# Patient Record
Sex: MALE | Race: WHITE | NOT HISPANIC OR LATINO | Employment: UNEMPLOYED | URBAN - METROPOLITAN AREA
[De-identification: names, ages, dates, MRNs, and addresses within clinical notes are randomized per-mention and may not be internally consistent; named-entity substitution may affect disease eponyms.]

---

## 2020-06-15 DIAGNOSIS — Z30.09 FAMILY PLANNING: Primary | ICD-10-CM

## 2020-06-23 DIAGNOSIS — Z30.09 FAMILY PLANNING: ICD-10-CM

## 2020-06-23 PROCEDURE — 89322 SEMEN ANAL STRICT CRITERIA: CPT

## 2020-06-25 LAB
ABNORMAL SPERM: 94 MORPHOLOGY
ABSTINENCE DAYS: 7 DAYS (ref 2–7)
AGGLUTINATION: NO YES/NO
ANALYSIS TEMP - CENTIGRADE: 23 CENTIGRADE
CELL FRAGMENTS: NORMAL %
COLLECTION METHOD: NORMAL
COLLECTION SITE: NORMAL
CONSENT TO RELEASE TO PARTNER: YES
HEAD DEFECT: 94
IMMATURE SPERM: NORMAL %
IMMOTILE: 44 %
LAB RECEIPT TIME: NORMAL
LIQUEFIED: YES YES/NO
MIDPIECE DEFECT: 49
NON-PROGRESSIVE MOTILITY: 6 %
NORMAL SPERM: 6 % NORMAL FORMS (ref 4–?)
PROGRESSIVE MOTILITY: 50 % (ref 32–?)
ROUND CELLS: 0.2 MILLION/ML (ref ?–2)
SPECIMEN CONCENTRATION: 290 MILLION/ML (ref 15–?)
SPECIMEN PH: 7.2 PH (ref 7.2–?)
SPECIMEN TYPE: NORMAL
SPECIMEN VOL UR: 1.8 ML (ref 1.5–?)
TAIL DEFECT: 16
TIME OF ANALYSIS: NORMAL
TOTAL NUMBER: 522 MILLION (ref 39–?)
TOTAL PROGRESSIVE MOTILE: 261 MILLION (ref 15.6–?)
VISCOUS: NO YES/NO
VITALITY: NORMAL % (ref 58–?)
WBC SPECIMEN: NORMAL %

## 2024-05-26 ENCOUNTER — HOSPITAL ENCOUNTER (INPATIENT)
Facility: CLINIC | Age: 34
LOS: 35 days | Discharge: HOME | DRG: 885 | End: 2024-07-03
Attending: FAMILY MEDICINE | Admitting: PSYCHIATRY & NEUROLOGY
Payer: COMMERCIAL

## 2024-05-26 DIAGNOSIS — F31.2 BIPOLAR DISORDER, CURRENT EPISODE MANIC SEVERE WITH PSYCHOTIC FEATURES (H): ICD-10-CM

## 2024-05-26 DIAGNOSIS — F30.9 MANIA (H): ICD-10-CM

## 2024-05-26 DIAGNOSIS — G25.9 EXTRAPYRAMIDAL AND MOVEMENT DISORDER: ICD-10-CM

## 2024-05-26 DIAGNOSIS — F31.2 SEVERE MANIC BIPOLAR I DISORDER WITH PSYCHOTIC FEATURES (H): Primary | ICD-10-CM

## 2024-05-26 DIAGNOSIS — F29 PSYCHOSIS, UNSPECIFIED PSYCHOSIS TYPE (H): ICD-10-CM

## 2024-05-26 PROBLEM — F25.0 SCHIZOAFFECTIVE DISORDER, BIPOLAR TYPE (H): Status: ACTIVE | Noted: 2024-05-26

## 2024-05-26 LAB
AMPHETAMINES UR QL SCN: NORMAL
BARBITURATES UR QL SCN: NORMAL
BENZODIAZ UR QL SCN: NORMAL
BZE UR QL SCN: NORMAL
CANNABINOIDS UR QL SCN: NORMAL
FENTANYL UR QL: NORMAL
OPIATES UR QL SCN: NORMAL
PCP QUAL URINE (ROCHE): NORMAL

## 2024-05-26 PROCEDURE — 99285 EMERGENCY DEPT VISIT HI MDM: CPT | Performed by: FAMILY MEDICINE

## 2024-05-26 PROCEDURE — 80307 DRUG TEST PRSMV CHEM ANLYZR: CPT | Performed by: FAMILY MEDICINE

## 2024-05-26 PROCEDURE — 250N000013 HC RX MED GY IP 250 OP 250 PS 637: Performed by: FAMILY MEDICINE

## 2024-05-26 PROCEDURE — 250N000013 HC RX MED GY IP 250 OP 250 PS 637: Performed by: EMERGENCY MEDICINE

## 2024-05-26 RX ORDER — CITALOPRAM HYDROBROMIDE 20 MG/1
20 TABLET ORAL DAILY
COMMUNITY
End: 2024-05-26

## 2024-05-26 RX ORDER — OLANZAPINE 10 MG/1
10 TABLET, ORALLY DISINTEGRATING ORAL ONCE
Status: COMPLETED | OUTPATIENT
Start: 2024-05-26 | End: 2024-05-26

## 2024-05-26 RX ORDER — ARIPIPRAZOLE 10 MG/1
10 TABLET, ORALLY DISINTEGRATING ORAL DAILY
COMMUNITY
End: 2024-05-26

## 2024-05-26 RX ORDER — GABAPENTIN 300 MG/1
600 CAPSULE ORAL AT BEDTIME
Status: DISCONTINUED | OUTPATIENT
Start: 2024-05-26 | End: 2024-07-03 | Stop reason: HOSPADM

## 2024-05-26 RX ORDER — GABAPENTIN 300 MG/1
300 CAPSULE ORAL DAILY
Status: DISCONTINUED | OUTPATIENT
Start: 2024-05-27 | End: 2024-07-03 | Stop reason: HOSPADM

## 2024-05-26 RX ORDER — IPRATROPIUM BROMIDE 42 UG/1
2 SPRAY, METERED NASAL 4 TIMES DAILY
COMMUNITY

## 2024-05-26 RX ORDER — DIVALPROEX SODIUM 500 MG/1
1000 TABLET, EXTENDED RELEASE ORAL AT BEDTIME
Status: ON HOLD | COMMUNITY
End: 2024-07-02

## 2024-05-26 RX ORDER — GABAPENTIN 100 MG/1
100 CAPSULE ORAL DAILY
COMMUNITY
End: 2024-05-26

## 2024-05-26 RX ORDER — RISPERIDONE 2 MG/1
2 TABLET ORAL AT BEDTIME
Status: DISCONTINUED | OUTPATIENT
Start: 2024-05-26 | End: 2024-05-30

## 2024-05-26 RX ORDER — RISPERIDONE 2 MG/1
2 TABLET ORAL AT BEDTIME
Status: ON HOLD | COMMUNITY
End: 2024-07-02

## 2024-05-26 RX ORDER — DIAZEPAM 5 MG
5-20 TABLET ORAL EVERY 30 MIN PRN
Status: DISCONTINUED | OUTPATIENT
Start: 2024-05-26 | End: 2024-05-29

## 2024-05-26 RX ORDER — FLUTICASONE PROPIONATE 50 MCG
2 SPRAY, SUSPENSION (ML) NASAL DAILY
COMMUNITY

## 2024-05-26 RX ORDER — DIVALPROEX SODIUM 500 MG/1
1000 TABLET, EXTENDED RELEASE ORAL AT BEDTIME
Status: DISCONTINUED | OUTPATIENT
Start: 2024-05-26 | End: 2024-05-29

## 2024-05-26 RX ORDER — GABAPENTIN 300 MG/1
CAPSULE ORAL
COMMUNITY
End: 2024-07-16

## 2024-05-26 RX ADMIN — DIVALPROEX SODIUM 1000 MG: 500 TABLET, FILM COATED, EXTENDED RELEASE ORAL at 22:36

## 2024-05-26 RX ADMIN — OLANZAPINE 10 MG: 10 TABLET, ORALLY DISINTEGRATING ORAL at 17:40

## 2024-05-26 RX ADMIN — RISPERIDONE 2 MG: 2 TABLET ORAL at 22:57

## 2024-05-26 RX ADMIN — GABAPENTIN 600 MG: 300 CAPSULE ORAL at 22:35

## 2024-05-26 ASSESSMENT — COLUMBIA-SUICIDE SEVERITY RATING SCALE - C-SSRS
2. HAVE YOU ACTUALLY HAD ANY THOUGHTS OF KILLING YOURSELF IN THE PAST MONTH?: NO
6. HAVE YOU EVER DONE ANYTHING, STARTED TO DO ANYTHING, OR PREPARED TO DO ANYTHING TO END YOUR LIFE?: NO
1. IN THE PAST MONTH, HAVE YOU WISHED YOU WERE DEAD OR WISHED YOU COULD GO TO SLEEP AND NOT WAKE UP?: NO

## 2024-05-26 ASSESSMENT — ACTIVITIES OF DAILY LIVING (ADL)
ADLS_ACUITY_SCORE: 37

## 2024-05-26 NOTE — PHARMACY-ADMISSION MEDICATION HISTORY
Admission Medication History Completed by Pharmacy    See [facility] Admission Navigator for allergy information, preferred outpatient pharmacy, prior to admission medications and immunization status.     Medication history sources:  Patient and patient's wife; Surescripts     Pertinent changes made to PTA medication list:  Added: N/A  Deleted:   - Citalopram 20 mg tablets (patient no longer taking)   Changed:   - Depakote 1500 mg HS --> 1000 mg HS (per patient and fill hx)   - Gabapentin 100 mg TID --> 300 mg AM + 600 mg HS (per patient and fill hx)     Additional medication history information:   - Patient denies taking any additional Rx/OTC medications other than the ones listed below.    Prior to Admission medications    Medication Sig Last Dose Taking? Auth Provider Long Term End Date   divalproex sodium extended-release (DEPAKOTE ER) 500 MG 24 hr tablet Take 1,000 mg by mouth at bedtime 5/25/2024 Yes Unknown, Entered By History No    fluticasone (FLONASE) 50 MCG/ACT nasal spray Spray 2 sprays into both nostrils daily 5/26/2024 Yes Unknown, Entered By History     gabapentin (NEURONTIN) 300 MG capsule Take 300 mg by mouth in the morning and 600 mg by mouth at bedtime 5/26/2024 at AM DOSE Yes Unknown, Entered By History     ipratropium (ATROVENT) 0.06 % nasal spray Spray 2 sprays into both nostrils 4 times daily 5/26/2024 at AM DOSE Yes Unknown, Entered By History     multivitamin, therapeutic with minerals (MULTI-VITAMIN) TABS Take 1 tablet by mouth daily Past Week Yes Reported, Patient     risperiDONE (RISPERDAL) 2 MG tablet Take 2 mg by mouth at bedtime 5/25/2024 Yes Reported, Patient            Date completed: 05/26/24    Medication history completed by:   Soraya Baker, PharmD  *[id number]
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
## 2024-05-26 NOTE — PLAN OF CARE
Basil Andrews  May 26, 2024  Plan of Care Hand-off Note     Patient Care Path: inpatient mental health    Plan for Care:     The patient presents with misha, paranoia, delusions, hallucinations with persistent inappropriate sexual thoughts. Speech is tangential, pressured, and at times, disorganized thoughts. Patient reports sexual urges, fantasies and behaviors, including inappropriate sexual behaviors in the presence of family. The family became concerned due to patient's thoughts of him having sexual interaction with his mother-in-law and inappropriate boundaries in their home, such as walking around the house in his underwear only.     Patient denies suicidal or homicidal thoughts. He denies any use of substances. Patient's medications were recently changed, and the sexual behaviors started shortly afterwards.     Collateral source reports the misha symptoms started 2 or more weeks ago, including not sleeing at night, being restless, talking fast and non-sensical. The inappropriate sexual behaviors started a week ago and triggers or events are unclear. Historically, patient presents with misha to the hospital during the month of May and for the past 3 years.   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 He denies acting out sexual thoughts at work. He has established mental health providers.    Identified Goals and Safety Issues:  Safety, stabilization, treatment.     Overview:    Legal Status: Legal Status at Admission: Voluntary/Patient has signed consent for treatment    Psychiatry Consult:  YES       Updated   regarding plan of care.  Dr Sierra and RN.         NICOLE Meyers, [facility], Psychotherapist

## 2024-05-26 NOTE — ED PROVIDER NOTES
[facility] EMERGENCY DEPARTMENT ([facility])    5/26/24      ED PROVIDER NOTE       History     Chief Complaint   Patient presents with    Psychiatric Evaluation     Patient states he is schizoaffective bipolar and states his mother and sister are uncomfortable with him at the house due to the sexual thoughts he is having about them.  Patient states he is having most of his sexual thoughts about his mother.  Patient is accompanied by Wife, mother and father.     HPI  Basil Andrews is a 33 year old male with a past medical history of alcohol abuse, bipolar disorder, misha, and marijuana dependence who presents to the ED for a psychiatric evaluation.     Patient reports his mother and sister are uncomfortable with him at the house due to the sexual thoughts he is having about them. Patient states he is having most of his sexual thoughts about his mother in law please refer to documentation by the DEC  patient is understanding that his behaviors seem to be an appropriate but is having a hard time controlling his thoughts he is willing to have inpatient admission at this time for stabilization and treatment.  Note patient is not currently taking most of his medications is only prescribed Risperdal is no longer on lithium or Depakote by family report.    Past Medical History  Past Medical History:   Diagnosis Date    Bipolar affective (H)     Chemical dependency (H)      Past Surgical History:   Procedure Laterality Date    ADENOIDECTOMY      ENT SURGERY  [place]    wisdom teeth removed    wisdom teeth  2005?     divalproex sodium extended-release (DEPAKOTE ER) 500 MG 24 hr tablet  fluticasone (FLONASE) 50 MCG/ACT nasal spray  gabapentin (NEURONTIN) 300 MG capsule  ipratropium (ATROVENT) 0.06 % nasal spray  multivitamin, therapeutic with minerals (MULTI-VITAMIN) TABS  risperiDONE (RISPERDAL) 2 MG tablet      Allergies   Allergen Reactions    Seasonal Allergies Other (See Comments)     Congestion. Managed  with meds.     Family History  Family History   Problem Relation Age of Onset    Substance Abuse Maternal Grandmother     Substance Abuse Maternal Grandfather     Substance Abuse Paternal Grandmother     Substance Abuse Paternal Grandfather     Parkinsonism Paternal Grandfather     Depression Other     Suicide Other 18        attempted     Social History   Social History     Tobacco Use    Smoking status: Some Days     Current packs/day: 0.25     Average packs/day: 0.3 packs/day for 1 year (0.3 ttl pk-yrs)     Types: Cigarettes    Smokeless tobacco: Never   Substance Use Topics    Alcohol use: Yes     Comment: socially    Drug use: Yes     Types: Marijuana     Comment: Last on Sat but not usually      Past medical history, past surgical history, medications, allergies, family history, and social history were reviewed with the patient. No additional pertinent items.   A complete review of systems was performed with pertinent positives and negatives noted in the HPI, and all other systems negative.    Physical Exam   BP: (!) 153/79  Pulse: 71  Temp: 97.8  F (36.6  C)  Resp: 18  Weight: 91.6 kg (202 lb)  SpO2: 100 %  Physical Exam  Vitals and nursing note reviewed.   Constitutional:       General: He is not in acute distress.     Appearance: Normal appearance. He is not toxic-appearing.   HENT:      Head: Atraumatic.   Eyes:      General: No scleral icterus.     Conjunctiva/sclera: Conjunctivae normal.   Cardiovascular:      Rate and Rhythm: Normal rate.      Heart sounds: Normal heart sounds.   Pulmonary:      Effort: Pulmonary effort is normal. No respiratory distress.      Breath sounds: Normal breath sounds.   Abdominal:      Palpations: Abdomen is soft.      Tenderness: There is no abdominal tenderness.   Musculoskeletal:         General: No deformity.      Cervical back: Neck supple.   Skin:     General: Skin is warm.   Neurological:      General: No focal deficit present.      Mental Status: He is alert and  oriented to person, place, and time.      Sensory: No sensory deficit.      Motor: No weakness.      Coordination: Coordination normal.   Psychiatric:         Mood and Affect: Mood is anxious.         Speech: Speech is rapid and pressured.         Behavior: Behavior is hyperactive.         Thought Content: Thought content is paranoid and delusional.           ED Course, Procedures, & Data      Procedures  Urine drug screen pending at this time    Medications   OLANZapine zydis (zyPREXA) ODT tab 10 mg (10 mg Oral $Given 5/26/24 6354)     Labs Ordered and Resulted from Time of ED Arrival to Time of ED Departure - No data to display  No orders to display          Critical care was not performed.     Medical Decision Making  The patient's presentation was of high complexity (a chronic illness severe exacerbation, progression, or side effect of treatment).    The patient's evaluation involved:  an assessment requiring an independent historian (patient's family able to give independent historical facts.)  ordering and/or review of 1 test(s) in this encounter (see separate area of note for details)  discussion of management or test interpretation with another health professional (independent professional able to do full DEC assessment at this time with evaluation of possible admission versus outpatient management.  It was felt as though patient would require inpatient stabilization.  He will remain in the emergency room until a bed is available.)    The patient's management necessitated high risk (a decision regarding hospitalization).    Assessment & Plan        I have reviewed the nursing notes. I have reviewed the findings, diagnosis, plan and need for follow up with the patient.      Final diagnoses:   Chantal (H)   Psychosis, unspecified psychosis type (H)       Raffaele Sierra MD  [facility] EMERGENCY DEPARTMENT  5/26/2024     Raffaele Sierra MD  05/26/24 4236

## 2024-05-26 NOTE — ED TRIAGE NOTES
Patient states he is schizoaffective bipolar and states his mother and sister are uncomfortable with him at the house due to the sexual thoughts he is having about them.   Patient states he is having most of his sexual thoughts about his mother.  Patient is accompanied by Wife, mother and father.      Triage Assessment (Adult)       Row Name 05/26/24 1217          Triage Assessment    Airway WDL WDL        Respiratory WDL    Respiratory WDL WDL        Skin Circulation/Temperature WDL    Skin Circulation/Temperature WDL WDL        Cardiac WDL    Cardiac WDL WDL        Peripheral/Neurovascular WDL    Peripheral Neurovascular WDL WDL        Cognitive/Neuro/Behavioral WDL    Cognitive/Neuro/Behavioral WDL X;mood/behavior     Mood/Behavior excitable;labile;cooperative

## 2024-05-27 ENCOUNTER — TELEPHONE (OUTPATIENT)
Dept: BEHAVIORAL HEALTH | Facility: CLINIC | Age: 34
End: 2024-05-27
Payer: COMMERCIAL

## 2024-05-27 PROCEDURE — 250N000013 HC RX MED GY IP 250 OP 250 PS 637: Performed by: PSYCHIATRY & NEUROLOGY

## 2024-05-27 PROCEDURE — 250N000013 HC RX MED GY IP 250 OP 250 PS 637: Performed by: EMERGENCY MEDICINE

## 2024-05-27 PROCEDURE — 250N000013 HC RX MED GY IP 250 OP 250 PS 637: Performed by: FAMILY MEDICINE

## 2024-05-27 RX ORDER — HYDROXYZINE HYDROCHLORIDE 25 MG/1
25-50 TABLET, FILM COATED ORAL EVERY 6 HOURS PRN
Status: DISCONTINUED | OUTPATIENT
Start: 2024-05-27 | End: 2024-05-27

## 2024-05-27 RX ORDER — LANOLIN ALCOHOL/MO/W.PET/CERES
3 CREAM (GRAM) TOPICAL
Status: DISCONTINUED | OUTPATIENT
Start: 2024-05-27 | End: 2024-07-03 | Stop reason: HOSPADM

## 2024-05-27 RX ORDER — HYDROXYZINE HYDROCHLORIDE 50 MG/1
50 TABLET, FILM COATED ORAL EVERY 6 HOURS PRN
Status: DISCONTINUED | OUTPATIENT
Start: 2024-05-27 | End: 2024-06-06

## 2024-05-27 RX ORDER — HYDROXYZINE HYDROCHLORIDE 25 MG/1
25 TABLET, FILM COATED ORAL EVERY 6 HOURS PRN
Status: DISCONTINUED | OUTPATIENT
Start: 2024-05-27 | End: 2024-06-06

## 2024-05-27 RX ORDER — TRAZODONE HYDROCHLORIDE 50 MG/1
50 TABLET, FILM COATED ORAL
Status: DISCONTINUED | OUTPATIENT
Start: 2024-05-27 | End: 2024-06-29

## 2024-05-27 RX ADMIN — GABAPENTIN 600 MG: 300 CAPSULE ORAL at 21:01

## 2024-05-27 RX ADMIN — RISPERIDONE 2 MG: 2 TABLET ORAL at 21:01

## 2024-05-27 RX ADMIN — HYDROXYZINE HYDROCHLORIDE 50 MG: 50 TABLET, FILM COATED ORAL at 11:17

## 2024-05-27 RX ADMIN — TRAZODONE HYDROCHLORIDE 50 MG: 50 TABLET ORAL at 21:46

## 2024-05-27 RX ADMIN — Medication 3 MG: at 21:47

## 2024-05-27 RX ADMIN — DIAZEPAM 10 MG: 5 TABLET ORAL at 16:16

## 2024-05-27 RX ADMIN — GABAPENTIN 300 MG: 300 CAPSULE ORAL at 08:03

## 2024-05-27 RX ADMIN — DIVALPROEX SODIUM 1000 MG: 500 TABLET, FILM COATED, EXTENDED RELEASE ORAL at 21:01

## 2024-05-27 ASSESSMENT — ACTIVITIES OF DAILY LIVING (ADL)
ADLS_ACUITY_SCORE: 37

## 2024-05-27 NOTE — CONSULTS
SPIRITUAL HEALTH SERVICES  SPIRITUAL ASSESSMENT consult Note  [facility] ([facility]) BEC  ON-CALL     REFERRAL SOURCE: Routine consult/STAT consult    Spoke with Basil's nurse and confirmed that he would like to see a , not a regular , I will place Basil on our 's list to see this week.     PLAN: Will inform Fr. Yusuf of care request. Spiritual health services remains available for any follow-up or requests. For further visits please place spiritual health consults.    Sera Adams, [facility]  Associate   Pager: [phone]

## 2024-05-27 NOTE — ED NOTES
Start of shift Pt mood was not pleasant. He was sobering, arms were shaking. He was stating some of his regrets and needing kind of one to one attention. Writer tried to offer some emotional support with no result. Pt denied SI/HI,SIB,AVH and pain. Pt scored 9 on MSSA. PRN protocol for MSSA was given.   Pt mood got better in few minutes. He requested for snacks, and currently in the milieu socializing appropriately. Pt ate 100% of dinner. Will continue to evaluate.

## 2024-05-27 NOTE — TELEPHONE ENCOUNTER
[place]: [place]  Access Inpatient Bed Call Log 5/27/24  @ 7:63 am:   Intake has called facilities that have not updated the bed status within the last 12 hours.                               [facility] is at capacity.            [facility] is posting 0 beds. [phone]; per call at 7:06 am to [facility], they are at cap    [facility] is posting 0 beds. Negative covid required              [facility] is posting 0 beds. Neg covid. No high school/[employer]-psych. [phone]; per call at 7:07 am to [facility], they are at cap for the day.    [facility] is posting 0 beds. [phone]    [facility] is posting 0 beds. [phone]    [facility] is posting 2 Young Adult beds. Ages 18-28. Negative covid. [phone]; per call at 7:09 am to Earnest, they have 4 y/a, 1 child, a handful of adol's, no singles and no flor beds avail.    [facility] is posting 0 beds.     [facility] is posting 1 bed [phone] Per Madeline @ 11:27 AM, [facility] is at cap but will be reevaluating later today. 6:18 PM Per Rashaun @ [facility] at capacity until 7:30 PM      Pt remains on the work list pending appropriate bed availability.

## 2024-05-27 NOTE — TELEPHONE ENCOUNTER
R:  [place]  Access Inpatient Bed Call Log 5/27/24 @ 12:05AM  Intake has called facilities that have not updated their bed status within the last 12 hours.    [facility]     [facility]: @ cap  [facility]: @ cap per website   [facility]: @ cap per website   [facility]: @ cap per website   [facility]: @ cap per website   [facility]: @ cap per website   [facility]: Posting 2 beds (Young Adult unit: No recent aggressions, violence or sexual assault. Neg covid required). Pt not age appropriate     [facility]: @ cap per website   [facility]: @ cap per website   [facility]: Posting 1 bed. Per Yesenia @ 12:08AM, they are at capacity    Pt remains on work list until appropriate placement is available

## 2024-05-27 NOTE — ED PROVIDER NOTES
"[facility] ED Mental Health Handoff Note:       Brief HPI:  This is a 33 year old male signed out to me.  See initial ED Provider note for full details of the presentation. Interval history is pertinent for continued need for inpatient hospitalization..    Home meds reviewed and ordered/administered: Yes    Medically stable for inpatient mental health admission: Yes.    Evaluated by mental health: Yes. The recommendation is for inpatient mental health treatment. Bed search in process    Safety concerns: At the time I received sign out, there were no safety concerns.    Hold Status:  Active Orders   N/A           Exam:   Patient Vitals for the past 24 hrs:   BP Temp Temp src Pulse SpO2 Height Weight   05/27/24 0812 135/80 98.6  F (37  C) -- 74 100 % -- --   05/26/24 2209 -- -- -- -- -- 1.854 m (6' 1\") 90.7 kg (200 lb)   05/26/24 2201 128/68 98.1  F (36.7  C) Oral 68 97 % -- --           ED Course:    Medications   divalproex sodium extended-release (DEPAKOTE ER) 24 hr tablet 1,000 mg (1,000 mg Oral $Given 5/26/24 2236)   gabapentin (NEURONTIN) capsule 300 mg (300 mg Oral $Given 5/27/24 0803)   gabapentin (NEURONTIN) capsule 600 mg (600 mg Oral $Given 5/26/24 2235)   diazepam (VALIUM) tablet 5-20 mg (has no administration in time range)   risperiDONE (risperDAL) tablet 2 mg (2 mg Oral $Given 5/26/24 2257)   hydrOXYzine HCl (ATARAX) tablet 25 mg ( Oral See Alternative 5/27/24 1117)     Or   hydrOXYzine HCl (ATARAX) tablet 50 mg (50 mg Oral $Given 5/27/24 1117)   OLANZapine zydis (zyPREXA) ODT tab 10 mg (10 mg Oral $Given 5/26/24 1740)            There were no significant events during my shift.    Patient was signed out to the oncoming provider, Dr. Longoria      Impression:    ICD-10-CM    1. Chantal (H)  F30.9       2. Psychosis, unspecified psychosis type (H)  F29           Plan:    Awaiting inpatient mental health admission/transfer.      RESULTS:   Results for orders placed or performed during the hospital " encounter of 05/26/24 (from the past 24 hour(s))   Urine Drug Screen     Status: Normal    Collection Time: 05/26/24  7:43 PM    Narrative    The following orders were created for panel order Urine Drug Screen.  Procedure                               Abnormality         Status                     ---------                               -----------         ------                     Urine Drug Screen Panel[[id number]]      Normal              Final result                 Please view results for these tests on the individual orders.   Urine Drug Screen Panel     Status: Normal    Collection Time: 05/26/24  7:43 PM   Result Value Ref Range    Amphetamines Urine Screen Negative Screen Negative    Barbituates Urine Screen Negative Screen Negative    Benzodiazepine Urine Screen Negative Screen Negative    Cannabinoids Urine Screen Negative Screen Negative    Cocaine Urine Screen Negative Screen Negative    Fentanyl Qual Urine Screen Negative Screen Negative    Opiates Urine Screen Negative Screen Negative    PCP Urine Screen Negative Screen Negative   Spiritual Health Services IP Consult     Status: None ()    Collection Time: 05/27/24 12:25 AM    Sera Modi     5/27/2024 10:24 AM  SPIRITUAL HEALTH SERVICES  SPIRITUAL ASSESSMENT consult Note  [facility] ([facility]  ON-CALL     REFERRAL SOURCE: Routine consult/STAT consult    Spoke with Basil's nurse and confirmed that he would like to   see a , not a regular , I will place   Basil on our 's list to see this week.     PLAN: Will inform Fr. Yusuf of care request. Spiritual health   services remains available for any follow-up or requests. For   further visits please place spiritual health consults.    Sera Adams [facility]  Associate   Pager: [phone]     Spiritual Health Services IP Consult     Status: None ()    Collection Time: 05/27/24  2:47 AM    Sera Modi     5/27/2024 10:24 AM  SPIRITUAL HEALTH  SERVICES  SPIRITUAL ASSESSMENT consult Note  [facility] ([facility]  ON-CALL     REFERRAL SOURCE: Routine consult/STAT consult    Spoke with Basil's nurse and confirmed that he would like to   see a , not a regular , I will place   Basil on our 's list to see this week.     PLAN: Will inform Fr. Yusuf of care request. Spiritual health   services remains available for any follow-up or requests. For   further visits please place spiritual health consults.    Sera Adams [facility]  Associate   Pager: [phone]               MD Rigo Carpenter Eric Girard, MD  05/27/24 9636
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
## 2024-05-27 NOTE — ED NOTES
Pt stating he only drinks an average of 2 alcoholic beverages per month, denies withdrawal symptoms.

## 2024-05-28 ENCOUNTER — TELEPHONE (OUTPATIENT)
Dept: BEHAVIORAL HEALTH | Facility: CLINIC | Age: 34
End: 2024-05-28
Payer: COMMERCIAL

## 2024-05-28 PROCEDURE — 250N000013 HC RX MED GY IP 250 OP 250 PS 637: Performed by: EMERGENCY MEDICINE

## 2024-05-28 PROCEDURE — 250N000013 HC RX MED GY IP 250 OP 250 PS 637: Performed by: FAMILY MEDICINE

## 2024-05-28 RX ADMIN — DIVALPROEX SODIUM 1000 MG: 500 TABLET, FILM COATED, EXTENDED RELEASE ORAL at 21:12

## 2024-05-28 RX ADMIN — DIAZEPAM 10 MG: 5 TABLET ORAL at 05:22

## 2024-05-28 RX ADMIN — GABAPENTIN 300 MG: 300 CAPSULE ORAL at 07:59

## 2024-05-28 RX ADMIN — GABAPENTIN 600 MG: 300 CAPSULE ORAL at 21:11

## 2024-05-28 RX ADMIN — HYDROXYZINE HYDROCHLORIDE 50 MG: 50 TABLET, FILM COATED ORAL at 19:13

## 2024-05-28 RX ADMIN — RISPERIDONE 2 MG: 2 TABLET ORAL at 21:12

## 2024-05-28 RX ADMIN — HYDROXYZINE HYDROCHLORIDE 50 MG: 50 TABLET, FILM COATED ORAL at 12:58

## 2024-05-28 ASSESSMENT — ACTIVITIES OF DAILY LIVING (ADL)
ADLS_ACUITY_SCORE: 37

## 2024-05-28 NOTE — ED NOTES
Patient was calm and cooperative.Patient took his morning meds and his meals without any issues.Patient socialized with other peers in the main milieu.Patient stated that he is overwhelmed with everything,atarax 50mg PRN was given and was effective.Patient's family is here visiting.No inappropriate behaviors noted.A  was requested by this writer by the patient's request. Patient denied pain ,SI and HI.VSS.Will continue to monitor.

## 2024-05-28 NOTE — TELEPHONE ENCOUNTER
R:  [place]  Access Inpatient Bed Call Log 5/27/24 @ 11:50PM  Intake has called facilities that have not updated their bed status within the last 12 hours.    [facility]    [facility]: @ cap  [facility]: @ cap per website. Per Justin @ 11:51PM, patients have to go through their APS/ED   [facility]: @ cap per website   [facility]: @ cap per website. Per Madyson @ 11:51PM, they are full    [facility]: @ cap per website   [facility]: @ cap per website   [facility]: Posting 4 beds (Young Adult unit: No recent aggressions, violence or sexual assault. Neg covid required). Pt not age appropriate     [facility]: @ cap per website   [facility]: @ cap per website   [facility]: Posting 1 bed. Per Yaritza @ 11:53PM, they are at capacity     Pt remains on work list until appropriate placement is available

## 2024-05-28 NOTE — ED NOTES
Pt spent most of the shift out in the milieu. Socialized appropriately. Pt had intermittent mood changes where he will start sobering. Pt denied SI/HI,SIB urge. Denied pain. MSSA score 5. PRN Trazodone and melatonin given as requested for sleep. Pt was med compliant. Contracted for safety. Will continue to evaluate.

## 2024-05-28 NOTE — PROGRESS NOTES
Triage & Transition Services, Extended Care     Client Name: Basil Andrews    Date: May 28, 2024    Patient was seen    Mode of Assessment:      Service Type: attended group session  Session Start Time:  0358    Session End Time: 0430  Session Length: 32  Site Location: [facility] EMERGENCY DEPARTMENT                             [id number]  Total Number ofAttendees: 4  Topic:   cognitive therapy techniques, coping skills/lifestyle management   Response: cooperative with task, discussed personal experience with topic, offered helpful suggestions to peers, unable to comprehend information, verbalizations were off topic     Madyson Capellan [facility]   Licensed Mental Health Professional (LMHP), Extended Care  [phone]

## 2024-05-28 NOTE — ED PROVIDER NOTES
[facility] ED Mental Health Handoff Note:       Brief HPI:  This is a 33 year old male signed out to me.  See initial ED Provider note for full details of the presentation. Interval history is pertinent for ongoing ED boarding. No acute concerns today.    Home meds reviewed and ordered/administered: Yes    Medically stable for inpatient mental health admission: Yes.    Evaluated by mental health: Yes. The recommendation is for inpatient mental health treatment. Bed search in process    Safety concerns: At the time I received sign out, there were no safety concerns.    Hold Status:  Active Orders   N/A       Exam:   Patient Vitals for the past 24 hrs:   BP Temp Temp src Pulse Resp SpO2   05/28/24 0757 116/77 97.3  F (36.3  C) Oral 75 16 98 %   05/28/24 0718 123/83 97.7  F (36.5  C) Oral 83 18 100 %   05/28/24 0522 128/82 98  F (36.7  C) Oral 82 18 98 %   05/28/24 0010 118/74 97.8  F (36.6  C) Oral 72 18 99 %   05/27/24 2142 120/77 97.8  F (36.6  C) Oral 67 18 100 %   05/27/24 1613 132/85 97.5  F (36.4  C) Oral 74 18 100 %       ED Course:    Medications   divalproex sodium extended-release (DEPAKOTE ER) 24 hr tablet 1,000 mg (1,000 mg Oral $Given 5/27/24 2101)   gabapentin (NEURONTIN) capsule 300 mg (300 mg Oral $Given 5/28/24 0759)   gabapentin (NEURONTIN) capsule 600 mg (600 mg Oral $Given 5/27/24 2101)   diazepam (VALIUM) tablet 5-20 mg (10 mg Oral $Given 5/28/24 0522)   risperiDONE (risperDAL) tablet 2 mg (2 mg Oral $Given 5/27/24 2101)   hydrOXYzine HCl (ATARAX) tablet 25 mg ( Oral See Alternative 5/28/24 1258)     Or   hydrOXYzine HCl (ATARAX) tablet 50 mg (50 mg Oral $Given 5/28/24 1258)   melatonin tablet 3 mg (3 mg Oral $Given 5/27/24 2147)   traZODone (DESYREL) tablet 50 mg (50 mg Oral $Given 5/27/24 1852)   OLANZapine zydis (zyPREXA) ODT tab 10 mg (10 mg Oral $Given 5/26/24 2435)            There were no significant events during my shift.      Impression:    ICD-10-CM    1. Chantal (H)  F30.9        2. Psychosis, unspecified psychosis type (H)  F29           Plan:    Awaiting mental health evaluation/recommendations.      RESULTS:   No results found for this visit on 05/26/24 (from the past 24 hour(s)).          MD Rick Layne Dung Hoang, MD  05/28/24 8544
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
## 2024-05-28 NOTE — CONSULTS
Office Visit [facility]      Patient Name: Eulalio Frazier  : 1940   MRN: [id number]     Chief Complaint:   Chief Complaint   Patient presents with    Urinary Retention       Referring Provider: No ref. provider found    History of Present Illness: Eulalio Frazier is a 83 y.o. male who presents today with history of hernia surgery on 3/6/24 surgery was more complicated than they expected. They placed a catheter in the OR was removed and went home had blood in his urine and ended up in the ER and had to have a cath placed due to retention. He is here today to hopefully get the catheter removed.  Takes flomax and saw navneet for his prostate                Subjective      Review of System:   As noted in HPI    Past Medical History:   Past Medical History:   Diagnosis Date    Anxiety     GERD (gastroesophageal reflux disease)     H/O pyloric stenosis     Hyperlipidemia     Insomnia        Past Surgical History:   Past Surgical History:   Procedure Laterality Date    CERVICAL FUSION      ENDOSCOPY  2016    GALLBLADDER SURGERY      HERNIA REPAIR         Family History: History reviewed. No pertinent family history.    Social History:   Social History     Socioeconomic History    Marital status: Unknown   Tobacco Use    Smoking status: Never    Smokeless tobacco: Never   Vaping Use    Vaping status: Never Used   Substance and Sexual Activity    Alcohol use: No    Drug use: No    Sexual activity: Defer       Medications:     Current Outpatient Medications:     Ascorbic Acid (VITAMIN C PO), Take  by mouth., Disp: , Rfl:     citalopram (CeleXA) 10 MG tablet, Take 1 tablet by mouth Daily., Disp: , Rfl:     coenzyme Q10 100 MG capsule, Take 1 capsule by mouth Daily., Disp: , Rfl:     esomeprazole (nexIUM) 40 MG capsule, Take 1 capsule by mouth Every Morning Before Breakfast., Disp: , Rfl:     ezetimibe (ZETIA) 10 MG tablet, Take 1 tablet by mouth Daily., Disp: , Rfl:     flunisolide (NASALIDE) 25 MCG/ACT  SPIRITUAL HEALTH SERVICES  SPIRITUAL ASSESSMENT consult Note  [facility] ([facility]  ON-CALL     REFERRAL SOURCE: Routine/STAT consult    Basil explained that he only wanted to speak with a  and would not speak with a . He also shared that he intended to contact his own  rather than speaking with Fr. Yusuf.     PLAN: Will keep Fr. Yusuf in the loop in case Basil's  is unable to come. Spiritual health services remains available for any follow-up or requests. For further visits please place spiritual health consults.    Sera Adams, [facility]  Associate   Pager: [phone]     "(0.025%) solution nasal spray, Inhale 1 spray Daily., Disp: 25 mL, Rfl: 6    Multiple Vitamin (MULTI-VITAMIN DAILY PO), Take  by mouth., Disp: , Rfl:     VITAMIN D PO, Take  by mouth., Disp: , Rfl:     Zinc 20 MG capsule, Take  by mouth., Disp: , Rfl:     zolpidem (AMBIEN) 10 MG tablet, , Disp: , Rfl:     Allergies:   Allergies   Allergen Reactions    Sulfa Antibiotics        Objective     Physical Exam:   Vital Signs:   Vitals:    03/19/24 0845   BP: 128/60   BP Location: Left arm   Patient Position: Sitting   Cuff Size: Adult   Pulse: 74   Resp: 17   Temp: 97.8 °F (36.6 °C)   TempSrc: Temporal   SpO2: 97%   Weight: 66.7 kg (147 lb)   Height: 167.6 cm (65.98\")     Body mass index is 23.74 kg/m².     Constitutional: NAD, WDWN.   Neurological: A + O x 3.    Skin: Pink, warm and dry.  No rashes noted.  Psych: Normal mood and affect     Labs  No results found for: \"PSA\"    Brief Urine Lab Results       None            PVR  Post-void residual performed by staff - 62ml      Assessment / Plan      Assessment  Mr. Frazier is a 83 y.o. male who presents with urinary retention after left inguinal hernia surgery.  He does have chronic BPH manages with tamsulosin.  Today patient was able to pass fill and void with PVR of 62 mL and is instructed if he is unable to void by 3:00 to return to the office.  We discussed recommendation for BPH workup to evaluate for obstructing prostate to see if this is a underlying cause of his retention.  Patient agrees to workup with Dr. Ray    Plan  1.  Follow up for cystoscopy, TRUS, Uroflow, JED, GE    I spent a total of 30 minutes with the patient and the chart engaging in data gathering and interpretation, patient interaction, as well as counseling on the risks, benefits, and alternatives of the therapy and coordinating care.        Follow Up:   Return for Dr. Ray cystoscopy, TRUS, and uroflo.    TIFF Colon, NP-C  [facility]   "

## 2024-05-28 NOTE — TELEPHONE ENCOUNTER
R; [place]  Access Inpatient Bed Call Log 5/28/2024 8:02:46 AM  ([facility]):  Intake has called facilities that have not updated their bed status within the last 12 hours.    ADULTS:    [facility] is posting 0 beds.              [facility] is posting 0 beds. [phone] 8:10a Per [place], low acuity beds. Connect with APS: [phone]; Per call at 10:07 am to [place], they are at cap.   [facility] is posting 0 beds. [phone]              [facility] is posting 0 beds. [phone]  8:12a Per sana Gomez.  [facility] is posting 0 beds. [phone]   [facility] (These are Young Adult beds, 18-28) is posting 4 beds. Negative COVID test required, no recent or significant aggression, violence, or sexual assault. [phone]; per Faby at 7:48 am, they have 2 y/a beds. Pt is outside the accepted age range.   [facility] is posting 0 beds. [phone]            [facility] is posting 0 beds. [phone]     [facility] is posting 1 bed. [phone]; per call at 10:08 am to Shreya, they are at cap until tomorrow.      Pt remains on work list pending appropriate bed availability.

## 2024-05-29 ENCOUNTER — TELEPHONE (OUTPATIENT)
Dept: BEHAVIORAL HEALTH | Facility: CLINIC | Age: 34
End: 2024-05-29
Payer: COMMERCIAL

## 2024-05-29 PROBLEM — F29 PSYCHOSIS, UNSPECIFIED PSYCHOSIS TYPE (H): Status: ACTIVE | Noted: 2024-05-29

## 2024-05-29 LAB
ALBUMIN SERPL BCG-MCNC: 4.8 G/DL (ref 3.5–5.2)
ALP SERPL-CCNC: 42 U/L (ref 40–150)
ALT SERPL W P-5'-P-CCNC: 15 U/L (ref 0–70)
ANION GAP SERPL CALCULATED.3IONS-SCNC: 8 MMOL/L (ref 7–15)
AST SERPL W P-5'-P-CCNC: 12 U/L (ref 0–45)
BASOPHILS # BLD AUTO: 0 10E3/UL (ref 0–0.2)
BASOPHILS NFR BLD AUTO: 1 %
BILIRUB SERPL-MCNC: 0.3 MG/DL
BUN SERPL-MCNC: 15.7 MG/DL (ref 6–20)
CALCIUM SERPL-MCNC: 9.6 MG/DL (ref 8.6–10)
CHLORIDE SERPL-SCNC: 104 MMOL/L (ref 98–107)
CREAT SERPL-MCNC: 0.77 MG/DL (ref 0.67–1.17)
DEPRECATED HCO3 PLAS-SCNC: 30 MMOL/L (ref 22–29)
EGFRCR SERPLBLD CKD-EPI 2021: >90 ML/MIN/1.73M2
EOSINOPHIL # BLD AUTO: 0.1 10E3/UL (ref 0–0.7)
EOSINOPHIL NFR BLD AUTO: 2 %
ERYTHROCYTE [DISTWIDTH] IN BLOOD BY AUTOMATED COUNT: 12.5 % (ref 10–15)
GLUCOSE SERPL-MCNC: 90 MG/DL (ref 70–99)
HCT VFR BLD AUTO: 40.1 % (ref 40–53)
HGB BLD-MCNC: 13 G/DL (ref 13.3–17.7)
IMM GRANULOCYTES # BLD: 0 10E3/UL
IMM GRANULOCYTES NFR BLD: 0 %
LYMPHOCYTES # BLD AUTO: 2.9 10E3/UL (ref 0.8–5.3)
LYMPHOCYTES NFR BLD AUTO: 39 %
MCH RBC QN AUTO: 30.5 PG (ref 26.5–33)
MCHC RBC AUTO-ENTMCNC: 32.4 G/DL (ref 31.5–36.5)
MCV RBC AUTO: 94 FL (ref 78–100)
MONOCYTES # BLD AUTO: 0.7 10E3/UL (ref 0–1.3)
MONOCYTES NFR BLD AUTO: 10 %
NEUTROPHILS # BLD AUTO: 3.6 10E3/UL (ref 1.6–8.3)
NEUTROPHILS NFR BLD AUTO: 48 %
NRBC # BLD AUTO: 0 10E3/UL
NRBC BLD AUTO-RTO: 0 /100
PLATELET # BLD AUTO: 223 10E3/UL (ref 150–450)
POTASSIUM SERPL-SCNC: 4.9 MMOL/L (ref 3.4–5.3)
PROT SERPL-MCNC: 7.2 G/DL (ref 6.4–8.3)
RBC # BLD AUTO: 4.26 10E6/UL (ref 4.4–5.9)
SODIUM SERPL-SCNC: 142 MMOL/L (ref 135–145)
VALPROATE SERPL-MCNC: 31 UG/ML
WBC # BLD AUTO: 7.5 10E3/UL (ref 4–11)

## 2024-05-29 PROCEDURE — 36415 COLL VENOUS BLD VENIPUNCTURE: CPT | Performed by: CLINICAL NURSE SPECIALIST

## 2024-05-29 PROCEDURE — 250N000013 HC RX MED GY IP 250 OP 250 PS 637: Performed by: EMERGENCY MEDICINE

## 2024-05-29 PROCEDURE — 82040 ASSAY OF SERUM ALBUMIN: CPT | Performed by: FAMILY MEDICINE

## 2024-05-29 PROCEDURE — 80164 ASSAY DIPROPYLACETIC ACD TOT: CPT | Performed by: CLINICAL NURSE SPECIALIST

## 2024-05-29 PROCEDURE — 250N000013 HC RX MED GY IP 250 OP 250 PS 637: Performed by: CLINICAL NURSE SPECIALIST

## 2024-05-29 PROCEDURE — 250N000013 HC RX MED GY IP 250 OP 250 PS 637: Performed by: FAMILY MEDICINE

## 2024-05-29 PROCEDURE — 82374 ASSAY BLOOD CARBON DIOXIDE: CPT | Performed by: FAMILY MEDICINE

## 2024-05-29 PROCEDURE — 99223 1ST HOSP IP/OBS HIGH 75: CPT

## 2024-05-29 PROCEDURE — 85004 AUTOMATED DIFF WBC COUNT: CPT | Performed by: CLINICAL NURSE SPECIALIST

## 2024-05-29 PROCEDURE — 99418 PROLNG IP/OBS E/M EA 15 MIN: CPT

## 2024-05-29 PROCEDURE — 124N000002 HC R&B MH UMMC

## 2024-05-29 PROCEDURE — 250N000013 HC RX MED GY IP 250 OP 250 PS 637: Performed by: PSYCHIATRY & NEUROLOGY

## 2024-05-29 RX ORDER — DIVALPROEX SODIUM 500 MG/1
1000 TABLET, EXTENDED RELEASE ORAL AT BEDTIME
Status: DISCONTINUED | OUTPATIENT
Start: 2024-05-29 | End: 2024-06-12

## 2024-05-29 RX ORDER — OLANZAPINE 10 MG/2ML
10 INJECTION, POWDER, FOR SOLUTION INTRAMUSCULAR 3 TIMES DAILY PRN
Status: DISCONTINUED | OUTPATIENT
Start: 2024-05-29 | End: 2024-07-03 | Stop reason: HOSPADM

## 2024-05-29 RX ORDER — DIAZEPAM 5 MG
5-20 TABLET ORAL EVERY 30 MIN PRN
Status: DISCONTINUED | OUTPATIENT
Start: 2024-05-29 | End: 2024-06-01

## 2024-05-29 RX ORDER — LORAZEPAM 1 MG/1
1 TABLET ORAL
Status: DISCONTINUED | OUTPATIENT
Start: 2024-05-29 | End: 2024-06-04

## 2024-05-29 RX ORDER — OLANZAPINE 10 MG/1
10 TABLET, ORALLY DISINTEGRATING ORAL 3 TIMES DAILY PRN
Status: DISCONTINUED | OUTPATIENT
Start: 2024-05-29 | End: 2024-07-03 | Stop reason: HOSPADM

## 2024-05-29 RX ADMIN — TRAZODONE HYDROCHLORIDE 50 MG: 50 TABLET ORAL at 22:05

## 2024-05-29 RX ADMIN — TRAZODONE HYDROCHLORIDE 50 MG: 50 TABLET ORAL at 02:05

## 2024-05-29 RX ADMIN — HYDROXYZINE HYDROCHLORIDE 25 MG: 25 TABLET, FILM COATED ORAL at 04:10

## 2024-05-29 RX ADMIN — GABAPENTIN 300 MG: 300 CAPSULE ORAL at 08:18

## 2024-05-29 RX ADMIN — HYDROXYZINE HYDROCHLORIDE 25 MG: 25 TABLET, FILM COATED ORAL at 14:14

## 2024-05-29 RX ADMIN — GABAPENTIN 600 MG: 300 CAPSULE ORAL at 22:06

## 2024-05-29 RX ADMIN — DIVALPROEX SODIUM 1000 MG: 500 TABLET, FILM COATED, EXTENDED RELEASE ORAL at 22:05

## 2024-05-29 RX ADMIN — RISPERIDONE 2 MG: 2 TABLET ORAL at 22:05

## 2024-05-29 ASSESSMENT — ACTIVITIES OF DAILY LIVING (ADL)
ADLS_ACUITY_SCORE: 57
ADLS_ACUITY_SCORE: 37
ADLS_ACUITY_SCORE: 57
ADLS_ACUITY_SCORE: 37
ADLS_ACUITY_SCORE: 57
ADLS_ACUITY_SCORE: 37
ADLS_ACUITY_SCORE: 37
ADLS_ACUITY_SCORE: 57
ADLS_ACUITY_SCORE: 37

## 2024-05-29 NOTE — TELEPHONE ENCOUNTER
R: [place]  Access Inpatient Bed Call Log  5/24/2024 12:01 AM  Intake has called facilities that have not updated their bed status within the last 12 hours.??       [facility]    [place] [facility]: @ cap per website.  [place] -- [facility]:  @ cap per website.  [place] -- [facility]: @ Cap per website.  [place] -- [facility]: @ cap per website. - 11:20 PM Per Juan Carlos, they are capped.  [place] -- [facility]: @ Cap per website.  [facility] -- [facility]: @ cap per website.  [facility] -- [facility]/YA beds @Posting 2 beds. Ages 18-28, Voluntary only, COVID test req'd, NO aggression, physical or sexual assault, violence hx or drug abuse, or psychosis. Pt not age appropriate   [place] -- [facility]: @ Cap per website.  [facility] -- [facility]: @ cap per website.  [place] -- [facility]:  @ Posting 1 bed. - 12:52 AM Per Ruthie at [facility]    Pt remains on waitlist pending appropriate placement availability.

## 2024-05-29 NOTE — ED NOTES
Pt was calm and cooperative this shift, have some poor boundaries but he is redirectable. Ate dinner, Pt denied pain, denied SI/HI. Endorses hallucination/hearing voices, but the voices do not tell him to do anything. PRN given, all scheduled medications were administered. Will continue to monitor

## 2024-05-29 NOTE — TELEPHONE ENCOUNTER
R: [place]  Access Inpatient Bed Call Log  5/28/2024 9:20 PM   Intake has called facilities that have not updated their bed status within the last 12 hours.??        ADULTS:       *[facility]   [place] -- [facility]: @ cap per website.   [place] -- [facility]:  @ cap per website.    [place] -- [facility]: @ Cap per website.   [place] -- [facility]: @ cap per website.    [place] -- [facility]: @ Cap per website.   [facility]: @ cap per website.    [facility]/ beds Posting 2 beds. Ages 18-28, Voluntary only, COVID test req'd, No aggression, physical or sexual assault, violence hx or drug abuse, or psychosis.    [place] -- [facility]: @ Cap per website.   [place] -- [facility]: @ cap per website.   [place] -- [facility]:  @ Posting 1 Bed Low acuity. Per call, at capacity, try back tomorrow.        Pt remains on waitlist pending appropriate placement availability.

## 2024-05-29 NOTE — PROGRESS NOTES
"Triage & Transition Services, Extended Care     Therapy Progress Note    Patient: Basil goes by \"Basil,\" uses he/him pronouns  Date of Service: May 29, 2024  Site of Service: [facility] EMERGENCY DEPARTMENT                             ED11  Patient was seen yes  Mode of Assessment: In person    Presentation Summary: Pt was alert and oriented x5. Pt denied SI, HI, NSSIB. Pt endorses AH and VH but states that the voices do not instruct him to do anything. Pt reports that focusing on distractions such as TV, phone, or conversations with staff helps with the AH. Pt reports feelings of misha and identified racing thoughts, intrusive thoughts, and lack of sleep as indicators. Pt was cooperative but spoke with delayed, hyperverbal speech.    Therapeutic Intervention(s) Provided: Engaged in guided discovery, explored patient's perspectives and helped expand them through socratic dialogue., Reviewed healthy living that supports positive mental health, including looking at sleep hygiene, regular movement, nutrition, and regular socialization.    Current Symptoms: anxious, racing thoughts, excessive worry excessive guilt racing thoughts, anxious, excessive worry auditory hallucinations, visual hallucinations, flight of ideas, hyperverbal, inattentive increased appetite    Mental Status Exam   Affect: Appropriate  Appearance: Appropriate  Attention Span/Concentration: Inattentive  Eye Contact: Intense    Fund of Knowledge: Delayed (Disorganized speech)   Language /Speech Content: Expressive Speech  Language /Speech Volume: Normal  Language /Speech Rate/Productions: Hyperverbal, Pressured  Recent Memory: Intact  Remote Memory: Intact  Mood: Normal  Orientation to Person: Yes   Orientation to Place: Yes  Orientation to Time of Day: Yes  Orientation to Date: Yes     Situation (Do they understand why they are here?): Yes  Psychomotor Behavior: Normal  Thought Content: Hallucinations  Thought Form: Loose " Associations    Treatment Objective(s) Addressed: rapport building, orienting the patient to therapy, identifying treatment goals    Patient Response to Interventions: eager to participate, acceptance expressed, verbalizes understanding    Progress Towards Goals: Patient Reports Symptoms Are: ongoing  Patient Progress Toward Goals: is making progress  Comment: Pt has been accepted to an [facility] unit.  Next Step to Work Toward Discharge: symptom stabilization  Symptom Stabilization Comment: Pt has been accepted IP.    Case Management: Case Management Included: completing or following up on referrals  Details on completing or following up on referrals: [facility]  Summary of Interaction: Pt will go [facility]    Plan: inpatient mental health  yes provider, RN Dr. Richye, [facility]  yes    Clinical Substantiation: It is the recommendation of this clinician that pt admit to [facility] for safety and stabilization. Pt displays the following risk factors that support IP admission:  Due to severe paranoia delusional and inappropriate sexual urges, unable to contract for safety. Pt is unable to engage in safety planning to mitigate risk level in a non-secure setting. Lower levels of care have not been successful in mitigating risk. Due to this IP is the least restrictive option of care for pt. Pt should remain in IP until deemed safe to return to the community and engage in [facility] supports. Pt is voluntary and is in support of this admission.    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

## 2024-05-29 NOTE — TELEPHONE ENCOUNTER
[place]  Access Inpatient Bed Call Log 5/29/2024 8:30 AM    Intake has called facilities that have not updated their bed status within the last 12 hours.    ADULTS:  *[facility] is posting 0 beds.              [facility] is posting 0 beds. [phone]   [place] is posting 0 beds. [phone]              [facility] is posting 0 beds. [phone]    [facility] is posting 0 beds. [phone]   [facility] (These are Young Adult beds, 18-28) is posting  3 beds. Negative COVID test required, no recent or significant aggression, violence, or sexual assault. [phone]     [facility] is posting 0 beds. [phone]            [facility] is posting 0 beds. [phone]     [facility] through [facility] is posting 1 beds. [phone]  Per call to Donovan at 9:30 am currently at capacity until 5/30      10:46 AM Per call to unit 12 CRN will review for potential admit, MRN provided.  12:55 PM Per callback from unit 12 CRN pt is appropriate for unit. Intake to brooklynn Parker.  1:01 PM Paged Keith.  2:03 PM Paged Keith.  2:15 PM Patient accepted by Dr. Parker to unit 12  2:20 PM Unit CRN informed of patient in queue. Will call [facility] ED for report.  2:21 PM Indicia completed.  2:24 PM [facility] ED notified.                    4:52 PM PPS received call from Dr. Hartman unit is working on acquiring staff to accommodate patients 1:1 need. Per Dr. Hartman the earliest pt will admit to unit will be 7:30p-8-p if able to accommodate.    Pt remains on work list pending appropriate bed availability.

## 2024-05-29 NOTE — ED PROVIDER NOTES
[facility] ED Mental Health Handoff Note:       Brief HPI:  This is a 33 year old male signed out to me by Dr. Hawk.  See initial ED Provider note for full details of the presentation. Interval history is pertinent for no events or changes were reported at shift change.    Home meds reviewed and ordered/administered: Yes    Medically stable for inpatient mental health admission: Yes.    Evaluated by mental health: Yes. The recommendation is for inpatient mental health treatment. Bed search in process    Safety concerns: At the time I received sign out, there were no safety concerns.    Hold Status:  Active Orders   N/A            Exam:   Patient Vitals for the past 24 hrs:   BP Pulse Resp SpO2   05/29/24 0031 134/74 73 18 98 %   05/28/24 2115 123/71 68 16 99 %           ED Course:    Medications   divalproex sodium extended-release (DEPAKOTE ER) 24 hr tablet 1,000 mg (1,000 mg Oral $Given 5/28/24 2112)   gabapentin (NEURONTIN) capsule 300 mg (300 mg Oral $Given 5/28/24 0759)   gabapentin (NEURONTIN) capsule 600 mg (600 mg Oral $Given 5/28/24 2111)   diazepam (VALIUM) tablet 5-20 mg (10 mg Oral $Given 5/28/24 0522)   risperiDONE (risperDAL) tablet 2 mg (2 mg Oral $Given 5/28/24 2112)   hydrOXYzine HCl (ATARAX) tablet 25 mg (25 mg Oral $Given 5/29/24 0410)     Or   hydrOXYzine HCl (ATARAX) tablet 50 mg ( Oral See Alternative 5/29/24 0410)   melatonin tablet 3 mg (3 mg Oral $Given 5/27/24 2147)   traZODone (DESYREL) tablet 50 mg (50 mg Oral $Given 5/29/24 0205)   OLANZapine zydis (zyPREXA) ODT tab 10 mg (10 mg Oral $Given 5/26/24 1740)            There were no significant events during my shift.    Patient was signed out to the oncoming provider      Impression:    ICD-10-CM    1. Chantal (H)  F30.9       2. Psychosis, unspecified psychosis type (H)  F29           Plan:    Awaiting inpatient mental health admission/transfer.      RESULTS:   Results for orders placed or performed during the hospital encounter of  05/26/24 (from the past 24 hour(s))   Spiritual Health Services IP Consult     Status: None ()    Collection Time: 05/28/24  2:34 PM    Narrative    Sera Adams     5/28/2024  3:09 PM  SPIRITUAL HEALTH SERVICES  SPIRITUAL ASSESSMENT consult Note  [facility]  ON-CALL     REFERRAL SOURCE: Routine/STAT consult    Basil explained that he only wanted to speak with a Latter day    and would not speak with a . He also shared that   he intended to contact his own  rather than   speaking with Fr. Yusuf.     PLAN: Will keep  Madelin in the loop in case Basil's    is unable to come. Spiritual health services remains available   for any follow-up or requests. For further visits please place   spiritual health consults.    Sera Adams, [facility]  Associate   Pager: [phone]               MD Rossi Milner David, MD  05/29/24 5213
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
## 2024-05-29 NOTE — ED NOTES
Pt brought to ED from [facility]. Pt calm, allowing this RN to obtain VS. Pt denies SI/HI. Belongings placed in locker. Denies any additional needs at this time

## 2024-05-29 NOTE — ED NOTES
"Pt seems to be increasingly anxious and disorganized throughout the day. At approximately 1300, Pt told writer that he is \"hearing voices in the bathroom;\" that he is unable to use the bathroom, because being alone with \"the voices\" makes him too uncomfortable. He then requested to pace in the hallway and repeatedly \"count to 10\" to comfort himself.     Throughout the day, he has been observed repeating lists of staff members' names, which he informed this writer is to \"keep him grounded.\" He is intermittently tearful when reflecting on the events leading up to his presentation to the hospital and will bring these events up in conversation of his own volition - seemingly randomly. Pt indicated to writer that he has a habit of listening to a Yarsanism radio show called \"[employer]\" in order to drown out the noise of the world around him, which he feels may be exacerbating his auditory hallucinations.    Also of note, Pt informed this writer that he would like to refrain from restarting his \"psych meds,\" because he would like to \"stay fit.\" He says that his father instructs him to \"get fat and take the maximum dose\" of his psychiatric medications, so he can \"stay sane.\"  "
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
## 2024-05-30 PROCEDURE — G0177 OPPS/PHP; TRAIN & EDUC SERV: HCPCS

## 2024-05-30 PROCEDURE — 250N000013 HC RX MED GY IP 250 OP 250 PS 637: Performed by: CLINICAL NURSE SPECIALIST

## 2024-05-30 PROCEDURE — 124N000002 HC R&B MH UMMC

## 2024-05-30 PROCEDURE — 99233 SBSQ HOSP IP/OBS HIGH 50: CPT | Performed by: CLINICAL NURSE SPECIALIST

## 2024-05-30 RX ORDER — MULTIPLE VITAMINS W/ MINERALS TAB 9MG-400MCG
1 TAB ORAL DAILY
Status: DISCONTINUED | OUTPATIENT
Start: 2024-05-30 | End: 2024-07-03 | Stop reason: HOSPADM

## 2024-05-30 RX ORDER — RISPERIDONE 2 MG/1
2 TABLET ORAL AT BEDTIME
Status: DISCONTINUED | OUTPATIENT
Start: 2024-05-30 | End: 2024-06-07

## 2024-05-30 RX ORDER — FLUTICASONE PROPIONATE 50 MCG
2 SPRAY, SUSPENSION (ML) NASAL DAILY
Status: DISCONTINUED | OUTPATIENT
Start: 2024-05-30 | End: 2024-07-03 | Stop reason: HOSPADM

## 2024-05-30 RX ORDER — IPRATROPIUM BROMIDE 42 UG/1
2 SPRAY, METERED NASAL 4 TIMES DAILY
Status: DISCONTINUED | OUTPATIENT
Start: 2024-05-30 | End: 2024-07-03 | Stop reason: HOSPADM

## 2024-05-30 RX ADMIN — Medication 1 TABLET: at 12:43

## 2024-05-30 RX ADMIN — DIVALPROEX SODIUM 1000 MG: 500 TABLET, FILM COATED, EXTENDED RELEASE ORAL at 21:09

## 2024-05-30 RX ADMIN — IPRATROPIUM BROMIDE 2 SPRAY: 42 SPRAY, METERED NASAL at 20:35

## 2024-05-30 RX ADMIN — RISPERIDONE 2 MG: 2 TABLET ORAL at 21:09

## 2024-05-30 RX ADMIN — GABAPENTIN 300 MG: 300 CAPSULE ORAL at 08:45

## 2024-05-30 RX ADMIN — IPRATROPIUM BROMIDE 2 SPRAY: 42 SPRAY, METERED NASAL at 17:08

## 2024-05-30 RX ADMIN — IPRATROPIUM BROMIDE 2 SPRAY: 42 SPRAY, METERED NASAL at 12:30

## 2024-05-30 RX ADMIN — FLUTICASONE PROPIONATE 2 SPRAY: 50 SPRAY, METERED NASAL at 12:30

## 2024-05-30 RX ADMIN — GABAPENTIN 600 MG: 300 CAPSULE ORAL at 21:09

## 2024-05-30 ASSESSMENT — ACTIVITIES OF DAILY LIVING (ADL)
ADLS_ACUITY_SCORE: 41
ORAL_HYGIENE: INDEPENDENT
DRESS: INDEPENDENT
ADLS_ACUITY_SCORE: 41
ORAL_HYGIENE: INDEPENDENT
ADLS_ACUITY_SCORE: 41
LAUNDRY: UNABLE TO COMPLETE
DRESS: INDEPENDENT
ADLS_ACUITY_SCORE: 41
ADLS_ACUITY_SCORE: 41
LAUNDRY: UNABLE TO COMPLETE
ADLS_ACUITY_SCORE: 41
HYGIENE/GROOMING: INDEPENDENT
ADLS_ACUITY_SCORE: 41
HYGIENE/GROOMING: INDEPENDENT

## 2024-05-30 NOTE — H&P
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 benefits and alternatives and adequate verbalization of understanding.  2. Admitted on 5/29/24 to Station 12[facility]..  Precautions placed include: Suicide precautions, sexual precautions, withdrawal precautions.  Code 1-restricted to unit    3. Medications: 5/30/2024: [facility] medications reviewed.    4. Medications:  Hospital  Diazepam (Valium) tablet 5-20 mg oral every 30 minutes as needed order, this according to patient MSSA score  Divalproex sodium extended release(Depakote ER) 24 however tablet 1000 mg oral at bedtime  Fluticasone (Flonase) 50 mcg/ACT spray 2 sprays, both nostrils daily  Full code  Gabapentin (Neurontin) capsule 300 mg oral daily  Gabapentin (Neurontin) capsule 600 mg at bedtime  Hydroxyzine HCl (Atarax) tablet 25 mg oral every 6 hours as needed order adjuvant pain   Or  Hydroxyzine HCl (Atarax) tablet 50 mg oral every 6 hours as needed order, adjuvant pain  Ipratropium (Atrovent) 0.06% spray, 2 sprays both nostrils 4 times daily  Lorazepam (Ativan) tablet 1 mg oral at bedtime as needed sleep  Melatonin tablet 3 mg oral at bedtime as needed sleep  MHAS  extended care  MSSA score vital signs and pain assessment on admission and per  protocol  Multivitamin W/minerals (Thera-vit-M) tablet 1 tablet oral daily  No COVID-19 virus (coronavirus screening test needed or already ordered/completed  Notify physician-alcohol withdrawal MSSA focused  Notify provider if signs of substance withdrawal  Olanzapine Zydis (Zyprexa) ODT tab 10 mg oral 3 times daily as needed agitation, aggression  Or  Olanzapine (Zyprexa) injection 10 mg intramuscular 3 times daily as needed agitation, aggression  Patient CARE other 1-1 acuity staff for severe intrusiveness, SI  Regular diet adult  Requesting IP MHAS date  Risperidone (Risperdal) tablet 2 mg oral at bedtime  Routine programming  Sexual precaution  Status 15 every 15 minutes check  Suicide precautions: Suicide risk hide  Trazodone (Desyrel) tablet 50 mg oral at bedtime as needed sleep  Valproic acid a.m. draw draw to be done before this is given  Vital signs and pain assessment  Voluntary  With the patient per unit routine  Withdrawal precautions  5. Consultations:  Hospitalist to follow as needed.  IM to follow for every medical condition.  6. Structure and Supervision  Unit 12 [address].  Barriers to Discharge:  7.   is following in regards to collecting and reviewing collateral information, referrals and disposition planning.  Legal: Voluntary  Referrals: CTC to coordinate all referrals  Care Coordination: CTC to coordinate care with outside providers  Placement: CTC to facilitate placement following symptom stabilization  Anticipated Discharge: 5 to 7 days  . Further treatment programming to be determined throughout the hospital course.        Risk Assessment: IP MHAC RISK ASSESSMENT: Patient able to contract for safety and Patient on precautions    This note was created with help of Dragon dictation system. Grammatical / typing errors are not intentional.    Haroon Parker, ABBY, APRN CNP       CERTIFICATION   Initial Certification I certify that the inpatient psychiatric facility admission was  medically necessary for treatment which could   reasonably be expected to improve the patient s condition.                                       I estimate 7 days of hospitalization is necessary for proper treatment of the patient. My plans for post-hospital care for this patient are  TBD .                                       Haroon Parker DNP, APRN CNP     -     5/30/2024     -     10:31 AM
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
## 2024-05-30 NOTE — PLAN OF CARE
BEH IP Unit Acuity Rating Score (UARS)  Patient is given one point for every criteria they meet.    CRITERIA SCORING   On a 72 hour hold, court hold, committed, stay of commitment, or revocation. 0    Patient LOS on BEH unit exceeds 20 days. 0  LOS: 1   Patient under guardianship, 55+, otherwise medically complex, or under age 11. 0   Suicide ideation without relief of precipitating factors. 0   Current plan for suicide. 0   Current plan for homicide. 0   Imminent risk or actual attempt to seriously harm another without relief of factors precipitating the attempt. 0   Severe dysfunction in daily living (ex: complete neglect for self care, extreme disruption in vegetative function, extreme deterioration in social interactions). 1   Recent (last 7 days) or current physical aggression in the ED or on unit. 0   Restraints or seclusion episode in past 72 hours. 0   Recent (last 7 days) or current verbal aggression, agitation, yelling, etc., while in the ED or unit. 0   Active psychosis. 1   Need for constant or near constant redirection (from leaving, from others, etc).  0   Intrusive or disruptive behaviors. 0   Patient requires 3 or more hours of individualized nursing care per 8-hour shift (i.e. for ADLs, meds, therapeutic interventions). 0   TOTAL 2

## 2024-05-30 NOTE — PROGRESS NOTES
"CLINICAL NUTRITION SERVICES - ASSESSMENT NOTE     Nutrition Prescription    RECOMMENDATIONS FOR MDs/PROVIDERS TO ORDER:  None at this time    Malnutrition Status:    Unable to determine due to unable to complete NFPE/assess for weight loss    Recommendations already ordered by Registered Dietitian (RD):  None at this time    Future/Additional Recommendations:  RD to sign off at this time, but will remain available by consult if new nutrition problem arises.       REASON FOR ASSESSMENT  Basil Andrews is a/an 33 year old male assessed by the dietitian for Admission Nutrition Risk Screen for positive decreased appetite and weight loss.     CLINICAL HISTORY  PMH significant for bipolar disorder. Admitted from ED 5/29/24 due to concern for significant behavioral change, anxiety, paranoia and inappropriate sexual thoughts towards family and others.     NUTRITION HISTORY  Per chart review, pt is eating meals and snacks and drinking fluids adequately.     GI: no concerns reported    CURRENT NUTRITION ORDERS  Diet: Regular  Supplement: none  Intake/Tolerance: eating and drinking adequately per RN notes    LABS  Reviewed    MEDICATIONS  Depakote ER, atarax prn    ANTHROPOMETRICS  Height: 185.4 cm (6' 1\")  Most Recent Weight: 90.7 kg (200 lb)    IBW: 83.6 kg   BMI: Overweight BMI 25-29.9  Weight History:   Wt Readings from Last 15 Encounters:   05/26/24 90.7 kg (200 lb)   10/11/22 99.3 kg (219 lb)      06/28/15 112 kg (247 lb)   06/13/13 85.3 kg (188 lb)   05/19/11 77.6 kg (171 lb)   Limited wt hx available, STEPHANY for wt changes    Dosing Weight: 90 kg (actual)    ASSESSED NUTRITION NEEDS  Estimated Energy Needs: 2250 - 2700 kcals/day (25 - 30 kcals/kg)  Justification: Maintenance  Estimated Protein Needs: 72 - 90 grams protein/day (0.8 - 1 grams of pro/kg)  Justification: Maintenance  Estimated Fluid Needs: 1 mL/kcal  Justification: Maintenance or Per Provider    PHYSICAL FINDINGS  See malnutrition section below.  No abnormal " nutrition-related physical findings observed.     MALNUTRITION  % Intake: No decreased intake noted  % Weight Loss: Unable to assess  Subcutaneous Fat Loss: Unable to assess  Muscle Loss: Unable to assess  Fluid Accumulation/Edema: Unable to assess  Malnutrition Diagnosis: Unable to determine due to unable to complete NFPE/assess for weight loss    NUTRITION DIAGNOSIS  No nutrition diagnosis at this time     INTERVENTIONS  Implementation  Interventions not indicated at this time    Goals  Patient to consume % of nutritionally adequate meal trays TID, or the equivalent with supplements/snacks.     Monitoring/Evaluation  RD to sign off at this time, but will remain available by consult if new nutrition problem arises.      Lenora Carvalho, MPH, RDN, LD  Behavioral Health Adult & Pediatric Dietitian  Contact via [facility] or Desk Phone: [phone]

## 2024-05-30 NOTE — PLAN OF CARE
05/30/24 1536   Individualization/Patient Specific Goals   Patient Personal Strengths community support;coping skills;expressive of emotions;expressive of needs;family/social support;humor;independent living skills;insight into illness/situation;intellectual cognitive skills;interests/hobbies;medication/treatment adherence;motivated for treatment;positive attitude;positive educational history;positive vocational history;realistic evaluation of current/future capabilities;relationship stability;resilient;self-awareness;socioeconomic stability;spiritual/Zoroastrianism support;stable living environment   Patient Vulnerabilities traumatic event;substance abuse/addiction   Interprofessional Rounds   Summary Basil presents with symptoms of psychosis. He endorses auditory and visual hallucinations. Medication management ongoing.   Participants CTC;advanced practice nurse;nursing   Behavioral Team Discussion   Participants Haroon Parker CNP, Abbey RN, Christel FATIMA   Anticipated length of stay 7 days   Continued Stay Criteria/Rationale Presents with and endorses AVH. Medication management ongoing.   Anticipated Discharge Disposition home with family     PRECAUTIONS AND SAFETY    Behavioral Orders   Procedures    Code 1 - Restrict to Unit    MHAS Extended Care     Until discharge, Extended Care to offer psychotherapeutic services to mental health patients boarding for admission or stabilization. These services are to include but are not limited to: individual psychotherapy, diagnostic assessment, case management and care planning, safety planning, etc. This may include up to 1 visit per day. If patient is physically located at [facility] or [facility], group psychotherapy up to 2 time per day may be offered.     Routine Programming     As clinically indicated    Sexual precautions    Status 15     Every 15 minutes.    Suicide precautions: Suicide Risk: HIGH; Clinical rationale to override score: modification to the care  environment, lack of access to a plan for self-harm, Collateral information supporting Suicidal/self-harm behaviors     Patients on Suicide Precautions should have a Combination Diet ordered that includes a Diet selection(s) AND a Behavioral Tray selection for Safe Tray - with utensils, or Safe Tray - NO utensils       Order Specific Question:   Suicide Risk     Answer:   HIGH     Order Specific Question:   Clinical rationale to override score:     Answer:   modification to the care environment     Order Specific Question:   Clinical rationale to override score:     Answer:   lack of access to a plan for self-harm     Order Specific Question:   Clinical rationale to override score:     Answer:   Collateral information supporting Suicidal/self-harm behaviors    Withdrawal precautions       Safety  De-Escalation Techniques: 1:1 observation initiated, appropriate behavior reinforced, reoriented, diversional activity encouraged  Suicidality: SIO (Status Individual Observation)  (NOTE - order will specify distance, Behavioral scrubs (pajamas), Minimal furniture in room  Withdrawal: monitor withdrawal process  Sexual: status continuous sight

## 2024-05-30 NOTE — PLAN OF CARE
Team Note Due:  Thursday     Assessment/Intervention/Current Symtoms and Care Coordination:  Chart review and met with team, discussed pt progress, symptomology, and response to treatment.  Discussed the discharge plan and any potential impediments to discharge.    Met with Basil to introduce self. Completed the initial psychosocial assessment. He presented with insight into his psychotic symptoms including auditory and visual hallucinations.     Spoke to his wife, Flakita. Visiting hours and building information given. His parents are planning to visit tonight, she plans to visit tomorrow.      Discharge Plan or Goal:  When patient's psychotic symptoms have decreased discharge to his home with family will be facilitated      Barriers to Discharge:  Currently presents with symptoms of psychosis. Medication management ongoing.      Referral Status:  Referral needs pending as patient stabilizes     Legal Status:  Voluntary     Contacts:  Wife: Esme Andrews, [phone] (Pt wants only the provider to speak to his family)      Upcoming Meetings and Dates/Important Information and next steps:  Symptom stabilization  Discharge planning

## 2024-05-30 NOTE — PLAN OF CARE
Goal Outcome Evaluation:           Problem: Sleep Disturbance  Goal: Adequate Sleep/Rest  Outcome: Progressing         Patient continues to be on SIO with 1:1 staffing for safety as ordered. Patient appeared to be asleep for 6.5 hours during this shift. MSSA score was a 2 this shift. No prn medication administered. No concerns or complaints voiced by patient or noted by staff.

## 2024-05-30 NOTE — PLAN OF CARE
"Pt had an uneventful shift this day. Pt was present in the milieu with apporpriate interaction with staff and peers. Pt showered and attended to ADLs without prompting. Pt mood was euthymic with congruent affect. Pt appeared to have paucity of speech but was able to participate in conversation. Pt had insight into mental health symptoms stating he realizes some of his thoughts are delusional but he is glad to be here to \"get help\". Pt was guarded when asked to elaborate on these thoughts. Pt denies SI, SIB, HI and thoughts of hurting others.     Food and fluid intake adequate. No behavioral or physical concerns this day.    VS reviewed: /71 (BP Location: Left arm)   Pulse 69   Temp (!) 96.5  F (35.8  C) (Temporal)   Resp 16   Ht 1.854 m (6' 1\")   Wt 94.8 kg (209 lb)   SpO2 98%   BMI 27.57 kg/m   . Patient denies  pain.    Patient evaluation continues. Assessed mood,anxiety,thoughts and behavior. Patient is progressing towards goals. Patient is encouraged to participate in groups and assisted to develop healthy coping skills.     Length of stay: 1  "
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
## 2024-05-30 NOTE — ED NOTES
"ED to Behavioral Floor Handoff    SITUATION  Basil Andrews is a 33 year old male who speaks English and lives in a home with a spouse The patient arrived in the ED by private car from emergency room with a complaint of Psychiatric Evaluation (Patient states he is schizoaffective bipolar and states his mother and sister are uncomfortable with him at the house due to the sexual thoughts he is having about them./Patient states he is having most of his sexual thoughts about his mother.  Patient is accompanied by Wife, mother and father.)   ago and during this time the symptoms have been stable.  In the ED, pt was diagnosed with   Final diagnoses:   Chantal (H)   Psychosis, unspecified psychosis type (H)        Initial vitals were: BP: (!) 153/79  Pulse: 71  Temp: 97.8  F (36.6  C)  Resp: 18  Height: 185.4 cm (6' 1\")  Weight: 91.6 kg (202 lb)  SpO2: 100 %   --------  Is the patient diabetic? No   If yes, last blood glucose? --     If yes, was this treated in the ED? --  --------  Is the patient inebriated (ETOH) No or Impaired on other substances?   MSSA done? N/A  Last MSSA score: --    Were withdrawal symptoms treated? N/A  Does the patient have a seizure history? No. If yes, date of most recent seizure--  --------  Is the patient patient experiencing suicidal ideation? denies current or recent suicidal ideation     Homicidal ideation? denies current or recent homicidal ideation or behaviors.    Self-injurious behavior/urges? denies current or recent self injurious behavior or ideation.  ------  Was pt aggressive in the ED No  Was a code called No  Is the pt now cooperative? Yes  -------  Meds given in ED:   Medications   divalproex sodium extended-release (DEPAKOTE ER) 24 hr tablet 1,000 mg (1,000 mg Oral $Given 5/28/24 2112)   gabapentin (NEURONTIN) capsule 300 mg (300 mg Oral $Given 5/29/24 0818)   gabapentin (NEURONTIN) capsule 600 mg (600 mg Oral $Given 5/28/24 2111)   diazepam (VALIUM) tablet 5-20 mg (10 mg Oral " $Given 5/28/24 9997)   risperiDONE (risperDAL) tablet 2 mg (2 mg Oral $Given 5/28/24 7032)   hydrOXYzine HCl (ATARAX) tablet 25 mg (25 mg Oral $Given 5/29/24 1414)     Or   hydrOXYzine HCl (ATARAX) tablet 50 mg ( Oral See Alternative 5/29/24 1414)   melatonin tablet 3 mg (3 mg Oral $Given 5/27/24 2147)   traZODone (DESYREL) tablet 50 mg (50 mg Oral $Given 5/29/24 0205)   OLANZapine zydis (zyPREXA) ODT tab 10 mg (has no administration in time range)     Or   OLANZapine (zyPREXA) injection 10 mg (has no administration in time range)   OLANZapine zydis (zyPREXA) ODT tab 10 mg (10 mg Oral $Given 5/26/24 1740)      Family present during ED course? Yes  Family currently present? No    BACKGROUND  Does the patient have a cognitive impairment or developmental disability? No  Allergies:   Allergies   Allergen Reactions    Seasonal Allergies Other (See Comments)     Congestion. Managed with meds.   .   Social demographics are   Social History     Socioeconomic History    Marital status:    Tobacco Use    Smoking status: Some Days     Current packs/day: 0.25     Average packs/day: 0.3 packs/day for 1 year (0.3 ttl pk-yrs)     Types: Cigarettes    Smokeless tobacco: Never   Substance and Sexual Activity    Alcohol use: Yes     Comment: socially    Drug use: Yes     Types: Marijuana     Comment: Last on Sat but not usually    Sexual activity: Yes     Partners: Female     Birth control/protection: Condom, Pill   Other Topics Concern    Parent/sibling w/ CABG, MI or angioplasty before 65F 55M? No        ASSESSMENT  Labs results   Labs Ordered and Resulted from Time of ED Arrival to Time of ED Departure   URINE DRUG SCREEN PANEL - Normal       Result Value    Amphetamines Urine Screen Negative      Barbituates Urine Screen Negative      Benzodiazepine Urine Screen Negative      Cannabinoids Urine Screen Negative      Cocaine Urine Screen Negative      Fentanyl Qual Urine Screen Negative      Opiates Urine Screen Negative    "   PCP Urine Screen Negative     COMPREHENSIVE METABOLIC PANEL   VALPROIC ACID      Imaging Studies: No results found for this or any previous visit (from the past 24 hour(s)).   Most recent vital signs /72   Pulse 105   Temp 97.3  F (36.3  C) (Oral)   Resp 19   Ht 1.854 m (6' 1\")   Wt 90.7 kg (200 lb)   SpO2 100%   BMI 26.39 kg/m     Abnormal labs/tests/findings requiring intervention:---   Pain control:na  Nausea control: na    RECOMMENDATION  Are any infection precautions needed (MRSA, VRE, etc.)? No If yes, what infection? --  ---  Does the patient have mobility issues? independently. If yes, what device does the pt use? ---  ---  Is patient on 72 hour hold or commitment? No If on 72 hour hold, have hold and rights been given to patient? N/A  Are admitting orders written if after 10 p.m. ?N/A  Tasks needing to be completed:---     Ingrid Purcell RN    [phone] [facility]   [phone] [facility]   "

## 2024-05-30 NOTE — PLAN OF CARE
05/29/24 2146   Patient Belongings   Did you bring any home meds/supplements to the hospital?  No   Patient Belongings locker   Patient Belongings Put in Hospital Secure Location (Security or Locker, etc.) cell phone/electronics;clothing;Worship item;shoes;tote   Belongings Search Yes   Clothing Search Yes   Second Staff Wendy ARROYO     Goal Outcome Evaluation:    A               In Locker: 3 t-shirts, 3 pairs of boxers, cell phone, bible, catechism text, canvas bag, jeans, shoes, belt.    Admission:  I am responsible for any personal items that are not sent to the safe or pharmacy.  [facility] is not responsible for loss, theft or damage of any property in my possession.    Signature:  _________________________________ Date: _______  Time: _____                                              Staff Signature:  ____________________________ Date: ________  Time: _____      2nd Staff person, if patient is unable/unwilling to sign:    Signature: ________________________________ Date: ________  Time: _____     Discharge:  [facility] has returned all of my personal belongings:    Signature: _________________________________ Date: ________  Time: _____                                          Staff Signature:  ____________________________ Date: ________  Time: _____

## 2024-05-31 PROCEDURE — 250N000013 HC RX MED GY IP 250 OP 250 PS 637: Performed by: CLINICAL NURSE SPECIALIST

## 2024-05-31 PROCEDURE — 124N000002 HC R&B MH UMMC

## 2024-05-31 PROCEDURE — G0177 OPPS/PHP; TRAIN & EDUC SERV: HCPCS

## 2024-05-31 PROCEDURE — 90832 PSYTX W PT 30 MINUTES: CPT

## 2024-05-31 RX ADMIN — Medication 1 TABLET: at 08:33

## 2024-05-31 RX ADMIN — FLUTICASONE PROPIONATE 2 SPRAY: 50 SPRAY, METERED NASAL at 08:35

## 2024-05-31 RX ADMIN — IPRATROPIUM BROMIDE 2 SPRAY: 42 SPRAY, METERED NASAL at 08:35

## 2024-05-31 RX ADMIN — GABAPENTIN 300 MG: 300 CAPSULE ORAL at 08:33

## 2024-05-31 RX ADMIN — IPRATROPIUM BROMIDE 2 SPRAY: 42 SPRAY, METERED NASAL at 21:13

## 2024-05-31 RX ADMIN — IPRATROPIUM BROMIDE 2 SPRAY: 42 SPRAY, METERED NASAL at 14:23

## 2024-05-31 RX ADMIN — GABAPENTIN 600 MG: 300 CAPSULE ORAL at 21:10

## 2024-05-31 RX ADMIN — DIVALPROEX SODIUM 1000 MG: 500 TABLET, FILM COATED, EXTENDED RELEASE ORAL at 21:10

## 2024-05-31 RX ADMIN — RISPERIDONE 2 MG: 2 TABLET ORAL at 21:10

## 2024-05-31 ASSESSMENT — ACTIVITIES OF DAILY LIVING (ADL)
ADLS_ACUITY_SCORE: 41
HYGIENE/GROOMING: INDEPENDENT
ADLS_ACUITY_SCORE: 41
ORAL_HYGIENE: INDEPENDENT
ADLS_ACUITY_SCORE: 41
DRESS: INDEPENDENT;SCRUBS (BEHAVIORAL HEALTH)
ADLS_ACUITY_SCORE: 41

## 2024-05-31 NOTE — CARE PLAN
Rehab Group    Start time: 1030  End time: 1115  Patient time total: 20 minutes    attended partial group    #4 attended   Group Type: general health and coping   Group Topic Covered: coping skills     Group Session Detail:  Discussed coping skills, ways to maintain mental health stability, what was in place last time individuals we feeling well/doing well, and how to realistically restore some of these things.  Group members were asked what would be helpful during this hospitalization as well.     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  Pt accepted redirection well, discussed the benefits of the physical aspects of his job as a , the fact he's been in the hospital before - several times around this time of year, though not for several years.  Pt reports not sleeping for several days and realizing he is manic and needing help right now.        No Charge    Patient Active Problem List   Diagnosis    Marijuana dependence (H)    Alcohol abuse    Bipolar disorder, current episode manic severe with psychotic features (H)    Chantal (H)    Schizoaffective disorder, bipolar type (H)    Psychosis, unspecified psychosis type (H)

## 2024-05-31 NOTE — CARE PLAN
Rehab Group    Start time: 1400  End time: 1500  Patient time total: 60 minutes    attended full group     #3 attended   Group Type: recreation   Group Topic Covered: relaxation skills , problem solving, social skills, and healthy leisure time     Group Session Detail:  Pt participated in the OT leisure group.  Able to learn new game, demonstrated understanding and was able to use strategy and problem solving skills.  Appropriate social interactions with others.     Patient Response/Contribution:  Able to recall/repeat information presented, Cooperative with task, Positive Affect, Organized, Attentive, and Actively engaged       Patient Detail:    Bright mood, friendly, on topic conversation with peers while engaged in group game.  No Mu-ism discussion.        Train & Education Service Per Session 45 + Minutes () OT Group code    Patient Active Problem List   Diagnosis    Marijuana dependence (H)    Alcohol abuse    Bipolar disorder, current episode manic severe with psychotic features (H)    Chantal (H)    Schizoaffective disorder, bipolar type (H)    Psychosis, unspecified psychosis type (H)

## 2024-05-31 NOTE — PLAN OF CARE
Problem: Sleep Disturbance  Goal: Adequate Sleep/Rest  Outcome: Progressing   Goal Outcome Evaluation:  1117-0360: Pt sleeping at the start of the shift in no apparent distress. Continue on withdrawal/ sexual precaution without any  related behavior/symptoms noted.Scored 1 on MSSA at 0600. Slept for 4.25 hours without any problem. Woke up early this morning. No complaints voiced. Safety maintained.

## 2024-05-31 NOTE — PLAN OF CARE
"Nursing assessment completed. Patient sleeping in his room at the start of the shift, but did wake up and join a latter group. He presented as pleasant and cooperative with a full range affect. He stated he is not sure his atrovent inhaler has been effective and decided to speak to his provider before continuing to use it. Ate 100% of his dinner. Consuming fluids appropriately.   Patient approached writer after dinner and stated he was struggling with other people having different religions. He proceeded to attempt to explain to writer that he would prefer a nurse that is \"a strong Pentecostal man\", and then asked if Hitesh could be his nurse. He proceeded with a tangential and confusing story about his Anabaptist and his current visual and auditory hallucinations, and asked if staff could witness him talking to his wife this evening, while he planned to attempt to convert her to catholicism. He ultimately agreed that a conversation of that scale is likely best not to take place while in the hospital. He stated he realizes that he is not ready to leave the hospital and states he still \"needs to work on his mental health\".        MSSA 6. Pt scored half the points because of his A/V hallucinations. No additional s/s of withdrawal stated or observed.                "

## 2024-05-31 NOTE — PLAN OF CARE
"Goal Outcome Evaluation:    Plan of Care Reviewed With: patient        Pt napping at the beginning of the shift and pt later came out for dinner. Pt watched [employer] basket ball game with other peers and was engaged in conversation about the game. Pt denies all mental health psych symptoms and contracted for safety. Pt presented with flat affect, calm and polite and no outburst behavior observed or reported. Pt complied with medications and no side effects observed or reported.  Adequate fluid and food intake, voiding freely and no BM concern per pt. Pt denies pain and shortness of breath and vital sign stable. /79 (BP Location: Left arm, Patient Position: Sitting, Cuff Size: Adult Regular)   Pulse 65   Temp (!) 96.5  F (35.8  C) (Temporal)   Resp 16   Ht 1.854 m (6' 1\")   Wt 94.8 kg (209 lb)   SpO2 100%   BMI 27.57 kg/m               "

## 2024-05-31 NOTE — PLAN OF CARE
Team Note Due:  Thursday     Assessment/Intervention/Current Symtoms and Care Coordination:  Chart review and met with team, discussed pt progress, symptomology, and response to treatment.  Discussed the discharge plan and any potential impediments to discharge.    Met with Basil to discuss hospitalization. His parents did not come to visit last night, he stated that they may have gotten busy. He is excited for his wife to visit today. He had no questions for writer. He was encouraged to seek staff with any questions or concerns.      Discharge Plan or Goal:  When patient's psychotic symptoms have decreased discharge to his home with family will be facilitated      Barriers to Discharge:  Currently presents with symptoms of psychosis. Medication management ongoing.      Referral Status:  Referral needs pending as patient stabilizes     Legal Status:  Voluntary     Contacts:  Wife: Esme Andrews, [phone] (Pt wants only the provider to speak to his family)      Upcoming Meetings and Dates/Important Information and next steps:  Symptom stabilization  Discharge planning

## 2024-05-31 NOTE — PROVIDER NOTIFICATION
05/30/24 1906   C-SSRS (Daily/Shift Screen)   Q2 Suicidal Thoughts (Since Last Contact) 0-->no   Q6 Suicide Behavior 0-->no   Assess Risk to Self and Maintain Safety   Behavior Management behavioral plan developed   Self-Harm Prevention environmental self-harm risks assessed   Enhanced Safety Measures room near unit station   Promote Psychosocial Wellbeing   Family/Support System Care caregiver stress acknowledged   Sleep/Rest Enhancement medication;comfort measures   Supportive Measures active listening utilized;decision-making supported   Establish Safety Plan and Continuity of Care   Safe Transition Promotion personal safety plan developed   Environment of Care Checklist   Potentially harmful objects out of patient reach? yes   Personal belongings secured? yes   Patient dressed in hospital-provided attire only? yes   Plastic bags out of patient reach? yes   Patient care equipment (cords, cables, call bells, lines, and drains) shortened, removed, or accounted for? yes   Potentially toxic materials removed or secured? yes   Sharps container removed or secured? yes   Cabinets secured? yes   Room secured by RN per shift? yes

## 2024-05-31 NOTE — PLAN OF CARE
"Goal Outcome Evaluation:    Plan of Care Reviewed With: patient      Pt had an uneventful shift. His affect was blunted and he appeared anxious but cooperative throughout the shift. Pt was visible in the milieu watching television and conversating with staff/peers. His affect was blunted.  He denies all mental health symptoms including depression and paranoia and He did endorse having auditory delusions but didn't want to talk about it. He denies SI/SIB/HI. Committed to safe behavior. Pt was med complaint and no reported/ observed SE. No behavioral concerns this shift. No other medical concerns. Denies pain. No PRNS given.     /80 (BP Location: Left arm, Patient Position: Sitting, Cuff Size: Adult Regular)   Pulse 70   Temp 97.7  F (36.5  C) (Temporal)   Resp 16   Ht 1.854 m (6' 1\")   Wt 94.8 kg (209 lb)   SpO2 99%   BMI 27.57 kg/m          "

## 2024-05-31 NOTE — PLAN OF CARE
Group Attendance:  attended partial group    Time session began: 1415   Time session ended: 1500  Patient's total time in group: 20    Total # Attendees   5   Group Type psychotherapeutic     Group Topic Covered insight improvement and substance use        Group Session Detail Group members checked in with how they are feeling and a success for the day. The group then answered and discussed questions relating to self-knowledge and self-awareness, goals and life purpose, self-esteem, and self-confidence. Pt's started discussion about addiction that was all were engaged in and looked at root causes for addiction     Patient's response to the group topic/interactions:  Able to recall/repeat information presented, Cooperative with task, Positive Affect, Listened actively, and Actively engaged     Patient Details: Pt came toward the end of group and joined in conversations around addiction, recovery, and root cause of addiction.            No Charge (0-15 min)    Patient Active Problem List   Diagnosis    Marijuana dependence (H)    Alcohol abuse    Bipolar disorder, current episode manic severe with psychotic features (H)    Chantal (H)    Schizoaffective disorder, bipolar type (H)    Psychosis, unspecified psychosis type (H)

## 2024-05-31 NOTE — PROGRESS NOTES
"[facility], [place]   Psychiatric Progress Note  Hospital Day: 2        Interim History:   The patient's care was discussed with the treatment team during the daily team meeting and/or staff's chart notes were reviewed.  Staff report patient slept 4.5 hours with no withdrawal/sexual or behavioral related symptoms. Patient scored 1 on MSSA assessment. Patient watched [employer] basketball game with other peers and was engaged in conversation about the game.  Patient was medication compliant reported no side effects patient had adequate fluid and food intake, voiding freely and no bowel movement concerns.  Patient did not report any acute medical concerns or side effects. No behavioral issues overnight, including violent or aggressive behaviors. Patient did not require seclusion or restraints. Patient is not exhibiting signs/sx of psychosis or misha. Patient did not endorse suicidal ideation. Patient did not endorse HI. Patient is medication adherent. Patient is attending groups. Patient is not sleeping well. Patient is eating adequately. Patient is attending to ADLs.    Upon interview, the patient reported working on his mental health stated he had a good conversation with my Chacorta the night staff last night.  Patient did not disclose the nature of his conversation with Chacorta, but was rambling about good and bad people people.  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  He stated that this would not necessarily be limited to only Albania Smith but to the personalities like Katherin gonsales, Shawn Balbuena and the likes.  Writer simply encouraged the patient to follow up with his passion and whatever good things his mind dictates to him, to go for it.  Patient reported that " "he conceived this idea when he got to the hospital and had his own space where he is able to think creatively.  Patient states he would look for a way of getting access to these personalities.  Patient shared how he helped Stacy on the unit yesterday.  Writer asked the patient who Stacy is as there was no patient by that name on this unit.  Patient stated that he just used the name Stacy for the purpose of of confidentiality.    Patient talked about circulating pure light to some people, patient appeared pressured speech with flight of ideas.  Patient reported the attending groups stated that he worked with his with Patricia and other patients in the group yesterday.  Patient reported that his sleep was good, his appetite is good and that he has seen an improvement in his condition he stated that his sexual feeling is subsiding.  Patient reported that he is still having auditory hallucinations which he linked to his delusion that there are people around him talking to him.  Patient states \"my OCD tripped off I cannot believe it\" patient reports the superstitious person he thinks he is hearing a small whisper.    Although patient appears calm, his statements was filled with flight of ideas most of which make no sense.  Patient talked about looking up to some people on the unit and try to copy them.  He denied suicide or homicide ideation he denied thoughts of self-harm.  Patient stated goal for today is to work with staff and patients and wash some games.  Patient still perseverating on this writer calling his wife.    Called the patient's wife Flakita [phone] for collateral information.  That she had that she brought the patient to the hospital because \"of some unusual behaviors the patient was presenting with.  She shared that shortly before the Mother's Day precisely May 12, patient developed paranoia, manic restless not sleeping and that other people were talking about him. She reported that his " psychiatrist Dr. Bob changed his medication is risperidone was increased which seems to cause a penile overstimulation.  Flakita shared that her mother and her sister were with them during the week, and Basil was having an inappropriate sexual overture towards them, started with her mother (Basil's mother-in-law) rubbing himself in her presence making her uncomfortable, the next day a similar thing happened to her sister when Basil started following her, stalking her and touching her leg while rubbing himself.  The sister in-law on the wife noted a dissociation when talking with Basil.    Flakita explained that other weird things noted when Basil was playing with the [employer], she said this was pretty much unusual because Basil typically was a modest person, very caring and compassionate. Flakita shared that this has never happened before, she reported that although Basil was diagnosed with schizoaffective disorder bipolar type in 2013 and also had manic episode in 2017 he had never manifested abnormal sexual behavior like this. Flakita asked when will it be appropriate to obtain an FMLA for Basil to present to his work, she also asked if it is appropriate for her to visit.    Basil or that he might die of the lingering open I think is okay and articulate    Suicidal ideation: denies current or recent suicidal ideation or behaviors.    Homicidal ideation: denies current or recent homicidal ideation or behaviors.    Psychotic symptoms: Yes Patient endorses AH, VH, paranoia, delusions.     Medication side effects reported: No significant side effects.    Acute medical concerns: none    Other issues reported by patient: Patient had no further questions or concerns.           Medications:     Current Facility-Administered Medications   Medication Dose Route Frequency Provider Last Rate Last Admin    divalproex sodium extended-release (DEPAKOTE ER) 24 hr tablet 1,000 mg  1,000 mg Oral At Bedtime Haroon Parker  "NATHALIE CNP   1,000 mg at 05/30/24 2109    fluticasone (FLONASE) 50 MCG/ACT spray 2 spray  2 spray Both Nostrils Daily Keith Haroon, APRN CNP   2 spray at 05/31/24 0835    gabapentin (NEURONTIN) capsule 300 mg  300 mg Oral Daily KeithHaroon APRN CNP   300 mg at 05/31/24 0833    gabapentin (NEURONTIN) capsule 600 mg  600 mg Oral At Bedtime ParkerHaroon APRN CNP   600 mg at 05/30/24 2109    ipratropium (ATROVENT) 0.06 % spray 2 spray  2 spray Both Nostrils 4x Daily ParkerHaroon APRN CNP   2 spray at 05/31/24 0835    multivitamin w/minerals (THERA-VIT-M) tablet 1 tablet  1 tablet Oral Daily ParkerHaroon APRN CNP   1 tablet at 05/31/24 0833    risperiDONE (risperDAL) tablet 2 mg  2 mg Oral At Bedtime ParkerHaroon APRN CNP   2 mg at 05/30/24 2109          Allergies:     Allergies   Allergen Reactions    Seasonal Allergies Other (See Comments)     Congestion. Managed with meds.          Labs:   No results found for this or any previous visit (from the past 24 hour(s)).       Psychiatric Examination:     /80 (BP Location: Left arm, Patient Position: Sitting, Cuff Size: Adult Regular)   Pulse 70   Temp 97.7  F (36.5  C) (Temporal)   Resp 16   Ht 1.854 m (6' 1\")   Wt 94.8 kg (209 lb)   SpO2 99%   BMI 27.57 kg/m    Weight is 209 lbs 0 oz  Body mass index is 27.57 kg/m .    Weight over time:  Vitals:    05/26/24 1216 05/26/24 2209 05/30/24 0900   Weight: 91.6 kg (202 lb) 90.7 kg (200 lb) 94.8 kg (209 lb)       Orthostatic Vitals         Most Recent      Sitting Orthostatic /80 05/31 0800    Sitting Orthostatic Pulse (bpm) 70 05/31 0800    Standing Orthostatic /84 05/31 0800    Standing Orthostatic Pulse (bpm) 82 05/31 0800              Cardiometabolic risk assessment. 05/31/24      Reviewed patient profile for cardiometabolic risk factors    Date taken /Value  REFERENCE RANGE   Abdominal Obesity  (Waist Circumference)   See nursing flowsheet Women ?35 in (88 cm)   Men ?40 in " "(102 cm)      Triglycerides  No results found for: \"TRIG\"    ?150 mg/dL (1.7 mmol/L) or current treatment for elevated triglycerides   HDL cholesterol  No results found for: \"HDL\"]   Women <50 mg/dL (1.3 mmol/L) in women or current treatment for low HDL cholesterol  Men <40 mg/dL (1 mmol/L) in men or current treatment for low HDL cholesterol     Fasting plasma glucose (FPG) Lab Results   Component Value Date    GLC 90 05/29/2024     06/02/2015      FPG ?100 mg/dL (5.6 mmol/L) or treatment for elevated blood glucose   Blood pressure  BP Readings from Last 3 Encounters:   05/31/24 122/80   06/09/15 128/83   06/14/13 129/74    Blood pressure ?130/85 mmHg or treatment for elevated blood pressure   Family History  See family history     Mental Status Exam:  Appearance: awake, alert, dressed in hospital scrubs, and appeared as age stated  Attitude:  cooperative  Eye Contact:  good  Mood:  anxious  Affect:  mood congruent, intensity is blunted, and intensity is flat  Speech:  clear, coherent  Language: fluent and intact in English  Psychomotor, Gait, Musculoskeletal:  no evidence of tardive dyskinesia, dystonia, or tics  Throught Process:  disorganized, illogical, and tangental  Associations:  loosening of associations present  Thought Content:  no evidence of suicidal ideation or homicidal ideation  Insight:  fair  Judgement:  limited  Oriented to:  time, person, and place  Attention Span and Concentration:  intact  Recent and Remote Memory:  fair  Fund of Knowledge:  low-normal           Precautions:     Behavioral Orders   Procedures    Code 1 - Restrict to Unit    [facility]     Until discharge, Extended Care to offer psychotherapeutic services to mental health patients boarding for admission or stabilization. These services are to include but are not limited to: individual psychotherapy, diagnostic assessment, case management and care planning, safety planning, etc. This may include up to 1 visit per " "day. If patient is physically located at [facility] or [facility], group psychotherapy up to 2 time per day may be offered.     Routine Programming     As clinically indicated    Sexual precautions    Status 15     Every 15 minutes.    Suicide precautions: Suicide Risk: HIGH; Clinical rationale to override score: modification to the care environment, lack of access to a plan for self-harm, Collateral information supporting Suicidal/self-harm behaviors     Patients on Suicide Precautions should have a Combination Diet ordered that includes a Diet selection(s) AND a Behavioral Tray selection for Safe Tray - with utensils, or Safe Tray - NO utensils       Order Specific Question:   Suicide Risk     Answer:   HIGH     Order Specific Question:   Clinical rationale to override score:     Answer:   modification to the care environment     Order Specific Question:   Clinical rationale to override score:     Answer:   lack of access to a plan for self-harm     Order Specific Question:   Clinical rationale to override score:     Answer:   Collateral information supporting Suicidal/self-harm behaviors    Withdrawal precautions          Diagnoses:        Chantal (H)  Psychosis, unspecified psychosis type (H)  Severe manic bipolar I disorder with psychotic features (H)    Clinically Significant Risk Factors                         # Overweight: Estimated body mass index is 27.57 kg/m  as calculated from the following:    Height as of this encounter: 1.854 m (6' 1\").    Weight as of this encounter: 94.8 kg (209 lb)., PRESENT ON ADMISSION     # Financial/Environmental Concerns: none                 Assessment & Plan:     Assessment and hospital summary:  This is a 33 year old male with history of Bipolar disorder, current episode manic severe with psychotic features (H). Patient presented to the ED for psychiatric evaluation.  Patient reported that his mother in-law and sister in-laws are not comfortable with him around them in the house due " to the sexual thoughts and arousal toward them.  Patient states he is having most of his sexual thoughts  and arousal about his mother in law, sister in-law and other women.         Today's Changes: 5/31  No medication changes  Discontinued 1:1 SIO order.    Target psychiatric symptoms and interventions:  . Education given regarding diagnostic and treatment options with risks, benefits and alternatives and adequate verbalization of understanding.  2. Admitted on 5/29/24 to [facility]  Precautions placed include: Suicide precautions, sexual precautions, withdrawal precautions.  Code 1-restricted to unit     3. Medications: 5/30/2024: PTA medications reviewed.     4. Medications:  Hospital  Diazepam (Valium) tablet 5-20 mg oral every 30 minutes as needed order, this according to patient MSSA score  Divalproex sodium extended release(Depakote ER) 24 however tablet 1000 mg oral at bedtime  Fluticasone (Flonase) 50 mcg/ACT spray 2 sprays, both nostrils daily  Full code  Gabapentin (Neurontin) capsule 300 mg oral daily  Gabapentin (Neurontin) capsule 600 mg at bedtime  Hydroxyzine HCl (Atarax) tablet 25 mg oral every 6 hours as needed order adjuvant pain   Or  Hydroxyzine HCl (Atarax) tablet 50 mg oral every 6 hours as needed order, adjuvant pain  Ipratropium (Atrovent) 0.06% spray, 2 sprays both nostrils 4 times daily  Lorazepam (Ativan) tablet 1 mg oral at bedtime as needed sleep  Melatonin tablet 3 mg oral at bedtime as needed sleep  MHAS  extended care  MSSA score vital signs and pain assessment on admission and per protocol  Multivitamin W/minerals (Thera-vit-M) tablet 1 tablet oral daily  No COVID-19 virus (coronavirus screening test needed or already ordered/completed  Notify physician-alcohol withdrawal MSSA focused  Notify provider if signs of substance withdrawal  Olanzapine Zydis (Zyprexa) ODT tab 10 mg oral 3 times daily as needed agitation, aggression  Or  Olanzapine (Zyprexa) injection 10 mg intramuscular  3 times daily as needed agitation, aggression  Patient CARE other 1-1 acuity staff for severe intrusiveness, SI  Regular diet adult  Requesting IP MHAS date  Risperidone (Risperdal) tablet 2 mg oral at bedtime  Routine programming  Sexual precaution  Status 15 every 15 minutes check  Suicide precautions: Suicide risk hide  Trazodone (Desyrel) tablet 50 mg oral at bedtime as needed sleep  Valproic acid a.m. draw draw to be done before this is given  Vital signs and pain assessment  Voluntary  With the patient per unit routine  Withdrawal precautions  5. Consultations:  Hospitalist to follow as needed.  IM to follow for every medical condition.  6. Structure and Supervision  [address]  Barriers to Discharge:  7.   is following in regards to collecting and reviewing collateral information, referrals and disposition planning.  Legal: Voluntary  Referrals: CTC to coordinate all referrals  Care Coordination: CTC to coordinate care with outside providers  Placement: CTC to facilitate placement following symptom stabilization  Anticipated Discharge: 5 to 7 days  . Further treatment programming to be determined throughout the hospital course.     Risk Assessment: IP MHAC RISK ASSESSMENT: Patient able to contract for safety and Patient on precautions  Risks, benefits, and alternatives discussed at length with patient.     Acute Medical Problems and Treatments:  Acute medical concerns:  - No acute medical concerns    Pertinent labs/imaging:  Recent Results (from the past 168 hour(s))   Urine Drug Screen Panel    Collection Time: 05/26/24  7:43 PM   Result Value Ref Range    Amphetamines Urine Screen Negative Screen Negative    Barbituates Urine Screen Negative Screen Negative    Benzodiazepine Urine Screen Negative Screen Negative    Cannabinoids Urine Screen Negative Screen Negative    Cocaine Urine Screen Negative Screen Negative    Fentanyl Qual Urine Screen Negative Screen Negative    Opiates Urine Screen  Negative Screen Negative    PCP Urine Screen Negative Screen Negative   Comprehensive metabolic panel    Collection Time: 05/29/24  9:03 PM   Result Value Ref Range    Sodium 142 135 - 145 mmol/L    Potassium 4.9 3.4 - 5.3 mmol/L    Carbon Dioxide (CO2) 30 (H) 22 - 29 mmol/L    Anion Gap 8 7 - 15 mmol/L    Urea Nitrogen 15.7 6.0 - 20.0 mg/dL    Creatinine 0.77 0.67 - 1.17 mg/dL    GFR Estimate >90 >60 mL/min/1.73m2    Calcium 9.6 8.6 - 10.0 mg/dL    Chloride 104 98 - 107 mmol/L    Glucose 90 70 - 99 mg/dL    Alkaline Phosphatase 42 40 - 150 U/L    AST 12 0 - 45 U/L    ALT 15 0 - 70 U/L    Protein Total 7.2 6.4 - 8.3 g/dL    Albumin 4.8 3.5 - 5.2 g/dL    Bilirubin Total 0.3 <=1.2 mg/dL   Valproic acid (Depakote level)    Collection Time: 05/29/24  9:03 PM   Result Value Ref Range    Valproic acid 31.0 (L)   ug/mL   CBC with platelets and differential    Collection Time: 05/29/24  9:03 PM   Result Value Ref Range    WBC Count 7.5 4.0 - 11.0 10e3/uL    RBC Count 4.26 (L) 4.40 - 5.90 10e6/uL    Hemoglobin 13.0 (L) 13.3 - 17.7 g/dL    Hematocrit 40.1 40.0 - 53.0 %    MCV 94 78 - 100 fL    MCH 30.5 26.5 - 33.0 pg    MCHC 32.4 31.5 - 36.5 g/dL    RDW 12.5 10.0 - 15.0 %    Platelet Count 223 150 - 450 10e3/uL    % Neutrophils 48 %    % Lymphocytes 39 %    % Monocytes 10 %    % Eosinophils 2 %    % Basophils 1 %    % Immature Granulocytes 0 %    NRBCs per 100 WBC 0 <1 /100    Absolute Neutrophils 3.6 1.6 - 8.3 10e3/uL    Absolute Lymphocytes 2.9 0.8 - 5.3 10e3/uL    Absolute Monocytes 0.7 0.0 - 1.3 10e3/uL    Absolute Eosinophils 0.1 0.0 - 0.7 10e3/uL    Absolute Basophils 0.0 0.0 - 0.2 10e3/uL    Absolute Immature Granulocytes 0.0 <=0.4 10e3/uL    Absolute NRBCs 0.0 10e3/uL         Behavioral/Psychological/Social:  - Encourage unit programming    Safety:  - Continue precautions as noted above  - Status 15 minute checks    Legal Status: voluntary    Disposition Plan   Reason for ongoing admission: poses an imminent risk to  self and poses an imminent risk to others  Discharge location:  TBD  Discharge Medications: not ordered  Follow-up Appointments: not scheduled    Entered by: NATHALIE Goldberg CNP on 05/31/2024  at 12:04

## 2024-06-01 PROCEDURE — 250N000013 HC RX MED GY IP 250 OP 250 PS 637: Performed by: CLINICAL NURSE SPECIALIST

## 2024-06-01 PROCEDURE — 124N000002 HC R&B MH UMMC

## 2024-06-01 RX ADMIN — RISPERIDONE 2 MG: 2 TABLET ORAL at 20:14

## 2024-06-01 RX ADMIN — DIVALPROEX SODIUM 1000 MG: 500 TABLET, FILM COATED, EXTENDED RELEASE ORAL at 20:14

## 2024-06-01 RX ADMIN — Medication 3 MG: at 23:00

## 2024-06-01 RX ADMIN — IPRATROPIUM BROMIDE 2 SPRAY: 42 SPRAY, METERED NASAL at 16:34

## 2024-06-01 RX ADMIN — GABAPENTIN 600 MG: 300 CAPSULE ORAL at 20:14

## 2024-06-01 RX ADMIN — FLUTICASONE PROPIONATE 2 SPRAY: 50 SPRAY, METERED NASAL at 08:02

## 2024-06-01 RX ADMIN — IPRATROPIUM BROMIDE 2 SPRAY: 42 SPRAY, METERED NASAL at 08:02

## 2024-06-01 RX ADMIN — TRAZODONE HYDROCHLORIDE 50 MG: 50 TABLET ORAL at 23:00

## 2024-06-01 RX ADMIN — IPRATROPIUM BROMIDE 2 SPRAY: 42 SPRAY, METERED NASAL at 20:13

## 2024-06-01 RX ADMIN — Medication 1 TABLET: at 08:02

## 2024-06-01 RX ADMIN — GABAPENTIN 300 MG: 300 CAPSULE ORAL at 08:02

## 2024-06-01 RX ADMIN — IPRATROPIUM BROMIDE 2 SPRAY: 42 SPRAY, METERED NASAL at 12:40

## 2024-06-01 ASSESSMENT — ACTIVITIES OF DAILY LIVING (ADL)
ADLS_ACUITY_SCORE: 41
ADLS_ACUITY_SCORE: 41
ORAL_HYGIENE: INDEPENDENT
ADLS_ACUITY_SCORE: 41
DRESS: SCRUBS (BEHAVIORAL HEALTH)
DRESS: SCRUBS (BEHAVIORAL HEALTH);INDEPENDENT
ADLS_ACUITY_SCORE: 41
HYGIENE/GROOMING: INDEPENDENT
ADLS_ACUITY_SCORE: 41
ORAL_HYGIENE: INDEPENDENT
ADLS_ACUITY_SCORE: 41
HYGIENE/GROOMING: INDEPENDENT
ADLS_ACUITY_SCORE: 41

## 2024-06-01 NOTE — PLAN OF CARE
"  Problem: Adult Inpatient Plan of Care  Goal: Plan of Care Review  Goal Outcome Evaluation:    Pt was active in the milieu. Pt had no acute behavioral concerns this shift. Pt does continue to be hyper Restorationist and was often seen cornering other patients about their Restorationist beliefs. \"Your name is in the bible.\" Pt was heard talking frequently about the \"[employer]\" that works on the unit in reference to a [facility] staff member.   Pt confused and tangential in speech and in thought processes. Pt showed some obsessive tendencies about cleanliness and being detail oriented. \"Are you learning how to play the game too? I need things to be very exact. You know, you're my nurse. I need everything to be very clean. Someone isn't flushing the toilet. Who isn't flushing the toilet? I need things to be clean.\" Pt was seen making his bed over and over again. Pt had to re clean the shower before he would shower. He cleaned the restroom multiple times today. Pt reported this was new behavior and he was not OCD about things prior to this episode.   Pt spoke multiple times about numbers. Pt also denied AVH, but appeared to be responding at times because pt would look like things were amusing and would be trying to hide a smile when speaking. Pt also said, \"Numbers are very important to the voices, I mean my mind.\"   Pt appeared to have impaired concentration and difficulty understanding directions. Pt was attempting to learn how to play a card game with peers. Pt was not able to understand the game despite multiple attempts from peers to help him. Pt was intrusive and showed poor boundaries with attempting to look at his peers' cards.   Pt was intrusive into staff conversations and would try to listen and insert himself into conversations that he was not involved in.   Pt was not heard saying sexual in nature this shift. No aggression on this shift.   Pt was demanding of staff. Pt was fixated on getting his requested Micronesian " "dressing and would ask staff to find him Australian dressing.   Pt had no physical complaints.   No prns     /73 (BP Location: Left arm, Patient Position: Standing, Cuff Size: Adult Regular)   Pulse 87   Temp 97.6  F (36.4  C) (Temporal)   Resp 16   Ht 1.854 m (6' 1\")   Wt 94.8 kg (209 lb)   SpO2 99%   BMI 27.57 kg/m       "

## 2024-06-01 NOTE — PROVIDER NOTIFICATION
06/01/24 0700   Sleep/Rest   Sleep/Rest/Relaxation appears asleep   Sleep Hygiene Promotion noise level reduced   Night Time # Hours 3.5 hours     . Patient woke up several times this night, hyperverbal, mosly religiously preoccupied. Tells writer he's anxious and worried people in the world do not know God.Provided safe environment and structure. Rounding every 15 mins with ongoing assessment of pt, maintaining a quiet environment while ensuring pt's safety.Slept for 3.5 hours

## 2024-06-02 PROCEDURE — 90853 GROUP PSYCHOTHERAPY: CPT

## 2024-06-02 PROCEDURE — 250N000013 HC RX MED GY IP 250 OP 250 PS 637: Performed by: CLINICAL NURSE SPECIALIST

## 2024-06-02 PROCEDURE — 124N000002 HC R&B MH UMMC

## 2024-06-02 RX ADMIN — IPRATROPIUM BROMIDE 2 SPRAY: 42 SPRAY, METERED NASAL at 08:49

## 2024-06-02 RX ADMIN — Medication 1 TABLET: at 08:49

## 2024-06-02 RX ADMIN — IPRATROPIUM BROMIDE 2 SPRAY: 42 SPRAY, METERED NASAL at 16:46

## 2024-06-02 RX ADMIN — DIVALPROEX SODIUM 1000 MG: 500 TABLET, FILM COATED, EXTENDED RELEASE ORAL at 20:32

## 2024-06-02 RX ADMIN — GABAPENTIN 600 MG: 300 CAPSULE ORAL at 20:32

## 2024-06-02 RX ADMIN — FLUTICASONE PROPIONATE 2 SPRAY: 50 SPRAY, METERED NASAL at 08:49

## 2024-06-02 RX ADMIN — IPRATROPIUM BROMIDE 2 SPRAY: 42 SPRAY, METERED NASAL at 20:01

## 2024-06-02 RX ADMIN — IPRATROPIUM BROMIDE 2 SPRAY: 42 SPRAY, METERED NASAL at 12:01

## 2024-06-02 RX ADMIN — RISPERIDONE 2 MG: 2 TABLET ORAL at 20:33

## 2024-06-02 RX ADMIN — GABAPENTIN 300 MG: 300 CAPSULE ORAL at 08:50

## 2024-06-02 ASSESSMENT — ACTIVITIES OF DAILY LIVING (ADL)
ADLS_ACUITY_SCORE: 41
ORAL_HYGIENE: INDEPENDENT
LAUNDRY: UNABLE TO COMPLETE
ORAL_HYGIENE: INDEPENDENT
ADLS_ACUITY_SCORE: 41
HYGIENE/GROOMING: SHOWER;INDEPENDENT
ADLS_ACUITY_SCORE: 41
DRESS: SCRUBS (BEHAVIORAL HEALTH)
ADLS_ACUITY_SCORE: 41
ADLS_ACUITY_SCORE: 41
DRESS: SCRUBS (BEHAVIORAL HEALTH);INDEPENDENT
HYGIENE/GROOMING: SHOWER;INDEPENDENT

## 2024-06-02 NOTE — PROGRESS NOTES
"C/o racing thoughts including sexual thoughts of his mother and sister in law. States he has to \"do everything perfectly\" to convince everyone that he is really a nice dalton and does not want to hurt anyone. Explained that these sexual thoughts are occurring at all times of the day and keep him from sleeping at night, that he is experiencing his \"sexual awakening.     Received Melatonin and Trazodone at 23:00 for c/o insomnia.  "

## 2024-06-02 NOTE — PLAN OF CARE
Problem: Sleep Disturbance  Goal: Adequate Sleep/Rest  Outcome: Progressing   Goal Outcome Evaluation:    Patient appeared to sleep 4.5 hours this night shift.  No prns or snacks given or requested.  No concerns were reported or noted.  Depakote level tomorrow.

## 2024-06-02 NOTE — PLAN OF CARE
"  Problem: Adult Inpatient Plan of Care  Goal: Plan of Care Review  Goal Outcome Evaluation:    Pt continued to maintain behavioral control this shift. No aggressive behaviors or sexual comments were noted. Pt was active in the milieu and social with peers and staff. Pt did continue to be intrusive in conversations not involving him. Pt denied anxiety and depression. Pt did endorse paranoia and said \"I'm not sure\" when asked about feeling safe in the hospital. Pt reported AH. He said that he hears the voices of family and friends telling him things, but did not want to elaborate further. He denied command hallucinations. Pt reported having false memories. He said that he remembered calling his wife on the phone and talking to her. His wife said that conversation never happened. Pt denied SI/SIB/HI. Pt reported feeling mistrustful of staff. Pt's wife reported that he not near his baseline yet. She said that he still appeared \"agitated\" \"intense\" \"paranoid\" \"hyper Episcopal.\"   Pt was medication compliant. Pt was pleasant upon approach. Pt was seen performing ADLs. Patient reported still feeling increased compulsions with cleanliness and do things in order. Pt's PO intake was adequate.   Pt also reported to this nurse that he felt significantly improved. He said he had been having improved sleep and that all he needed was to work on his coping skills and sleep and he would not need any change in his medications.       Last 24H PRN:     melatonin tablet 3 mg, 3 mg at 06/01/24 2300    traZODone (DESYREL) tablet 50 mg, 50 mg at 06/01/24 2300   /78 (BP Location: Left arm, Patient Position: Sitting, Cuff Size: Adult Regular)   Pulse 61   Temp 97.5  F (36.4  C) (Temporal)   Resp 16   Ht 1.854 m (6' 1\")   Wt 94.8 kg (209 lb)   SpO2 98%   BMI 27.57 kg/m            "

## 2024-06-02 NOTE — PLAN OF CARE
"Pt has a full range affect with a anxious mood. Pt was tangential during check in. Pt reports poor concentration and having a difficult time focusing. Pt is noted to be mistrustful of writer but that improved as the shift went on. Pt reports feeling paranoid throughout the shift. Pt endorses anxiety and denies any depression. Pt states he has good coping skills for anxiety and states he doesn't need to take meds for anxiety. No sexual behaviors noted this shift. Pt talked about his OCD and that he has been trying to \"get in a routine\".  Pt rates pain at 0/10. Pt reports no SI/HI/SIB and contracts for safety. Pt denies any hallucinations and not noted responding to any internal stimuli. Pt was medication compliant. No reported or observed medication side effects. Pt was visible on unit and social with peers. Continue current POC.      Plan of Care Reviewed With: patient Plan of Care Reviewed With: patient    Overall Patient Progress: no changeOverall Patient Progress: no change           "

## 2024-06-03 LAB — VALPROATE SERPL-MCNC: 39.3 UG/ML

## 2024-06-03 PROCEDURE — 124N000002 HC R&B MH UMMC

## 2024-06-03 PROCEDURE — 250N000013 HC RX MED GY IP 250 OP 250 PS 637: Performed by: CLINICAL NURSE SPECIALIST

## 2024-06-03 PROCEDURE — 36415 COLL VENOUS BLD VENIPUNCTURE: CPT | Performed by: CLINICAL NURSE SPECIALIST

## 2024-06-03 PROCEDURE — 80164 ASSAY DIPROPYLACETIC ACD TOT: CPT | Performed by: CLINICAL NURSE SPECIALIST

## 2024-06-03 PROCEDURE — 99232 SBSQ HOSP IP/OBS MODERATE 35: CPT | Performed by: CLINICAL NURSE SPECIALIST

## 2024-06-03 RX ADMIN — OLANZAPINE 10 MG: 10 TABLET, ORALLY DISINTEGRATING ORAL at 08:52

## 2024-06-03 RX ADMIN — GABAPENTIN 300 MG: 300 CAPSULE ORAL at 08:43

## 2024-06-03 RX ADMIN — IPRATROPIUM BROMIDE 2 SPRAY: 42 SPRAY, METERED NASAL at 13:43

## 2024-06-03 RX ADMIN — Medication 1 TABLET: at 08:43

## 2024-06-03 RX ADMIN — IPRATROPIUM BROMIDE 2 SPRAY: 42 SPRAY, METERED NASAL at 16:04

## 2024-06-03 RX ADMIN — RISPERIDONE 2 MG: 2 TABLET ORAL at 20:25

## 2024-06-03 RX ADMIN — DIVALPROEX SODIUM 1000 MG: 500 TABLET, FILM COATED, EXTENDED RELEASE ORAL at 20:24

## 2024-06-03 RX ADMIN — FLUTICASONE PROPIONATE 2 SPRAY: 50 SPRAY, METERED NASAL at 08:45

## 2024-06-03 RX ADMIN — IPRATROPIUM BROMIDE 2 SPRAY: 42 SPRAY, METERED NASAL at 20:25

## 2024-06-03 RX ADMIN — GABAPENTIN 600 MG: 300 CAPSULE ORAL at 20:25

## 2024-06-03 RX ADMIN — IPRATROPIUM BROMIDE 2 SPRAY: 42 SPRAY, METERED NASAL at 08:45

## 2024-06-03 ASSESSMENT — ACTIVITIES OF DAILY LIVING (ADL)
ADLS_ACUITY_SCORE: 41
ADLS_ACUITY_SCORE: 41
ORAL_HYGIENE: INDEPENDENT
ADLS_ACUITY_SCORE: 41
DRESS: INDEPENDENT;SCRUBS (BEHAVIORAL HEALTH)
LAUNDRY: UNABLE TO COMPLETE
ADLS_ACUITY_SCORE: 41
HYGIENE/GROOMING: INDEPENDENT

## 2024-06-03 NOTE — PLAN OF CARE
Problem: Adult Inpatient Plan of Care  Goal: Optimal Comfort and Wellbeing  Outcome: Not Progressing  Intervention: Provide Person-Centered Care  Recent Flowsheet Documentation  Taken 6/2/2024 1937 by Hitesh Kaur RN  Trust Relationship/Rapport:   care explained   choices provided   emotional support provided   empathic listening provided   questions answered   questions encouraged   reassurance provided   thoughts/feelings acknowledged   Goal Outcome Evaluation:    Plan of Care Reviewed With: patient        Patient in the milieu throughout the evening at times appearing calm and playing cards with peers, and quickly transitioning to tense and paranoid stating fear of others wanting to kill him though not clear who he was referencing to. Patient with reassurance and use of coping techniques including deep breathing and taking time away in his room reporting therapeutic relief. Patient at this time did not want PRN medications though did request his HS medications early when reporting paranoia later in the evening. On call provider Dr. Caro contacted with patients concern that someone might be trying to kill him here, he ordered that 15 minute safety checks could be completed through the window to not increase patients paranoia.     Patient affect throughout the evening labile and at times tense and guarded. Patient reporting goal for improvement of symptoms with hopes to improve his sleep. He does report improved sleep last night that he relates to melatonin and trazodone. Patient denies SI/SIB, AH/VH, depression, or thoughts of harming others. Patient still demonstrating hyper Adventism behaviors and overheard trying to relate [employer] to hospitalization and current symptoms.     Patient cooperative with vital sign assessments. Patients blood pressure slightly elevated at 144/79 though was taken when patient was appearing tense and reporting paranoia. Patient denied and had no observed physical symptoms.  Patient diet good eating 100% of dinner and snacks. Patient independent with requesting and accepting of HS medications with no stated or observed side effects. Patient independent with identifying needs and completing ADL's.

## 2024-06-03 NOTE — CARE PLAN
"Pt did not attend groups today, though paced with writer in the smith wanting to explain to me that he \"figured out the matrix\".  Pt was starting to hover over a peer on the exercise bike, though was redirectable, and started to explain to writer that he figured out that \"everyone is Alevism, whether they are baptized or not\", and began to explain how he was letting people know this.  Pt was reminded that Alevism and spirituality are very personal to people, and it would be more appropriate to not discuss with others/not tell them they are Alevism, as that is not his call.  Pt then asked if he could meet with own  on the unit, and was encouraged to talk to nursing staff about having a visit from his personal .  "

## 2024-06-03 NOTE — PLAN OF CARE
BEH IP Unit Acuity Rating Score (UARS)  Patient is given one point for every criteria they meet.    CRITERIA SCORING   On a 72 hour hold, court hold, committed, stay of commitment, or revocation. 0    Patient LOS on BEH unit exceeds 20 days. 0  LOS: 5   Patient under guardianship, 55+, otherwise medically complex, or under age 11. 0   Suicide ideation without relief of precipitating factors. 0   Current plan for suicide. 0   Current plan for homicide. 0   Imminent risk or actual attempt to seriously harm another without relief of factors precipitating the attempt. 0   Severe dysfunction in daily living (ex: complete neglect for self care, extreme disruption in vegetative function, extreme deterioration in social interactions). 1   Recent (last 7 days) or current physical aggression in the ED or on unit. 0   Restraints or seclusion episode in past 72 hours. 0   Recent (last 7 days) or current verbal aggression, agitation, yelling, etc., while in the ED or unit. 0   Active psychosis. 1   Need for constant or near constant redirection (from leaving, from others, etc).  0   Intrusive or disruptive behaviors. 0   Patient requires 3 or more hours of individualized nursing care per 8-hour shift (i.e. for ADLs, meds, therapeutic interventions). 0   TOTAL 2

## 2024-06-03 NOTE — PLAN OF CARE
"  Problem: Psychotic Signs/Symptoms  Goal: Increased Participation and Engagement (Psychotic Signs/Symptoms)  Intervention: Facilitate Participation and Engagement  Flowsheets (Taken 6/3/2024 1718)  Supportive Measures:   active listening utilized   positive reinforcement provided   decision-making supported   self-care encouraged  Goal: Enhanced Social, Occupational or Functional Skills (Psychotic Signs/Symptoms)  Flowsheets (Taken 6/3/2024 1718)  Mutually Determined Action Steps (Enhanced Social, Occupational or Functional Skills): (Attending groups)   other (see comments)   identifies personal strengths    Pt visible in the milieu, brightens on approach. Encouraged to attend OT group. Pt stated, \"I don't know I'm feeling overwhelmed\". \"I'm afraid I'll say something paranoid during that\". Pt acknowledged, supported and attended. Writer attended to observe/ support. Pt appeared reluctant but learned game Sequence. Participated executing strategic game decisions with assistance in game 1. Pt won game. Pt executed more autonomously in game #2 and won game. Pt reluctantly acknowledged group was a fun distraction. No paranoid statements observed by writer. Appeared fearful when approached half hour later in room. Declined to elaborated at that time. Ate 100% of dinner and stated, \"it is nice to be getting my appetite back\". Brother visited. Denies depression, SI, SIB, HI. Endorsed anxiety, \"at times\". Endorsed nonspecific AH. Religiously preoccupied.    Blood pressure 120/65, pulse 66, temperature 97.4  F (36.3  C), temperature source Temporal, resp. rate 16, height 1.854 m (6' 1\"), weight 94.8 kg (209 lb), SpO2 99%.  Denies pain.                        "

## 2024-06-03 NOTE — CARE PLAN
Rehab Group    Start time: 1115  End time: 1200    Patient time total: 30   minutes    attended partial group     #3 attended   Group Type: recreation   Group Topic Covered: coping skills, social skills, and healthy leisure time     Group Session Detail:  Pt attended the OT leisure group.  Friendly interactions with peers, attentive to activity, engaged in visual spatial problem solving game.  Missed portion of group due to meeting with provider.   Much more relaxed up on return from meeting after finding out positive news.     Patient Response/Contribution:  positive affect, cooperative, able to engage in activity independently       Patient Detail:    See above        No Charge    Patient Active Problem List   Diagnosis    Marijuana dependence (H)    Alcohol abuse    Bipolar disorder, current episode manic severe with psychotic features (H)    Chantal (H)    Schizoaffective disorder, bipolar type (H)    Psychosis, unspecified psychosis type (H)

## 2024-06-03 NOTE — PLAN OF CARE
"Team Note Due:  Thursday     Assessment/Intervention/Current Symtoms and Care Coordination:  Chart review and met with team, discussed pt progress, symptomology, and response to treatment. Discussed the discharge plan and any potential impediments to discharge.    Met with Basil to discuss hospitalization. He reported having a challenging weekend. He told writer that he needed to place his feet in a certain direction when talking to people to harness their power, but acknowledged how that thought didn't make sense. He stated he was trying to do everything \"perfectly\" and it was stressful. He stated it was hard for him to concentrate. He stated he wanted to be able to sleep. Writer provided psycho-education and explored coping skills. He continues to endorse that square breathing is helpful. He stated he was worried he was going to be \"in the psych palma\" forever, writer reassured him that our goal is to make sure he can go home with his family once he is on the right medications and is not experiencing any concerning symptoms.      Discharge Plan or Goal:  When patient's psychotic symptoms have decreased discharge to his home with family will be facilitated      Barriers to Discharge:  Currently presents with symptoms of psychosis. Medication management ongoing.      Referral Status:  Referral needs pending as patient stabilizes     Legal Status:  Voluntary     Contacts:  Wife: Esme Andrews, [phone] (Pt wants only the provider to speak to his family)      Upcoming Meetings and Dates/Important Information and next steps:  Symptom stabilization  Discharge planning   "

## 2024-06-03 NOTE — CARE PLAN
Rehab Group    Start time: 1030   End time: 1115  Patient time total: 45 minutes    attended full group    #2 attended   Group Type: general health and coping   Group Topic Covered: coping skills, problem solving, and balanced lifestyle       Group Session Detail:  Mental health management group focused on a further exploration of how pt is feeling based on responses from the following questions/worksheet prompts.    Responses were as follows:   I feel, I wish, I think, I nned, I hope, I want     Patient Response/Contribution:  Able to recall/repeat information presented, Cooperative with task, Listened actively, and Organized       Patient Detail:    Pt appropriately expressed concern regarding long term impact of being on a commitment and having this information be public knowledged if employers were to look it up.  Pt brought up HIPAA, yet public record of his confidential mental health.          Train & Education Service Per Session 45 + Minutes () OT Group code    Patient Active Problem List   Diagnosis    Marijuana dependence (H)    Alcohol abuse    Bipolar disorder, current episode manic severe with psychotic features (H)    Chantal (H)    Schizoaffective disorder, bipolar type (H)    Psychosis, unspecified psychosis type (H)

## 2024-06-03 NOTE — PLAN OF CARE
"Goal Outcome Evaluation:    Patient was hyper most of the shift  but cooperative. His affect was euphoric. His thinking was disorganized and he was hyper Mandaeism. Pt was making delusional statements about having protection from the mental bars on doors and endorsed still having sexual thoughts but didn't want to get in detail. Pt needed constant reassurance about his safety and validation about his where about. He was fearful and paranoid of the other patients and units,This morning he didn't know what to do with his food even though he said he was hungry and same thing with the bathroom. Staff had to open the bathroom door and redirect him. PRN zyprexa was given and he reported it didn't help. Pt was med complaint and no reported SE/was observed. He was visible in the milieu watching tv and  conversating with staff. He did endorsed anxiety, depression, paranoia and delusions himself. He denies SI/SIB/HI.No behavioral concerns this shift. He did take a nap for 2 hours. No medical concerns. Nutrition was adequate.       Last 24H PRN:     OLANZapine zydis (zyPREXA) ODT tab 10 mg, 10 mg at 06/03/24 0852 **OR** OLANZapine (zyPREXA) injection 10 mg     /66 (Patient Position: Sitting)   Pulse 80   Temp 97.4  F (36.3  C) (Temporal)   Resp 20   Ht 1.854 m (6' 1\")   Wt 94.8 kg (209 lb)   SpO2 97%   BMI 27.57 kg/m     "

## 2024-06-03 NOTE — PROGRESS NOTES
Basil has been awake a good portion of the night shift tonight.  He has been in the lounge, standing in the hallway looking at himself in the mirror toward the exit doors while making strange noises.  He declines any prn meds and has been frequently talking about ninjas and the [employer]IV[place] to himself and to staff tonight.    Basil appeared to sleep a total of 4 hours tonight.  Continues to talk about not being a ninja so he feels he is unable to do some things.  Seems confused and paranoid. No snacks or prns given or requested. No aggression noted. Calm. Has a Depakote level to be done this morning.

## 2024-06-03 NOTE — PROGRESS NOTES
"The patient's care was discussed with the treatment team during the daily team meeting and/or staff's chart notes were reviewed.  Staff report patient slept 4 hours Staff report that patient was tense and paranoid with the fear that others are planning to kill him, though not referencing anyone in particular. Patient struggling cognitively and delusionally decompensating manifesting inappropriate sexual symptoms/behaviors.      Upon interview, the patient reported that he had a very eventful weekend and that the TV was telling him that the  [place] on the [employer] were at war with each other.  Writer asked if the patient was not directly hearing the news, but patient vaguely refuted that stating \"my paranoia has increased.\"  Patient observed to be disorganized and tangential, seems to be very anxious and continuing to deep breathe during this assessment.    Patient requested for Juan Hernandez his  stating that he would have loved to have talk to him on 1:1 basis.  Patient believes that this will bring him some relief, he called this person \"my lidia father\" writer asked if patient would like a  to visit him but patient declined.  Patient asked if writer is a [employer] by [employer] and writer answered in the affirmative.  Patient asked if writer could pray for him and share some words of encouragement with him from the scripture.  Writer reassured the patient that should feels safe here in the hospital as no one is coming to hurt him but that everybody is working to assist him to achieve his recovery potential.  Writer prayed for the patient per his request and he reported feeling less anxious.  Patient reported that this medication is working with no side effects however he shared that the diagnosis of paranoid is concerning to him.  Patient shared that his wife visited during the weekend and that the visit went well.  Writer informed the patient that he spoke with his wife and collected some collateral information " from her per his request.    Patient was torn in the middle of an opinion on whether or not to call his supervisor at work ([employer]) he discussed about  his promotion.  Writer informed the patient that it would sound inappropriate to do that from the hospital.  Patient did not share the motivation behind his thoughts, but he firmly believed that he is due for a promotion having worked for 7 years.  Writer observed that patient was at times having difficulty finding his words and that patient was very emotional from time to time, patient remained labile alternating laughter with crying throughout this assessment session.  Patient endorses auditory hallucinations, visual hallucinations, denies suicidal ideation/homicidal ideation or thoughts of self-harm.     Suicidal ideation: denies current or recent suicidal ideation or behaviors.     Homicidal ideation: denies current or recent homicidal ideation or behaviors.     Psychotic symptoms: Yes Patient endorses AH, VH, paranoia, delusions.      Medication side effects reported: No significant side effects.     Acute medical concerns: none     Other issues reported by patient: Patient had no further questions or concerns.            Medications:      Inpatient Administered Meds             Current Facility-Administered Medications   Medication Dose Route Frequency Provider Last Rate Last Admin    divalproex sodium extended-release (DEPAKOTE ER) 24 hr tablet 1,000 mg  1,000 mg Oral At Bedtime Haroon Parker APRN CNP   1,000 mg at 05/30/24 2109    fluticasone (FLONASE) 50 MCG/ACT spray 2 spray  2 spray Both Nostrils Daily Haroon Parker APRN CNP   2 spray at 05/31/24 0835    gabapentin (NEURONTIN) capsule 300 mg  300 mg Oral Daily Haroon Parker APRN CNP   300 mg at 05/31/24 0833    gabapentin (NEURONTIN) capsule 600 mg  600 mg Oral At Bedtime Haroon Parker APRN CNP   600 mg at 05/30/24 2109    ipratropium (ATROVENT) 0.06 % spray 2 spray  2 spray Both  "Nostrils 4x Daily KeithHaroon APRN CNP   2 spray at 05/31/24 0835    multivitamin w/minerals (THERA-VIT-M) tablet 1 tablet  1 tablet Oral Daily Keith NATHALIE Patiño CNP   1 tablet at 05/31/24 0833    risperiDONE (risperDAL) tablet 2 mg  2 mg Oral At Bedtime Keith NATHALIE Patiño CNP   2 mg at 05/30/24 2109              Allergies:      Allergies         Allergies   Allergen Reactions    Seasonal Allergies Other (See Comments)       Congestion. Managed with meds.              Labs:   Recent Results   No results found for this or any previous visit (from the past 24 hour(s)).          Psychiatric Examination:      /80 (BP Location: Left arm, Patient Position: Sitting, Cuff Size: Adult Regular)   Pulse 70   Temp 97.7  F (36.5  C) (Temporal)   Resp 16   Ht 1.854 m (6' 1\")   Wt 94.8 kg (209 lb)   SpO2 99%   BMI 27.57 kg/m    Weight is 209 lbs 0 oz  Body mass index is 27.57 kg/m .     Weight over time:        Vitals:     05/26/24 1216 05/26/24 2209 05/30/24 0900   Weight: 91.6 kg (202 lb) 90.7 kg (200 lb) 94.8 kg (209 lb)         Orthostatic Vitals           Most Recent       Sitting Orthostatic /80 05/31 0800     Sitting Orthostatic Pulse (bpm) 70 05/31 0800     Standing Orthostatic /84 05/31 0800     Standing Orthostatic Pulse (bpm) 82 05/31 0800                   Cardiometabolic risk assessment. 05/31/24        Reviewed patient profile for cardiometabolic risk factors     Date taken /Value  REFERENCE RANGE   Abdominal Obesity  (Waist Circumference)   See nursing flowsheet Women ?35 in (88 cm)   Men ?40 in (102 cm)       Triglycerides  No results found for: \"TRIG\"     ?150 mg/dL (1.7 mmol/L) or current treatment for elevated triglycerides   HDL cholesterol  No results found for: \"HDL\"]    Women <50 mg/dL (1.3 mmol/L) in women or current treatment for low HDL cholesterol  Men <40 mg/dL (1 mmol/L) in men or current treatment for low HDL cholesterol      Fasting plasma glucose (FPG)     "   Lab Results   Component Value Date     GLC 90 05/29/2024      06/02/2015        FPG ?100 mg/dL (5.6 mmol/L) or treatment for elevated blood glucose   Blood pressure      BP Readings from Last 3 Encounters:   05/31/24 122/80   06/09/15 128/83   06/14/13 129/74    Blood pressure ?130/85 mmHg or treatment for elevated blood pressure   Family History  See family history      Mental Status Exam:  Appearance: awake, alert, dressed in hospital scrubs, and appeared as age stated  Attitude:  cooperative  Eye Contact:  good  Mood:  anxious, labile  Affect:  mood congruent, anxious, labile.   Speech:  clear, coherent  Language: fluent and intact in English  Psychomotor, Gait, Musculoskeletal:  no evidence of tardive dyskinesia, dystonia, or tics  Throught Process:  disorganized, illogical, and tangental  Associations:  loosening of associations present  Thought Content:  no evidence of suicidal ideation or homicidal ideation  Insight:  fair  Judgement:  limited  Oriented to:  time, person, and place  Attention Span and Concentration:  intact  Recent and Remote Memory:  fair  Fund of Knowledge:  low-normal             Precautions:            Behavioral Orders   Procedures    Code 1 - Restrict to Unit    [facility] Extended Care       Until discharge, Extended Care to offer psychotherapeutic services to mental health patients boarding for admission or stabilization. These services are to include but are not limited to: individual psychotherapy, diagnostic assessment, case management and care planning, safety planning, etc. This may include up to 1 visit per day. If patient is physically located at [facility] or [facility], group psychotherapy up to 2 time per day may be offered.     Routine Programming       As clinically indicated    Sexual precautions    Status 15       Every 15 minutes.    Suicide precautions: Suicide Risk: HIGH; Clinical rationale to override score: modification to the care environment, lack of access to a plan for  "self-harm, Collateral information supporting Suicidal/self-harm behaviors       Patients on Suicide Precautions should have a Combination Diet ordered that includes a Diet selection(s) AND a Behavioral Tray selection for Safe Tray - with utensils, or Safe Tray - NO utensils          Order Specific Question:   Suicide Risk       Answer:   HIGH       Order Specific Question:   Clinical rationale to override score:       Answer:   modification to the care environment       Order Specific Question:   Clinical rationale to override score:       Answer:   lack of access to a plan for self-harm       Order Specific Question:   Clinical rationale to override score:       Answer:   Collateral information supporting Suicidal/self-harm behaviors    Withdrawal precautions           Diagnoses:         Chantal (H)  Psychosis, unspecified psychosis type (H)  Severe manic bipolar I disorder with psychotic features (H)     Clinically Significant Risk Factors                          # Overweight: Estimated body mass index is 27.57 kg/m  as calculated from the following:    Height as of this encounter: 1.854 m (6' 1\").    Weight as of this encounter: 94.8 kg (209 lb)., PRESENT ON ADMISSION     # Financial/Environmental Concerns: none               Assessment & Plan:      Assessment and hospital summary:  This is a 33 year old male with history of Bipolar disorder, current episode manic severe with psychotic features (H). Patient presented to the ED for psychiatric evaluation.  Patient reported that his mother in-law and sister in-laws are not comfortable with him around them in the house due to the sexual thoughts and arousal toward them.  Patient states he is having most of his sexual thoughts  and arousal about his mother in law, sister in-law and other women.           Today's Changes: 6/3  No medication changes       Target psychiatric symptoms and interventions:  . Education given regarding diagnostic and treatment options with " risks, benefits and alternatives and adequate verbalization of understanding.  2. Admitted on 5/29/24 to [facility]  Precautions placed include: Suicide precautions, sexual precautions, withdrawal precautions.  Code 1-restricted to unit     3. Medications: 5/30/2024: [facility] medications reviewed.     4. Medications:  Hospital  Diazepam (Valium) tablet 5-20 mg oral every 30 minutes as needed order, this according to patient MSSA score  Divalproex sodium extended release(Depakote ER) 24 however tablet 1000 mg oral at bedtime  Fluticasone (Flonase) 50 mcg/ACT spray 2 sprays, both nostrils daily  Full code  Gabapentin (Neurontin) capsule 300 mg oral daily  Gabapentin (Neurontin) capsule 600 mg at bedtime  Hydroxyzine HCl (Atarax) tablet 25 mg oral every 6 hours as needed order adjuvant pain   Or  Hydroxyzine HCl (Atarax) tablet 50 mg oral every 6 hours as needed order, adjuvant pain  Ipratropium (Atrovent) 0.06% spray, 2 sprays both nostrils 4 times daily  Lorazepam (Ativan) tablet 1 mg oral at bedtime as needed sleep  Melatonin tablet 3 mg oral at bedtime as needed sleep  [facility]  extended care  MSSA score vital signs and pain assessment on admission and per protocol  Multivitamin W/minerals (Thera-vit-M) tablet 1 tablet oral daily  No COVID-19 virus (coronavirus screening test needed or already ordered/completed  Notify physician-alcohol withdrawal MSSA focused  Notify provider if signs of substance withdrawal  Olanzapine Zydis (Zyprexa) ODT tab 10 mg oral 3 times daily as needed agitation, aggression  Or  Olanzapine (Zyprexa) injection 10 mg intramuscular 3 times daily as needed agitation, aggression  Patient CARE other 1-1 acuity staff for severe intrusiveness, SI  Regular diet adult  Requesting IP [facility] date  Risperidone (Risperdal) tablet 2 mg oral at bedtime  Routine programming  Sexual precaution  Status 15 every 15 minutes check  Suicide precautions: Suicide risk hide  Trazodone (Desyrel) tablet 50 mg oral at  bedtime as needed sleep  Valproic acid a.m. draw draw to be done before this is given  Vital signs and pain assessment  Voluntary  With the patient per unit routine  Withdrawal precautions  5. Consultations:  Hospitalist to follow as needed.  IM to follow for every medical condition.  6. Structure and Supervision  [facility] [address]  Barriers to Discharge:  7.   is following in regards to collecting and reviewing collateral information, referrals and disposition planning.  Legal: Voluntary  Referrals: CTC to coordinate all referrals  Care Coordination: CTC to coordinate care with outside providers  Placement: CTC to facilitate placement following symptom stabilization  Anticipated Discharge: 5 to 7 days  . Further treatment programming to be determined throughout the hospital course.     Risk Assessment: [facility] RISK ASSESSMENT: Patient able to contract for safety and Patient on precautions  Risks, benefits, and alternatives discussed at length with patient.      Acute Medical Problems and Treatments:  Acute medical concerns:  - No acute medical concerns     Pertinent labs/imaging:  Recent Results         Recent Results (from the past 168 hour(s))   Urine Drug Screen Panel     Collection Time: 05/26/24  7:43 PM   Result Value Ref Range     Amphetamines Urine Screen Negative Screen Negative     Barbituates Urine Screen Negative Screen Negative     Benzodiazepine Urine Screen Negative Screen Negative     Cannabinoids Urine Screen Negative Screen Negative     Cocaine Urine Screen Negative Screen Negative     Fentanyl Qual Urine Screen Negative Screen Negative     Opiates Urine Screen Negative Screen Negative     PCP Urine Screen Negative Screen Negative   Comprehensive metabolic panel     Collection Time: 05/29/24  9:03 PM   Result Value Ref Range     Sodium 142 135 - 145 mmol/L     Potassium 4.9 3.4 - 5.3 mmol/L     Carbon Dioxide (CO2) 30 (H) 22 - 29 mmol/L     Anion Gap 8 7 - 15 mmol/L     Urea Nitrogen 15.7  6.0 - 20.0 mg/dL     Creatinine 0.77 0.67 - 1.17 mg/dL     GFR Estimate >90 >60 mL/min/1.73m2     Calcium 9.6 8.6 - 10.0 mg/dL     Chloride 104 98 - 107 mmol/L     Glucose 90 70 - 99 mg/dL     Alkaline Phosphatase 42 40 - 150 U/L     AST 12 0 - 45 U/L     ALT 15 0 - 70 U/L     Protein Total 7.2 6.4 - 8.3 g/dL     Albumin 4.8 3.5 - 5.2 g/dL     Bilirubin Total 0.3 <=1.2 mg/dL   Valproic acid (Depakote level)     Collection Time: 05/29/24  9:03 PM   Result Value Ref Range     Valproic acid 31.0 (L)   ug/mL   CBC with platelets and differential     Collection Time: 05/29/24  9:03 PM   Result Value Ref Range     WBC Count 7.5 4.0 - 11.0 10e3/uL     RBC Count 4.26 (L) 4.40 - 5.90 10e6/uL     Hemoglobin 13.0 (L) 13.3 - 17.7 g/dL     Hematocrit 40.1 40.0 - 53.0 %     MCV 94 78 - 100 fL     MCH 30.5 26.5 - 33.0 pg     MCHC 32.4 31.5 - 36.5 g/dL     RDW 12.5 10.0 - 15.0 %     Platelet Count 223 150 - 450 10e3/uL     % Neutrophils 48 %     % Lymphocytes 39 %     % Monocytes 10 %     % Eosinophils 2 %     % Basophils 1 %     % Immature Granulocytes 0 %     NRBCs per 100 WBC 0 <1 /100     Absolute Neutrophils 3.6 1.6 - 8.3 10e3/uL     Absolute Lymphocytes 2.9 0.8 - 5.3 10e3/uL     Absolute Monocytes 0.7 0.0 - 1.3 10e3/uL     Absolute Eosinophils 0.1 0.0 - 0.7 10e3/uL     Absolute Basophils 0.0 0.0 - 0.2 10e3/uL     Absolute Immature Granulocytes 0.0 <=0.4 10e3/uL     Absolute NRBCs 0.0 10e3/uL               Behavioral/Psychological/Social:  - Encourage unit programming     Safety:  - Continue precautions as noted above  - Status 15 minute checks     Legal Status: voluntary     Disposition Plan   Reason for ongoing admission: poses an imminent risk to self and poses an imminent risk to others  Discharge location:  TBD  Discharge Medications: not ordered  Follow-up Appointments: not scheduled     Entered by: NATHALIE Goldberg CNP on 05/31/2024  at 12:04

## 2024-06-03 NOTE — PLAN OF CARE
06/02/24 3018   Group Therapy Session   Group Attendance attended group session   Time Session Began 1600   Time Session Ended 1645   Total Time (minutes) 45   Total # Attendees 4   Group Type psychotherapeutic   Group Topic Covered coping skills/lifestyle management;emotions/expression   Group Session Detail CTC-Group members completed/discussed worksheets on job needs inventor. Each participant listed their physical, social, emotional, and cognitive needs.   Patient Response/Contribution cooperative with task;discussed personal experience with topic;offered helpful suggestions to peers;unable to comprehend information;verbalizations were off topic   Patient Participation Detail Patient presented to group was pleasant, engaged, and checked in feeling good.

## 2024-06-04 PROCEDURE — H2032 ACTIVITY THERAPY, PER 15 MIN: HCPCS

## 2024-06-04 PROCEDURE — 250N000013 HC RX MED GY IP 250 OP 250 PS 637: Performed by: CLINICAL NURSE SPECIALIST

## 2024-06-04 PROCEDURE — 124N000002 HC R&B MH UMMC

## 2024-06-04 PROCEDURE — 99232 SBSQ HOSP IP/OBS MODERATE 35: CPT | Performed by: CLINICAL NURSE SPECIALIST

## 2024-06-04 RX ORDER — LORAZEPAM 1 MG/1
1 TABLET ORAL 2 TIMES DAILY PRN
Status: DISCONTINUED | OUTPATIENT
Start: 2024-06-04 | End: 2024-07-03 | Stop reason: HOSPADM

## 2024-06-04 RX ADMIN — LORAZEPAM 1 MG: 1 TABLET ORAL at 13:08

## 2024-06-04 RX ADMIN — IPRATROPIUM BROMIDE 2 SPRAY: 42 SPRAY, METERED NASAL at 17:27

## 2024-06-04 RX ADMIN — Medication 3 MG: at 22:50

## 2024-06-04 RX ADMIN — IPRATROPIUM BROMIDE 2 SPRAY: 42 SPRAY, METERED NASAL at 19:52

## 2024-06-04 RX ADMIN — RISPERIDONE 2 MG: 2 TABLET ORAL at 19:53

## 2024-06-04 RX ADMIN — TRAZODONE HYDROCHLORIDE 50 MG: 50 TABLET ORAL at 02:15

## 2024-06-04 RX ADMIN — DIVALPROEX SODIUM 1000 MG: 500 TABLET, FILM COATED, EXTENDED RELEASE ORAL at 19:52

## 2024-06-04 RX ADMIN — GABAPENTIN 600 MG: 300 CAPSULE ORAL at 19:52

## 2024-06-04 RX ADMIN — IPRATROPIUM BROMIDE 2 SPRAY: 42 SPRAY, METERED NASAL at 13:09

## 2024-06-04 RX ADMIN — OLANZAPINE 10 MG: 10 TABLET, ORALLY DISINTEGRATING ORAL at 10:36

## 2024-06-04 RX ADMIN — Medication 1 TABLET: at 10:36

## 2024-06-04 RX ADMIN — GABAPENTIN 300 MG: 300 CAPSULE ORAL at 10:36

## 2024-06-04 ASSESSMENT — ACTIVITIES OF DAILY LIVING (ADL)
ORAL_HYGIENE: INDEPENDENT
HYGIENE/GROOMING: INDEPENDENT
ORAL_HYGIENE: INDEPENDENT
ADLS_ACUITY_SCORE: 45
ADLS_ACUITY_SCORE: 41
ADLS_ACUITY_SCORE: 41
ADLS_ACUITY_SCORE: 45
HYGIENE/GROOMING: INDEPENDENT
ADLS_ACUITY_SCORE: 41
ADLS_ACUITY_SCORE: 45
ADLS_ACUITY_SCORE: 41
ADLS_ACUITY_SCORE: 41
ADLS_ACUITY_SCORE: 45
ADLS_ACUITY_SCORE: 45
DRESS: INDEPENDENT
ADLS_ACUITY_SCORE: 41
ADLS_ACUITY_SCORE: 41
ADLS_ACUITY_SCORE: 45
ADLS_ACUITY_SCORE: 45
ADLS_ACUITY_SCORE: 41
ADLS_ACUITY_SCORE: 41
ADLS_ACUITY_SCORE: 45
ADLS_ACUITY_SCORE: 41
LAUNDRY: UNABLE TO COMPLETE
ADLS_ACUITY_SCORE: 41
DRESS: INDEPENDENT
ADLS_ACUITY_SCORE: 45
ADLS_ACUITY_SCORE: 45
ADLS_ACUITY_SCORE: 41
ADLS_ACUITY_SCORE: 41

## 2024-06-04 NOTE — PROGRESS NOTES
"  Rehab Group    Start time: 1615  End time: 1700  Patient time total: 40 minutes    attended partial group     #3 attended   Group Type: relaxation and recreation   Group Topic Covered: balanced lifestyle and healthy leisure time       Group Session Detail:  Ot Leisure Group     Patient Response/Contribution:  Cooperative with task and Did not share thoughts verbally       Patient Detail:    Pt attended topic group today with encouragement and was cooperative.  Pt needed multiple directions repeated to understand  steps of the game, but once more familiar it became easier.  Pt was not social with peers and staff and when approached with a question pt responded \"I feel too paranoid to answer that.\"  Pt's affect was blunted and attention span was limited.  Pt will continue to be encouraged to attend groups for ongoing assessment and to work toward goals identified on plan of care.         No Charge    Patient Active Problem List   Diagnosis    Marijuana dependence (H)    Alcohol abuse    Bipolar disorder, current episode manic severe with psychotic features (H)    Chantal (H)    Schizoaffective disorder, bipolar type (H)    Psychosis, unspecified psychosis type (H)      "

## 2024-06-04 NOTE — PLAN OF CARE
BEH IP Unit Acuity Rating Score (UARS)  Patient is given one point for every criteria they meet.    CRITERIA SCORING   On a 72 hour hold, court hold, committed, stay of commitment, or revocation. 0    Patient LOS on BEH unit exceeds 20 days. 0  LOS: 6   Patient under guardianship, 55+, otherwise medically complex, or under age 11. 0   Suicide ideation without relief of precipitating factors. 0   Current plan for suicide. 0   Current plan for homicide. 0   Imminent risk or actual attempt to seriously harm another without relief of factors precipitating the attempt. 0   Severe dysfunction in daily living (ex: complete neglect for self care, extreme disruption in vegetative function, extreme deterioration in social interactions). 1   Recent (last 7 days) or current physical aggression in the ED or on unit. 0   Restraints or seclusion episode in past 72 hours. 0   Recent (last 7 days) or current verbal aggression, agitation, yelling, etc., while in the ED or unit. 1   Active psychosis. 1   Need for constant or near constant redirection (from leaving, from others, etc).  0   Intrusive or disruptive behaviors. 0   Patient requires 3 or more hours of individualized nursing care per 8-hour shift (i.e. for ADLs, meds, therapeutic interventions). 0   TOTAL 3

## 2024-06-04 NOTE — PROGRESS NOTES
"The patient's care was discussed with the treatment team during the daily team meeting and/or staff's chart notes were reviewed.  Staff report patient slept 6.25 hours, still sleeping this morning, paranoid and still endorsing AH of unknown contents. Patient appears anxious and religiously preoccupied.      [place] [facility] [place] staff attempted to give him medications for agitation, patient was paranoid and delusional talking about not being safe here that he is planning to move to [place] and not to see the staff here no more patient stated \"I may come to a new psych unit but not here.\"  Patient was angry, using \"f words\" freely calling staff \"fucking shit\" patient was very tangential, disorganized and at times circumstantial. He remained focus on Magdiel, stating that  Sylvia and Magdiel can figure it out (singled them out as the only people he trust).  Patient received olanzapine ODT 10 mg tab and his scheduled gabapentin 300 mg and agreed to be assessed by this writer.  Upon interview, patient reported that he wanted to call the emergency number because he does not feel safe here, he stated he would not commit suicide but will allow other people to kill him.  Patient was reassured that he is in the right place and that if he called the emergency number this is where they will bring him for care.  As the assessment continues, the patient stood up from the bed and turned his back towards this writer with his arms outstretched  facing the window.  Writer asked the patient what that meant?  Patient responded with some delusional context stating that the window is listening to him and the television is telling him to get out of here.  Patient was constantly focused on a particular patient on the unit, mentioning the patient's name many times that he wants to be with him.  Most of what the patient was saying was gibberish, making no sense.    Patient continue with this delusional statement he stated \"if I put " "my feet in front of Magdiel,, I will get a new doctor.\"  Patient was asking this writer if he could say Magdiel whenever he wants.  Patient reported \"Magdiel is a tough dalton, he is the #1 dalton in this world.\"  I should be fine now that Magdiel is here.  Writer informed the patient that his request to talk to Juan Hernandez his  has been granted and we will invite Juan to come and see him.  Patient responded he does not want to see Juan again but Magdiel. Then became emotional, gripped with fear and Shuckling believing that father Juan Hernandez, Magdiel, would be killed, sobbing that if Juan Tate is killed, he will have to look for another .  Patient was wondering who will be the next  to help him, he mentioned Father Kai then said no father Kai is dead.    Writer did much to assure the patient, who kept voicing delusional statements stating \"Magdiel is a tough dalton, I will have conversation with Magdiel and tell him about the ALIVIA, and that I am from [place], [place], [place].  Writer asked the patient if he was hearing voices and patient answered in the affirmative saying \"yes the voice is telling me to kill my dad who is the 50.\"  Patient mentions some other names like Anurag Andrews, Madyson Andrews, Charles and Vimal, these seems to be the names of his family members.  Writer assured the patient that he is safe here and no one will hurt him.  Patient was instructed to deep breathe, he did this for some time and started getting calmer, patient also continues to recite the Gnosticist prayer \"Madyson mother of Edilberto,  are ye among women, and barryed is the child you will bear.\"     Suicidal ideation: denies current or recent suicidal ideation or behaviors.     Homicidal ideation: denies current or recent homicidal ideation or behaviors.     Psychotic symptoms: Yes Patient endorses AH, VH, paranoia, delusions.      Medication side effects reported: No significant side effects.     Acute medical concerns: none     Other " "issues reported by patient: Patient had no further questions or concerns.            Medications:      Inpatient Administered Meds                     Current Facility-Administered Medications   Medication Dose Route Frequency Provider Last Rate Last Admin    divalproex sodium extended-release (DEPAKOTE ER) 24 hr tablet 1,000 mg  1,000 mg Oral At Bedtime Haroon Parker APRN CNP   1,000 mg at 05/30/24 2109    fluticasone (FLONASE) 50 MCG/ACT spray 2 spray  2 spray Both Nostrils Daily Haroon Parker APRN CNP   2 spray at 05/31/24 0835    gabapentin (NEURONTIN) capsule 300 mg  300 mg Oral Daily Haroon Parker APRN CNP   300 mg at 05/31/24 0833    gabapentin (NEURONTIN) capsule 600 mg  600 mg Oral At Bedtime Haroon Parker APRN CNP   600 mg at 05/30/24 2109    ipratropium (ATROVENT) 0.06 % spray 2 spray  2 spray Both Nostrils 4x Daily Haroon Parker APRN CNP   2 spray at 05/31/24 0835    multivitamin w/minerals (THERA-VIT-M) tablet 1 tablet  1 tablet Oral Daily Haroon Parker APRN CNP   1 tablet at 05/31/24 0833    risperiDONE (risperDAL) tablet 2 mg  2 mg Oral At Bedtime Haroon Parker APRN CNP   2 mg at 05/30/24 2109              Allergies:      Allergies             Allergies   Allergen Reactions    Seasonal Allergies Other (See Comments)       Congestion. Managed with meds.              Labs:   Recent Results   No results found for this or any previous visit (from the past 24 hour(s)).           Psychiatric Examination:      /80 (BP Location: Left arm, Patient Position: Sitting, Cuff Size: Adult Regular)   Pulse 70   Temp 97.7  F (36.5  C) (Temporal)   Resp 16   Ht 1.854 m (6' 1\")   Wt 94.8 kg (209 lb)   SpO2 99%   BMI 27.57 kg/m    Weight is 209 lbs 0 oz  Body mass index is 27.57 kg/m .     Weight over time:            Vitals:     05/26/24 1216 05/26/24 2209 05/30/24 0900   Weight: 91.6 kg (202 lb) 90.7 kg (200 lb) 94.8 kg (209 lb)         Orthostatic Vitals           Most Recent   " "    Sitting Orthostatic /80 05/31 0800     Sitting Orthostatic Pulse (bpm) 70 05/31 0800     Standing Orthostatic /84 05/31 0800     Standing Orthostatic Pulse (bpm) 82 05/31 0800                   Cardiometabolic risk assessment. 05/31/24        Reviewed patient profile for cardiometabolic risk factors     Date taken /Value  REFERENCE RANGE   Abdominal Obesity  (Waist Circumference)   See nursing flowsheet Women ?35 in (88 cm)   Men ?40 in (102 cm)       Triglycerides  No results found for: \"TRIG\"     ?150 mg/dL (1.7 mmol/L) or current treatment for elevated triglycerides   HDL cholesterol  No results found for: \"HDL\"]    Women <50 mg/dL (1.3 mmol/L) in women or current treatment for low HDL cholesterol  Men <40 mg/dL (1 mmol/L) in men or current treatment for low HDL cholesterol      Fasting plasma glucose (FPG)           Lab Results   Component Value Date     GLC 90 05/29/2024      06/02/2015        FPG ?100 mg/dL (5.6 mmol/L) or treatment for elevated blood glucose   Blood pressure        BP Readings from Last 3 Encounters:   05/31/24 122/80   06/09/15 128/83   06/14/13 129/74    Blood pressure ?130/85 mmHg or treatment for elevated blood pressure   Family History  See family history      Mental Status Exam:  Appearance: awake, alert, dressed in hospital scrubs, and appeared as age stated  Attitude:  Cooperative  Eye Contact:  good  Mood:  anxious,   Affect:  mood congruent, anxious, labile.   Speech:  clear, coherent  Language: fluent and intact in English  Psychomotor, Gait, Musculoskeletal:  no evidence of tardive dyskinesia, dystonia, or tics  Throught Process:  disorganized, illogical, and tangential, Perseveration  Associations:  loosening of associations present  Thought Content:  no evidence of suicidal ideation or homicidal ideation, marked persecutory delusions.  Insight:  fair  Judgement:  limited  Oriented to:  time, person, and place  Attention Span and Concentration:  " intact  Recent and Remote Memory:  fair  Fund of Knowledge:  low-normal             Precautions:                Behavioral Orders   Procedures    Code 1 - Restrict to Unit    [facility] Extended Care       Until discharge, Extended Care to offer psychotherapeutic services to mental health patients boarding for admission or stabilization. These services are to include but are not limited to: individual psychotherapy, diagnostic assessment, case management and care planning, safety planning, etc. This may include up to 1 visit per day. If patient is physically located at [facility] or [facility], group psychotherapy up to 2 time per day may be offered.     Routine Programming       As clinically indicated    Sexual precautions    Status 15       Every 15 minutes.    Suicide precautions: Suicide Risk: HIGH; Clinical rationale to override score: modification to the care environment, lack of access to a plan for self-harm, Collateral information supporting Suicidal/self-harm behaviors       Patients on Suicide Precautions should have a Combination Diet ordered that includes a Diet selection(s) AND a Behavioral Tray selection for Safe Tray - with utensils, or Safe Tray - NO utensils          Order Specific Question:   Suicide Risk       Answer:   HIGH       Order Specific Question:   Clinical rationale to override score:       Answer:   modification to the care environment       Order Specific Question:   Clinical rationale to override score:       Answer:   lack of access to a plan for self-harm       Order Specific Question:   Clinical rationale to override score:       Answer:   Collateral information supporting Suicidal/self-harm behaviors    Withdrawal precautions           Diagnoses:         Chantal (H)  Psychosis, unspecified psychosis type (H)  Severe manic bipolar I disorder with psychotic features (H)     Clinically Significant Risk Factors                          # Overweight: Estimated body mass index is 27.57 kg/m  as  "calculated from the following:    Height as of this encounter: 1.854 m (6' 1\").    Weight as of this encounter: 94.8 kg (209 lb)., PRESENT ON ADMISSION     # Financial/Environmental Concerns: none               Assessment & Plan:      Assessment and hospital summary:  This is a 33 year old male with history of Bipolar disorder, current episode manic severe with psychotic features (H). Patient presented to the ED for psychiatric evaluation.  Patient reported that his mother in-law and sister in-laws are not comfortable with him around them in the house due to the sexual thoughts and arousal toward them.  Patient states he is having most of his sexual thoughts  and arousal about his mother in law, sister in-law and other women.           Today's Changes: 6/4  Prn ativan at bedtime changed to 2 times daily prn        Target psychiatric symptoms and interventions:  . Education given regarding diagnostic and treatment options with risks, benefits and alternatives and adequate verbalization of understanding.  2. Admitted on 5/29/24 to [facility]  Precautions placed include: Suicide precautions, sexual precautions, withdrawal precautions.  Code 1-restricted to unit     3. Medications: 5/30/2024: [facility] medications reviewed.     4. Medications:  Hospital  Diazepam (Valium) tablet 5-20 mg oral every 30 minutes as needed order, this according to patient MSSA score  Divalproex sodium extended release(Depakote ER) 24 however tablet 1000 mg oral at bedtime  Fluticasone (Flonase) 50 mcg/ACT spray 2 sprays, both nostrils daily  Full code  Gabapentin (Neurontin) capsule 300 mg oral daily  Gabapentin (Neurontin) capsule 600 mg at bedtime  Hydroxyzine HCl (Atarax) tablet 25 mg oral every 6 hours as needed order adjuvant pain   Or  Hydroxyzine HCl (Atarax) tablet 50 mg oral every 6 hours as needed order, adjuvant pain  Ipratropium (Atrovent) 0.06% spray, 2 sprays both nostrils 4 times daily  Lorazepam (Ativan) tablet 1 mg oral at " bedtime as needed sleep  Melatonin tablet 3 mg oral at bedtime as needed sleep  [facility]  extended care  MSSA score vital signs and pain assessment on admission and per protocol  Multivitamin W/minerals (Thera-vit-M) tablet 1 tablet oral daily  No COVID-19 virus (coronavirus screening test needed or already ordered/completed  Notify physician-alcohol withdrawal MSSA focused  Notify provider if signs of substance withdrawal  Olanzapine Zydis (Zyprexa) ODT tab 10 mg oral 3 times daily as needed agitation, aggression  Or  Olanzapine (Zyprexa) injection 10 mg intramuscular 3 times daily as needed agitation, aggression  Patient CARE other 1-1 acuity staff for severe intrusiveness, SI  Regular diet adult  Requesting IP MHAS date  Risperidone (Risperdal) tablet 2 mg oral at bedtime  Routine programming  Sexual precaution  Status 15 every 15 minutes check  Suicide precautions: Suicide risk hide  Trazodone (Desyrel) tablet 50 mg oral at bedtime as needed sleep  Valproic acid a.m. draw draw to be done before this is given  Vital signs and pain assessment  Voluntary  With the patient per unit routine  Withdrawal precautions  5. Consultations:  Hospitalist to follow as needed.  IM to follow for every medical condition.  6. Structure and Supervision  [facility] [address]  Barriers to Discharge:  7.   is following in regards to collecting and reviewing collateral information, referrals and disposition planning.  Legal: Voluntary  Referrals: CTC to coordinate all referrals  Care Coordination: CTC to coordinate care with outside providers  Placement: CTC to facilitate placement following symptom stabilization  Anticipated Discharge: 5 to 7 days  . Further treatment programming to be determined throughout the hospital course.     Risk Assessment: IP MHAC RISK ASSESSMENT: Patient able to contract for safety and Patient on precautions  Risks, benefits, and alternatives discussed at length with patient.      Acute Medical Problems and  Treatments:  Acute medical concerns:  - No acute medical concerns     Pertinent labs/imaging:  Recent Results             Recent Results (from the past 168 hour(s))   Urine Drug Screen Panel     Collection Time: 05/26/24  7:43 PM   Result Value Ref Range     Amphetamines Urine Screen Negative Screen Negative     Barbituates Urine Screen Negative Screen Negative     Benzodiazepine Urine Screen Negative Screen Negative     Cannabinoids Urine Screen Negative Screen Negative     Cocaine Urine Screen Negative Screen Negative     Fentanyl Qual Urine Screen Negative Screen Negative     Opiates Urine Screen Negative Screen Negative     PCP Urine Screen Negative Screen Negative   Comprehensive metabolic panel     Collection Time: 05/29/24  9:03 PM   Result Value Ref Range     Sodium 142 135 - 145 mmol/L     Potassium 4.9 3.4 - 5.3 mmol/L     Carbon Dioxide (CO2) 30 (H) 22 - 29 mmol/L     Anion Gap 8 7 - 15 mmol/L     Urea Nitrogen 15.7 6.0 - 20.0 mg/dL     Creatinine 0.77 0.67 - 1.17 mg/dL     GFR Estimate >90 >60 mL/min/1.73m2     Calcium 9.6 8.6 - 10.0 mg/dL     Chloride 104 98 - 107 mmol/L     Glucose 90 70 - 99 mg/dL     Alkaline Phosphatase 42 40 - 150 U/L     AST 12 0 - 45 U/L     ALT 15 0 - 70 U/L     Protein Total 7.2 6.4 - 8.3 g/dL     Albumin 4.8 3.5 - 5.2 g/dL     Bilirubin Total 0.3 <=1.2 mg/dL   Valproic acid (Depakote level)     Collection Time: 05/29/24  9:03 PM   Result Value Ref Range     Valproic acid 31.0 (L)   ug/mL   CBC with platelets and differential     Collection Time: 05/29/24  9:03 PM   Result Value Ref Range     WBC Count 7.5 4.0 - 11.0 10e3/uL     RBC Count 4.26 (L) 4.40 - 5.90 10e6/uL     Hemoglobin 13.0 (L) 13.3 - 17.7 g/dL     Hematocrit 40.1 40.0 - 53.0 %     MCV 94 78 - 100 fL     MCH 30.5 26.5 - 33.0 pg     MCHC 32.4 31.5 - 36.5 g/dL     RDW 12.5 10.0 - 15.0 %     Platelet Count 223 150 - 450 10e3/uL     % Neutrophils 48 %     % Lymphocytes 39 %     % Monocytes 10 %     % Eosinophils 2 %      % Basophils 1 %     % Immature Granulocytes 0 %     NRBCs per 100 WBC 0 <1 /100     Absolute Neutrophils 3.6 1.6 - 8.3 10e3/uL     Absolute Lymphocytes 2.9 0.8 - 5.3 10e3/uL     Absolute Monocytes 0.7 0.0 - 1.3 10e3/uL     Absolute Eosinophils 0.1 0.0 - 0.7 10e3/uL     Absolute Basophils 0.0 0.0 - 0.2 10e3/uL     Absolute Immature Granulocytes 0.0 <=0.4 10e3/uL     Absolute NRBCs 0.0 10e3/uL               Behavioral/Psychological/Social:  - Encourage unit programming     Safety:  - Continue precautions as noted above  - Status 15 minute checks     Legal Status: voluntary     Disposition Plan   Reason for ongoing admission: poses an imminent risk to self and poses an imminent risk to others  Discharge location:  TBD  Discharge Medications: not ordered  Follow-up Appointments: not scheduled     Entered by: NATHALIE Goldberg CNP on 06/4/24/2024

## 2024-06-04 NOTE — PROGRESS NOTES
Basil came to the med window asking for something for sleep.  Appears very sleepy and thinks he had been sleeping earlier tonight.  Trazodone prn given and understands to ask for further prns if Trazodone not helping him to sleep.  (0215)    Now at 0235, Basil appears to be sleeping.  Appeared to sleep a total of 6.25 hours after having the Trazodone.

## 2024-06-04 NOTE — PLAN OF CARE
"Goal Outcome Evaluation:    Plan of Care Reviewed With: patient    \"I keep seeing the number 50\"  \"I need to get the fuck out of here\"  \"I'm worried the religous wars with the catholics and Muslims\"  \"I don't mind dying or someone killing me\"  \"I'm thinking about hurting you but I won't'    Pt was anxious but mildly cooperative this shift. His affect was labile and  tearful. Pt was med complaint. Pt participated in groups and tried to socialized with peers/staff. Pt endorsed anxiety, paranoia and auditory hallucinations. He was religiously preoccupied, making delusional statement and very paranoid throughout shift. Pt has an episode for about 30 minute where multiple staff members had to monitor him while making delusional statements. Provider walked him to his room and meds were given for symptoms. He did agree to safe behaviors but endorsed passive SI. Pt ate and drank adequately. Denies pain. No medical concerns. No PRNS given. Declined VS. Reported lorazepam helped him the most.  Currently reported he's no longer thinking about dying.Said he would come to staff if he's having any suicidal ideations.   "

## 2024-06-04 NOTE — PLAN OF CARE
Team Note Due:  Thursday     Assessment/Intervention/Current Symtoms and Care Coordination:  Chart review and met with team, discussed pt progress, symptomology, and response to treatment. Discussed the discharge plan and any potential impediments to discharge.    Basil continues to present with symptoms of psychosis and his anxiety seems to be worsening.     He signed ROIs and a Consent form for his outpatient therapist.     Writer emailed his outpatient therapist requesting a copy of the diagnostic assessment. His outpatient therapist is out of the office until June 10th.      Discharge Plan or Goal:  When patient's psychotic symptoms have decreased discharge to his home with family will be facilitated      Barriers to Discharge:  Currently presents with symptoms of psychosis. Medication management ongoing.      Referral Status:  Referral needs pending as patient stabilizes     Legal Status:  Voluntary     Contacts:  Wife: Esme Andrews, [phone] (Pt wants only the provider to speak to his family)      Upcoming Meetings and Dates/Important Information and next steps:  Symptom stabilization  Discharge planning

## 2024-06-04 NOTE — CARE PLAN
"As writer was about to start group, pt asked about getting discharged, and stated he needed to get out of here.  Pt then began to repeat writers name, then commented \"Patricia and Brittni I know you two, you helped me before\".  Pt then held hands to his head, appeared to be struggling, wanting to know who was baptized, commented he doesn't want to have to be the \"Protest[employer] Police\".  Pt then began repeating the name of a peer on the unit whom was disruptive the other night, and wanted reassurance he wasn't on the unit.  Another nurse approached whom pt was comfortable with, pt began repeating both of our names, then became worried that by repeating our names, something would happen to our family, and appeared to be getting increasingly distraught over this.  Once reassurance was provided about one issue, pt became stressed about something else - asking if we \"had to do the foot thing\", appeared to be referencing feeling as if he feet need to be pointed in a certain direction.  This episode lasted nearly 30 minutes with pt standing at lounge table talking to writer and nurse.  "

## 2024-06-04 NOTE — PLAN OF CARE
"Individual Therapy Note      Date of Service: June 4, 2024    Patient: Basil goes by \"Basil,\" uses he/him pronouns    Individuals Present: Basil & Carmen Torrez, [facility]    Session start: 1210  Session end: 1220  Session duration in minutes: 10      Modality Used: Person Centered and Brief Therapy    Goals: Verbal processing    Patient Description of current symptoms: busy thinking about converting people to catholicism     Mental Status Exam:   Attitude: cooperative  Eye Contact: good  Mood: anxious  Affect: mood congruent  Speech: clear, coherent  Psychomotor Behavior: no evidence of tardive dyskinesia, dystonia, or tics  Thought Process:  disorganized and illogical  Associations: loosening of associations present  Thought Content: no auditory hallucinations present, no visual hallucinations present, and obsessions present  Insight: limited  Judgement: poor  Attention Span and Concentration: fair    Pt progress: Met pt in room to see how he was doing. Pt shared that he was thinking about converting people to catholicism. Pt was only able to talk about this and shared who he wanted to convert. Pt shared that he hear people behind the tv (which were people in th room next door talking at times) and he is sure they are listening to him and maybe watching him through the TV. Pt also shared that another pt greeted him and said something about \"whacking\" him. Pt then shared that he thinks everyone needs to convert to catholicism and there would be peace on earth. Pt shared that he gets all of his information from \"[employer]\" and they teach him the proper way to convert people. Pt only wanted to talk about this and shared it was his only purpose and objective. Pt wanted to get discharged from the hospital b/c he is fine now and needs to get out in the world so he can work on conversion. Pt ended the conversation with surmising about the role of women being listeners and then sharing what they hear to a man " for decisions. Writer shared that they have a different perspective on things, but that was ok.     Treatment Objective(s) Addressed:   The focus of this session was on processing feelings related to stay in the hospital.      Progress Towards Goals and Assessment of Patient:   Patient is not making progress towards treatment goals as evidenced by increased delusional content.       Therapeutic Intervention(s):   Provided active listening, unconditional positive regard, and validation.   Engaged in guided discovery, explored patient's perspectives and helped expand them through socratic dialogue       Plan/next step: Engaged in unit programming  Check in with therapist as needed      No Charge (0-15 min)    Patient Active Problem List   Diagnosis    Marijuana dependence (H)    Alcohol abuse    Bipolar disorder, current episode manic severe with psychotic features (H)    Chantal (H)    Schizoaffective disorder, bipolar type (H)    Psychosis, unspecified psychosis type (H)

## 2024-06-05 PROCEDURE — 250N000013 HC RX MED GY IP 250 OP 250 PS 637: Performed by: CLINICAL NURSE SPECIALIST

## 2024-06-05 PROCEDURE — 124N000002 HC R&B MH UMMC

## 2024-06-05 PROCEDURE — 99232 SBSQ HOSP IP/OBS MODERATE 35: CPT | Performed by: CLINICAL NURSE SPECIALIST

## 2024-06-05 PROCEDURE — G0177 OPPS/PHP; TRAIN & EDUC SERV: HCPCS

## 2024-06-05 RX ORDER — QUETIAPINE FUMARATE 50 MG/1
50 TABLET, EXTENDED RELEASE ORAL DAILY
Status: DISCONTINUED | OUTPATIENT
Start: 2024-06-05 | End: 2024-06-12

## 2024-06-05 RX ADMIN — HYDROXYZINE HYDROCHLORIDE 50 MG: 50 TABLET, FILM COATED ORAL at 09:20

## 2024-06-05 RX ADMIN — RISPERIDONE 2 MG: 2 TABLET ORAL at 20:03

## 2024-06-05 RX ADMIN — IPRATROPIUM BROMIDE 2 SPRAY: 42 SPRAY, METERED NASAL at 12:51

## 2024-06-05 RX ADMIN — DIVALPROEX SODIUM 1000 MG: 500 TABLET, FILM COATED, EXTENDED RELEASE ORAL at 20:17

## 2024-06-05 RX ADMIN — IPRATROPIUM BROMIDE 2 SPRAY: 42 SPRAY, METERED NASAL at 07:53

## 2024-06-05 RX ADMIN — FLUTICASONE PROPIONATE 2 SPRAY: 50 SPRAY, METERED NASAL at 07:54

## 2024-06-05 RX ADMIN — IPRATROPIUM BROMIDE 2 SPRAY: 42 SPRAY, METERED NASAL at 16:08

## 2024-06-05 RX ADMIN — GABAPENTIN 300 MG: 300 CAPSULE ORAL at 07:53

## 2024-06-05 RX ADMIN — Medication 1 TABLET: at 07:53

## 2024-06-05 RX ADMIN — Medication 3 MG: at 20:16

## 2024-06-05 RX ADMIN — QUETIAPINE FUMARATE 50 MG: 50 TABLET, EXTENDED RELEASE ORAL at 14:01

## 2024-06-05 RX ADMIN — IPRATROPIUM BROMIDE 2 SPRAY: 42 SPRAY, METERED NASAL at 20:04

## 2024-06-05 RX ADMIN — GABAPENTIN 600 MG: 300 CAPSULE ORAL at 20:05

## 2024-06-05 ASSESSMENT — ACTIVITIES OF DAILY LIVING (ADL)
HYGIENE/GROOMING: HANDWASHING;SHOWER;INDEPENDENT
ADLS_ACUITY_SCORE: 45
ORAL_HYGIENE: INDEPENDENT
ADLS_ACUITY_SCORE: 41
ADLS_ACUITY_SCORE: 45
HYGIENE/GROOMING: INDEPENDENT
ADLS_ACUITY_SCORE: 45
ADLS_ACUITY_SCORE: 41
ADLS_ACUITY_SCORE: 45
ADLS_ACUITY_SCORE: 41
ADLS_ACUITY_SCORE: 41
ADLS_ACUITY_SCORE: 45
DRESS: SCRUBS (BEHAVIORAL HEALTH)
ADLS_ACUITY_SCORE: 41
LAUNDRY: UNABLE TO COMPLETE
ADLS_ACUITY_SCORE: 45
ADLS_ACUITY_SCORE: 41
ADLS_ACUITY_SCORE: 41
ADLS_ACUITY_SCORE: 45
ADLS_ACUITY_SCORE: 41
ADLS_ACUITY_SCORE: 45
ADLS_ACUITY_SCORE: 41
ADLS_ACUITY_SCORE: 45

## 2024-06-05 NOTE — PLAN OF CARE
BEH IP Unit Acuity Rating Score (UARS)  Patient is given one point for every criteria they meet.    CRITERIA SCORING   On a 72 hour hold, court hold, committed, stay of commitment, or revocation. 0    Patient LOS on BEH unit exceeds 20 days. 0  LOS: 7   Patient under guardianship, 55+, otherwise medically complex, or under age 11. 0   Suicide ideation without relief of precipitating factors. 0   Current plan for suicide. 0   Current plan for homicide. 0   Imminent risk or actual attempt to seriously harm another without relief of factors precipitating the attempt. 0   Severe dysfunction in daily living (ex: complete neglect for self care, extreme disruption in vegetative function, extreme deterioration in social interactions). 1   Recent (last 7 days) or current physical aggression in the ED or on unit. 0   Restraints or seclusion episode in past 72 hours. 0   Recent (last 7 days) or current verbal aggression, agitation, yelling, etc., while in the ED or unit. 1   Active psychosis. 1   Need for constant or near constant redirection (from leaving, from others, etc).  0   Intrusive or disruptive behaviors. 0   Patient requires 3 or more hours of individualized nursing care per 8-hour shift (i.e. for ADLs, meds, therapeutic interventions). 0   TOTAL 3

## 2024-06-05 NOTE — PROGRESS NOTES
"Pt was an engaged participant in dance/movement therapy (D/MT) standing to dance in a range of creative movement expressions.  Pt could follow movement interventions, as well as initiate his own non-verbal expressions.  He was able to do this in an organized and fluid manner.  Pt prefer rhythms in half time as well as grounded self-referencing (shape flow) for self-soothing and self-regulation.  He appeared overwhelmed when a peer moved quickly with bound flow and he was able to state this in his verbal checkout:  \"I felt overwhelmed sometimes.\"  Pt was able to find resource movements that helped his head feel less overwhelmed, as well as movements that aligned him toward the midline of his body.         06/04/24 8775   Expressive Therapy   Therapy Type dance/movement   Minutes of Treatment 60       "

## 2024-06-05 NOTE — PLAN OF CARE
Rehab Group    Start time: 1600  End time: 1645  Patient time total: 30 minutes    attended partial group    #4 attended   Group Type: occupational therapy   Group Topic Covered: healthy leisure time     Group Session Detail:    Patient Response: Pt partcipated in group focused on leisure participation and exploration, socialization and cognitive challenge. Discussed how participation in leisure activities can be used as a healthy coping skill in symptom management and a strategy to reduce stress.    Mood/Affect: Anxious,  Pleasant       Plan: Patient encouraged to maintain attendance for continued ongoing support in working towards occupational therapy goals to support overall treatment/care.        Patient Detail:    Joined for group with encouragement from writer and other peers in the group. Was an active participant in group card game activity. Was receptive to having other peers offer instruction, not having played one of the card games before. Was able to quickly  on the activity after instruction from peers. After about 30 minutes, voiced wanting to go take a nap before dinner. Offered reassurance that staff would wake him for dinner if requested.      No Charge    Patient Active Problem List   Diagnosis    Marijuana dependence (H)    Alcohol abuse    Bipolar disorder, current episode manic severe with psychotic features (H)    Chantal (H)    Schizoaffective disorder, bipolar type (H)    Psychosis, unspecified psychosis type (H)

## 2024-06-05 NOTE — PLAN OF CARE
"  Problem: Adult Behavioral Health Plan of Care  Goal: Adheres to Safety Considerations for Self and Others  Outcome: Progressing    Patient is calm and cooperative; he is medication compliant. His affect is flat and blunted. Pt denies most mental health symptoms including anxiety, depression, and SI/SIB/HI. Pt is still paranoid, but is not exhibiting extreme paranoia symptoms like earlier during the day. He is religiously preoccupied and believes this whole hospital thing is a part of matrix. He is delusional but tends to keep to self this evening. \" I apologize for what happened\". He would not elaborate more but states \"you know what happened right?\" He is visible in the milieu and was seen watching television. No acute behavioral concerns this shift. No medication side effects stated or observed.       /76 (BP Location: Right arm)   Pulse 92   Temp 97.6  F (36.4  C) (Oral)   Resp 14   Ht 1.854 m (6' 1\")   Wt 94.8 kg (209 lb)   SpO2 98%   BMI 27.57 kg/m       Melatonin 3mg given at 2250  "

## 2024-06-05 NOTE — PROGRESS NOTES
"The patient's care was discussed with the treatment team during the daily team meeting and/or staff's chart notes were reviewed.  Staff report patient slept 6 hours, Staff report Patient is calm and cooperative; he is medication compliant. His affect is flat and blunted. Pt denies most mental health symptoms including anxiety, depression, and SI/SIB/HI. Pt is still paranoid, but is not exhibiting extreme paranoia symptoms like earlier during the day. He is religiously preoccupied and believes this whole hospital thing is a part of matrix. He is delusional but tends to keep to self this evening. \" I apologize for what happened\". He would not elaborate more but states \"you know what happened right?\" He is visible in the milieu and was seen watching television. No acute behavioral concerns this shift. No medication side effects stated or observed.      Upon interview, patient was met laying down in bed in his room having some delusional contexts.  Patient was counting his fingers, mentioning both staff and patient's names while this writer entered the room.  Patient informed this writer that these are the people that would get me out of here.  Patient continued with the ritual of mentioning and counting the names, classifying them into a good and bad team.  Writer asked the patient if he was ready to have an assessment done and he said yes.  Writer asked the patient what criteria he used to sort the names into either the good or bad group?  Patient responded \" it is according to my heart dictate.\"    Patient was educated that we have a formidable team here to help him and no members of the team is bad.  Patient was encouraged to feel free to interact with any of the team members especially making his needs known to the them.  Writer briefly discussed with the patient the incidence of yesterday and what could be helpful in the near future, on how to use a healthy coping skills to navigate a similar situation.  Patient " "was encouraged to always ask for help on time whenever getting anxious before the anxiety becomes escalated.    Writer asked the patient what goal he sets for today patient responded \"To not divulge confidential information, and to work  with Marely to guide me.\"  Patient thought that Marely is one of the team members.  Patient reported that his medication is working well, that he experienced no side effects.  Patient denies auditory or visual hallucinations, denied suicidal or homicidal ideation or thoughts of self-harm, patient also denied paranoid ideation.  Patient shared that he will ask Hitesh for as needed medications if he is getting anxious.  Writer discussed adding Seroquel 50 mg to the patient's regimen, educated the patient on the benefits and risks of Seroquel including the common side effects.  Patient is agreeable with ordering Seroquel for him, he had a previous signed the neuroleptic consent.    Suicidal ideation: denies current or recent suicidal ideation or behaviors.     Homicidal ideation: denies current or recent homicidal ideation or behaviors.     Psychotic symptoms: Yes Patient endorses AH, VH, paranoia, delusions.      Medication side effects reported: No significant side effects.     Acute medical concerns: none     Other issues reported by patient: Patient had no further questions or concerns.            Medications:      Inpatient Administered Meds                     Current Facility-Administered Medications   Medication Dose Route Frequency Provider Last Rate Last Admin    divalproex sodium extended-release (DEPAKOTE ER) 24 hr tablet 1,000 mg  1,000 mg Oral At Bedtime Haroon Parker APRN CNP   1,000 mg at 05/30/24 2109    fluticasone (FLONASE) 50 MCG/ACT spray 2 spray  2 spray Both Nostrils Daily Haroon Parker APRN CNP   2 spray at 05/31/24 0835    gabapentin (NEURONTIN) capsule 300 mg  300 mg Oral Daily Haroon Parker APRN CNP   300 mg at 05/31/24 0833    gabapentin (NEURONTIN) " "capsule 600 mg  600 mg Oral At Bedtime Haroon Parker APRAYDIN CNP   600 mg at 05/30/24 2109    ipratropium (ATROVENT) 0.06 % spray 2 spray  2 spray Both Nostrils 4x Daily Haroon Parker APRAYDIN CNP   2 spray at 05/31/24 0835    multivitamin w/minerals (THERA-VIT-M) tablet 1 tablet  1 tablet Oral Daily Haroon Parker APRAYDIN CNP   1 tablet at 05/31/24 0833    risperiDONE (risperDAL) tablet 2 mg  2 mg Oral At Bedtime Haroon Parker APRAYDIN CNP   2 mg at 05/30/24 2109              Allergies:      Allergies             Allergies   Allergen Reactions    Seasonal Allergies Other (See Comments)       Congestion. Managed with meds.              Labs:   Recent Results   No results found for this or any previous visit (from the past 24 hour(s)).           Psychiatric Examination:      /80 (BP Location: Left arm, Patient Position: Sitting, Cuff Size: Adult Regular)   Pulse 70   Temp 97.7  F (36.5  C) (Temporal)   Resp 16   Ht 1.854 m (6' 1\")   Wt 94.8 kg (209 lb)   SpO2 99%   BMI 27.57 kg/m    Weight is 209 lbs 0 oz  Body mass index is 27.57 kg/m .     Weight over time:            Vitals:     05/26/24 1216 05/26/24 2209 05/30/24 0900   Weight: 91.6 kg (202 lb) 90.7 kg (200 lb) 94.8 kg (209 lb)         Orthostatic Vitals           Most Recent       Sitting Orthostatic /80 05/31 0800     Sitting Orthostatic Pulse (bpm) 70 05/31 0800     Standing Orthostatic /84 05/31 0800     Standing Orthostatic Pulse (bpm) 82 05/31 0800                   Cardiometabolic risk assessment. 05/31/24        Reviewed patient profile for cardiometabolic risk factors     Date taken /Value  REFERENCE RANGE   Abdominal Obesity  (Waist Circumference)   See nursing flowsheet Women ?35 in (88 cm)   Men ?40 in (102 cm)       Triglycerides  No results found for: \"TRIG\"     ?150 mg/dL (1.7 mmol/L) or current treatment for elevated triglycerides   HDL cholesterol  No results found for: \"HDL\"]    Women <50 mg/dL (1.3 mmol/L) in women or " "current treatment for low HDL cholesterol  Men <40 mg/dL (1 mmol/L) in men or current treatment for low HDL cholesterol      Fasting plasma glucose (FPG)           Lab Results   Component Value Date     GLC 90 05/29/2024      06/02/2015        FPG ?100 mg/dL (5.6 mmol/L) or treatment for elevated blood glucose   Blood pressure        BP Readings from Last 3 Encounters:   05/31/24 122/80   06/09/15 128/83   06/14/13 129/74    Blood pressure ?130/85 mmHg or treatment for elevated blood pressure   Family History  See family history      Mental Status Exam:  Appearance: awake, alert, dressed in hospital scrubs, and appeared as age stated  Attitude:  Cooperative  Eye Contact:  good  Mood:  \"fine\",   Affect:   anxious, labile but brighter  Speech:  clear, coherent  Language: fluent and intact in English  Psychomotor, Gait, Musculoskeletal:  no evidence of tardive dyskinesia, dystonia, or tics  Throught Process:  disorganized, illogical, and tangential, Perseveration  Associations:  loosening of associations present  Thought Content:  no evidence of suicidal ideation or homicidal ideation, marked persecutory delusions.  Insight:  fair  Judgement:  limited  Oriented to:  time, person, and place  Attention Span and Concentration:  intact  Recent and Remote Memory:  fair  Fund of Knowledge:  low-normal             Precautions:                Behavioral Orders   Procedures    Code 1 - Restrict to Unit    AS Extended Care       Until discharge, Extended Care to offer psychotherapeutic services to mental health patients boarding for admission or stabilization. These services are to include but are not limited to: individual psychotherapy, diagnostic assessment, case management and care planning, safety planning, etc. This may include up to 1 visit per day. If patient is physically located at [facility] or [facility], group psychotherapy up to 2 time per day may be offered.     Routine Programming       As clinically indicated    " "Sexual precautions    Status 15       Every 15 minutes.    Suicide precautions: Suicide Risk: HIGH; Clinical rationale to override score: modification to the care environment, lack of access to a plan for self-harm, Collateral information supporting Suicidal/self-harm behaviors       Patients on Suicide Precautions should have a Combination Diet ordered that includes a Diet selection(s) AND a Behavioral Tray selection for Safe Tray - with utensils, or Safe Tray - NO utensils          Order Specific Question:   Suicide Risk       Answer:   HIGH       Order Specific Question:   Clinical rationale to override score:       Answer:   modification to the care environment       Order Specific Question:   Clinical rationale to override score:       Answer:   lack of access to a plan for self-harm       Order Specific Question:   Clinical rationale to override score:       Answer:   Collateral information supporting Suicidal/self-harm behaviors    Withdrawal precautions           Diagnoses:         Chantal (H)  Psychosis, unspecified psychosis type (H)  Severe manic bipolar I disorder with psychotic features (H)     Clinically Significant Risk Factors                          # Overweight: Estimated body mass index is 27.57 kg/m  as calculated from the following:    Height as of this encounter: 1.854 m (6' 1\").    Weight as of this encounter: 94.8 kg (209 lb)., PRESENT ON ADMISSION     # Financial/Environmental Concerns: none               Assessment & Plan:      Assessment and hospital summary:  This is a 33 year old male with history of Bipolar disorder, current episode manic severe with psychotic features (H). Patient presented to the ED for psychiatric evaluation.  Patient reported that his mother in-law and sister in-laws are not comfortable with him around them in the house due to the sexual thoughts and arousal toward them.  Patient states he is having most of his sexual thoughts  and arousal about his mother in law, " sister in-law and other women.           Today's Changes: 6/5  Seroquel XR 24 hour tablet 50 mg daily.        Target psychiatric symptoms and interventions:  . Education given regarding diagnostic and treatment options with risks, benefits and alternatives and adequate verbalization of understanding.  2. Admitted on 5/29/24 to [facility]  Precautions placed include: Suicide precautions, sexual precautions, withdrawal precautions.  Code 1-restricted to unit     3. Medications: 5/30/2024: PTA medications reviewed.     4. Medications:  Hospital  Diazepam (Valium) tablet 5-20 mg oral every 30 minutes as needed order, this according to patient MSSA score  Divalproex sodium extended release(Depakote ER) 24 however tablet 1000 mg oral at bedtime  Fluticasone (Flonase) 50 mcg/ACT spray 2 sprays, both nostrils daily  Seroquel XR 24 hr tablet 50 mg daily  Full code  Gabapentin (Neurontin) capsule 300 mg oral daily  Gabapentin (Neurontin) capsule 600 mg at bedtime  Hydroxyzine HCl (Atarax) tablet 25 mg oral every 6 hours as needed order adjuvant pain   Or  Hydroxyzine HCl (Atarax) tablet 50 mg oral every 6 hours as needed order, adjuvant pain  Ipratropium (Atrovent) 0.06% spray, 2 sprays both nostrils 4 times daily  Lorazepam (Ativan) tablet 1 mg oral at bedtime as needed sleep  Melatonin tablet 3 mg oral at bedtime as needed sleep  MHAS  extended care  MSSA score vital signs and pain assessment on admission and per protocol  Multivitamin W/minerals (Thera-vit-M) tablet 1 tablet oral daily  No COVID-19 virus (coronavirus screening test needed or already ordered/completed  Notify physician-alcohol withdrawal MSSA focused  Notify provider if signs of substance withdrawal  Olanzapine Zydis (Zyprexa) ODT tab 10 mg oral 3 times daily as needed agitation, aggression  Or  Olanzapine (Zyprexa) injection 10 mg intramuscular 3 times daily as needed agitation, aggression  Patient CARE other 1-1 acuity staff for severe intrusiveness,  SI  Regular diet adult  Requesting IP MHAS date  Risperidone (Risperdal) tablet 2 mg oral at bedtime  Routine programming  Sexual precaution  Status 15 every 15 minutes check  Suicide precautions: Suicide risk hide  Trazodone (Desyrel) tablet 50 mg oral at bedtime as needed sleep  Valproic acid a.m. draw draw to be done before this is given  Vital signs and pain assessment  Voluntary  With the patient per unit routine  Withdrawal precautions  5. Consultations:  Hospitalist to follow as needed.  IM to follow for every medical condition.  6. Structure and Supervision  [address]  Barriers to Discharge:  7.   is following in regards to collecting and reviewing collateral information, referrals and disposition planning.  Legal: Voluntary  Referrals: CTC to coordinate all referrals  Care Coordination: CTC to coordinate care with outside providers  Placement: CTC to facilitate placement following symptom stabilization  Anticipated Discharge: 5 to 7 days  . Further treatment programming to be determined throughout the hospital course.     Risk Assessment: IP MHAC RISK ASSESSMENT: Patient able to contract for safety and Patient on precautions  Risks, benefits, and alternatives discussed at length with patient.      Acute Medical Problems and Treatments:  Acute medical concerns:  - No acute medical concerns     Pertinent labs/imaging:  Recent Results             Recent Results (from the past 168 hour(s))   Urine Drug Screen Panel     Collection Time: 05/26/24  7:43 PM   Result Value Ref Range     Amphetamines Urine Screen Negative Screen Negative     Barbituates Urine Screen Negative Screen Negative     Benzodiazepine Urine Screen Negative Screen Negative     Cannabinoids Urine Screen Negative Screen Negative     Cocaine Urine Screen Negative Screen Negative     Fentanyl Qual Urine Screen Negative Screen Negative     Opiates Urine Screen Negative Screen Negative     PCP Urine Screen Negative Screen Negative    Comprehensive metabolic panel     Collection Time: 05/29/24  9:03 PM   Result Value Ref Range     Sodium 142 135 - 145 mmol/L     Potassium 4.9 3.4 - 5.3 mmol/L     Carbon Dioxide (CO2) 30 (H) 22 - 29 mmol/L     Anion Gap 8 7 - 15 mmol/L     Urea Nitrogen 15.7 6.0 - 20.0 mg/dL     Creatinine 0.77 0.67 - 1.17 mg/dL     GFR Estimate >90 >60 mL/min/1.73m2     Calcium 9.6 8.6 - 10.0 mg/dL     Chloride 104 98 - 107 mmol/L     Glucose 90 70 - 99 mg/dL     Alkaline Phosphatase 42 40 - 150 U/L     AST 12 0 - 45 U/L     ALT 15 0 - 70 U/L     Protein Total 7.2 6.4 - 8.3 g/dL     Albumin 4.8 3.5 - 5.2 g/dL     Bilirubin Total 0.3 <=1.2 mg/dL   Valproic acid (Depakote level)     Collection Time: 05/29/24  9:03 PM   Result Value Ref Range     Valproic acid 31.0 (L)   ug/mL   CBC with platelets and differential     Collection Time: 05/29/24  9:03 PM   Result Value Ref Range     WBC Count 7.5 4.0 - 11.0 10e3/uL     RBC Count 4.26 (L) 4.40 - 5.90 10e6/uL     Hemoglobin 13.0 (L) 13.3 - 17.7 g/dL     Hematocrit 40.1 40.0 - 53.0 %     MCV 94 78 - 100 fL     MCH 30.5 26.5 - 33.0 pg     MCHC 32.4 31.5 - 36.5 g/dL     RDW 12.5 10.0 - 15.0 %     Platelet Count 223 150 - 450 10e3/uL     % Neutrophils 48 %     % Lymphocytes 39 %     % Monocytes 10 %     % Eosinophils 2 %     % Basophils 1 %     % Immature Granulocytes 0 %     NRBCs per 100 WBC 0 <1 /100     Absolute Neutrophils 3.6 1.6 - 8.3 10e3/uL     Absolute Lymphocytes 2.9 0.8 - 5.3 10e3/uL     Absolute Monocytes 0.7 0.0 - 1.3 10e3/uL     Absolute Eosinophils 0.1 0.0 - 0.7 10e3/uL     Absolute Basophils 0.0 0.0 - 0.2 10e3/uL     Absolute Immature Granulocytes 0.0 <=0.4 10e3/uL     Absolute NRBCs 0.0 10e3/uL               Behavioral/Psychological/Social:  - Encourage unit programming     Safety:  - Continue precautions as noted above  - Status 15 minute checks     Legal Status: voluntary     Disposition Plan   Reason for ongoing admission: poses an imminent risk to self and poses an  imminent risk to others  Discharge location:  TBD  Discharge Medications: not ordered  Follow-up Appointments: not scheduled

## 2024-06-05 NOTE — PLAN OF CARE
"  Problem: Adult Inpatient Plan of Care  Goal: Optimal Comfort and Wellbeing  Outcome: Progressing  Intervention: Provide Person-Centered Care  Flowsheets (Taken 6/5/2024 1706)  Trust Relationship/Rapport:   reassurance provided   thoughts/feelings acknowledged   empathic listening provided  Milieu Participation:Behavior: Pt presented as brightening on aproach and welcoming of various conversations this evenng. Attended Recreational Therapy with minimal encouragement. Participated well. Ate 100% of dinner. Pt sought out writer to inform of increasing paranoia and voices telling him to not trust other patients and to not allow wife to visit or talk with her on the phone for this reason. Pt supported and validated for seeking assistance with these thoughts. Pt provided synopsis of milieu management by staff for safety and in providing a therapeutic environment for everyone. Pt appeared to visibly calm and declined PRN hydroxyzine. Pt would later inform writer of coping skills he is employing now, to help during distressing moments.Pt allowed visitation with his mom and dad and stated he had a good visit. Medication compliant with medication s/e stated or observed.      Safety: status 15 SI/Self harm: denies  Aggression/agitation/HI: denies, exhibited safe behavior  AVH: denies Affect: blunted Mood: calm    Physical Complaints/Issues: denies    PRN Med:Melatonin 3 mg   Medication AE: denies    I & O: eating and drinking well 100%  Sleep: denies concerns  LBM: denies concerns  ADLs: independent  Visits/calls: Mom and Dad visited, Wife called.    Vitals:  Blood pressure 123/78, pulse 72, temperature 97.1  F (36.2  C), temperature source Temporal, resp. rate 16, height 1.854 m (6' 1\"), weight 94.8 kg (209 lb), SpO2 99%.   Pain                         "

## 2024-06-05 NOTE — PLAN OF CARE
"  Rehab Group    Start time: 1315  End time: 1415  Patient time total: 45 minutes    attended full group    #4 attended   Group Type: occupational therapy   Group Topic Covered: healthy leisure time and cognitive activities    [place]      Group Session Detail:    Patient Response: Pt partcipated in group focused on leisure participation and exploration, socialization and cognitive challenge. Discussed how participation in leisure activities can be used as a healthy coping skill in symptom management and a strategy to reduce stress.    Mood/Affect: Pleasant       Plan: Patient encouraged to maintain attendance for continued ongoing support in working towards occupational therapy goals to support overall treatment/care.        Patient Detail:    Was an active participant in activity for duration of time in group. Was social during this time, often initiating the conversation. The topics often jumped around, several times commented about how another peer reminded them of a friend of theirs. Made comments such as \"this is not my first time in the psych palma, I know how to play the iyzico game\" and \"I am here voluntary, so  I can leave whenever I want to. I just need to make sure I am good enough so I can leave and not come back\".      Patient Active Problem List   Diagnosis    Marijuana dependence (H)    Alcohol abuse    Bipolar disorder, current episode manic severe with psychotic features (H)    Chantal (H)    Schizoaffective disorder, bipolar type (H)    Psychosis, unspecified psychosis type (H)        "

## 2024-06-05 NOTE — PLAN OF CARE
"Team Note Due:  Thursday     Assessment/Intervention/Current Symtoms and Care Coordination:  Chart review and met with team, discussed pt progress, symptomology, and response to treatment. Discussed the discharge plan and any potential impediments to discharge.    Basil's wife called and asked about updates. She stated that last night Basil told her that he wanted to leave. Writer asked if he had a previous diagnosis of OCD, she stated he did not. She visited over the weekend and at that time she believed he appeared \"intense\" and more agitated. She stated that at baseline he is \"a very chill dalton.\" She stated that he is interjecting in other people's conversations which is not typical for him. Stated she \"cannot get a word in\" with him. She plans to visit on Thursday.     Met with Basil in the afternoon. He reported that he is \"feeling 100.\" He stated that he thought he was here for \"Uatsdin war\" but after getting sleep last night he recognizes that he's \"here for mental luanne and not Tenriism.\" Did not make any delusional or paranoid statements throughout interview. He stated he was ready to go and writer encouraged him to have this conversation with his provider tomorrow.      Discharge Plan or Goal:  When patient's psychotic symptoms have decreased discharge to his home with family will be facilitated      Barriers to Discharge:  Currently presents with symptoms of psychosis. Medication management ongoing.      Referral Status:  Referral needs pending as patient stabilizes     Legal Status:  Voluntary     Contacts:  Wife: Esme Andrews, [phone] (Consent)     Upcoming Meetings and Dates/Important Information and next steps:  Symptom stabilization  Discharge planning   "

## 2024-06-05 NOTE — PLAN OF CARE
"  Problem: Adult Inpatient Plan of Care  Goal: Optimal Comfort and Wellbeing  Intervention: Provide Person-Centered Care  Recent Flowsheet Documentation  Taken 6/5/2024 1675 by Hitesh Kaur RN  Trust Relationship/Rapport:   care explained   choices provided   emotional support provided   empathic listening provided   questions answered   questions encouraged   reassurance provided   thoughts/feelings acknowledged   Goal Outcome Evaluation:    Plan of Care Reviewed With: patient             Patient in the milieu throughout the day at times appearing calm and social attending group and playing cards with peers, and quickly transitioning to tense, paranoid and fixated on Taoist. Patient with reassurance and use of coping techniques including deep breathing and taking time away in his room reporting therapeutic relief. Patient at this time was receptive of PRN Hydroxyzine later reporting relief and appearing less tense.      Patient affect throughout the day labile and at times tense and guarded. Patient reporting goal for improvement of psychiatric symptoms with hopes to discharge soon but states he still does not feel ready. Patient denies SI/SIB, AH/VH, depression, or thoughts of harming others. Patient demonstrating fixations and possible compulsions. Patient stated to RN \"I just have to say names\" then repeatedly stated staff and patients names out loud before RN could continue with conversation. Patient also heard talking to himself on the way to the restroom. When RN asked about the comment he stated \"I was just reminding myself to sit in the green chair\".     Patient cooperative with vital sign assessments with increased blood pressure of 147/82 observed. Patient denied and no physical symptoms were observed. Patient diet good eating 100% of dinner and snacks. Patient requested and received orders for double portions. Patient independent with requesting and accepting of scheduled medications with no stated " or observed side effects. New order for Seroquel daily added. Patient receptive of the medication and first dose education.Patient independent with identifying needs and completing ADL's.

## 2024-06-05 NOTE — PLAN OF CARE
Problem: Adult Inpatient Plan of Care  Goal: Absence of Hospital-Acquired Illness or Injury  Outcome: Progressing     Problem: Adult Behavioral Health Plan of Care  Goal: Adheres to Safety Considerations for Self and Others  Outcome: Progressing     Problem: Sleep Disturbance  Goal: Adequate Sleep/Rest  Outcome: Progressing   Goal Outcome Evaluation:    Patient quiet, calm, and cooperative, and commits for safety. Remained in room most of the time his sift. Denied pain,depression anxiety, paranoia, and other mental illness symptoms. Patient slept for for  6 hours this shift. No other concerns at this time.Team will continue to monitor for needs.

## 2024-06-06 PROCEDURE — 250N000013 HC RX MED GY IP 250 OP 250 PS 637: Performed by: CLINICAL NURSE SPECIALIST

## 2024-06-06 PROCEDURE — 124N000002 HC R&B MH UMMC

## 2024-06-06 PROCEDURE — 99232 SBSQ HOSP IP/OBS MODERATE 35: CPT | Performed by: CLINICAL NURSE SPECIALIST

## 2024-06-06 PROCEDURE — H2032 ACTIVITY THERAPY, PER 15 MIN: HCPCS

## 2024-06-06 RX ORDER — HYDROXYZINE HYDROCHLORIDE 50 MG/1
50 TABLET, FILM COATED ORAL EVERY 6 HOURS PRN
Status: DISCONTINUED | OUTPATIENT
Start: 2024-06-06 | End: 2024-07-03 | Stop reason: HOSPADM

## 2024-06-06 RX ORDER — HYDROXYZINE HYDROCHLORIDE 25 MG/1
25 TABLET, FILM COATED ORAL EVERY 6 HOURS PRN
Status: DISCONTINUED | OUTPATIENT
Start: 2024-06-06 | End: 2024-07-03 | Stop reason: HOSPADM

## 2024-06-06 RX ADMIN — Medication 1 TABLET: at 08:15

## 2024-06-06 RX ADMIN — GABAPENTIN 600 MG: 300 CAPSULE ORAL at 20:46

## 2024-06-06 RX ADMIN — Medication 3 MG: at 22:51

## 2024-06-06 RX ADMIN — LORAZEPAM 1 MG: 1 TABLET ORAL at 13:13

## 2024-06-06 RX ADMIN — FLUTICASONE PROPIONATE 2 SPRAY: 50 SPRAY, METERED NASAL at 08:15

## 2024-06-06 RX ADMIN — GABAPENTIN 300 MG: 300 CAPSULE ORAL at 08:15

## 2024-06-06 RX ADMIN — IPRATROPIUM BROMIDE 2 SPRAY: 42 SPRAY, METERED NASAL at 20:47

## 2024-06-06 RX ADMIN — QUETIAPINE FUMARATE 50 MG: 50 TABLET, EXTENDED RELEASE ORAL at 08:15

## 2024-06-06 RX ADMIN — RISPERIDONE 2 MG: 2 TABLET ORAL at 20:46

## 2024-06-06 RX ADMIN — IPRATROPIUM BROMIDE 2 SPRAY: 42 SPRAY, METERED NASAL at 17:44

## 2024-06-06 RX ADMIN — DIVALPROEX SODIUM 1000 MG: 500 TABLET, FILM COATED, EXTENDED RELEASE ORAL at 20:46

## 2024-06-06 RX ADMIN — IPRATROPIUM BROMIDE 2 SPRAY: 42 SPRAY, METERED NASAL at 12:54

## 2024-06-06 RX ADMIN — IPRATROPIUM BROMIDE 2 SPRAY: 42 SPRAY, METERED NASAL at 08:15

## 2024-06-06 ASSESSMENT — ACTIVITIES OF DAILY LIVING (ADL)
ADLS_ACUITY_SCORE: 41
ORAL_HYGIENE: INDEPENDENT
ADLS_ACUITY_SCORE: 41
HYGIENE/GROOMING: INDEPENDENT
ADLS_ACUITY_SCORE: 41
ORAL_HYGIENE: INDEPENDENT
ADLS_ACUITY_SCORE: 41
HYGIENE/GROOMING: INDEPENDENT
ADLS_ACUITY_SCORE: 41
DRESS: INDEPENDENT
ADLS_ACUITY_SCORE: 41
DRESS: INDEPENDENT
ADLS_ACUITY_SCORE: 41

## 2024-06-06 NOTE — PLAN OF CARE
Problem: Adult Behavioral Health Plan of Care  Goal: Adheres to Safety Considerations for Self and Others  Outcome: Progressing     Problem: Suicide Risk  Goal: Absence of Self-Harm  Outcome: Progressing     Problem: Sleep Disturbance  Goal: Adequate Sleep/Rest  Outcome: Progressing     Problem: Alcohol Withdrawal  Goal: Alcohol Withdrawal Symptom Control  Outcome: Progressing   Goal Outcome Evaluation:  Patient is alert, oriented, and able to communicate needs without any difficulty. Patient commits to safety, denies pain, depression, anxiety, SI/SIB, and other mental illness symptoms. No alcohol withdrawal symptoms observed/reported this shift. Patient slept for  6 hours this shift. No other concerns at this time, team will continue to monitor for needs.

## 2024-06-06 NOTE — PROGRESS NOTES
Rehab Group    Start time: 1615  End time: 1700  Patient time total: 10 minutes    attended partial group     #3 attended   Group Type: recreation   Group Topic Covered: healthy leisure time       Group Session Detail:  TR leisure group     Patient Response/Contribution:  Cooperative with task and Withdrawn       Patient Detail:    Pt participated in some of the structured Therapeutic Recreation group with a focus on leisure participation, stress reduction, and social engagement via a group game. Pt entered group late and missed the beginning of the activity and instructions. Pt was unfamiliar with the activity, but didn't need much assistance he seemed to catch on as he took his first few turns. Pt was a quiet participant, minimal interaction with anyone in group.        Activity Therapy Per 15 minutes () Other Rehab therapies    Patient Active Problem List   Diagnosis    Marijuana dependence (H)    Alcohol abuse    Bipolar disorder, current episode manic severe with psychotic features (H)    Chantal (H)    Schizoaffective disorder, bipolar type (H)    Psychosis, unspecified psychosis type (H)

## 2024-06-06 NOTE — PLAN OF CARE
BEH IP Unit Acuity Rating Score (UARS)  Patient is given one point for every criteria they meet.    CRITERIA SCORING   On a 72 hour hold, court hold, committed, stay of commitment, or revocation. 0    Patient LOS on BEH unit exceeds 20 days. 0  LOS: 8   Patient under guardianship, 55+, otherwise medically complex, or under age 11. 0   Suicide ideation without relief of precipitating factors. 0   Current plan for suicide. 0   Current plan for homicide. 0   Imminent risk or actual attempt to seriously harm another without relief of factors precipitating the attempt. 0   Severe dysfunction in daily living (ex: complete neglect for self care, extreme disruption in vegetative function, extreme deterioration in social interactions). 1   Recent (last 7 days) or current physical aggression in the ED or on unit. 0   Restraints or seclusion episode in past 72 hours. 0   Recent (last 7 days) or current verbal aggression, agitation, yelling, etc., while in the ED or unit. 1   Active psychosis. 1   Need for constant or near constant redirection (from leaving, from others, etc).  0   Intrusive or disruptive behaviors. 0   Patient requires 3 or more hours of individualized nursing care per 8-hour shift (i.e. for ADLs, meds, therapeutic interventions). 0   TOTAL 3

## 2024-06-06 NOTE — PROGRESS NOTES
Rehab Group    Start time: 1115  End time: 1145  Patient time total: 30 minutes (no charge)    attended partial group    #4 attended   Group Type: occupational therapy   Group Topic Covered: healthy leisure time and cognitive activities     Group Session Detail:  Strategic group game     Patient Response/Contribution:  Cooperative with task, Attentive, and Actively engaged     Patient Detail:    Pt initially requested to observe the group task, and agreed to join when a peer left group early. Pt actively participated in a structured occupational therapy group with a focus on social and leisure engagement via a group game. Pt was able to follow multi-step directions and was attentive to task parameters throughout. Pt demonstrated strategic planning and good task organization. Calm, attentive, and engaged. Related to a peer who was sharing briefly about events leading up to admission, describing some grandiosity prior to admission.         No Charge    Patient Active Problem List   Diagnosis    Marijuana dependence (H)    Alcohol abuse    Bipolar disorder, current episode manic severe with psychotic features (H)    Chantal (H)    Schizoaffective disorder, bipolar type (H)    Psychosis, unspecified psychosis type (H)

## 2024-06-06 NOTE — PROGRESS NOTES
"The patient's care was discussed with the treatment team during the daily team meeting and/or staff's chart notes were reviewed.  Staff report patient slept 6 hours, Staff report Milieu Participation:Behavior: Pt presented as brightening on aproach and welcoming of various conversations this evenng. Attended Recreational Therapy with minimal encouragement. Participated well. Ate 100% of dinner. Pt sought out writer to inform of increasing paranoia and voices telling him to not trust other patients and to not allow wife to visit or talk with her on the phone for this reason. Pt supported and validated for seeking assistance with these thoughts. Pt provided synopsis of milieu management by staff for safety and in providing a therapeutic environment for everyone. Pt appeared to visibly calm and declined PRN hydroxyzine. Pt would later inform writer of coping skills he is employing now, to help during distressing moments.Pt allowed visitation with his mom and dad and stated he had a good visit. Medication compliant with medication s/e stated or observed.      Upon interview, patient was interviewed in his room, he was cooperative with the assessment questions, when asked how patient was doing, he replied \"I'm chilling\" patient shared that he has been doing breathing exercise as directed.  Patient reported he was having anxiety 10/10 with a heart palpitation.  Writer further reinforced that patient should report to his nurse and asks for anxiety medication anytime he is experiencing anxiety and not to allow his anxiety to escalate to a high level before asking for help.    Patient reported sleeping good and when asked what changes patient stated \".me knowing myself\" patient appears blunted but manages to force some grin in between conversation.  Patient is less disorganized, tangential but still manifesting some delusional statement for example, when asked what makes his palpitation better or worse, patient responded " Placed in senior lounge room.   "\"not talking to Jorge, but talking to Sg helps.\"  Writer re-emphasized with the patient what he needed to do when feeling anxious.  Patient denied auditory/visual hallucinations, he denied suicidal/homicidal ideation and thoughts of self-harm.  Writer also clarified from the patient if he wanted his wife to visit on not and the patient vaguely responded that he wanted her to visit.  .   Suicidal ideation: denies current or recent suicidal ideation or behaviors.     Homicidal ideation: denies current or recent homicidal ideation or behaviors.     Psychotic symptoms: Yes Patient endorses AH, VH, paranoia, delusions.      Medication side effects reported: No significant side effects.     Acute medical concerns: none     Other issues reported by patient: Patient had no further questions or concerns.            Medications:      Inpatient Administered Meds                     Current Facility-Administered Medications   Medication Dose Route Frequency Provider Last Rate Last Admin    divalproex sodium extended-release (DEPAKOTE ER) 24 hr tablet 1,000 mg  1,000 mg Oral At Bedtime Haroon Parker APRN CNP   1,000 mg at 05/30/24 2109    fluticasone (FLONASE) 50 MCG/ACT spray 2 spray  2 spray Both Nostrils Daily Haroon Parker APRN CNP   2 spray at 05/31/24 0835    gabapentin (NEURONTIN) capsule 300 mg  300 mg Oral Daily Haroon Parker APRN CNP   300 mg at 05/31/24 0833    gabapentin (NEURONTIN) capsule 600 mg  600 mg Oral At Bedtime Haroon Parker APRN CNP   600 mg at 05/30/24 2109    ipratropium (ATROVENT) 0.06 % spray 2 spray  2 spray Both Nostrils 4x Daily Haroon Parker APRN CNP   2 spray at 05/31/24 0835    multivitamin w/minerals (THERA-VIT-M) tablet 1 tablet  1 tablet Oral Daily Haroon Parker APRN CNP   1 tablet at 05/31/24 0833    risperiDONE (risperDAL) tablet 2 mg  2 mg Oral At Bedtime Haroon Parker APRN CNP   2 mg at 05/30/24 2109              Allergies:      Allergies           " "  Allergies   Allergen Reactions    Seasonal Allergies Other (See Comments)       Congestion. Managed with meds.              Labs:   Recent Results   No results found for this or any previous visit (from the past 24 hour(s)).           Psychiatric Examination:      /80 (BP Location: Left arm, Patient Position: Sitting, Cuff Size: Adult Regular)   Pulse 70   Temp 97.7  F (36.5  C) (Temporal)   Resp 16   Ht 1.854 m (6' 1\")   Wt 94.8 kg (209 lb)   SpO2 99%   BMI 27.57 kg/m    Weight is 209 lbs 0 oz  Body mass index is 27.57 kg/m .     Weight over time:            Vitals:     05/26/24 1216 05/26/24 2209 05/30/24 0900   Weight: 91.6 kg (202 lb) 90.7 kg (200 lb) 94.8 kg (209 lb)         Orthostatic Vitals           Most Recent       Sitting Orthostatic /80 05/31 0800     Sitting Orthostatic Pulse (bpm) 70 05/31 0800     Standing Orthostatic /84 05/31 0800     Standing Orthostatic Pulse (bpm) 82 05/31 0800                   Cardiometabolic risk assessment. 05/31/24        Reviewed patient profile for cardiometabolic risk factors     Date taken /Value  REFERENCE RANGE   Abdominal Obesity  (Waist Circumference)   See nursing flowsheet Women ?35 in (88 cm)   Men ?40 in (102 cm)       Triglycerides  No results found for: \"TRIG\"     ?150 mg/dL (1.7 mmol/L) or current treatment for elevated triglycerides   HDL cholesterol  No results found for: \"HDL\"]    Women <50 mg/dL (1.3 mmol/L) in women or current treatment for low HDL cholesterol  Men <40 mg/dL (1 mmol/L) in men or current treatment for low HDL cholesterol      Fasting plasma glucose (FPG)           Lab Results   Component Value Date     GLC 90 05/29/2024      06/02/2015        FPG ?100 mg/dL (5.6 mmol/L) or treatment for elevated blood glucose   Blood pressure        BP Readings from Last 3 Encounters:   05/31/24 122/80   06/09/15 128/83   06/14/13 129/74    Blood pressure ?130/85 mmHg or treatment for elevated blood pressure   Family " "History  See family history      Mental Status Exam:  Appearance: awake, alert, dressed in hospital scrubs, and appeared as age stated  Attitude:  Cooperative  Eye Contact:  good  Mood:  \"anxious\",   Affect:   anxious, mood congruent,   Speech:  clear, coherent  Language: fluent and intact in English  Psychomotor, Gait, Musculoskeletal:  no evidence of tardive dyskinesia, dystonia, or tics  Throught Process:  fairly disorganized, less tangential  Associations:  loosening of associations present but reduced  Thought Content:  no evidence of suicidal ideation or homicidal ideation, marked persecutory delusions.  Insight:  fair  Judgement:  limited  Oriented to:  time, person, and place  Attention Span and Concentration:  intact  Recent and Remote Memory:  fair  Fund of Knowledge:  low-normal             Precautions:                Behavioral Orders   Procedures    Code 1 - Restrict to Unit    [facility] Extended Care       Until discharge, Extended Care to offer psychotherapeutic services to mental health patients boarding for admission or stabilization. These services are to include but are not limited to: individual psychotherapy, diagnostic assessment, case management and care planning, safety planning, etc. This may include up to 1 visit per day. If patient is physically located at [facility] or [facility], group psychotherapy up to 2 time per day may be offered.     Routine Programming       As clinically indicated    Sexual precautions    Status 15       Every 15 minutes.    Suicide precautions: Suicide Risk: HIGH; Clinical rationale to override score: modification to the care environment, lack of access to a plan for self-harm, Collateral information supporting Suicidal/self-harm behaviors       Patients on Suicide Precautions should have a Combination Diet ordered that includes a Diet selection(s) AND a Behavioral Tray selection for Safe Tray - with utensils, or Safe Tray - NO utensils          Order Specific Question:   " "Suicide Risk       Answer:   HIGH       Order Specific Question:   Clinical rationale to override score:       Answer:   modification to the care environment       Order Specific Question:   Clinical rationale to override score:       Answer:   lack of access to a plan for self-harm       Order Specific Question:   Clinical rationale to override score:       Answer:   Collateral information supporting Suicidal/self-harm behaviors    Withdrawal precautions           Diagnoses:         Chantal (H)  Psychosis, unspecified psychosis type (H)  Severe manic bipolar I disorder with psychotic features (H)     Clinically Significant Risk Factors                          # Overweight: Estimated body mass index is 27.57 kg/m  as calculated from the following:    Height as of this encounter: 1.854 m (6' 1\").    Weight as of this encounter: 94.8 kg (209 lb)., PRESENT ON ADMISSION     # Financial/Environmental Concerns: none               Assessment & Plan:      Assessment and hospital summary:  This is a 33 year old male with history of Bipolar disorder, current episode manic severe with psychotic features (H). Patient presented to the ED for psychiatric evaluation.  Patient reported that his mother in-law and sister in-laws are not comfortable with him around them in the house due to the sexual thoughts and arousal toward them.  Patient states he is having most of his sexual thoughts  and arousal about his mother in law, sister in-law and other women.           Today's Changes: 6/6  No medication changes         Target psychiatric symptoms and interventions:  . Education given regarding diagnostic and treatment options with risks, benefits and alternatives and adequate verbalization of understanding.  2. Admitted on 5/29/24 to [facility]..  Precautions placed include: Suicide precautions, sexual precautions, withdrawal precautions.  Code 1-restricted to unit     3. Medications: 5/30/2024: PTA medications reviewed.     4. " Medications:  Hospital  Diazepam (Valium) tablet 5-20 mg oral every 30 minutes as needed order, this according to patient MSSA score  Divalproex sodium extended release(Depakote ER) 24 however tablet 1000 mg oral at bedtime  Fluticasone (Flonase) 50 mcg/ACT spray 2 sprays, both nostrils daily  Seroquel XR 24 hr tablet 50 mg daily  Full code  Gabapentin (Neurontin) capsule 300 mg oral daily  Gabapentin (Neurontin) capsule 600 mg at bedtime  Hydroxyzine HCl (Atarax) tablet 25 mg oral every 6 hours as needed order adjuvant pain   Or  Hydroxyzine HCl (Atarax) tablet 50 mg oral every 6 hours as needed order, adjuvant pain  Ipratropium (Atrovent) 0.06% spray, 2 sprays both nostrils 4 times daily  Lorazepam (Ativan) tablet 1 mg oral at bedtime as needed sleep  Melatonin tablet 3 mg oral at bedtime as needed sleep  [facility]  extended care  MSSA score vital signs and pain assessment on admission and per protocol  Multivitamin W/minerals (Thera-vit-M) tablet 1 tablet oral daily  No COVID-19 virus (coronavirus screening test needed or already ordered/completed  Notify physician-alcohol withdrawal MSSA focused  Notify provider if signs of substance withdrawal  Olanzapine Zydis (Zyprexa) ODT tab 10 mg oral 3 times daily as needed agitation, aggression  Or  Olanzapine (Zyprexa) injection 10 mg intramuscular 3 times daily as needed agitation, aggression  Patient CARE other 1-1 acuity staff for severe intrusiveness, SI  Regular diet adult  Requesting IP [facility]AS date  Risperidone (Risperdal) tablet 2 mg oral at bedtime  Routine programming  Sexual precaution  Status 15 every 15 minutes check  Suicide precautions: Suicide risk hide  Trazodone (Desyrel) tablet 50 mg oral at bedtime as needed sleep  Valproic acid a.m. draw draw to be done before this is given  Vital signs and pain assessment  Voluntary  With the patient per unit routine  Withdrawal precautions  5. Consultations:  Hospitalist to follow as needed.  IM to follow for every  medical condition.  6. Structure and Supervision  [facility] [address]  Barriers to Discharge:  7.   is following in regards to collecting and reviewing collateral information, referrals and disposition planning.  Legal: Voluntary  Referrals: CTC to coordinate all referrals  Care Coordination: CTC to coordinate care with outside providers  Placement: CTC to facilitate placement following symptom stabilization  Anticipated Discharge: 5 to 7 days  . Further treatment programming to be determined throughout the hospital course.     [facility] Patient able to contract for safety and Patient on precautions  Risks, benefits, and alternatives discussed at length with patient.      Acute Medical Problems and Treatments:  Acute medical concerns:  - No acute medical concerns     Pertinent labs/imaging:  Recent Results             Recent Results (from the past 168 hour(s))   Urine Drug Screen Panel     Collection Time: 05/26/24  7:43 PM   Result Value Ref Range     Amphetamines Urine Screen Negative Screen Negative     Barbituates Urine Screen Negative Screen Negative     Benzodiazepine Urine Screen Negative Screen Negative     Cannabinoids Urine Screen Negative Screen Negative     Cocaine Urine Screen Negative Screen Negative     Fentanyl Qual Urine Screen Negative Screen Negative     Opiates Urine Screen Negative Screen Negative     PCP Urine Screen Negative Screen Negative   Comprehensive metabolic panel     Collection Time: 05/29/24  9:03 PM   Result Value Ref Range     Sodium 142 135 - 145 mmol/L     Potassium 4.9 3.4 - 5.3 mmol/L     Carbon Dioxide (CO2) 30 (H) 22 - 29 mmol/L     Anion Gap 8 7 - 15 mmol/L     Urea Nitrogen 15.7 6.0 - 20.0 mg/dL     Creatinine 0.77 0.67 - 1.17 mg/dL     GFR Estimate >90 >60 mL/min/1.73m2     Calcium 9.6 8.6 - 10.0 mg/dL     Chloride 104 98 - 107 mmol/L     Glucose 90 70 - 99 mg/dL     Alkaline Phosphatase 42 40 - 150 U/L     AST 12 0 - 45 U/L     ALT 15  0 - 70 U/L     Protein Total 7.2 6.4 - 8.3 g/dL     Albumin 4.8 3.5 - 5.2 g/dL     Bilirubin Total 0.3 <=1.2 mg/dL   Valproic acid (Depakote level)     Collection Time: 05/29/24  9:03 PM   Result Value Ref Range     Valproic acid 31.0 (L)   ug/mL   CBC with platelets and differential     Collection Time: 05/29/24  9:03 PM   Result Value Ref Range     WBC Count 7.5 4.0 - 11.0 10e3/uL     RBC Count 4.26 (L) 4.40 - 5.90 10e6/uL     Hemoglobin 13.0 (L) 13.3 - 17.7 g/dL     Hematocrit 40.1 40.0 - 53.0 %     MCV 94 78 - 100 fL     MCH 30.5 26.5 - 33.0 pg     MCHC 32.4 31.5 - 36.5 g/dL     RDW 12.5 10.0 - 15.0 %     Platelet Count 223 150 - 450 10e3/uL     % Neutrophils 48 %     % Lymphocytes 39 %     % Monocytes 10 %     % Eosinophils 2 %     % Basophils 1 %     % Immature Granulocytes 0 %     NRBCs per 100 WBC 0 <1 /100     Absolute Neutrophils 3.6 1.6 - 8.3 10e3/uL     Absolute Lymphocytes 2.9 0.8 - 5.3 10e3/uL     Absolute Monocytes 0.7 0.0 - 1.3 10e3/uL     Absolute Eosinophils 0.1 0.0 - 0.7 10e3/uL     Absolute Basophils 0.0 0.0 - 0.2 10e3/uL     Absolute Immature Granulocytes 0.0 <=0.4 10e3/uL     Absolute NRBCs 0.0 10e3/uL               Behavioral/Psychological/Social:  - Encourage unit programming     Safety:  - Continue precautions as noted above  - Status 15 minute checks     Legal Status: voluntary     Disposition Plan   Reason for ongoing admission: poses an imminent risk to self and poses an imminent risk to others  Discharge location:  TBD  Discharge Medications: not ordered  Follow-up Appointments: not scheduled

## 2024-06-06 NOTE — PLAN OF CARE
"Team Note Due:  Thursday     Assessment/Intervention/Current Symtoms and Care Coordination:  Chart review and met with team, discussed pt progress, symptomology, and response to treatment. Discussed the discharge plan and any potential impediments to discharge.    Met with Basil this afternoon. He stated he is feeling good and that he just needs to get his anxiety under control. He stated he is experiencing delusional thought content but that he always does. He stated he wants to leave but knows that he needs to \"be better\" so that he doesn't \"come back.\" His blink rate appears to have significantly increased since the last time this writer met with him.      Discharge Plan or Goal:  When patient's psychotic symptoms have decreased discharge to his home with family will be facilitated      Barriers to Discharge:  Currently presents with symptoms of psychosis. Medication management ongoing.      Referral Status:  Referral needs pending as patient stabilizes     Legal Status:  Voluntary     Contacts:  Wife: Esme Andrews, [phone] (Consent)     Upcoming Meetings and Dates/Important Information and next steps:  Symptom stabilization  Discharge planning   "

## 2024-06-07 PROCEDURE — G0177 OPPS/PHP; TRAIN & EDUC SERV: HCPCS

## 2024-06-07 PROCEDURE — 99232 SBSQ HOSP IP/OBS MODERATE 35: CPT | Performed by: CLINICAL NURSE SPECIALIST

## 2024-06-07 PROCEDURE — 124N000002 HC R&B MH UMMC

## 2024-06-07 PROCEDURE — 250N000013 HC RX MED GY IP 250 OP 250 PS 637: Performed by: CLINICAL NURSE SPECIALIST

## 2024-06-07 RX ORDER — RISPERIDONE 2 MG/1
2 TABLET ORAL 2 TIMES DAILY
Status: DISCONTINUED | OUTPATIENT
Start: 2024-06-07 | End: 2024-06-12

## 2024-06-07 RX ADMIN — RISPERIDONE 2 MG: 2 TABLET ORAL at 21:21

## 2024-06-07 RX ADMIN — DIVALPROEX SODIUM 1000 MG: 500 TABLET, FILM COATED, EXTENDED RELEASE ORAL at 21:21

## 2024-06-07 RX ADMIN — QUETIAPINE FUMARATE 50 MG: 50 TABLET, EXTENDED RELEASE ORAL at 08:43

## 2024-06-07 RX ADMIN — GABAPENTIN 300 MG: 300 CAPSULE ORAL at 08:43

## 2024-06-07 RX ADMIN — FLUTICASONE PROPIONATE 2 SPRAY: 50 SPRAY, METERED NASAL at 08:46

## 2024-06-07 RX ADMIN — RISPERIDONE 2 MG: 2 TABLET ORAL at 08:44

## 2024-06-07 RX ADMIN — GABAPENTIN 600 MG: 300 CAPSULE ORAL at 21:21

## 2024-06-07 RX ADMIN — Medication 1 TABLET: at 08:43

## 2024-06-07 RX ADMIN — IPRATROPIUM BROMIDE 2 SPRAY: 42 SPRAY, METERED NASAL at 08:46

## 2024-06-07 ASSESSMENT — ACTIVITIES OF DAILY LIVING (ADL)
ADLS_ACUITY_SCORE: 41
ORAL_HYGIENE: INDEPENDENT
ADLS_ACUITY_SCORE: 41
DRESS: INDEPENDENT
ADLS_ACUITY_SCORE: 41
HYGIENE/GROOMING: INDEPENDENT
ADLS_ACUITY_SCORE: 41

## 2024-06-07 NOTE — PLAN OF CARE
Goal Outcome Evaluation:    Plan of Care Reviewed With: patient          Problem: Adult Inpatient Plan of Care  Goal: Plan of Care Review  Description: The Plan of Care Review/Shift note should be completed every shift.  The Outcome Evaluation is a brief statement about your assessment that the patient is improving, declining, or no change.  This information will be displayed automatically on your shift  note.  Outcome: Marium Gracia was up on the unit social with peers and staff. He  initiated and took a shower. He denied having any disturbed thinking, denied SI, HI and he contracted for safety. Pt was medication compliant and he denied side effects. Pt reports that he is always hungry and would like double portions. Provider informed and patient got an order for it. He spent most of the day in his room watching TV but was briefly out in the milieu, mainly for meals.

## 2024-06-07 NOTE — CARE PLAN
Rehab Group    Start time: 1030  End time: 1115  Patient time total: 45 minutes    attended full group    #2 attended   Group Type: occupational therapy   Group Topic Covered: balanced lifestyle and self-care       Group Session Detail:  Morning wellness/exercise group with stretching and resistance bands to begin the day.  Discussion on wellness routines and activities done outside the hospital to promote  health and wellness.     Patient Response/Contribution:  positive affect, cooperative with task, and organized       Patient Detail:    Pt engaged in exercises both using visual cue cards and watching writer demonstrations.  Pt shared he plans to move with family to his in-laws in [place] after completing mental health treatment, and is excited for new beginnings.  Pt read mental health poster aloud in the lounge, encouraging peer to explore the coping skills posted, along with boxed breathing, etc.        Train & Education Service Per Session 45 + Minutes () OT Group code    Patient Active Problem List   Diagnosis    Marijuana dependence (H)    Alcohol abuse    Bipolar disorder, current episode manic severe with psychotic features (H)    Chantal (H)    Schizoaffective disorder, bipolar type (H)    Psychosis, unspecified psychosis type (H)

## 2024-06-07 NOTE — PLAN OF CARE
Team Note Due:  Thursday     Assessment/Intervention/Current Symtoms and Care Coordination:  Chart review and met with team, discussed pt progress, symptomology, and response to treatment. Discussed the discharge plan and any potential impediments to discharge.    Basil attended a psychotherapy group in the afternoon. He reports feeling anxious but coping. Did not make any overtly delusional statements during our meeting, he was Restoration at times but appropriate to the situation.      Discharge Plan or Goal:  When patient's psychotic symptoms have decreased discharge to his home with family will be facilitated      Barriers to Discharge:  Currently presents with symptoms of psychosis. Medication management ongoing.      Referral Status:  Referral needs pending as patient stabilizes     Legal Status:  Voluntary     Contacts:  Wife: Esme Andrews, [phone] (Consent)     Upcoming Meetings and Dates/Important Information and next steps:  Symptom stabilization  Discharge planning

## 2024-06-07 NOTE — PLAN OF CARE
BEH IP Unit Acuity Rating Score (UARS)  Patient is given one point for every criteria they meet.    CRITERIA SCORING   On a 72 hour hold, court hold, committed, stay of commitment, or revocation. 0    Patient LOS on BEH unit exceeds 20 days. 0  LOS: 9   Patient under guardianship, 55+, otherwise medically complex, or under age 11. 0   Suicide ideation without relief of precipitating factors. 0   Current plan for suicide. 0   Current plan for homicide. 0   Imminent risk or actual attempt to seriously harm another without relief of factors precipitating the attempt. 0   Severe dysfunction in daily living (ex: complete neglect for self care, extreme disruption in vegetative function, extreme deterioration in social interactions). 1   Recent (last 7 days) or current physical aggression in the ED or on unit. 0   Restraints or seclusion episode in past 72 hours. 0   Recent (last 7 days) or current verbal aggression, agitation, yelling, etc., while in the ED or unit. 1   Active psychosis. 1   Need for constant or near constant redirection (from leaving, from others, etc).  0   Intrusive or disruptive behaviors. 0   Patient requires 3 or more hours of individualized nursing care per 8-hour shift (i.e. for ADLs, meds, therapeutic interventions). 0   TOTAL 3

## 2024-06-07 NOTE — PROGRESS NOTES
Pt participated in dance/movement therapy (D/MT) with encouragement from this therapist.  He was slower and more resistant to movement than previous groups but gradually increased in vitality.  He was more dismissive of therapeutic processes and expressed impatience about discharging.  He did stand to dance, followed movements and rhythms of this therapist and the music, as well as used some expressive movement in gestures and torso motion.         06/06/24 0737   Expressive Therapy   Therapy Type dance/movement   Minutes of Treatment 55

## 2024-06-07 NOTE — PLAN OF CARE
"  Problem: Psychotic Signs/Symptoms  Goal: Improved Behavioral Control (Psychotic Signs/Symptoms)  Outcome: Progressing   Goal Outcome Evaluation:    Patient is calm and cooperative. He has been visible in the milieu and social with peers and staff this evening. He does state that his hallucination, paranoia and have decreased; he has insight to his mental health symptoms and is aware when he is feeling paranoid or delusional. He denies anxiety, depression, SI/SIB/HI. Pt also stated he has not heard any voices this shift. No acute behavioral concerns this shift. He was also seen playing games with peers in the lounge. Pt denies any physical concerns this shift. He denies pain. He is eating and drinking fine. He is also going to the bathroom fine.     Pt does appear to be suspicous at times, when eye contact is made pt smiles to writer.       Last 24H PRN:     LORazepam (ATIVAN) tablet 1 mg, 1 mg at 06/06/24 1313    melatonin tablet 3 mg, 3 mg at 06/06/24 2251     /71 (BP Location: Left arm, Patient Position: Sitting, Cuff Size: Adult Large)   Pulse 89   Temp 97.3  F (36.3  C) (Temporal)   Resp 16   Ht 1.854 m (6' 1\")   Wt 96.2 kg (212 lb)   SpO2 98%   BMI 27.97 kg/m       "

## 2024-06-07 NOTE — CARE PLAN
Rehab Group    Start time: 1615  End time: 1700  Patient time total: 15 minutes    attended partial group     #3 attended   Group Type: occupational therapy   Group Topic Covered: balanced lifestyle, coping skills, social skills, self-esteem, and healthy leisure time       Group Session Detail:  Happiness or Recovery Collages for creative expression, concentration, follow through, building self awareness, coping, mood stabilization, reality-based activity, organization/planning, fostering hope for a sustainable recovery and expanding social skills.       Patient Response/Contribution:  Actively engaged       Patient Detail:  Pt was pleasant and quietly worked on his collage.  Pt made a rather abstract one where colors and textures were displayed.  Pt left group early to use the restroom and did not return.  No charge.                Patient Active Problem List   Diagnosis    Marijuana dependence (H)    Alcohol abuse    Bipolar disorder, current episode manic severe with psychotic features (H)    Chantal (H)    Schizoaffective disorder, bipolar type (H)    Psychosis, unspecified psychosis type (H)

## 2024-06-07 NOTE — PLAN OF CARE
Goal Outcome Evaluation:  Problem: Sleep Disturbance  Goal: Adequate Sleep/Rest  Outcome: Progressing     Pt in bed at beginning of shift, breathing quiet and unlabored. Pt slept through shift. Pt slept 6 hours.      No pt complaints or concerns at this time.      No PRNs given. Pt is presently awake in his room.Will continue to monitor.

## 2024-06-07 NOTE — PLAN OF CARE
06/07/24 1500   Group Therapy Session   Group Attendance attended group session   Time Session Began 1345   Time Session Ended 1415   Total Time (minutes) 30   Total # Attendees 2   Group Type psychotherapeutic   Group Topic Covered other (see comments)   Group Session Detail Acceptance and Commitment Therapy group. Explored prompts related to openness, engagement, and awareness. Discussed mindfulness techniques, practiced somatic interventions, engaged in visualization practices, and processed experiences related to mental health challenges.   Patient Response/Contribution cooperative with task;discussed personal experience with topic   Patient Participation Detail Basil participated in the entire group and shared his experiences related to each prompt. At times he took his own body break to close his eyes and gather his thoughts, and put his head on the table when the other participant was sharing. He had Moravian thought content throughout the group, but it was appropriate and not delusional in nature. He was redirected a couple of times as he was speaking for the other participant's experience instead of his own, he was easily redirectable.

## 2024-06-07 NOTE — PROGRESS NOTES
"The patient's care was discussed with the treatment team during the daily team meeting and/or staff's chart notes were reviewed.  Staff report patient slept 6 hours with no issues of concerns. Patient is calm and cooperative. He has been visible in the milieu and social with peers and staff this evening. He does state that his hallucination, paranoia and have decreased; he has insight to his mental health symptoms and is aware when he is feeling paranoid or delusional. He denies anxiety, depression, SI/SIB/HI. Pt also stated he has not heard any voices this shift. No acute behavioral concerns this shift. He was also seen playing games with peers in the lounge. Pt denies any physical concerns this shift. He denies pain. He is eating and drinking fine. He is also going to the bathroom fine.    Pt does appear to be suspicous at times, when eye contact is made pt smiles to writer.      Upon interview, patient was interviewed in his room, he was cooperative with the assessment questions, affect is neutral.  Patient reports that he was feeling hungry, he requested if he could get a double portion of meal and writer informed him that the other will be inserted.  Patient shared that he wants to be discharged and run out of here with the speed of [employer]. However, patient reported that he slept well and felt well rested.    Writer asked what patient has been doing since morning, he shared that he has been charting with people, watch TV channels.  Patient reported that his favorite TV channels are channel 4 or 5.  As the conversation progresses, patient informed this writer that he has a note for him and brought out a paper in which he wrote \"my wife and I have decided that I should go to outpatient treatment due to you (the care team) not communicating.\"  Writer validated patient concern and informed him that his concern is genuine and that we could do better in terms of communication.    Writer asked if there is " anything the patient wanted the team to discuss with his wife, and patient responded nothing. Writer then informed the patient that the  has been communicating with his wife and this writer also collected collateral information from the wife the second day the patient came to the unit.  Patient denied auditory/visual hallucinations, he denied suicidal/homicidal ideation and thoughts of self-harm.    Suicidal ideation: denies current or recent suicidal ideation or behaviors.     Homicidal ideation: denies current or recent homicidal ideation or behaviors.     Psychotic symptoms: Yes Patient endorses AH, VH, paranoia, delusions.      Medication side effects reported: No significant side effects.     Acute medical concerns: none     Other issues reported by patient: Patient had no further questions or concerns.            Medications:      Inpatient Administered Meds                     Current Facility-Administered Medications   Medication Dose Route Frequency Provider Last Rate Last Admin    divalproex sodium extended-release (DEPAKOTE ER) 24 hr tablet 1,000 mg  1,000 mg Oral At Bedtime Haroon Parker APRN CNP   1,000 mg at 05/30/24 2109    fluticasone (FLONASE) 50 MCG/ACT spray 2 spray  2 spray Both Nostrils Daily Haroon Parker APRN CNP   2 spray at 05/31/24 0835    gabapentin (NEURONTIN) capsule 300 mg  300 mg Oral Daily Haroon Parker APRN CNP   300 mg at 05/31/24 0833    gabapentin (NEURONTIN) capsule 600 mg  600 mg Oral At Bedtime Haroon Parker APRN CNP   600 mg at 05/30/24 2109    ipratropium (ATROVENT) 0.06 % spray 2 spray  2 spray Both Nostrils 4x Daily Haroon Parker APRN CNP   2 spray at 05/31/24 0835    multivitamin w/minerals (THERA-VIT-M) tablet 1 tablet  1 tablet Oral Daily Haroon Parker APRN CNP   1 tablet at 05/31/24 0833    risperiDONE (risperDAL) tablet 2 mg  2 mg Oral At Bedtime Haroon Parker APRN CNP   2 mg at 05/30/24 2109              Allergies:     "  Allergies             Allergies   Allergen Reactions    Seasonal Allergies Other (See Comments)       Congestion. Managed with meds.                  Labs:    Recent Results    No results found for this or any previous visit (from the past 24 hour(s)).            Psychiatric Examination:      /80 (BP Location: Left arm, Patient Position: Sitting, Cuff Size: Adult Regular)   Pulse 70   Temp 97.7  F (36.5  C) (Temporal)   Resp 16   Ht 1.854 m (6' 1\")   Wt 94.8 kg (209 lb)   SpO2 99%   BMI 27.57 kg/m    Weight is 209 lbs 0 oz  Body mass index is 27.57 kg/m .     Weight over time:            Vitals:     05/26/24 1216 05/26/24 2209 05/30/24 0900   Weight: 91.6 kg (202 lb) 90.7 kg (200 lb) 94.8 kg (209 lb)         Orthostatic Vitals           Most Recent       Sitting Orthostatic /80 05/31 0800     Sitting Orthostatic Pulse (bpm) 70 05/31 0800     Standing Orthostatic /84 05/31 0800     Standing Orthostatic Pulse (bpm) 82 05/31 0800                   Cardiometabolic risk assessment. 05/31/24        Reviewed patient profile for cardiometabolic risk factors     Date taken /Value  REFERENCE RANGE   Abdominal Obesity  (Waist Circumference)   See nursing flowsheet Women ?35 in (88 cm)   Men ?40 in (102 cm)       Triglycerides  No results found for: \"TRIG\"     ?150 mg/dL (1.7 mmol/L) or current treatment for elevated triglycerides   HDL cholesterol  No results found for: \"HDL\"]    Women <50 mg/dL (1.3 mmol/L) in women or current treatment for low HDL cholesterol  Men <40 mg/dL (1 mmol/L) in men or current treatment for low HDL cholesterol      Fasting plasma glucose (FPG)           Lab Results   Component Value Date     GLC 90 05/29/2024      06/02/2015        FPG ?100 mg/dL (5.6 mmol/L) or treatment for elevated blood glucose   Blood pressure        BP Readings from Last 3 Encounters:   05/31/24 122/80   06/09/15 128/83   06/14/13 129/74    Blood pressure ?130/85 mmHg or treatment for elevated " "blood pressure   Family History  See family history      Mental Status Exam:  Appearance: awake, alert, dressed in hospital scrubs, and appeared as age stated  Attitude:  Cooperative  Eye Contact:  good  Mood:  \"Good\",   Affect:   Calm, mood congruent,   Speech:  clear, coherent  Language: fluent and intact in English  Psychomotor, Gait, Musculoskeletal:  no evidence of tardive dyskinesia, dystonia, or tics  Throught Process:  fairly disorganized, less tangential  Associations: No loosening of associations  Thought Content:  no evidence of suicidal ideation or homicidal ideation, marked persecutory delusions.  Insight:  fair  Judgement:  limited  Oriented to:  time, person, and place  Attention Span and Concentration:  intact  Recent and Remote Memory:  fair  Fund of Knowledge:  low-normal             Precautions:                Behavioral Orders   Procedures    Code 1 - Restrict to Unit    [facility] Extended Care       Until discharge, Extended Care to offer psychotherapeutic services to mental health patients boarding for admission or stabilization. These services are to include but are not limited to: individual psychotherapy, diagnostic assessment, case management and care planning, safety planning, etc. This may include up to 1 visit per day. If patient is physically located at [facility] or [facility], group psychotherapy up to 2 time per day may be offered.     Routine Programming       As clinically indicated    Sexual precautions    Status 15       Every 15 minutes.    Suicide precautions: Suicide Risk: HIGH; Clinical rationale to override score: modification to the care environment, lack of access to a plan for self-harm, Collateral information supporting Suicidal/self-harm behaviors       Patients on Suicide Precautions should have a Combination Diet ordered that includes a Diet selection(s) AND a Behavioral Tray selection for Safe Tray - with utensils, or Safe Tray - NO utensils          Order Specific Question:   " "Suicide Risk       Answer:   HIGH       Order Specific Question:   Clinical rationale to override score:       Answer:   modification to the care environment       Order Specific Question:   Clinical rationale to override score:       Answer:   lack of access to a plan for self-harm       Order Specific Question:   Clinical rationale to override score:       Answer:   Collateral information supporting Suicidal/self-harm behaviors    Withdrawal precautions           Diagnoses:         Chantal (H)  Psychosis, unspecified psychosis type (H)  Severe manic bipolar I disorder with psychotic features (H)     Clinically Significant Risk Factors                          # Overweight: Estimated body mass index is 27.57 kg/m  as calculated from the following:    Height as of this encounter: 1.854 m (6' 1\").    Weight as of this encounter: 94.8 kg (209 lb)., PRESENT ON ADMISSION     # Financial/Environmental Concerns: none               Assessment & Plan:      Assessment and hospital summary:  This is a 33 year old male with history of Bipolar disorder, current episode manic severe with psychotic features (H). Patient presented to the ED for psychiatric evaluation.  Patient reported that his mother in-law and sister in-laws are not comfortable with him around them in the house due to the sexual thoughts and arousal toward them.  Patient states he is having most of his sexual thoughts  and arousal about his mother in law, sister in-law and other women.           Today's Changes: 6/7  No medication changes   Double portions of meals     Target psychiatric symptoms and interventions:  . Education given regarding diagnostic and treatment options with risks, benefits and alternatives and adequate verbalization of understanding.  2. Admitted on 5/29/24 to [facility]  Precautions placed include: Suicide precautions, sexual precautions, withdrawal precautions.  Code 1-restricted to unit     3. Medications: 5/30/2024: PTA medications " reviewed.     4. Medications:  Hospital  Diazepam (Valium) tablet 5-20 mg oral every 30 minutes as needed order, this according to patient MSSA score  Divalproex sodium extended release(Depakote ER) 24 however tablet 1000 mg oral at bedtime  Fluticasone (Flonase) 50 mcg/ACT spray 2 sprays, both nostrils daily  Seroquel XR 24 hr tablet 50 mg daily  Full code  Gabapentin (Neurontin) capsule 300 mg oral daily  Gabapentin (Neurontin) capsule 600 mg at bedtime  Hydroxyzine HCl (Atarax) tablet 25 mg oral every 6 hours as needed order adjuvant pain   Or  Hydroxyzine HCl (Atarax) tablet 50 mg oral every 6 hours as needed order, adjuvant pain  Ipratropium (Atrovent) 0.06% spray, 2 sprays both nostrils 4 times daily  Lorazepam (Ativan) tablet 1 mg oral at bedtime as needed sleep  Melatonin tablet 3 mg oral at bedtime as needed sleep  [facility]  extended care  MSSA score vital signs and pain assessment on admission and per protocol  Multivitamin W/minerals (Thera-vit-M) tablet 1 tablet oral daily  No COVID-19 virus (coronavirus screening test needed or already ordered/completed  Notify physician-alcohol withdrawal MSSA focused  Notify provider if signs of substance withdrawal  Olanzapine Zydis (Zyprexa) ODT tab 10 mg oral 3 times daily as needed agitation, aggression  Or  Olanzapine (Zyprexa) injection 10 mg intramuscular 3 times daily as needed agitation, aggression  Patient CARE other 1-1 acuity staff for severe intrusiveness, SI  Regular diet adult  Requesting IP [facility]AS date  Risperidone (Risperdal) tablet 2 mg oral at bedtime  Routine programming  Sexual precaution  Status 15 every 15 minutes check  Suicide precautions: Suicide risk hide  Trazodone (Desyrel) tablet 50 mg oral at bedtime as needed sleep  Valproic acid a.m. draw draw to be done before this is given  Vital signs and pain assessment  Voluntary  With the patient per unit routine  Withdrawal precautions  5. Consultations:  Hospitalist to follow as needed.  IM to follow  for every medical condition.  6. Structure and Supervision  [facility] [address]  Barriers to Discharge:  7.   is following in regards to collecting and reviewing collateral information, referrals and disposition planning.  Legal: Voluntary  Referrals: CTC to coordinate all referrals  Care Coordination: CTC to coordinate care with outside providers  Placement: CTC to facilitate placement following symptom stabilization  Anticipated Discharge: 5 to 7 days  . Further treatment programming to be determined throughout the hospital course.     Risk Assessment: [facility] RISK ASSESSMENT: Patient able to contract for safety and Patient on precautions  Risks, benefits, and alternatives discussed at length with patient.      Acute Medical Problems and Treatments:  Acute medical concerns:  - No acute medical concerns     Pertinent labs/imaging:  Recent Results             Recent Results (from the past 168 hour(s))   Urine Drug Screen Panel     Collection Time: 05/26/24  7:43 PM   Result Value Ref Range     Amphetamines Urine Screen Negative Screen Negative     Barbituates Urine Screen Negative Screen Negative     Benzodiazepine Urine Screen Negative Screen Negative     Cannabinoids Urine Screen Negative Screen Negative     Cocaine Urine Screen Negative Screen Negative     Fentanyl Qual Urine Screen Negative Screen Negative     Opiates Urine Screen Negative Screen Negative     PCP Urine Screen Negative Screen Negative   Comprehensive metabolic panel     Collection Time: 05/29/24  9:03 PM   Result Value Ref Range     Sodium 142 135 - 145 mmol/L     Potassium 4.9 3.4 - 5.3 mmol/L     Carbon Dioxide (CO2) 30 (H) 22 - 29 mmol/L     Anion Gap 8 7 - 15 mmol/L     Urea Nitrogen 15.7 6.0 - 20.0 mg/dL     Creatinine 0.77 0.67 - 1.17 mg/dL     GFR Estimate >90 >60 mL/min/1.73m2     Calcium 9.6 8.6 - 10.0 mg/dL     Chloride 104 98 - 107 mmol/L     Glucose 90 70 - 99 mg/dL     Alkaline Phosphatase 42 40 - 150 U/L     AST 12 0 - 45 U/L      ALT 15 0 - 70 U/L     Protein Total 7.2 6.4 - 8.3 g/dL     Albumin 4.8 3.5 - 5.2 g/dL     Bilirubin Total 0.3 <=1.2 mg/dL   Valproic acid (Depakote level)     Collection Time: 05/29/24  9:03 PM   Result Value Ref Range     Valproic acid 31.0 (L)   ug/mL   CBC with platelets and differential     Collection Time: 05/29/24  9:03 PM   Result Value Ref Range     WBC Count 7.5 4.0 - 11.0 10e3/uL     RBC Count 4.26 (L) 4.40 - 5.90 10e6/uL     Hemoglobin 13.0 (L) 13.3 - 17.7 g/dL     Hematocrit 40.1 40.0 - 53.0 %     MCV 94 78 - 100 fL     MCH 30.5 26.5 - 33.0 pg     MCHC 32.4 31.5 - 36.5 g/dL     RDW 12.5 10.0 - 15.0 %     Platelet Count 223 150 - 450 10e3/uL     % Neutrophils 48 %     % Lymphocytes 39 %     % Monocytes 10 %     % Eosinophils 2 %     % Basophils 1 %     % Immature Granulocytes 0 %     NRBCs per 100 WBC 0 <1 /100     Absolute Neutrophils 3.6 1.6 - 8.3 10e3/uL     Absolute Lymphocytes 2.9 0.8 - 5.3 10e3/uL     Absolute Monocytes 0.7 0.0 - 1.3 10e3/uL     Absolute Eosinophils 0.1 0.0 - 0.7 10e3/uL     Absolute Basophils 0.0 0.0 - 0.2 10e3/uL     Absolute Immature Granulocytes 0.0 <=0.4 10e3/uL     Absolute NRBCs 0.0 10e3/uL               Behavioral/Psychological/Social:  - Encourage unit programming     Safety:  - Continue precautions as noted above  - Status 15 minute checks     Legal Status: voluntary     Disposition Plan   Reason for ongoing admission: poses an imminent risk to self and poses an imminent risk to others  Discharge location:  TBD  Discharge Medications: not ordered  Follow-up Appointments: not scheduled

## 2024-06-08 PROCEDURE — 250N000013 HC RX MED GY IP 250 OP 250 PS 637: Performed by: CLINICAL NURSE SPECIALIST

## 2024-06-08 PROCEDURE — G0177 OPPS/PHP; TRAIN & EDUC SERV: HCPCS

## 2024-06-08 PROCEDURE — 124N000002 HC R&B MH UMMC

## 2024-06-08 RX ADMIN — GABAPENTIN 600 MG: 300 CAPSULE ORAL at 20:36

## 2024-06-08 RX ADMIN — RISPERIDONE 2 MG: 2 TABLET ORAL at 20:36

## 2024-06-08 RX ADMIN — IPRATROPIUM BROMIDE 2 SPRAY: 42 SPRAY, METERED NASAL at 18:25

## 2024-06-08 RX ADMIN — QUETIAPINE FUMARATE 50 MG: 50 TABLET, EXTENDED RELEASE ORAL at 07:49

## 2024-06-08 RX ADMIN — RISPERIDONE 2 MG: 2 TABLET ORAL at 07:49

## 2024-06-08 RX ADMIN — GABAPENTIN 300 MG: 300 CAPSULE ORAL at 07:49

## 2024-06-08 RX ADMIN — Medication 1 TABLET: at 07:49

## 2024-06-08 RX ADMIN — DIVALPROEX SODIUM 1000 MG: 500 TABLET, FILM COATED, EXTENDED RELEASE ORAL at 20:35

## 2024-06-08 RX ADMIN — LORAZEPAM 1 MG: 1 TABLET ORAL at 14:48

## 2024-06-08 RX ADMIN — LORAZEPAM 1 MG: 1 TABLET ORAL at 04:08

## 2024-06-08 ASSESSMENT — ACTIVITIES OF DAILY LIVING (ADL)
ADLS_ACUITY_SCORE: 41
HYGIENE/GROOMING: INDEPENDENT
ORAL_HYGIENE: INDEPENDENT
ADLS_ACUITY_SCORE: 41
DRESS: INDEPENDENT;SCRUBS (BEHAVIORAL HEALTH)
ADLS_ACUITY_SCORE: 41

## 2024-06-08 NOTE — PLAN OF CARE
"RN Assessment  The patient was observed to be engaged and interactive with others in the milieu until after lunch, when he began to exhibit signs of increased paranoia. During visiting hours, he expressed paranoid ideations to his wife. In a subsequent conversation with the patient, he endorsed paranoid thoughts, believing he was in a war and that people would get hurt due to a deal he made with the devil. He did not feel that he himself would be harmed. The patient was visibly distraught and tearful during the interview, reporting significant anxiety. He also reported experiencing auditory hallucinations, although he could not provide details about their content and denied that they were command hallucinations. He denied any safety concerns, including active or passive suicidal ideations or thoughts of non-suicidal self-injury.   The patient was offered PRN medications and agreed to take lorazepam at approximately 2:40 p.m. He reported that additional antipsychotic medications generally do not provide relief for his auditory hallucinations. No physical complaints or concerns were reported.   /81   Pulse 86   Temp (!) 96.4  F (35.8  C)   Resp 16   Ht 1.854 m (6' 1\")   Wt 96.2 kg (212 lb)   SpO2 98%   BMI 27.97 kg/m      "

## 2024-06-08 NOTE — PLAN OF CARE
"Problem: Psychotic Signs/Symptoms  Goal: Improved Behavioral Control (Psychotic Signs/Symptoms)  Outcome: Progressing   Goal Outcome Evaluation:    Calm, cooperative. Appeared to be sleeping at beginning of shift. Visible in milieu, participated in group. Denies all psychological symptoms. Appeared guarded during assessment, gave minimal answers to questioning.    Took scheduled psychiatric medication without issue     Denies pain. Denies having any acute physical concerns at this time. No adverse effects of medication noted or reported.     /81   Pulse 86   Temp (!) 96.4  F (35.8  C)   Resp 16   Ht 1.854 m (6' 1\")   Wt 96.2 kg (212 lb)   SpO2 98%   BMI 27.97 kg/m      "

## 2024-06-08 NOTE — PROGRESS NOTES
"Patient is calm and cooperative. He is quiet in milieu, visible in the lounge watching television. Denies most mental health symptoms including anxiety, depression, paranoia and delusions. Pt denies SI/SIB/HI. Pt stated he did hear voices this evening but it did not bother him. He does appear paranoid/suspicious/guarded at times. He denies any physical pain. No problems with appetite reported. Pt also denies medication side effects.     /81   Pulse 86   Temp (!) 96.4  F (35.8  C)   Resp 16   Ht 1.854 m (6' 1\")   Wt 96.2 kg (212 lb)   SpO2 98%   BMI 27.97 kg/m     "

## 2024-06-08 NOTE — PLAN OF CARE
Problem: Psychotic Signs/Symptoms  Goal: Improved Sleep (Psychotic Signs/Symptoms)  6/8/2024 0425 by Nasima Goldberg RN  Outcome: Not Progressing    Patient has hard time sleeping through the night. He slept late evening and woke up early morning. He woke up around 0330 and started to pace the halls. He is preoccupied with internal stimuli. He also appears paranoid/guarded/ and suspicious. He was seen making odd noises and was also seen whistling while pacing the halls at 0430 in the morning. Fairly redirectable. Pt stated he was feeling anxious and requested to take ativan 1 mg. It was given at 0408. He went back to sleep around 0500. Pt slept for 5 hours.            Last 24H PRN:     LORazepam (ATIVAN) tablet 1 mg, 1 mg at 06/08/24 0408

## 2024-06-08 NOTE — PLAN OF CARE
"  Rehab Group    Start time: 1615  End time: 1715  Patient time total: 60 minutes    attended full group     #3 attended   Group Type: occupational therapy   Group Topic Covered: cognitive activities, coping skills, and healthy leisure time   Group Session Detail: OT: Education cognitive wellness and interactive social activity (Scatte[employer]ories) to increase concentration, focus, attention to task/detail, task follow through, frustration tolerance, memory recall, healthy leisure engagement, coping with stress, heathy distraction engagement, sober activity engagement, cognitive wellness, social wellness, and social engagement    Patient Response/Contribution:  cooperative with task, actively engaged, required repetition of directions, and distracted    Patient Detail: Pt reported during check-in they enjoy \"changing my mindset; like instead of saying I want to punch you in the face, I want to give you a flower\" to support their cognitive wellness. Pt unable to follow verbal directions for novel activity and required directions to be repeated a second time after pt initiated the task in order for pt to correctly engage in task. After full comprehension of directions, pt able to identify several unique responses. Throughout group pt was observed to eat nine cheese sticks, four pudding cups, and two containers of yogurt. Pt reported they enjoyed the activity and verbalized understanding of importance of cognitive wellness in a healthy lifestyle.         Train & Education Service Per Session 45 + Minutes () OT Group code    Patient Active Problem List   Diagnosis    Marijuana dependence (H)    Alcohol abuse    Bipolar disorder, current episode manic severe with psychotic features (H)    Chantal (H)    Schizoaffective disorder, bipolar type (H)    Psychosis, unspecified psychosis type (H)         "

## 2024-06-09 PROCEDURE — 250N000013 HC RX MED GY IP 250 OP 250 PS 637: Performed by: CLINICAL NURSE SPECIALIST

## 2024-06-09 PROCEDURE — 124N000002 HC R&B MH UMMC

## 2024-06-09 RX ADMIN — DIVALPROEX SODIUM 1000 MG: 500 TABLET, FILM COATED, EXTENDED RELEASE ORAL at 20:36

## 2024-06-09 RX ADMIN — RISPERIDONE 2 MG: 2 TABLET ORAL at 08:46

## 2024-06-09 RX ADMIN — IPRATROPIUM BROMIDE 2 SPRAY: 42 SPRAY, METERED NASAL at 08:49

## 2024-06-09 RX ADMIN — IPRATROPIUM BROMIDE 2 SPRAY: 42 SPRAY, METERED NASAL at 20:41

## 2024-06-09 RX ADMIN — HYDROXYZINE HYDROCHLORIDE 50 MG: 50 TABLET, FILM COATED ORAL at 09:33

## 2024-06-09 RX ADMIN — RISPERIDONE 2 MG: 2 TABLET ORAL at 20:36

## 2024-06-09 RX ADMIN — IPRATROPIUM BROMIDE 2 SPRAY: 42 SPRAY, METERED NASAL at 13:29

## 2024-06-09 RX ADMIN — LORAZEPAM 1 MG: 1 TABLET ORAL at 21:39

## 2024-06-09 RX ADMIN — LORAZEPAM 1 MG: 1 TABLET ORAL at 15:15

## 2024-06-09 RX ADMIN — OLANZAPINE 10 MG: 10 TABLET, ORALLY DISINTEGRATING ORAL at 17:00

## 2024-06-09 RX ADMIN — GABAPENTIN 600 MG: 300 CAPSULE ORAL at 20:35

## 2024-06-09 RX ADMIN — QUETIAPINE FUMARATE 50 MG: 50 TABLET, EXTENDED RELEASE ORAL at 08:46

## 2024-06-09 RX ADMIN — FLUTICASONE PROPIONATE 2 SPRAY: 50 SPRAY, METERED NASAL at 08:48

## 2024-06-09 RX ADMIN — GABAPENTIN 300 MG: 300 CAPSULE ORAL at 08:46

## 2024-06-09 RX ADMIN — TRAZODONE HYDROCHLORIDE 50 MG: 50 TABLET ORAL at 02:17

## 2024-06-09 RX ADMIN — Medication 1 TABLET: at 08:46

## 2024-06-09 ASSESSMENT — ACTIVITIES OF DAILY LIVING (ADL)
ADLS_ACUITY_SCORE: 41
LAUNDRY: UNABLE TO COMPLETE
ADLS_ACUITY_SCORE: 41
DRESS: INDEPENDENT;SCRUBS (BEHAVIORAL HEALTH)
ADLS_ACUITY_SCORE: 41
ADLS_ACUITY_SCORE: 41
HYGIENE/GROOMING: INDEPENDENT
ORAL_HYGIENE: INDEPENDENT
ADLS_ACUITY_SCORE: 41
ADLS_ACUITY_SCORE: 41
DRESS: INDEPENDENT
ADLS_ACUITY_SCORE: 41
ADLS_ACUITY_SCORE: 41
HYGIENE/GROOMING: INDEPENDENT
ORAL_HYGIENE: INDEPENDENT
ADLS_ACUITY_SCORE: 41
ADLS_ACUITY_SCORE: 41
ORAL_HYGIENE: INDEPENDENT
HYGIENE/GROOMING: INDEPENDENT
LAUNDRY: UNABLE TO COMPLETE
ADLS_ACUITY_SCORE: 41
ADLS_ACUITY_SCORE: 41
DRESS: INDEPENDENT
ADLS_ACUITY_SCORE: 41
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
## 2024-06-09 NOTE — PLAN OF CARE
Goal Outcome Evaluation:    Pt spent most of the shift in his room sleeping but at one point was up asking for snacks. PRN given for sleep. Slept 5.75 hours. No s/s of side effects observed or reported. No medical concern. No behavior concerns.        Last 24H PRN:     LORazepam (ATIVAN) tablet 1 mg, 1 mg at 06/08/24 1448    traZODone (DESYREL) tablet 50 mg, 50 mg at 06/09/24 0213

## 2024-06-10 ENCOUNTER — TELEPHONE (OUTPATIENT)
Dept: BEHAVIORAL HEALTH | Facility: CLINIC | Age: 34
End: 2024-06-10
Payer: COMMERCIAL

## 2024-06-10 PROCEDURE — 99232 SBSQ HOSP IP/OBS MODERATE 35: CPT | Performed by: CLINICAL NURSE SPECIALIST

## 2024-06-10 PROCEDURE — 250N000013 HC RX MED GY IP 250 OP 250 PS 637: Performed by: CLINICAL NURSE SPECIALIST

## 2024-06-10 PROCEDURE — 124N000002 HC R&B MH UMMC

## 2024-06-10 RX ORDER — DIVALPROEX SODIUM 500 MG/1
500 TABLET, EXTENDED RELEASE ORAL DAILY
Status: DISCONTINUED | OUTPATIENT
Start: 2024-06-10 | End: 2024-07-03 | Stop reason: HOSPADM

## 2024-06-10 RX ADMIN — DIVALPROEX SODIUM 1000 MG: 500 TABLET, FILM COATED, EXTENDED RELEASE ORAL at 21:08

## 2024-06-10 RX ADMIN — IPRATROPIUM BROMIDE 2 SPRAY: 42 SPRAY, METERED NASAL at 21:39

## 2024-06-10 RX ADMIN — GABAPENTIN 600 MG: 300 CAPSULE ORAL at 21:08

## 2024-06-10 RX ADMIN — IPRATROPIUM BROMIDE 2 SPRAY: 42 SPRAY, METERED NASAL at 16:35

## 2024-06-10 RX ADMIN — QUETIAPINE FUMARATE 50 MG: 50 TABLET, EXTENDED RELEASE ORAL at 07:58

## 2024-06-10 RX ADMIN — RISPERIDONE 2 MG: 2 TABLET ORAL at 21:08

## 2024-06-10 RX ADMIN — FLUTICASONE PROPIONATE 2 SPRAY: 50 SPRAY, METERED NASAL at 08:09

## 2024-06-10 RX ADMIN — DIVALPROEX SODIUM 500 MG: 500 TABLET, FILM COATED, EXTENDED RELEASE ORAL at 12:16

## 2024-06-10 RX ADMIN — IPRATROPIUM BROMIDE 2 SPRAY: 42 SPRAY, METERED NASAL at 08:09

## 2024-06-10 RX ADMIN — RISPERIDONE 2 MG: 2 TABLET ORAL at 07:58

## 2024-06-10 RX ADMIN — GABAPENTIN 300 MG: 300 CAPSULE ORAL at 07:58

## 2024-06-10 RX ADMIN — IPRATROPIUM BROMIDE 2 SPRAY: 42 SPRAY, METERED NASAL at 12:16

## 2024-06-10 RX ADMIN — Medication 1 TABLET: at 07:58

## 2024-06-10 ASSESSMENT — ACTIVITIES OF DAILY LIVING (ADL)
ADLS_ACUITY_SCORE: 41
ORAL_HYGIENE: INDEPENDENT
ADLS_ACUITY_SCORE: 41
DRESS: SCRUBS (BEHAVIORAL HEALTH);INDEPENDENT
ADLS_ACUITY_SCORE: 41
ADLS_ACUITY_SCORE: 41
ORAL_HYGIENE: INDEPENDENT
ADLS_ACUITY_SCORE: 41
HYGIENE/GROOMING: INDEPENDENT
ADLS_ACUITY_SCORE: 41
HYGIENE/GROOMING: INDEPENDENT
ADLS_ACUITY_SCORE: 41
DRESS: SCRUBS (BEHAVIORAL HEALTH);INDEPENDENT
ADLS_ACUITY_SCORE: 41

## 2024-06-10 NOTE — PLAN OF CARE
BEH IP Unit Acuity Rating Score (UARS)  Patient is given one point for every criteria they meet.    CRITERIA SCORING   On a 72 hour hold, court hold, committed, stay of commitment, or revocation. 0    Patient LOS on BEH unit exceeds 20 days. 0  LOS: 12   Patient under guardianship, 55+, otherwise medically complex, or under age 11. 0   Suicide ideation without relief of precipitating factors. 0   Current plan for suicide. 0   Current plan for homicide. 0   Imminent risk or actual attempt to seriously harm another without relief of factors precipitating the attempt. 1   Severe dysfunction in daily living (ex: complete neglect for self care, extreme disruption in vegetative function, extreme deterioration in social interactions). 1   Recent (last 7 days) or current physical aggression in the ED or on unit. 0   Restraints or seclusion episode in past 72 hours. 0   Recent (last 7 days) or current verbal aggression, agitation, yelling, etc., while in the ED or unit. 1   Active psychosis. 1   Need for constant or near constant redirection (from leaving, from others, etc).  0   Intrusive or disruptive behaviors. 1   Patient requires 3 or more hours of individualized nursing care per 8-hour shift (i.e. for ADLs, meds, therapeutic interventions). 0   TOTAL 5

## 2024-06-10 NOTE — PLAN OF CARE
"Team Note Due:  Thursday     Assessment/Intervention/Current Symtoms and Care Coordination:  Chart review and met with team, discussed pt progress, symptomology, and response to treatment. Discussed the discharge plan and any potential impediments to discharge.    Received voicemail from his wife, Flakita. She would like a care conference with the provider.     Met with Basil to discuss hospitalization. He stated he is \"voluntarily discharging\" this evening. Writer educated him on the discharge process and reminded him that providers assess for readiness to discharge. He replied, \"I'll just be here forever, that's fine.\" Writer reassured him that he won't be here forever and validated that he wants to leave.       Discharge Plan or Goal:  When patient's psychotic symptoms have decreased discharge to his home with family will be facilitated      Barriers to Discharge:  Currently presents with symptoms of psychosis. Medication management ongoing.      Referral Status:  Referral needs pending as patient stabilizes     Legal Status:  Voluntary     Contacts:  Wife: Flakita Andrews, [phone] (Consent)     Upcoming Meetings and Dates/Important Information and next steps:  Symptom stabilization  Discharge planning   "

## 2024-06-10 NOTE — PROGRESS NOTES
Pt is transferring up to sta 30. He has no issue with the transfer. He was on the phone with his wife, and wanted to leave. He did not bring it up to writer, but it was overheard.   Wife called and stated that she was frustrated with the lack of communication, and that no one called her back. Writer let her know that the message would be passed on to the providers to call back when available. She stated that her  was struggling.   Writer apologized and stated that she would pass on the information.   Pt belongings, chart, and medications went up to sta 30 with a , although he is voluntary, due to pt confusion, and delusional state.

## 2024-06-10 NOTE — TELEPHONE ENCOUNTER
R:    1:41p Received call from Psychiatry Provider Keith informing that Psychiatry Provider Rayray has accepted pt for a swap and PT WILL ADMIT TO uNIT 30.    1:51p Called Unit 12 CRN Donta to inform pt is in queue for swap to unit 30. CRN notes hopeful to have swap completed during evening shift.     1:55p Called Unit 30 CRN Vonnie to inform pt is in queue for their unit. CRN informed nursing will review pt and will call the unit for report, pt won't admit until evening shift.    1:56p Intake notes all work lists updated with pt's swap to Unit 30.

## 2024-06-10 NOTE — PROGRESS NOTES
"The patient's care was discussed with the treatment team during the daily team meeting and/or staff's chart notes were reviewed.  Staff report patient slept 7 hours with no issues of concerns. Evening staff report: Patient has been isolative to his room. He appears to be guarded and isolative to his room. His brother called the unit to speak with the patient but he did not want to speak with the patient. His wife visited this evening, and she would like to have a care conference with the provider. Patient was focused on discharging and requesting to leave the hospital. He did endorse hearing voices and was given Prn Zyprexa. He was not willing to discuss what the voices were telling him, and having delusions as well. He expressed feeling depressed and anxious. Vitals are within normal limits. Per another RN report Patient was reporting anxiety, restless and appears agitated and was given Prn Ativan.      Blood pressure 114/63, pulse 86, temperature 98.1  F (36.7  C), temperature source Temporal, resp. rate 16, height 1.854 m (6' 1\"), weight 96.2 kg (212 lb), SpO2 97%.    Upon interview, patient was interviewed in his room, he was cooperative with the assessment questions, patient reports his mood is good, affect is labile, when writer asked the patient how the weekend went, patient responded \"defending myself has been my goal.\"  Patient reported that he was constantly de-escalating situations in the unit during the weekend and that he is tired working for free.  Writer asked how chaotic the unit had been for him to be de-escalating the situation?  Patient laughed, then continued with persecutory delusional statements \"MNM and Kane Ya are the ones who could take me down.\"    Writer assured the patient of his safety and informed the him that his Depakote will be increased to 500 mg in the morning in addition to the 1000 mg he is taking at bedtime, patient was in agreement with this increase.  Patient asked when " would he be discharged?  Patient's question was validated as a very valid question and reassured that we are working to getting him to his baseline when he will not have to come back to the hospital after being discharged.    Patient endorsed auditory hallucinations stating that the major problem I have is being in the psych palma.  Patient denied visual hallucination he denied suicidal/homicidal ideations and thoughts of self-harm.  Patient informed this writer that he has a note for him.  Patient brought out 2 scratch papers in which he wrote about defending himself from those who wanted to hurt him.  Patient wrote that he would  like to have a team meeting with only Dr. Parker and his family.  Patient wrote extensively, but most of what he wrote was meaningless.    Suicidal ideation: denies current or recent suicidal ideation or behaviors.     Homicidal ideation: denies current or recent homicidal ideation or behaviors.     Psychotic symptoms: Yes Patient endorses AH, VH, paranoia, delusions.      Medication side effects reported: No significant side effects.     Acute medical concerns: none     Other issues reported by patient: Patient had no further questions or concerns.            Medications:      Inpatient Administered Meds                     Current Facility-Administered Medications   Medication Dose Route Frequency Provider Last Rate Last Admin    divalproex sodium extended-release (DEPAKOTE ER) 24 hr tablet 1,000 mg  1,000 mg Oral At Bedtime Haroon Parker APRN CNP   1,000 mg at 05/30/24 2109    fluticasone (FLONASE) 50 MCG/ACT spray 2 spray  2 spray Both Nostrils Daily Haroon Parker APRN CNP   2 spray at 05/31/24 0835    gabapentin (NEURONTIN) capsule 300 mg  300 mg Oral Daily Haroon Parker APRN CNP   300 mg at 05/31/24 0833    gabapentin (NEURONTIN) capsule 600 mg  600 mg Oral At Bedtime Haroon Parker APRN CNP   600 mg at 05/30/24 2109    ipratropium (ATROVENT) 0.06 % spray 2 spray  2  "spray Both Nostrils 4x Daily ParkerHaroon APRN CNP   2 spray at 05/31/24 0835    multivitamin w/minerals (THERA-VIT-M) tablet 1 tablet  1 tablet Oral Daily Haroon Parker APRN CNP   1 tablet at 05/31/24 0833    risperiDONE (risperDAL) tablet 2 mg  2 mg Oral At Bedtime KeithHaroon APRN CNP   2 mg at 05/30/24 2109              Allergies:      Allergies             Allergies   Allergen Reactions    Seasonal Allergies Other (See Comments)       Congestion. Managed with meds.                    Labs:     Recent Results     No results found for this or any previous visit (from the past 24 hour(s)).             Psychiatric Examination:      /80 (BP Location: Left arm, Patient Position: Sitting, Cuff Size: Adult Regular)   Pulse 70   Temp 97.7  F (36.5  C) (Temporal)   Resp 16   Ht 1.854 m (6' 1\")   Wt 94.8 kg (209 lb)   SpO2 99%   BMI 27.57 kg/m    Weight is 209 lbs 0 oz  Body mass index is 27.57 kg/m .     Weight over time:            Vitals:     05/26/24 1216 05/26/24 2209 05/30/24 0900   Weight: 91.6 kg (202 lb) 90.7 kg (200 lb) 94.8 kg (209 lb)         Orthostatic Vitals           Most Recent       Sitting Orthostatic /80 05/31 0800     Sitting Orthostatic Pulse (bpm) 70 05/31 0800     Standing Orthostatic /84 05/31 0800     Standing Orthostatic Pulse (bpm) 82 05/31 0800                   Cardiometabolic risk assessment. 05/31/24        Reviewed patient profile for cardiometabolic risk factors     Date taken /Value  REFERENCE RANGE   Abdominal Obesity  (Waist Circumference)   See nursing flowsheet Women ?35 in (88 cm)   Men ?40 in (102 cm)       Triglycerides  No results found for: \"TRIG\"     ?150 mg/dL (1.7 mmol/L) or current treatment for elevated triglycerides   HDL cholesterol  No results found for: \"HDL\"]    Women <50 mg/dL (1.3 mmol/L) in women or current treatment for low HDL cholesterol  Men <40 mg/dL (1 mmol/L) in men or current treatment for low HDL cholesterol    " "  Fasting plasma glucose (FPG)           Lab Results   Component Value Date     GLC 90 05/29/2024      06/02/2015        FPG ?100 mg/dL (5.6 mmol/L) or treatment for elevated blood glucose   Blood pressure        BP Readings from Last 3 Encounters:   05/31/24 122/80   06/09/15 128/83   06/14/13 129/74    Blood pressure ?130/85 mmHg or treatment for elevated blood pressure   Family History  See family history      Mental Status Exam:  Appearance: awake, alert, dressed in hospital scrubs, and appeared as age stated  Attitude:  Cooperative  Eye Contact:  good  Mood:  \"Good\",   Affect:   Anxious, mood congruent,   Speech:  clear, coherent  Language: fluent and intact in English  Psychomotor, Gait, Musculoskeletal:  no evidence of tardive dyskinesia, dystonia, or tics  Throught Process:  fairly disorganized, less tangential  Associations: No loosening of associations  Thought Content:  no evidence of suicidal ideation or homicidal ideation, marked persecutory delusions.  Insight:  fair  Judgement:  limited  Oriented to:  time, person, and place  Attention Span and Concentration:  intact  Recent and Remote Memory:  fair  Fund of Knowledge:  low-normal             Precautions:                Behavioral Orders   Procedures    Code 1 - Restrict to Unit    [facility] Extended Care       Until discharge, Extended Care to offer psychotherapeutic services to mental health patients boarding for admission or stabilization. These services are to include but are not limited to: individual psychotherapy, diagnostic assessment, case management and care planning, safety planning, etc. This may include up to 1 visit per day. If patient is physically located at [facility] or [facility], group psychotherapy up to 2 time per day may be offered.     Routine Programming       As clinically indicated    Sexual precautions    Status 15       Every 15 minutes.    Suicide precautions: Suicide Risk: HIGH; Clinical rationale to override score: modification " "to the care environment, lack of access to a plan for self-harm, Collateral information supporting Suicidal/self-harm behaviors       Patients on Suicide Precautions should have a Combination Diet ordered that includes a Diet selection(s) AND a Behavioral Tray selection for Safe Tray - with utensils, or Safe Tray - NO utensils          Order Specific Question:   Suicide Risk       Answer:   HIGH       Order Specific Question:   Clinical rationale to override score:       Answer:   modification to the care environment       Order Specific Question:   Clinical rationale to override score:       Answer:   lack of access to a plan for self-harm       Order Specific Question:   Clinical rationale to override score:       Answer:   Collateral information supporting Suicidal/self-harm behaviors    Withdrawal precautions           Diagnoses:         Chantal (H)  Psychosis, unspecified psychosis type (H)  Severe manic bipolar I disorder with psychotic features (H)     Clinically Significant Risk Factors                          # Overweight: Estimated body mass index is 27.57 kg/m  as calculated from the following:    Height as of this encounter: 1.854 m (6' 1\").    Weight as of this encounter: 94.8 kg (209 lb)., PRESENT ON ADMISSION     # Financial/Environmental Concerns: none               Assessment & Plan:      Assessment and hospital summary:  This is a 33 year old male with history of Bipolar disorder, current episode manic severe with psychotic features (H). Patient presented to the ED for psychiatric evaluation.  Patient reported that his mother in-law and sister in-laws are not comfortable with him around them in the house due to the sexual thoughts and arousal toward them.  Patient states he is having most of his sexual thoughts  and arousal about his mother in law, sister in-law and other women.           Today's Changes: 6/10  Depakote  mg daily       Target psychiatric symptoms and interventions:  . " Education given regarding diagnostic and treatment options with risks, benefits and alternatives and adequate verbalization of understanding.  2. Admitted on 5/29/24 to [facility]..  Precautions placed include: Suicide precautions, sexual precautions, withdrawal precautions.  Code 1-restricted to unit     3. Medications: 5/30/2024: [facility] medications reviewed.     4. Medications:  Hospital  Diazepam (Valium) tablet 5-20 mg oral every 30 minutes as needed order, this according to patient MSSA score  Divalproex sodium extended release(Depakote ER) 24 however tablet 1000 mg oral at bedtime and 500 mg daily.  Fluticasone (Flonase) 50 mcg/ACT spray 2 sprays, both nostrils daily  Seroquel XR 24 hr tablet 50 mg daily  Full code  Gabapentin (Neurontin) capsule 300 mg oral daily  Gabapentin (Neurontin) capsule 600 mg at bedtime  Hydroxyzine HCl (Atarax) tablet 25 mg oral every 6 hours as needed order adjuvant pain   Or  Hydroxyzine HCl (Atarax) tablet 50 mg oral every 6 hours as needed order, adjuvant pain  Ipratropium (Atrovent) 0.06% spray, 2 sprays both nostrils 4 times daily  Lorazepam (Ativan) tablet 1 mg oral at bedtime as needed sleep  Melatonin tablet 3 mg oral at bedtime as needed sleep  [facility]  extended care  MSSA score vital signs and pain assessment on admission and per protocol  Multivitamin W/minerals (Thera-vit-M) tablet 1 tablet oral daily  No COVID-19 virus (coronavirus screening test needed or already ordered/completed  Notify physician-alcohol withdrawal MSSA focused  Notify provider if signs of substance withdrawal  Olanzapine Zydis (Zyprexa) ODT tab 10 mg oral 3 times daily as needed agitation, aggression  Or  Olanzapine (Zyprexa) injection 10 mg intramuscular 3 times daily as needed agitation, aggression  Patient CARE other 1-1 acuity staff for severe intrusiveness, SI  Regular diet adult  Requesting IP [facility]AS date  Risperidone (Risperdal) tablet 2 mg oral at bedtime  Routine programming  Sexual  precaution  Status 15 every 15 minutes check  Suicide precautions: Suicide risk hide  Trazodone (Desyrel) tablet 50 mg oral at bedtime as needed sleep  Valproic acid a.m. draw draw to be done before this is given  Vital signs and pain assessment  Voluntary  With the patient per unit routine  Withdrawal precautions  5. Consultations:  Hospitalist to follow as needed.  IM to follow for every medical condition.  6. Structure and Supervision  [facility] [address].  Barriers to Discharge:  7.   is following in regards to collecting and reviewing collateral information, referrals and disposition planning.  Legal: Voluntary  Referrals: CTC to coordinate all referrals  Care Coordination: CTC to coordinate care with outside providers  Placement: CTC to facilitate placement following symptom stabilization  Anticipated Discharge: 5 to 7 days  . Further treatment programming to be determined throughout the hospital course.     Risk Assessment: [facility] RISK ASSESSMENT: Patient able to contract for safety and Patient on precautions  Risks, benefits, and alternatives discussed at length with patient.      Acute Medical Problems and Treatments:  Acute medical concerns:  - No acute medical concerns     Pertinent labs/imaging:  Recent Results             Recent Results (from the past 168 hour(s))   Urine Drug Screen Panel     Collection Time: 05/26/24  7:43 PM   Result Value Ref Range     Amphetamines Urine Screen Negative Screen Negative     Barbituates Urine Screen Negative Screen Negative     Benzodiazepine Urine Screen Negative Screen Negative     Cannabinoids Urine Screen Negative Screen Negative     Cocaine Urine Screen Negative Screen Negative     Fentanyl Qual Urine Screen Negative Screen Negative     Opiates Urine Screen Negative Screen Negative     PCP Urine Screen Negative Screen Negative   Comprehensive metabolic panel     Collection Time: 05/29/24  9:03 PM   Result Value Ref Range     Sodium 142 135 - 145 mmol/L      Potassium 4.9 3.4 - 5.3 mmol/L     Carbon Dioxide (CO2) 30 (H) 22 - 29 mmol/L     Anion Gap 8 7 - 15 mmol/L     Urea Nitrogen 15.7 6.0 - 20.0 mg/dL     Creatinine 0.77 0.67 - 1.17 mg/dL     GFR Estimate >90 >60 mL/min/1.73m2     Calcium 9.6 8.6 - 10.0 mg/dL     Chloride 104 98 - 107 mmol/L     Glucose 90 70 - 99 mg/dL     Alkaline Phosphatase 42 40 - 150 U/L     AST 12 0 - 45 U/L     ALT 15 0 - 70 U/L     Protein Total 7.2 6.4 - 8.3 g/dL     Albumin 4.8 3.5 - 5.2 g/dL     Bilirubin Total 0.3 <=1.2 mg/dL   Valproic acid (Depakote level)     Collection Time: 05/29/24  9:03 PM   Result Value Ref Range     Valproic acid 31.0 (L)   ug/mL   CBC with platelets and differential     Collection Time: 05/29/24  9:03 PM   Result Value Ref Range     WBC Count 7.5 4.0 - 11.0 10e3/uL     RBC Count 4.26 (L) 4.40 - 5.90 10e6/uL     Hemoglobin 13.0 (L) 13.3 - 17.7 g/dL     Hematocrit 40.1 40.0 - 53.0 %     MCV 94 78 - 100 fL     MCH 30.5 26.5 - 33.0 pg     MCHC 32.4 31.5 - 36.5 g/dL     RDW 12.5 10.0 - 15.0 %     Platelet Count 223 150 - 450 10e3/uL     % Neutrophils 48 %     % Lymphocytes 39 %     % Monocytes 10 %     % Eosinophils 2 %     % Basophils 1 %     % Immature Granulocytes 0 %     NRBCs per 100 WBC 0 <1 /100     Absolute Neutrophils 3.6 1.6 - 8.3 10e3/uL     Absolute Lymphocytes 2.9 0.8 - 5.3 10e3/uL     Absolute Monocytes 0.7 0.0 - 1.3 10e3/uL     Absolute Eosinophils 0.1 0.0 - 0.7 10e3/uL     Absolute Basophils 0.0 0.0 - 0.2 10e3/uL     Absolute Immature Granulocytes 0.0 <=0.4 10e3/uL     Absolute NRBCs 0.0 10e3/uL               Behavioral/Psychological/Social:  - Encourage unit programming     Safety:  - Continue precautions as noted above  - Status 15 minute checks     Legal Status: voluntary     Disposition Plan   Reason for ongoing admission: poses an imminent risk to self and poses an imminent risk to others  Discharge location:  TBD  Discharge Medications: not ordered  Follow-up Appointments: not scheduled

## 2024-06-10 NOTE — PLAN OF CARE
"  Problem: Adult Inpatient Plan of Care  Goal: Optimal Comfort and Wellbeing  Intervention: Provide Person-Centered Care  Recent Flowsheet Documentation  Taken 6/10/2024 0930 by Shreya Whitney RN  Trust Relationship/Rapport:   care explained   choices provided   emotional support provided   empathic listening provided   questions answered   questions encouraged   reassurance provided   thoughts/feelings acknowledged   Goal Outcome Evaluation:    Plan of Care Reviewed With: patient      Pt presents as disorganized with a blunted affect. He was seen in the lounge talking to self. He endorsed \"low\" anxiety but denied all other mental health symptoms including SI/HI/AVH, but did appear to be responding to internal stimuli. He was seen walking/pacing the unit smith, talking to self. He appeared tense around 1000. And writer offered PRN. Pt then refused, and stated that he was feeling fine.   Pt was responding throughout thed shift. Staff overhead him making Yazidi statements, and carrying a bible pacing vigorously down the hallway.   Pt was med complaint without issue. Depakote 500mg daily was added in addition to the 1000 mg at bedtime.   Pt ate and drank well, denied pain, and had no other acute physical or behavioral concerns this shift.    /77 (BP Location: Left arm, Patient Position: Sitting, Cuff Size: Adult Large)   Pulse 98   Temp 98.2  F (36.8  C) (Temporal)   Resp 15   Ht 1.854 m (6' 1\")   Wt 96.2 kg (212 lb)   SpO2 97%   BMI 27.97 kg/m        "

## 2024-06-10 NOTE — PROGRESS NOTES
"Patient has been isolative to his room. He appears to be guarded and isolative to his room. His brother called the unit to speak with the patient but he did not want to speak with the patient. His wife visited this evening, and she would like to have a care conference with the provider. Patient was focused on discharging and requesting to leave the hospital. He did endorse hearing voices and was given Prn Zyprexa. He was not willing to discuss what the voices were telling him, and having delusions as well. He expressed feeling depressed and anxious. Vitals are within normal limits. Per another RN report Patient was reporting anxiety, restless and appears agitated and was given Prn Ativan.     Blood pressure 114/63, pulse 86, temperature 98.1  F (36.7  C), temperature source Temporal, resp. rate 16, height 1.854 m (6' 1\"), weight 96.2 kg (212 lb), SpO2 97%.    "

## 2024-06-10 NOTE — CARE PLAN
"Knocked on  pts door to invite him to morning groups as he typically attends. Pt calmly responded with \"I'm just going to be in here reading the  catechism until whenever the fuck I get to get out of here\".    [place] in [facility] [place], appeared slightly calmer, writer verified with him that he still wasn't interested in attending, and he replied he was fine.  Pt did come over to the table while writer was playing a game, he appeared tense, commented how bored he was, and just started saying random numbers.  There were numbers on the tiles we were using in the game, when asked why he was saying numbers aloud, he replied it was because he was bored, and he then moved back to the [facility] chairs and started reading aloud from his bible.  Pt appeared unaware how distracting this was to others.  "

## 2024-06-11 PROCEDURE — 250N000013 HC RX MED GY IP 250 OP 250 PS 637: Performed by: CLINICAL NURSE SPECIALIST

## 2024-06-11 PROCEDURE — 99232 SBSQ HOSP IP/OBS MODERATE 35: CPT | Performed by: PSYCHIATRY & NEUROLOGY

## 2024-06-11 PROCEDURE — 124N000002 HC R&B MH UMMC

## 2024-06-11 RX ADMIN — GABAPENTIN 600 MG: 300 CAPSULE ORAL at 21:58

## 2024-06-11 RX ADMIN — TRAZODONE HYDROCHLORIDE 50 MG: 50 TABLET ORAL at 00:06

## 2024-06-11 RX ADMIN — RISPERIDONE 2 MG: 2 TABLET ORAL at 21:57

## 2024-06-11 RX ADMIN — QUETIAPINE FUMARATE 50 MG: 50 TABLET, EXTENDED RELEASE ORAL at 08:16

## 2024-06-11 RX ADMIN — DIVALPROEX SODIUM 1000 MG: 500 TABLET, FILM COATED, EXTENDED RELEASE ORAL at 21:57

## 2024-06-11 RX ADMIN — IPRATROPIUM BROMIDE 2 SPRAY: 42 SPRAY, METERED NASAL at 13:18

## 2024-06-11 RX ADMIN — GABAPENTIN 300 MG: 300 CAPSULE ORAL at 08:15

## 2024-06-11 RX ADMIN — LORAZEPAM 1 MG: 1 TABLET ORAL at 16:03

## 2024-06-11 RX ADMIN — RISPERIDONE 2 MG: 2 TABLET ORAL at 08:15

## 2024-06-11 RX ADMIN — IPRATROPIUM BROMIDE 2 SPRAY: 42 SPRAY, METERED NASAL at 16:55

## 2024-06-11 RX ADMIN — FLUTICASONE PROPIONATE 2 SPRAY: 50 SPRAY, METERED NASAL at 08:17

## 2024-06-11 RX ADMIN — IPRATROPIUM BROMIDE 2 SPRAY: 42 SPRAY, METERED NASAL at 08:17

## 2024-06-11 RX ADMIN — DIVALPROEX SODIUM 500 MG: 500 TABLET, FILM COATED, EXTENDED RELEASE ORAL at 08:16

## 2024-06-11 RX ADMIN — LORAZEPAM 1 MG: 1 TABLET ORAL at 00:06

## 2024-06-11 RX ADMIN — TRAZODONE HYDROCHLORIDE 50 MG: 50 TABLET ORAL at 23:12

## 2024-06-11 RX ADMIN — Medication 1 TABLET: at 08:16

## 2024-06-11 RX ADMIN — IPRATROPIUM BROMIDE 2 SPRAY: 42 SPRAY, METERED NASAL at 22:00

## 2024-06-11 ASSESSMENT — ACTIVITIES OF DAILY LIVING (ADL)
ADLS_ACUITY_SCORE: 52
ADLS_ACUITY_SCORE: 52
ADLS_ACUITY_SCORE: 41
ADLS_ACUITY_SCORE: 52
DRESS: STREET CLOTHES;INDEPENDENT;PROMPTS
ADLS_ACUITY_SCORE: 52
ADLS_ACUITY_SCORE: 41
ADLS_ACUITY_SCORE: 52
ADLS_ACUITY_SCORE: 41
HYGIENE/GROOMING: HANDWASHING;SHOWER;INDEPENDENT
HYGIENE/GROOMING: INDEPENDENT
ADLS_ACUITY_SCORE: 52
ADLS_ACUITY_SCORE: 41
ADLS_ACUITY_SCORE: 41
ADLS_ACUITY_SCORE: 52
LAUNDRY: WITH SUPERVISION
ORAL_HYGIENE: INDEPENDENT;PROMPTS
ADLS_ACUITY_SCORE: 41
ADLS_ACUITY_SCORE: 52
ADLS_ACUITY_SCORE: 41
ADLS_ACUITY_SCORE: 52
ADLS_ACUITY_SCORE: 41
ADLS_ACUITY_SCORE: 41

## 2024-06-11 NOTE — PLAN OF CARE
"Goal Outcome Evaluation:    Plan of Care Reviewed With: patient Plan of Care Reviewed With: patient    Overall Patient Progress: improvingOverall Patient Progress: improving       Problem: Adult Inpatient Plan of Care  Goal: Plan of Care Review  Description: The Plan of Care Review/Shift note should be completed every shift.  The Outcome Evaluation is a brief statement about your assessment that the patient is improving, declining, or no change.  This information will be displayed automatically on your shift  note.  Outcome: Progressing  Flowsheets (Taken 6/11/2024 1154)  Plan of Care Reviewed With: patient  Overall Patient Progress: improving     Pt is a transfer from # 12 last evening. This morning pt was presented with bright affect but appears guarded and dismissive upon MH assessments. When pt was assessed, he stated, \"I don't want to talk about it.\" Pt requested \"triple portion\" and healthy snacks between meals. Provider was notified and will place the order for Nutrition consult. Has Valproic acid labs tomorrow 6/12/24. Pt is requesting to be reminded when it is 6 pm so he can call wife and his kid. Pt nutrition, hydration and hygiene is adequate. Pt is medications complaint. Pt is voluntary and is on sexual and SI precautions. Safety was maintained throughout the shift. Blood pressure 114/67, pulse 78, temperature 98  F (36.7  C), temperature source Oral, resp. rate 15, height 1.854 m (6' 1\"), weight 96.2 kg (212 lb), SpO2 96%.    "

## 2024-06-11 NOTE — PLAN OF CARE
Problem: Suicide Risk  Goal: Absence of Self-Harm  Outcome: Progressing   Goal Outcome Evaluation:    The patient was transferred from Station 12 and spent the shift in the common area interacting with other patients. He displayed a full range of emotions, appeared dismissive, and denied experiencing any mental health symptoms. However, he was pleasant and cooperative. He had dinner and snacks, participated in recreational therapy, watched movies with his peers, and took his scheduled medications. Overall, his evening was uneventful.

## 2024-06-11 NOTE — PLAN OF CARE
Rehab Group    Start time: 14:00  End time: 14:45  Patient time total: 20 minutes    attended partial group    #6 attended   Group Type: occupational therapy   Group Topic Covered: balanced lifestyle, mindfulness, and relaxation      Group Session Detail:  Patient actively participated in a relaxation skills group.  facilitated a sampler of three various relaxation techniques: deep breathing, imagery, and progressive muscle relaxation. Group was facilitated in order to promote: positive relaxation and coping skills, encourage insight and self-reflection, promote a positive change, manage behaviors, and improve focus.      Patient Response/Contribution:  cooperative with task, listened actively, and actively engaged     Patient Detail:  Patient entered group late. Patient appeared to be actively listening to educational content while in group. Active participation in guided exercises fluctuated. Socially appropriate throughout group session. No safety or behavioral concerns identified in group. Patient left group early. No charge.        Patient Active Problem List   Diagnosis    Marijuana dependence (H)    Alcohol abuse    Bipolar disorder, current episode manic severe with psychotic features (H)    Chantal (H)    Schizoaffective disorder, bipolar type (H)    Psychosis, unspecified psychosis type (H)

## 2024-06-11 NOTE — PLAN OF CARE
"  Problem: Psychotic Signs/Symptoms  Goal: Improved Sleep (Psychotic Signs/Symptoms)  Outcome: Progressing   Goal Outcome Evaluation:    Plan of Care Reviewed With: patient        Patient has valproic acid lab scheduled for tomorrow. According to day shift report, patient requested for a triple portion and healthy snacks between meals, provider will placed order for nutritional consult. Around 1600  patient reported hearing voices. Patient stated \"the devil is talking in my head I need medication to stop it\".  Patient was given PRN ativan per his request.     Patient's  wife called around 1730 and wanted to talk to the provider or .  will call wife tomorrow she was about to leave when patient's wife called.     Affect flat mood was calmed and cooperative, patient denied SI/SIB denied feeling depressed or having any anxiety, patient denied hearing voices around 2200, he said,  the ativan was effective. Took all HS scheduled medications  with zero problem. PRN trazodone given around 2300. No behavior issues or any safety concern noted.              "

## 2024-06-11 NOTE — PLAN OF CARE
Goal Outcome Evaluation:    Initial meeting note:    Therapist introduced self to patient and discussed psychotherapy service available to patient.     Pt response: Pt expressed interest in meeting with therapist    Plan: Made plan to meet with pt again; began identifying goals

## 2024-06-11 NOTE — PLAN OF CARE
BEH IP Unit Acuity Rating Score (UARS)  Patient is given one point for every criteria they meet.    CRITERIA SCORING   On a 72 hour hold, court hold, committed, stay of commitment, or revocation. 0/1    Patient LOS on BEH unit exceeds 20 days. 0/1  LOS: 13   Patient under guardianship, 55+, otherwise medically complex, or under age 11. 0/1   Suicide ideation without relief of precipitating factors. 0/1   Current plan for suicide. 0/1   Current plan for homicide. 0/1   Imminent risk or actual attempt to seriously harm another without relief of factors precipitating the attempt. 0/1   Severe dysfunction in daily living (ex: complete neglect for self care, extreme disruption in vegetative function, extreme deterioration in social interactions). 1/1   Recent (last 7 days) or current physical aggression in the ED or on unit. 0/1   Restraints or seclusion episode in past 72 hours. 0/1   Recent (last 7 days) or current verbal aggression, agitation, yelling, etc., while in the ED or unit. 1/1 Last: 6/4   Active psychosis. 1/1   Need for constant or near constant redirection (from leaving, from others, etc).  0/1   Intrusive or disruptive behaviors. 1/1   Patient requires 3 or more hours of individualized nursing care per 8-hour shift (i.e. for ADLs, meds, therapeutic interventions). 0   TOTAL 4

## 2024-06-11 NOTE — PLAN OF CARE
Rehab Group    Start time: 10:15  End time: 11:10  Patient time total: 35 minutes    attended partial group    #5 attended   Group Type: OT Clinic   Group Topic Covered: balanced lifestyle, coping skills, emotional regulation, healthy leisure time, relaxation , and social skills     Group Session Detail:  Pt actively participated in occupational therapy clinic to facilitate coping skill exploration, creative expression within personally meaningful activities, and clinical observation of social, cognitive, and kinesthetic performance skills.     Patient Response/Contribution:  cooperative with task, safe use of materials/supplies, and actively engaged     Patient Detail:  Pt response: Duc to initiate, gather materials, sequence, and adjust to workspace demands as needed. Patient opted to color using a watercolor canvas and template. Writer offered patient the appropriate tools for this task; however, patient politely declined and requested to use sharpies. Demonstrated fair focus, planning, and problem solving for selected creative task. Patient appeared to develop and follow a pattern; however, he identified minor errors as the task progressed. Patient was able to tolerate frustration well when identifying these minor imperfections. Able to ask for assistance as needed, and minimally social with peers and staff.  At one point, patient was overheard saying random letter's aloud. When writer inquired about these verbalizations; patient just laughed and did not elaborate on meaning. Patient remained mostly pleasant and cooperative throughout group.          Patient Active Problem List   Diagnosis    Marijuana dependence (H)    Alcohol abuse    Bipolar disorder, current episode manic severe with psychotic features (H)    Chantal (H)    Schizoaffective disorder, bipolar type (H)    Psychosis, unspecified psychosis type (H)

## 2024-06-11 NOTE — PLAN OF CARE
Team Note Due:  Thursday     Assessment/Intervention/Current Symtoms and Care Coordination:  Chart review and met with team, discussed pt progress, symptomology, and response to treatment. Discussed the discharge plan and any potential impediments to discharge.     Discharge Plan or Goal:  Current plan is to stabilize symptoms and develop safe disposition plan. Following hospitalization, plan is for pt to return home with family with outpatient supports.      Barriers to Discharge:  Currently presents with symptoms of psychosis. Medication management ongoing.      Referral Status:  Referral needs pending as patient stabilizes     Legal Status:  Voluntary     Contacts:  Wife: Flakita Andrews, [phone] (Consent)     Upcoming Meetings and Dates/Important Information and next steps:  Symptom stabilization  Discharge planning
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
## 2024-06-11 NOTE — PLAN OF CARE
"  Rehab Group    Start time: 13:00  End time: 13:45  Patient time total: 25 minutes    attended partial group    #7 attended   Group Type: occupational therapy   Group Topic Covered: balanced lifestyle, coping skills, emotional regulation, and healthy leisure time     Group Session Detail:  Patient engaged in group leisure activity (Family Feud) with the focus being: building interpersonal skills, encouraging team collaboration, and promoting overall prosocial engagement and interaction.      Patient Response/Contribution:  cooperative with task, actively engaged, required prompts or assistance to participate, and distracted      Patient Detail:  Patient entered group late; which is appearing to be developing into a pattern. Patient was willing to engage in a group activity and settled into a team dynamic with two other peers. Patient appeared distracted at times; requiring redirection and re-focus back to the task. Patient was able to offer a few appropriate responses to game prompts when attentive to task. Patient engaged in side-conversations intermittently throughout task and was verbally intrusive at times, for example: asking a female peer \"what are you thinking right now.\" Patient was observed staring, primarily at females, intermittently throughout group. Recommend continued  observation to ensure appropriate boundaries are maintained.        Patient Active Problem List   Diagnosis    Marijuana dependence (H)    Alcohol abuse    Bipolar disorder, current episode manic severe with psychotic features (H)    Chantal (H)    Schizoaffective disorder, bipolar type (H)    Psychosis, unspecified psychosis type (H)        "

## 2024-06-11 NOTE — PROGRESS NOTES
Rehab Group    Start time: 1700  End time: 1750  Patient time total: 10 minutes    attended partial group    #8 attended   Group Type: recreation   Group Topic Covered: healthy leisure time       Group Session Detail:  TR leisure group     Patient Response/Contribution:  attentive       Patient Detail:    Pt briefly attended the structured Therapeutic Recreation group, participating in a group activity. Pt participated in a leisure activity with social engagement to gain self-esteem, manage behaviors, improve social skills, decrease isolation, and reduce anxiety/depression.   Pt entered group late, participating in group activity for the final 10 minutes of group.               Activity Therapy Per 15 minutes () Other Rehab therapies    Patient Active Problem List   Diagnosis    Marijuana dependence (H)    Alcohol abuse    Bipolar disorder, current episode manic severe with psychotic features (H)    Chantal (H)    Schizoaffective disorder, bipolar type (H)    Psychosis, unspecified psychosis type (H)

## 2024-06-11 NOTE — PLAN OF CARE
Night Nursing Note   6/10/24 - 6/11/24      Basil was awake in room at beginning of shift.  Monitored q15 mins for safety.    Approached this writer at nurses station and requested pudding.  Appeared preoccupied anxious and responding to internal stimuli.  Smiling, closed eyes and appeared to be praying while standing at desk.  Could not rate anxiety level. Offered and accepted trazodone 50mg for sleep  and ativan 1mg for anxiety @ 0006.    No pudding available, accepted yogurt. Returned to room and observed   lying on bed but not asleep.  Came back out to nurses desk, offered no   complaints. Informed that we now had pudding and he accepted the pudding.  Returned back to room and was observed sleep @ 0200 safety round checks.  Awake @ 0545. Requested to take a shower. Informed that he could shower   doing day shift. Paced unit and spent time in room. Offered no complaints.    Continue to monitor, offer support and reassurance per care plan.    Basil informed this writer that he soiled his scrubs and requested to take a  shower. When asked if he wet his pants or had a stool, he said he wet his pants.      Slept 2.75 hrs.

## 2024-06-12 LAB
HOLD SPECIMEN: NORMAL
HOLD SPECIMEN: NORMAL
VALPROATE SERPL-MCNC: 59.9 UG/ML

## 2024-06-12 PROCEDURE — 36415 COLL VENOUS BLD VENIPUNCTURE: CPT | Performed by: PSYCHIATRY & NEUROLOGY

## 2024-06-12 PROCEDURE — H2032 ACTIVITY THERAPY, PER 15 MIN: HCPCS

## 2024-06-12 PROCEDURE — 90853 GROUP PSYCHOTHERAPY: CPT

## 2024-06-12 PROCEDURE — 250N000013 HC RX MED GY IP 250 OP 250 PS 637: Performed by: CLINICAL NURSE SPECIALIST

## 2024-06-12 PROCEDURE — 99233 SBSQ HOSP IP/OBS HIGH 50: CPT | Performed by: PSYCHIATRY & NEUROLOGY

## 2024-06-12 PROCEDURE — 124N000002 HC R&B MH UMMC

## 2024-06-12 PROCEDURE — 250N000013 HC RX MED GY IP 250 OP 250 PS 637: Performed by: PSYCHIATRY & NEUROLOGY

## 2024-06-12 PROCEDURE — 80164 ASSAY DIPROPYLACETIC ACD TOT: CPT | Performed by: PSYCHIATRY & NEUROLOGY

## 2024-06-12 RX ORDER — RISPERIDONE 2 MG/1
2 TABLET ORAL EVERY MORNING
Status: DISCONTINUED | OUTPATIENT
Start: 2024-06-13 | End: 2024-07-01

## 2024-06-12 RX ORDER — DIVALPROEX SODIUM 500 MG/1
1500 TABLET, EXTENDED RELEASE ORAL AT BEDTIME
Status: DISCONTINUED | OUTPATIENT
Start: 2024-06-12 | End: 2024-07-03 | Stop reason: HOSPADM

## 2024-06-12 RX ADMIN — GABAPENTIN 300 MG: 300 CAPSULE ORAL at 08:28

## 2024-06-12 RX ADMIN — Medication 1 TABLET: at 08:28

## 2024-06-12 RX ADMIN — DIVALPROEX SODIUM 500 MG: 500 TABLET, FILM COATED, EXTENDED RELEASE ORAL at 10:51

## 2024-06-12 RX ADMIN — DIVALPROEX SODIUM 1500 MG: 500 TABLET, FILM COATED, EXTENDED RELEASE ORAL at 20:28

## 2024-06-12 RX ADMIN — RISPERIDONE 3 MG: 2 TABLET ORAL at 20:29

## 2024-06-12 RX ADMIN — FLUTICASONE PROPIONATE 2 SPRAY: 50 SPRAY, METERED NASAL at 08:29

## 2024-06-12 RX ADMIN — RISPERIDONE 2 MG: 2 TABLET ORAL at 08:28

## 2024-06-12 RX ADMIN — GABAPENTIN 600 MG: 300 CAPSULE ORAL at 20:28

## 2024-06-12 RX ADMIN — LORAZEPAM 1 MG: 1 TABLET ORAL at 20:30

## 2024-06-12 RX ADMIN — IPRATROPIUM BROMIDE 2 SPRAY: 42 SPRAY, METERED NASAL at 08:29

## 2024-06-12 RX ADMIN — QUETIAPINE FUMARATE 50 MG: 50 TABLET, EXTENDED RELEASE ORAL at 08:28

## 2024-06-12 RX ADMIN — IPRATROPIUM BROMIDE 2 SPRAY: 42 SPRAY, METERED NASAL at 13:02

## 2024-06-12 RX ADMIN — HYDROXYZINE HYDROCHLORIDE 50 MG: 50 TABLET, FILM COATED ORAL at 23:55

## 2024-06-12 RX ADMIN — TRAZODONE HYDROCHLORIDE 50 MG: 50 TABLET ORAL at 23:56

## 2024-06-12 RX ADMIN — IPRATROPIUM BROMIDE 2 SPRAY: 42 SPRAY, METERED NASAL at 20:29

## 2024-06-12 ASSESSMENT — ACTIVITIES OF DAILY LIVING (ADL)
ADLS_ACUITY_SCORE: 52
ADLS_ACUITY_SCORE: 42
ADLS_ACUITY_SCORE: 42
ADLS_ACUITY_SCORE: 52
DRESS: SCRUBS (BEHAVIORAL HEALTH)
ADLS_ACUITY_SCORE: 42
ADLS_ACUITY_SCORE: 52
LAUNDRY: WITH SUPERVISION
ADLS_ACUITY_SCORE: 52
ADLS_ACUITY_SCORE: 52
ADLS_ACUITY_SCORE: 42
ADLS_ACUITY_SCORE: 42
ADLS_ACUITY_SCORE: 52
ORAL_HYGIENE: INDEPENDENT;PROMPTS
HYGIENE/GROOMING: INDEPENDENT
ADLS_ACUITY_SCORE: 52
ADLS_ACUITY_SCORE: 52

## 2024-06-12 NOTE — PLAN OF CARE
"Goal Outcome Evaluation:    Plan of Care Reviewed With: patient        Problem: Psychotic Signs/Symptoms  Goal: Optimal Cognitive Function (Psychotic Signs/Symptoms)    Problem: Psychotic Signs/Symptoms    Pt was up early, active and social in the milieu.  Pt was alert and oriented x 4, pleasant and cooperative with staff. VS stable. Affect is guarded and restricted range Pt reports okay appetite, ate about 100% of breakfast and 100% of lunch with adequate fluid intake.     Pt checked in as doing okay, rated anxiety at 3 and depression at 2 with 10 being worst. Pt rated overall mood is calm. Pt denies SI/SIB/HI. Denies visual and auditory hallucinations.     Pt was medication compliant, denied pain, medication side effects and medical concerns.    /77 (BP Location: Right arm)   Pulse 73   Temp 97.7  F (36.5  C) (Oral)   Resp 15   Ht 1.854 m (6' 1\")   Wt 96.2 kg (212 lb)   SpO2 98%   BMI 27.97 kg/m        "

## 2024-06-12 NOTE — PLAN OF CARE
Team Note Due:  Thursday     Assessment/Intervention/Current Symtoms and Care Coordination:  Chart review and met with team, discussed pt progress, symptomology, and response to treatment. Discussed the discharge plan and any potential impediments to discharge.     Tasks Completed:  - CTC called pt's wife, Flakita, to provide update. Flakita expressed frustration that she had not been notified that pt was transferred to another unit. CTC validated this frustration and apologized for the lack of communication. CTC provided update on pt's presentation on our unit over the past two days. Flakita inquired about discharge date and CTC shared there is no anticipated discharge date at this time. Flakita provided more information on what led to hospitalization. Flakita reports pt is generally open about his mental health and proactive (attending weekly therapy, psychiatry appointments, [facility] support groups, etc.). Flakita reports more recently pt went through medication adjustments as he appeared to be displaying more misha symptoms (decrease in Celexa and increase in Risperdal). Flakita asked for call from provider to discuss recent medication changes and encouraged the provider to contact pt's outpatient psychiatrist who pt has a longstanding relationship with. Flakita will also email pt's [place] paperwork to complete. Flakita expressed gratitude for the phone call and [facility] will check-in weekly to provide updates. Flakita was provided with unit phone number and [facility] number for updates.  - Provider notified that Flakita is requesting phone call.    Discharge Plan or Goal:  Current plan is to stabilize symptoms and develop safe disposition plan. Following hospitalization, plan is for pt to return home with family with outpatient supports.      Barriers to Discharge:  Currently presents with symptoms of psychosis. Medication management ongoing.      Referral Status:  Referral needs pending as patient stabilizes     Legal Status:  Voluntary     Contacts:  Wife:  Flakita Andrews, [phone] (Consent)     Upcoming Meetings and Dates/Important Information and next steps:  - Check-in with pt's wife weekly.

## 2024-06-12 NOTE — DISCHARGE INSTRUCTIONS
Behavioral Discharge Planning and Instructions    Summary: You were admitted on 5/26/2024 due to Psychotic Symptomology and Manic Symptomology.  You were treated by Blaine Seo MD and discharged on July 3, 2024 from [facility] Home  Your wife provided transportation home.    Main Diagnosis:   Psychosis, unspecified psychosis type   Severe manic bipolar I disorder with psychotic features   Schizoaffective disorder, bipolar type     Health Care Follow-up:   You have health insurance through [facility].      You are starting the [facility] Partial Hospitalization Program on July 5, 2024  You have been given a Welcome Letter that gives you all the details to access this program. This information is also in MyChart.  You can access the psychiatric provider in these 3 weeks if you need help adjusting your medications.      Primary Care appointment: Thursday, August 1, 2024 @ 12:45pm In-person  Your primary care no[facility] can help you fill your psychiatric medications if there is a lapse.  Provider: Dmitry Tee PA-C  Location: [facility], [address] [place] [address]  Phone: [phone]  Fax: [phone]  HUC TO FAX AVS to above appointment, please        Appointment: Psychiatry   Date/time: Tuesday September 10th, 2024 @ 8:00 AM Telephone  Provider:  Donta Bob MD  Address: [facility]  [address] [place] [address], [address] [address]  Phone: [phone]  Fax: [phone]      Appointment: Therapy  Date/time: @  [facility]  Provider:  Nabil Craig   Address: [facility] [address] [place] [address]  Phone: [phone]  Fax: [phone]   Note:  Provider is out of office 6/28- 7/9 please call or e-mail [email or web address] to schedule a follow up once he is back in office.          Information will be faxed to your outpatient providers to ensure a healthy continuity of care for you.     Attend all  scheduled appointments with your outpatient providers. Call at least 24 hours in advance if you need to reschedule an appointment to ensure continued access to your outpatient providers.     Major Treatments, Procedures and Findings:  You were provided with: a psychiatric assessment, assessed for medical stability, medication evaluation and/or management, group therapy, family therapy, individual therapy, milieu management, and medical interventions    You had lab work done. You can review this with your doctor and in MyChart.    Symptoms to Report: feeling more aggressive, increased confusion, losing more sleep, mood getting worse, or thoughts of suicide    Early warning signs can include: increased depression or anxiety sleep disturbances increased thoughts or behaviors of suicide or self-harm  increased unusual thinking, such as paranoia or hearing voices    Safety and Wellness:  Take all medicines as directed. Make no changes unless your doctor suggests them.  Follow treatment recommendations. Refrain from alcohol and non-prescribed drugs. Ask your support system to help you reduce your access to items that could harm yourself or others. Items could include:    Firearms  Medicines (both prescribed and over-the-counter)  Knives and other sharp objects  Ropes and like materials  Car keys  If there is a concern for safety, call 911.     Resources:   Mental Health Crisis Resources  Throughout [place]: call **CRISIS (**376527)  Crisis Text Line: is available for free, 24/7 by texting [place] to 136506  Suicide Awareness Voices of Education (SAVE) (www.save.org): 202-669-NSSI (0617)  The National Suicide Prevention Lifeline is now: 988 Suicide and Crisis Lifeline. Call 988 anytime.  National [place] on Mental Illness (www.[place].hong.org): 348.796.2899 or 667-976-5321.  [employer]: text the word LIFE to 15416 for immediate support and crisis intervention  Mental Health Consumer/Survivor Network of [place] (www.mhcsn.net):  319-423-3702 or 425-308-1114  Mental Health Association of [place] (www.mentalhealth.org): 280-456-7472 or 825-569-4465  Peer Support Connection [place] Warmline (PSC) 1-420.594.7233 Available from 5pm - 9am (7 days a week/365 days a year)  [facility] 1-405.481.6710    General Medication Instructions:   See your medication sheet(s) for instructions.   Take all medicines as directed.  Make no changes unless your doctor suggests them.   Go to all your doctor visits.  Be sure to have all your required lab tests. This way, your medicines can be refilled on time.  Do not use any drugs not prescribed by your doctor.  Avoid alcohol.    Advance Directives:   Scanned document on file with [employer]? No scanned doc  Is document scanned? Pt states no documents  Honoring Choices Your Rights Handout: Informed and given  Was more information offered? Pt declined    The Treatment team has appreciated the opportunity to work with you. If you have any questions or concerns about your recent admission, you can contact the unit which can receive your call 24 hours a day, 7 days a week. They will be able to get in touch with a Provider if needed. The unit number is [phone].

## 2024-06-12 NOTE — PROGRESS NOTES
"[facility], [place]   Psychiatric Progress Note        Interim History:     The patient's care was discussed with the treatment team during the daily team meeting and/or staff's chart notes were reviewed. Patient slept for 5.25 hours last night. He was reported to be compliant with meds and care, denied med side effects. He went to some groups and socialized with select peers and staff members, reported hearing Auditory hallucinations off and on, see RN's note below:    \" Patient has valproic acid lab scheduled for tomorrow. According to day shift report, patient requested for a triple portion and healthy snacks between meals, provider will placed order for nutritional consult. Around 1600  patient reported hearing voices. Patient stated \"the devil is talking in my head I need medication to stop it\".  Patient was given PRN ativan per his request.     Patient's  wife called around 1730 and wanted to talk to the provider or .  will call wife tomorrow she was about to leave when patient's wife called.      Affect flat mood was calmed and cooperative, patient denied SI/SIB denied feeling depressed or having any anxiety, patient denied hearing voices around 2200, he said,  the ativan was effective. Took all HS scheduled medications  with zero problem. PRN trazodone given around 2300. No behavior issues or any safety concern noted.\"    Met with patient: We met with the patient in the conference room in presence of PA student. Patient went into the room willingly and brought with him a list of paper. He read out from that list what appeared to be very thorough description of his mental symptoms with main focus on his paranoia. Said that he believed now that communication with a \"holy spirit\" was a part of his psychosis/misha and denied recent similar experiences. Said that he, overall, felt better, slept better. He denied presence of Suicidal ideation, Homicidal " thoughts, Auditory hallucinations and Visual hallucinations during visit with us, though, reported Auditory hallucinations shortly after our visit, See discussion above. He requested to provide him with double portions of meals. We promised to do that and informed Basil that his Valproic Acid level came back today low 39.3 and asked his permission to increase his dose of Depakote ER and Risperdal. Basil agreed with that and had no more questions or concerns.     After visit with patient I had 20 min long phone conversation with his wife Flakita. She asked questions about why  was moved to another unit and why she was not notified about that. I explained that we needed to separate patients from a violent peer and move him to [facility] and as a replacement admitted to our unit her . Flakita said that she was understanding. We talked to her about Basil's symptoms, his response to medications and criteria for discharge. I encouraged Flakita to call us if she had any questions about Basil, not to wait for one of us to call her for an update and she promised to do that.          Medications:     Current Facility-Administered Medications   Medication Dose Route Frequency Provider Last Rate Last Admin    divalproex sodium extended-release (DEPAKOTE ER) 24 hr tablet 1,500 mg  1,500 mg Oral At Bedtime Blaine Seo MD        divalproex sodium extended-release (DEPAKOTE ER) 24 hr tablet 500 mg  500 mg Oral Daily Haroon Parker APRN CNP   500 mg at 06/12/24 1051    fluticasone (FLONASE) 50 MCG/ACT spray 2 spray  2 spray Both Nostrils Daily Haroon Parker APRN CNP   2 spray at 06/12/24 0829    gabapentin (NEURONTIN) capsule 300 mg  300 mg Oral Daily Haroon Parker APRN CNP   300 mg at 06/12/24 0828    gabapentin (NEURONTIN) capsule 600 mg  600 mg Oral At Bedtime Haroon Parker APRN CNP   600 mg at 06/11/24 2158    ipratropium (ATROVENT) 0.06 % spray 2 spray  2 spray Both Nostrils 4x Daily Keith  "NATHALIE Patiño CNP   2 spray at 06/12/24 1302    multivitamin w/minerals (THERA-VIT-M) tablet 1 tablet  1 tablet Oral Daily Haroon Parker APRN CNP   1 tablet at 06/12/24 0828    [START ON 6/13/2024] risperiDONE (risperDAL) tablet 2 mg  2 mg Oral Blaine Demarco MD        risperiDONE (risperDAL) tablet 3 mg  3 mg Oral At Bedtime Blaine Seo MD              Allergies:     Allergies   Allergen Reactions    Seasonal Allergies Other (See Comments)     Congestion. Managed with meds.          Labs:     Recent Results (from the past 24 hour(s))   Extra Green Top (Lithium Heparin) Tube    Collection Time: 06/12/24  7:20 AM   Result Value Ref Range    Hold Specimen JIC    Extra Purple Top Tube    Collection Time: 06/12/24  7:20 AM   Result Value Ref Range    Hold Specimen JIC           Psychiatric Examination:     /77 (BP Location: Right arm)   Pulse 73   Temp 97.7  F (36.5  C) (Oral)   Resp 15   Ht 1.854 m (6' 1\")   Wt 96.2 kg (212 lb)   SpO2 98%   BMI 27.97 kg/m    Weight is 212 lbs 0 oz  Body mass index is 27.97 kg/m .    Orthostatic Vitals         Most Recent      Sitting Orthostatic /77 06/12 0835    Sitting Orthostatic Pulse (bpm) 73 06/12 0835    Standing Orthostatic /83 06/12 0835    Standing Orthostatic Pulse (bpm) 81 06/12 0835           Appearance: awake, alert, dressed in hospital scrubs, and appeared as age stated  Attitude: less guarded and somewhat cooperative  Eye Contact:  somewhat better today.   Mood: less anxious  Affect:  guarded and restricted range  Speech:  clear, coherent  Psychomotor Behavior:  no evidence of tardive dyskinesia, dystonia, or tics  Throught Process:  disorganized and illogical, though, apparently, better than before  Associations:  loosening of associations present  Thought Content:  no evidence of suicidal ideation or homicidal ideation, patient denies auditory hallucinations but appears to be responding to internal stimuli "   Insight:  partial  Judgement:  poor  Oriented to:  time, person, and place  Attention Span and Concentration:  limited  Recent and Remote Memory:  fair    Clinical Global Impressions  First: 6/4 6/11/2024      Most recent:            Precautions:     Behavioral Orders   Procedures    Code 1 - Restrict to Unit    Routine Programming     As clinically indicated    Sexual precautions    Status 15     Every 15 minutes.    Suicide precautions: Suicide Risk: HIGH; Clinical rationale to override score: modification to the care environment, lack of access to a plan for self-harm, Collateral information supporting Suicidal/self-harm behaviors     Patients on Suicide Precautions should have a Combination Diet ordered that includes a Diet selection(s) AND a Behavioral Tray selection for Safe Tray - with utensils, or Safe Tray - NO utensils       Order Specific Question:   Suicide Risk     Answer:   HIGH     Order Specific Question:   Clinical rationale to override score:     Answer:   modification to the care environment     Order Specific Question:   Clinical rationale to override score:     Answer:   lack of access to a plan for self-harm     Order Specific Question:   Clinical rationale to override score:     Answer:   Collateral information supporting Suicidal/self-harm behaviors          DIagnoses:   Chantal (H)  Psychosis, unspecified psychosis type (H)  Severe manic bipolar I disorder with psychotic features (H)  Schizoaffective disorder, bipolar type            Plan:   Basil reports still present Auditory hallucinations and paranoia. Will as per discussion above increase his Depakote dose and Risperdal on 6/12/2024. He is voluntary. Valproic acid level 6/12/2024 was 39.4. Continue sexual precautions, suicide precautions, will block his room.      I was present with PA student who participated in the service and in the documentation of the note. I have verified the history and personally performed the physical exam  and medical decision making. I agree with the assessment and plan of care as documented in the note.     Blaine Seo MD  [facility]     Total time spent was 53 minutes. Over 50% of times was spent counseling and coordination of care regarding coping skills, medication and discharge planning/phone talk with wife.

## 2024-06-12 NOTE — PLAN OF CARE
Problem: Sleep Disturbance  Goal: Adequate Sleep/Rest  Outcome: Progressing   Goal Outcome Evaluation:  Patient in assigned room at beginning of the shift and appeared to be comfortably asleep with no signs of distress. No PRN medications requested or administered. No additional issues or concerns reported or observed. Safety checks completed at least every 15 minutes.   Sleep hours - 5.25.  Nursing will continue to monitor.

## 2024-06-12 NOTE — PLAN OF CARE
BEH IP Unit Acuity Rating Score (UARS)  Patient is given one point for every criteria they meet.    CRITERIA SCORING   On a 72 hour hold, court hold, committed, stay of commitment, or revocation. 0/1    Patient LOS on BEH unit exceeds 20 days. 0/1  LOS: 14   Patient under guardianship, 55+, otherwise medically complex, or under age 11. 0/1   Suicide ideation without relief of precipitating factors. 0/1   Current plan for suicide. 0/1   Current plan for homicide. 0/1   Imminent risk or actual attempt to seriously harm another without relief of factors precipitating the attempt. 0/1   Severe dysfunction in daily living (ex: complete neglect for self care, extreme disruption in vegetative function, extreme deterioration in social interactions). 1/1   Recent (last 7 days) or current physical aggression in the ED or on unit. 0/1   Restraints or seclusion episode in past 72 hours. 0/1   Recent (last 7 days) or current verbal aggression, agitation, yelling, etc., while in the ED or unit. 0/1   Active psychosis. 1/1   Need for constant or near constant redirection (from leaving, from others, etc).  0/1   Intrusive or disruptive behaviors. 1/1   Patient requires 3 or more hours of individualized nursing care per 8-hour shift (i.e. for ADLs, meds, therapeutic interventions). 0   TOTAL 3

## 2024-06-13 PROCEDURE — 99233 SBSQ HOSP IP/OBS HIGH 50: CPT | Performed by: PSYCHIATRY & NEUROLOGY

## 2024-06-13 PROCEDURE — G0177 OPPS/PHP; TRAIN & EDUC SERV: HCPCS

## 2024-06-13 PROCEDURE — 250N000013 HC RX MED GY IP 250 OP 250 PS 637: Performed by: PSYCHIATRY & NEUROLOGY

## 2024-06-13 PROCEDURE — 250N000013 HC RX MED GY IP 250 OP 250 PS 637: Performed by: CLINICAL NURSE SPECIALIST

## 2024-06-13 PROCEDURE — 90832 PSYTX W PT 30 MINUTES: CPT

## 2024-06-13 PROCEDURE — H2032 ACTIVITY THERAPY, PER 15 MIN: HCPCS

## 2024-06-13 PROCEDURE — 124N000002 HC R&B MH UMMC

## 2024-06-13 RX ADMIN — IPRATROPIUM BROMIDE 2 SPRAY: 42 SPRAY, METERED NASAL at 21:24

## 2024-06-13 RX ADMIN — RISPERIDONE 2 MG: 2 TABLET ORAL at 08:19

## 2024-06-13 RX ADMIN — GABAPENTIN 300 MG: 300 CAPSULE ORAL at 08:18

## 2024-06-13 RX ADMIN — Medication 1 TABLET: at 08:18

## 2024-06-13 RX ADMIN — RISPERIDONE 3 MG: 2 TABLET ORAL at 21:22

## 2024-06-13 RX ADMIN — GABAPENTIN 600 MG: 300 CAPSULE ORAL at 21:22

## 2024-06-13 RX ADMIN — IPRATROPIUM BROMIDE 2 SPRAY: 42 SPRAY, METERED NASAL at 08:20

## 2024-06-13 RX ADMIN — DIVALPROEX SODIUM 1500 MG: 500 TABLET, FILM COATED, EXTENDED RELEASE ORAL at 21:22

## 2024-06-13 RX ADMIN — FLUTICASONE PROPIONATE 2 SPRAY: 50 SPRAY, METERED NASAL at 08:20

## 2024-06-13 RX ADMIN — DIVALPROEX SODIUM 500 MG: 500 TABLET, FILM COATED, EXTENDED RELEASE ORAL at 08:19

## 2024-06-13 RX ADMIN — LORAZEPAM 1 MG: 1 TABLET ORAL at 21:22

## 2024-06-13 ASSESSMENT — ACTIVITIES OF DAILY LIVING (ADL)
ADLS_ACUITY_SCORE: 42
ADLS_ACUITY_SCORE: 42
ORAL_HYGIENE: INDEPENDENT
ADLS_ACUITY_SCORE: 42
ADLS_ACUITY_SCORE: 42
DRESS: INDEPENDENT
ADLS_ACUITY_SCORE: 42
ORAL_HYGIENE: INDEPENDENT
ADLS_ACUITY_SCORE: 42
LAUNDRY: WITH SUPERVISION
ADLS_ACUITY_SCORE: 42
LAUNDRY: WITH SUPERVISION
ADLS_ACUITY_SCORE: 42
HYGIENE/GROOMING: INDEPENDENT
ADLS_ACUITY_SCORE: 42
DRESS: INDEPENDENT
ADLS_ACUITY_SCORE: 42
HYGIENE/GROOMING: INDEPENDENT

## 2024-06-13 NOTE — PLAN OF CARE
"Goal Outcome Evaluation:      Group Attendance:  attended full group    Time session began: 1:20 pm  Time session ended: 2:\"0 pm  Patient's total time in group: 40    Total # Attendees   5   Group Type psychotherapeutic     Group Topic Covered symptom management        Group Session Detail Happiness chemicals     Patient's response to the group topic/interactions:  verbalizations were off topic     Patient Details: Said working on medication levels of depokote     He stares inappropriately at writer, he at one point whe the group was discussing yoga, said \"yoga is bullshit\" when peers were surprised and asked why he said his Ep[employer] didn't allow. Peers and writer were saying yoga is non Jewish but he was religiously fixated on that he was correct.      [id number] - Psychotherapy (with patient) - 45 (38-52*) min    Patient Active Problem List   Diagnosis    Marijuana dependence (H)    Alcohol abuse    Bipolar disorder, current episode manic severe with psychotic features (H)    Chantal (H)    Schizoaffective disorder, bipolar type (H)    Psychosis, unspecified psychosis type (H)                            "

## 2024-06-13 NOTE — PLAN OF CARE
"  Problem: Psychotic Signs/Symptoms  Goal: Improved Behavioral Control (Psychotic Signs/Symptoms)  Outcome: Progressing   Goal Outcome Evaluation:    Plan of Care Reviewed With: patient      Basil has spent half of the shift in his room by himself. He has been out in the smith pacing a lot. He denies hearing voices but appears to be responding. Stated \"The headphones help distract me.\"He has been isolated and withdrawn to self.  He has been adequately groomed and dressed.he has been medication compliant with all scheduled prescribed medications. He did participate in evening programing. Basil was very hungry at supper and had double portions. He did request an Ativan with HS medications.Basil remains religiously preoccupied. Basil continues to have a No roommate order secondary to paranoia. Basil continues on Sexual and suicide behaviors. Nursing to continue to support current plan of care.                 "

## 2024-06-13 NOTE — PLAN OF CARE
"Team Note Due:  Thursday     Assessment/Intervention/Current Symtoms and Care Coordination:  Chart review and met with team, discussed pt progress, symptomology, and response to treatment. Discussed the discharge plan and any potential impediments to discharge.     Tasks Completed:  - [facility] received email from Flakita with FMLA paperwork for pt. CTC provided paperwork to provider to complete.   - Received completed FMLA paperwork from the provider.   - CTC met with pt in his room to introduce self and discuss FMLA paperwork. Pt immediately said \"I will not sign anything\" and shared that \"I'm on to your games\". Pt reported that he will not take phone calls and believes the staff here are \"playing games\" with him. CTC inquired if pt would feel comfortable signing the ROIs if he meets with his wife in person. Pt reported that until he meets with his wife \"or any of my siblings\" face to face he will not believe anything. Pt then said to CTC \"nice try\" and \"go [place], I guess\" as CTC left his room.  - [facility] replied to Flakita's email to provide update regarding pt's refusal to sign ROIs. Flakita reports she will come to the unit this evening for visiting hours to meet with pt,  FMLA paperwork, and try to get pt to sign ROIs. [facility] provided paperwork to pt's evening RN to give to pt's wife during visiting hours.    Discharge Plan or Goal:  Current plan is to stabilize symptoms and develop safe disposition plan. Following hospitalization, plan is for pt to return home with family with outpatient supports.      Barriers to Discharge:  Currently presents with symptoms of psychosis. Medication management ongoing.      Referral Status:  Referral needs pending as patient stabilizes     Legal Status:  Voluntary     Contacts:  Medication Management:  Name/Organization: Donta Bob MD  [facility]  Address: [address] [place] [address], [address], [place] [id number]  Phone: [phone]    Therapist:  Name/Organization: " [facility]   Phone: [phone]     Wife: Flakita Andrews, [phone] (Consent)     Upcoming Meetings and Dates/Important Information and next steps:  - Check-in with pt's wife weekly.

## 2024-06-13 NOTE — PLAN OF CARE
"   06/12/24 2100   Group Therapy Session   Group Attendance attended group session   Time Session Began 1700     Goal Outcome Evaluation:      Rehab Group    Start time: 1700  End time: 1800  Patient time total: 45 minutes    attended full group    #5 attended   Group Type: art   Group Topic Covered: mindfulness       Group Session Detail:  Art Therapy directive was to create a drawing expressing a non visual sensory experience using lines, shapes and colors.        Patient Response/Contribution:  verbalizations were off topic       Patient Detail:    Pt had an intense stare and was staring at author intermittently throughout group. Pt created abstract art using lines, shapes and colors. Pt talked about how his artwork expresses his paranoia and how tiring his paranoia is for him. Pt is hoping that his medications will help paranoia.  Pt shared that his pre-drawn [place] jen symbolizes a \"[place] of safety\" for pt and that pt feels that he needs to keep others safe and within this [place] once he meets them. Pt said that wanting to keep others safe is what is tiring and exhausting for pt.  Pt was expressive through his artwork and shared that he found the art to be helpful in expressing his thoughts to others.  Pts mood was calm, flat, restricted affect.        Group Therapy ([id number])    Patient Active Problem List   Diagnosis    Marijuana dependence (H)    Alcohol abuse    Bipolar disorder, current episode manic severe with psychotic features (H)    Chantal (H)    Schizoaffective disorder, bipolar type (H)    Psychosis, unspecified psychosis type (H)                            "

## 2024-06-13 NOTE — PLAN OF CARE
"  Problem: Adult Inpatient Plan of Care  Goal: Optimal Comfort and Wellbeing  Intervention: Provide Person-Centered Care  Recent Flowsheet Documentation  Taken 6/13/2024 0900 by Sandy Parr RN  Trust Relationship/Rapport:   care explained   choices provided   emotional support provided   empathic listening provided   questions answered   questions encouraged   reassurance provided   thoughts/feelings acknowledged   Goal Outcome Evaluation:    Plan of Care Reviewed With: patient               Presentation: The patient is observed sitting on the peripheral of the lounge watching tv.  The patient is appropriately dressed.  The patient has a flat and blunted affect.  Speech is clear.  Thoughts are disorganized and the patient is actively having paranoid hallucinations.  The patients mood is anxious.  The patient is behaviorally appropriate.       Mental health symptoms: The patient denies SI, SIB, and HI.  The patient is experiencing paranoid hallucinations.  The patient stated, \"I'm done, I'm done.\" The patient stated, \"I thought I heard something, but I know it is not real.\"  When talking to the PA.  The patient became aware another patient has the same name.  The patient became upset.  Voiced to the PA, he does not believe the other patient has the same name.        Medication compliance The patient is compliant with all his medications.        Medical Concerns: The patient denies having any medical concerns or complaints.  Denies pain.  Blood pressure 119/82, pulse 71, temperature 97.7  F (36.5  C), temperature source Oral, resp. rate 15, height 1.854 m (6' 1\"), weight 99.3 kg (219 lb), SpO2 96%.        PRN's: None      Precautions: suicidal    Continue with plan of care          "

## 2024-06-13 NOTE — PROGRESS NOTES
"        Rehab Group     Start time: 1715  End time: 1800  Patient time total: 45 minutes     #7 attended   Group Type: music   Group Topic Covered: activity therapy, balanced lifestyle, coping skills, and emotional regulation   Group Session Detail: Engaged in Evening Music Relaxation group to decrease anxiety and promote personal and group organization.   Patient Response/Contribution: distracted/delusional   Patient Detail: Pt stated staff were \"out to get him\" and asked if MT could make note of it.  MT validated pts feelings without validating the factuality of his claims.  At start of group he said to MT \"I want someone in here-I don't feel safe\".      When RNs were summoned to the room, pt had his hands in the air to the beat of the music and had forgotten about his request.  Pt continued to make delusional sounding statements throughout group, but did not require physical redirection.      Pt did state \"we need music here all night!\"       Activity Therapy Per 15 minutes () Other Rehab therapies    Patient Active Problem List   Diagnosis    Marijuana dependence (H)    Alcohol abuse    Bipolar disorder, current episode manic severe with psychotic features (H)    Chantal (H)    Schizoaffective disorder, bipolar type (H)    Psychosis, unspecified psychosis type (H)                "

## 2024-06-13 NOTE — PLAN OF CARE
"                                                     Night Nursing Note   6/12/24 - 6/13/24        Basil was awake in lounge at beginning of shift.  Monitored q15 mins for safety.    Complained of not being able to sleep. Requested and  received trazodone 50mg for sleep and hydroxyzine 50mg for anxiety, not rated  Appeared to be responding to internal stimuli. After taking medications, stated  to this writer, I\"I just want you to be safe.\" Reassured Basil that he was  safe here.  Smiled and went back to his room and went to bed.  PRN sleep med was effective.    0530:  Basil requested to take a shower this morning due to urinating on himself.  Given clean scrubs, under ware, wipes towel/wash cloth and soap.  Stated that he has a condition that causes him  to urinate on himself.  He cleaned himself and will take a shower on day shift.    Appeared to sleep majority of night without difficulty.  Continue to monitor, offer support and reassurance per care plan.    Slept 4 hrs.                        "

## 2024-06-13 NOTE — PROVIDER NOTIFICATION
06/13/24 1033   Individualization/Patient Specific Goals   Patient Personal Strengths expressive of emotions;relationship stability;stable living environment;spiritual/Ep[employer]op[employer] support;family/social support;independent living skills;positive vocational history;interests/hobbies   Patient Vulnerabilities traumatic event;poor impulse control   Interprofessional Rounds   Summary Pt has been endorsing paranoia and felt people were talking about him. Additionally, pt was hyperfocused on another patient with the same name. Pt has been medication compliant on the unit. Pt has been experiencing auditory hallucinations. Pt continues to be intrusive on the unit, at times.   Participants nursing;psychiatrist;CTC;other (see comments)   Behavioral Team Discussion   Participants Blaine Seo MD; Sandy Parr RN; INDIGO Kay; SARAH Kee, ATR-BC; NP student   Progress Pt slowly progresses toward his goals.   Anticipated length of stay 5-7 days   Continued Stay Criteria/Rationale Pt continues to endorse paranoia and experiences auditory hallucinations.   Medical/Physical No issues noted   Precautions Sexual and suicide precautions   Plan Multidisciplinary team evaluation, medication management, care coordination   Rationale for change in precautions or plan N/A   Safety Plan Per unit protocol; CTC therapist to complete with pt on the unit.   Anticipated Discharge Disposition home or self-care     PRECAUTIONS AND SAFETY    Behavioral Orders   Procedures    Code 1 - Restrict to Unit    Routine Programming     As clinically indicated    Sexual precautions    Status 15     Every 15 minutes.    Suicide precautions: Suicide Risk: HIGH; Clinical rationale to override score: modification to the care environment, lack of access to a plan for self-harm, Collateral information supporting Suicidal/self-harm behaviors     Patients on Suicide Precautions should have a Combination Diet ordered that includes a  Diet selection(s) AND a Behavioral Tray selection for Safe Tray - with utensils, or Safe Tray - NO utensils       Order Specific Question:   Suicide Risk     Answer:   HIGH     Order Specific Question:   Clinical rationale to override score:     Answer:   modification to the care environment     Order Specific Question:   Clinical rationale to override score:     Answer:   lack of access to a plan for self-harm     Order Specific Question:   Clinical rationale to override score:     Answer:   Collateral information supporting Suicidal/self-harm behaviors       Safety  Safety WDL: WDL  Patient Location: patient room, own  Observed Behavior: calm, walking  Observed Behavior (Comment): Sitting in room  Safety Measures: environmental rounds completed, safety rounds completed, clinical history reviewed, self-directed behavior promoted, suicide assessment completed, suicide check-in completed  Diversional Activity: television, music  De-Escalation Techniques: stimulation decreased  Suicidality: Status 15, Behavioral scrubs (pajamas), Minimal furniture in room, Minimal personal belongings in room  Withdrawal: monitor withdrawal process  Seizure precautions: clutter free environment  Assault: status 15  Elopement Assessment: Statements about wanting to leave  Elopement Interventions: status 15, behavioral scrubs (pajamas)  Sexual: status 15, private room

## 2024-06-13 NOTE — PROGRESS NOTES
"[facility], [place]   Psychiatric Progress Note        Interim History:   The patient's care was discussed with the treatment team during the daily team meeting and/or staff's chart notes were reviewed.  Staff report patient appears to be responding to internal stimuli. He was incontinent of urine last night. He is paranoid and was upset because there was another patient on the unit with the same name as him, saying it was a lie. Patient was also upset because he did not receive double portions of food. See nursing notes below:      6/13: \"Complained of not being able to sleep. Requested and  received trazodone 50mg for sleep and hydroxyzine 50mg for anxiety, not rated  Appeared to be responding to internal stimuli. After taking medications, stated  to this writer, I\"I just want you to be safe.\" Reassured Basil that he was  safe here.  Smiled and went back to his room and went to bed.  PRN sleep med was effective.     0530:  Basil requested to take a shower this morning due to urinating on himself.  Given clean scrubs, under ware, wipes towel/wash cloth and soap.  Stated that he has a condition that causes him  to urinate on himself.  He cleaned himself and will take a shower on day shift.     Appeared to sleep majority of night without difficulty.  Continue to monitor, offer support and reassurance per care plan. Slept 4 hrs.\"      6/12: \"Basil has spent half of the shift in his room by himself. He has been out in the smith pacing a lot. He denies hearing voices but appears to be responding. Stated \"The headphones help distract me.\"He has been isolated and withdrawn to self.  He has been adequately groomed and dressed.he has been medication compliant with all scheduled prescribed medications. He did participate in evening programing. Basil was very hungry at supper and had double portions. He did request an Ativan with HS medications.Basil remains religiously preoccupied. " "Basil continues to have a No roommate order secondary to paranoia. Basil continues on Sexual and suicide behaviors. Nursing to continue to support current plan of care.\"     Met with patient: [place] [facility] [place] \"doing great.\" He is requesting double portions for food and although the order has been placed, he has been getting single portions. He gets upset when this happens. Reassured him we put the order in and nursing staff called down to the kitchen. He reports sleeping better last night, reporting 5 hours of sleep. He says he feels rested.     Basil originally denied hearing voices, but once we asked him again, he said the voices sound like \"jibberish.\" He did not elaborate on this. He denies SI/HI. We told him about long conversation we had with his wife yesterday, that she told us how focused he was on going home and asked him if he would be willing to stay a few more days for his safety and stabilization, which he said yes to. All questions and concerns were answered. He smiled inappropriately during our visit and made an impression of attending to internal stimuli.          Medications:     Current Facility-Administered Medications   Medication Dose Route Frequency Provider Last Rate Last Admin    divalproex sodium extended-release (DEPAKOTE ER) 24 hr tablet 1,500 mg  1,500 mg Oral At Bedtime Blaine Seo MD   1,500 mg at 06/12/24 2028    divalproex sodium extended-release (DEPAKOTE ER) 24 hr tablet 500 mg  500 mg Oral Daily Haroon Parker APRN CNP   500 mg at 06/13/24 0819    fluticasone (FLONASE) 50 MCG/ACT spray 2 spray  2 spray Both Nostrils Daily Haroon Parker APRN CNP   2 spray at 06/13/24 0820    gabapentin (NEURONTIN) capsule 300 mg  300 mg Oral Daily Haroon Parker APRN CNP   300 mg at 06/13/24 0818    gabapentin (NEURONTIN) capsule 600 mg  600 mg Oral At Bedtime Haroon Parker APRN CNP   600 mg at 06/12/24 2028    ipratropium (ATROVENT) " "0.06 % spray 2 spray  2 spray Both Nostrils 4x Daily Vale ParkeratKYLIE kiranAYDIN CNP   2 spray at 06/13/24 0820    multivitamin w/minerals (THERA-VIT-M) tablet 1 tablet  1 tablet Oral Daily Keith Haroon APRAYDIN CNP   1 tablet at 06/13/24 0818    risperiDONE (risperDAL) tablet 2 mg  2 mg Oral QAM Blaine Seo MD   2 mg at 06/13/24 0819    risperiDONE (risperDAL) tablet 3 mg  3 mg Oral At Bedtime Blaine Seo MD   3 mg at 06/12/24 2029          Allergies:     Allergies   Allergen Reactions    Seasonal Allergies Other (See Comments)     Congestion. Managed with meds.          Labs:   No results found for this or any previous visit (from the past 24 hour(s)).       Psychiatric Examination:     /82 (BP Location: Left arm)   Pulse 71   Temp 97.7  F (36.5  C) (Oral)   Resp 15   Ht 1.854 m (6' 1\")   Wt 99.3 kg (219 lb)   SpO2 96%   BMI 28.89 kg/m    Weight is 219 lbs 0 oz  Body mass index is 28.89 kg/m .    Orthostatic Vitals         Most Recent      Sitting Orthostatic /82 06/13 0821    Sitting Orthostatic Pulse (bpm) 71 06/13 0821    Standing Orthostatic /77 06/13 0821    Standing Orthostatic Pulse (bpm) 87 06/13 0821          Appearance: awake, alert, dressed in hospital scrubs, and appeared as age stated  Attitude:  guarded, but more cooperative  Eye Contact:  fair  Mood: calmer, less anxious  Affect:  guarded and restricted range  Speech:  clear, coherent, decreased volume  Psychomotor Behavior:  no evidence of tardive dyskinesia, dystonia, or tics  Throught Process:  disorganized and illogical but appears to be improving?   Associations:  loosening of associations present  Thought Content:  no evidence of suicidal ideation or homicidal ideation, auditory hallucinations present, and no visual hallucinations present  Insight:  partial  Judgement:  poor, improving  Oriented to:  time, person, and place  Attention Span and Concentration:  limited  Recent and Remote Memory:  " fair    Clinical Global Impressions  First: 6/4 6/13/2024       Most recent:            Precautions:     Behavioral Orders   Procedures    Code 1 - Restrict to Unit    Routine Programming     As clinically indicated    Sexual precautions    Status 15     Every 15 minutes.    Suicide precautions: Suicide Risk: HIGH; Clinical rationale to override score: modification to the care environment, lack of access to a plan for self-harm, Collateral information supporting Suicidal/self-harm behaviors     Patients on Suicide Precautions should have a Combination Diet ordered that includes a Diet selection(s) AND a Behavioral Tray selection for Safe Tray - with utensils, or Safe Tray - NO utensils       Order Specific Question:   Suicide Risk     Answer:   HIGH     Order Specific Question:   Clinical rationale to override score:     Answer:   modification to the care environment     Order Specific Question:   Clinical rationale to override score:     Answer:   lack of access to a plan for self-harm     Order Specific Question:   Clinical rationale to override score:     Answer:   Collateral information supporting Suicidal/self-harm behaviors          DIagnoses:   Chantal (H)  Psychosis, unspecified psychosis type (H)  Severe manic bipolar I disorder with psychotic features (H)  Schizoaffective disorder, bipolar type          Plan:     Basil is still experiencing auditory hallucinations and paranoia. Depakote and Ripsperdal were increased on 6/12/24. He is voluntary. Valproic acid level 6/12/2024 was 39.4. No medication changes today 6/13/2024. Continue sexual precautions, suicide precautions, will block his room. Will continue to provide support and structure. I filled out his [place] papers.     I was present with PA student who participated in the service and in the documentation of the note. I have verified the history and personally performed the physical exam and medical decision making. I agree with the assessment and plan  of care as documented in the note.     Blaine Seo MD  [facility]    Total time spent was 52 minutes. Over 50% of times was spent counseling and coordination of care regarding coping skills, medication and discharge planning/FMLA papers.

## 2024-06-13 NOTE — PLAN OF CARE
"Goal Outcome Evaluation:    Individual Therapy Note      Date of Service: June 13, 2024    Patient: Basil goes by \"Basil,\" uses he/him pronouns    Individuals Present: Basil Wesley Ugalde [facility]    Session start: 10:15 am  Session end: 10:35 am  Session duration in minutes: 20      Modality Used: Person Centered and Brief Therapy    Goals: Safety planning, introduce therapy    Patient Description of current symptoms: sexual \"promiscuity\" and or thoughts     Mental Status Exam:   Attitude: cooperative  Eye Contact: fair, intense  Mood: good  Affect: intensity is heightened  Speech: clear, coherent  Psychomotor Behavior: no evidence of tardive dyskinesia, dystonia, or tics  Thought Process:  goal oriented  Associations: loosening of associations present  Thought Content: no evidence of suicidal ideation or homicidal ideation  Insight: fair  Judgement: poor  Attention Span and Concentration: fair    Pt progress: Pt says he is getting medications re- balanced, he is religiously preoccupied and says he is \" doing crazy things\" and having \" sexual thoughts and he used the word promiscuity. He said his reasons for living on the safety plan were that \" every member of my family to be baptized\"    Treatment Objective(s) Addressed:   The focus of this session was on rapport building, orienting the patient to therapy, identifying and practicing coping strategies, and safety planning     Progress Towards Goals and Assessment of Patient:   Patient is making progress towards treatment goals as evidenced by taking medications.       Therapeutic Intervention(s):   Provided active listening, unconditional positive regard, and validation.   Engaged in safety planning identifying coping skills, warning signs, health support and resources      Plan/next step: continue to improve, misha prior to discharge    52563 - Psychotherapy (with patient) - 30 (16-37*) min    Patient Active Problem List   Diagnosis    Marijuana dependence " (H)    Alcohol abuse    Bipolar disorder, current episode manic severe with psychotic features (H)    Chantal (H)    Schizoaffective disorder, bipolar type (H)    Psychosis, unspecified psychosis type (H)

## 2024-06-13 NOTE — PLAN OF CARE
Rehab Group    Start time: 1015  End time: 1145  Patient time total: 65 minutes    attended full group    #6 attended   Group Type: OT Clinic   Group Topic Covered: coping skills     Group Session Detail:    Intervention:  Pt participated in a OT Clinic group to facilitate coping skills exploration and creative expression through personally meaningful activities, and to encourage utilization of these healthy coping skills to promote overall health and wellness. Group included clinical observation of social, cognitive and kinesthetic performance skills to inform treatment and safe discharge planning.    Mood/Affect:  Pleasant      Plan: Patient encouraged to maintain attendance for continued ongoing support in working towards occupational therapy goals to support overall treatment/care.        Patient Detail:    Was in and out various times during group, electing to continue working on a project started in a previous clinic group. Was observed to be staring at times. Towards end of group began laughing aloud frequently. At end of group was all out laughter, however was not engaged in conversation with others at this time nor were others making jokes        Patient Active Problem List   Diagnosis    Marijuana dependence (H)    Alcohol abuse    Bipolar disorder, current episode manic severe with psychotic features (H)    Chantal (H)    Schizoaffective disorder, bipolar type (H)    Psychosis, unspecified psychosis type (H)

## 2024-06-13 NOTE — PLAN OF CARE
BEH IP Unit Acuity Rating Score (UARS)  Patient is given one point for every criteria they meet.    CRITERIA SCORING   On a 72 hour hold, court hold, committed, stay of commitment, or revocation. 0/1    Patient LOS on BEH unit exceeds 20 days. 0/1  LOS: 15   Patient under guardianship, 55+, otherwise medically complex, or under age 11. 0/1   Suicide ideation without relief of precipitating factors. 0/1   Current plan for suicide. 0/1   Current plan for homicide. 0/1   Imminent risk or actual attempt to seriously harm another without relief of factors precipitating the attempt. 0/1   Severe dysfunction in daily living (ex: complete neglect for self care, extreme disruption in vegetative function, extreme deterioration in social interactions). 1/1   Recent (last 7 days) or current physical aggression in the ED or on unit. 0/1   Restraints or seclusion episode in past 72 hours. 0/1   Recent (last 7 days) or current verbal aggression, agitation, yelling, etc., while in the ED or unit. 0/1   Active psychosis. 1/1   Need for constant or near constant redirection (from leaving, from others, etc).  0/1   Intrusive or disruptive behaviors. 1/1   Patient requires 3 or more hours of individualized nursing care per 8-hour shift (i.e. for ADLs, meds, therapeutic interventions). 0   TOTAL 3

## 2024-06-14 PROCEDURE — 250N000013 HC RX MED GY IP 250 OP 250 PS 637: Performed by: PSYCHIATRY & NEUROLOGY

## 2024-06-14 PROCEDURE — 250N000013 HC RX MED GY IP 250 OP 250 PS 637: Performed by: CLINICAL NURSE SPECIALIST

## 2024-06-14 PROCEDURE — 124N000002 HC R&B MH UMMC

## 2024-06-14 PROCEDURE — 99233 SBSQ HOSP IP/OBS HIGH 50: CPT | Performed by: PSYCHIATRY & NEUROLOGY

## 2024-06-14 RX ORDER — RISPERIDONE 2 MG/1
4 TABLET ORAL AT BEDTIME
Status: DISCONTINUED | OUTPATIENT
Start: 2024-06-14 | End: 2024-07-01

## 2024-06-14 RX ADMIN — FLUTICASONE PROPIONATE 2 SPRAY: 50 SPRAY, METERED NASAL at 08:04

## 2024-06-14 RX ADMIN — GABAPENTIN 300 MG: 300 CAPSULE ORAL at 08:04

## 2024-06-14 RX ADMIN — Medication 1 TABLET: at 08:04

## 2024-06-14 RX ADMIN — IPRATROPIUM BROMIDE 2 SPRAY: 42 SPRAY, METERED NASAL at 08:04

## 2024-06-14 RX ADMIN — DIVALPROEX SODIUM 500 MG: 500 TABLET, FILM COATED, EXTENDED RELEASE ORAL at 08:04

## 2024-06-14 RX ADMIN — RISPERIDONE 2 MG: 2 TABLET ORAL at 08:04

## 2024-06-14 RX ADMIN — OLANZAPINE 10 MG: 10 TABLET, ORALLY DISINTEGRATING ORAL at 10:07

## 2024-06-14 RX ADMIN — RISPERIDONE 4 MG: 2 TABLET, FILM COATED ORAL at 20:56

## 2024-06-14 RX ADMIN — IPRATROPIUM BROMIDE 2 SPRAY: 42 SPRAY, METERED NASAL at 20:58

## 2024-06-14 RX ADMIN — DIVALPROEX SODIUM 1500 MG: 500 TABLET, FILM COATED, EXTENDED RELEASE ORAL at 20:56

## 2024-06-14 RX ADMIN — LORAZEPAM 1 MG: 1 TABLET ORAL at 18:38

## 2024-06-14 RX ADMIN — GABAPENTIN 600 MG: 300 CAPSULE ORAL at 20:57

## 2024-06-14 ASSESSMENT — ACTIVITIES OF DAILY LIVING (ADL)
ADLS_ACUITY_SCORE: 42
HYGIENE/GROOMING: INDEPENDENT
LAUNDRY: WITH SUPERVISION
DRESS: INDEPENDENT
ADLS_ACUITY_SCORE: 42
ORAL_HYGIENE: INDEPENDENT
ADLS_ACUITY_SCORE: 42
ORAL_HYGIENE: INDEPENDENT
DRESS: INDEPENDENT
LAUNDRY: WITH SUPERVISION
ADLS_ACUITY_SCORE: 42
HYGIENE/GROOMING: INDEPENDENT

## 2024-06-14 NOTE — PLAN OF CARE
Patient in bed at the start of the shift and  appeared to be comfortably asleep and breathing with ease throughout the shift. Patient slept for 6.5 hours of the over night shift with no s/s of acute distress. Patient experienced no safety events or escalations during the shift, no concerns reported or observed. Safety checks completed at least every 15 minutes. Patient required no PRN medications, see MAR. Patient has a no roommate order because they remain on sexual precautions and are extremely paranoid. Nursing will continue to monitor.

## 2024-06-14 NOTE — PLAN OF CARE
Rehab Group    Start time: 10:15  End time: 11:45  Patient time total: 35 minutes    attended partial group    #6 attended   Group Type: OT Clinic   Group Topic Covered: balanced lifestyle, coping skills, emotional regulation, healthy leisure time, and social skills     Group Session Detail:  Pt actively participated in occupational therapy clinic to facilitate coping skill exploration, creative expression within personally meaningful activities, and clinical observation of social, cognitive, and kinesthetic performance skills.      Patient Response/Contribution:  cooperative with task, actively engaged, worked intermittently, left group on several occasions, distracted , and disorganized     Patient Detail:   Patient entered group late. Pt response: Duc to initiate, gather materials, sequence, and adjust to workspace demands as needed. Writer offered gentle cueing to help sequence steps of task as patient appeared disorganized and distracted. Demonstrated fair focus, planning, and problem solving for selected frame painting task. Patient's work and workstation were fairly disorganized. Patient was able to maintain attention on task for brief bursts; verbalizing and demonstrating motivation to complete project to gift to his wife. Patient was observed murmuring to self at times. Patient did appear to direct questions or conversation to the female peers in group; writer gently redirected conversation as needed. At the conclusion of group, patient was observed sitting and staring straight ahead for a prolonged period of time; eventually laying his head on the table. Patient needed direct cueing to leave the group room as group had concluded; patient was compliant with request.       Patient Active Problem List   Diagnosis    Marijuana dependence (H)    Alcohol abuse    Bipolar disorder, current episode manic severe with psychotic features (H)    Chantal (H)    Schizoaffective disorder, bipolar type (H)    Psychosis,  unspecified psychosis type (H)

## 2024-06-14 NOTE — PLAN OF CARE
"  Problem: Psychotic Signs/Symptoms  Goal: Improved Behavioral Control (Psychotic Signs/Symptoms)  Outcome: Progressing   Goal Outcome Evaluation:    Plan of Care Reviewed With: patient      Basil has continued to be paranoid this evening. He appears to be responding to internal stimuli. He continues to be religiously preoccupied. During evening group he yelled out in group and a peer came running to staff. The peer reported \"Basil is delusional.\" This writer walked into the group room and Basil was waving her arms to the music and the music therapist said there was no problem. Later the therapist reported that Basil had made some paranoid statements about staff. Basil's wife Flakita visited and Basil signed the NERY for his therapist.Basil has refused vital signs today. Basil continues on Sexual and Suicide precautions. Nursing to continue to support current plan of care..                  "

## 2024-06-14 NOTE — PLAN OF CARE
Team Note Due:  Thursday     Assessment/Intervention/Current Symtoms and Care Coordination:  Chart review and met with team, discussed pt progress, symptomology, and response to treatment. Discussed the discharge plan and any potential impediments to discharge.    Discharge Plan or Goal:  Current plan is to stabilize symptoms and develop safe disposition plan. Following hospitalization, plan is for pt to return home with family with outpatient supports.      Barriers to Discharge:  Currently presents with symptoms of psychosis. Medication management ongoing.      Referral Status:  Referral needs pending as patient stabilizes     Legal Status:  Voluntary     Contacts:  Medication Management:  Name/Organization: Donta Bob MD  [facility]  Address: [address] [place] [address], [place] [address]  Phone: [phone]    Therapist:  Name/Organization: [facility]   Phone: [phone]     Wife: Flakita Andrews, [phone] (Consent)     Upcoming Meetings and Dates/Important Information and next steps:  - Check-in with pt's wife weekly.

## 2024-06-14 NOTE — PROGRESS NOTES
"[facility], [place]   Psychiatric Progress Note        Interim History:     The patient's care was discussed with the treatment team during the daily team meeting and/or staff's chart notes were reviewed.  Staff report patient slept for about 6.5 hours last night. His wife visited and visit went OK. Basil was reported to be compliant with meds and care, however, still being paranoid, suspicious about other people' intentions and often times making paranoid statements, see RN's note below:    \"Basil has continued to be paranoid this evening. He appears to be responding to internal stimuli. He continues to be religiously preoccupied. During evening group he yelled out in group and a peer came running to staff. The peer reported \"Basil is delusional.\" This writer walked into the group room and Basil was waving her arms to the music and the music therapist said there was no problem. Later the therapist reported that Basil had made some paranoid statements about staff. Basil's wife Flakita visited and Basil signed the NERY for his therapist.Basil has refused vital signs today. Basil continues on Sexual and Suicide precautions. Nursing to continue to support current plan of care.\"    Met with patient: patient was first seen in the dining area. He was wiping tables and chairs and refused to meet with us: \"I am not ready for that\". We offered to see him later and he agreed. Shortly after that patient was seen standing on his knees in the small alcove and loudly praying. We asked him to go to his room and he agreed to do that, however, short time after that this provider was informed by RN that patient was following female peer and appeared to be very paranoid. We started him on SIO and saw him in the conference room in presence of SIO staff, PA student. Basil came in voluntarily and appeared to be reasonably open with us. He admitted that he thought that one of male peers on this " "unit is a predator and followed female peer because he thought that the abovementioned male peer was after her. Basil could not explain why/how he get the idea of male peer being a predator: \"I just have that feeling\"... He denied that he was suggested this by Auditory hallucinations. When asked about Auditory hallucinations, he again described them as gibberish. He agreed that his paranoia had not improved significantly. We informed him that from now he will have SIO staff with him and we would increase his Risperdal. Basil didn't argue with that. We spent additional time explaining to Basil that he and other patients on this floor were safe and asked him to stay away from the above mentioned patiens and if he had any concerns to report them to staff. Basil promised to do that and had no more questions or concerns.          Medications:     Current Facility-Administered Medications   Medication Dose Route Frequency Provider Last Rate Last Admin    divalproex sodium extended-release (DEPAKOTE ER) 24 hr tablet 1,500 mg  1,500 mg Oral At Bedtime Blaine Seo MD   1,500 mg at 06/13/24 2122    divalproex sodium extended-release (DEPAKOTE ER) 24 hr tablet 500 mg  500 mg Oral Daily Haroon Parker APRN CNP   500 mg at 06/14/24 0804    fluticasone (FLONASE) 50 MCG/ACT spray 2 spray  2 spray Both Nostrils Daily Haroon Parker APRN CNP   2 spray at 06/14/24 0804    gabapentin (NEURONTIN) capsule 300 mg  300 mg Oral Daily Haroon Parker APRN CNP   300 mg at 06/14/24 0804    gabapentin (NEURONTIN) capsule 600 mg  600 mg Oral At Bedtime Haroon Parker APRN CNP   600 mg at 06/13/24 2122    ipratropium (ATROVENT) 0.06 % spray 2 spray  2 spray Both Nostrils 4x Daily Haroon Parker APRN CNP   2 spray at 06/14/24 0804    multivitamin w/minerals (THERA-VIT-M) tablet 1 tablet  1 tablet Oral Daily Haroon Parker APRN CNP   1 tablet at 06/14/24 0804    risperiDONE (risperDAL) tablet 2 mg  2 mg Oral QAM " "Blaine Seo MD   2 mg at 06/14/24 0804    risperiDONE (risperDAL) tablet 4 mg  4 mg Oral At Bedtime Blaine Seo MD              Allergies:     Allergies   Allergen Reactions    Seasonal Allergies Other (See Comments)     Congestion. Managed with meds.          Labs:   No results found for this or any previous visit (from the past 24 hour(s)).       Psychiatric Examination:     /75 (BP Location: Right arm)   Pulse 83   Temp 97.5  F (36.4  C) (Oral)   Resp 15   Ht 1.854 m (6' 1\")   Wt 99.3 kg (219 lb)   SpO2 97%   BMI 28.89 kg/m    Weight is 219 lbs 0 oz  Body mass index is 28.89 kg/m .    Orthostatic Vitals         Most Recent      Sitting Orthostatic /82 06/13 0821    Sitting Orthostatic Pulse (bpm) 71 06/13 0821    Standing Orthostatic /76 06/14 0807    Standing Orthostatic Pulse (bpm) 95 06/14 0807           Appearance: awake, alert, dressed in hospital scrubs, and appeared as age stated  Attitude:  guarded, but cooperative  Eye Contact:  fair  Mood: calmer, less anxious  Affect:  guarded and restricted range  Speech:  clear, coherent, decreased volume  Psychomotor Behavior:  no evidence of tardive dyskinesia, dystonia, or tics  Throught Process:  disorganized and illogical    Associations:  loosening of associations present  Thought Content:  no evidence of suicidal ideation or homicidal ideation, auditory hallucinations present, and no visual hallucinations present, delusions present  Insight:  partial  Judgement:  poor,    Oriented to:  time, person, and place  Attention Span and Concentration:  limited  Recent and Remote Memory:  fair    Clinical Global Impressions  First: 6/4 6/13/2024       Most recent:            Precautions:     Behavioral Orders   Procedures    Code 1 - Restrict to Unit    Routine Programming     As clinically indicated    Sexual precautions    Status 15     Every 15 minutes.    Status Individual Observation     Patient SIO status reviewed " with team/RN.  Please also refer to RN/team documentation for add'l detail.    -SIO staff to monitor following which have contributed to patient being on SIO:  Severe intrusiveness, agitation, paranoia  -Possible interventions SIO staff could use to support patient's treatment progress:  Offering prn meds, redirections, suggestion to use coping skills  -When following observed, team will review discontinuation of SIO:  Decreased psychotic symptoms, more predictable behavior, can contract for safety     Order Specific Question:   CONTINUOUS 24 hours / day     Answer:   Other     Order Specific Question:   Specify distance     Answer:   10     Order Specific Question:   Indications for SIO     Answer:   Severe intrusiveness    Suicide precautions: Suicide Risk: HIGH; Clinical rationale to override score: modification to the care environment, lack of access to a plan for self-harm, Collateral information supporting Suicidal/self-harm behaviors     Patients on Suicide Precautions should have a Combination Diet ordered that includes a Diet selection(s) AND a Behavioral Tray selection for Safe Tray - with utensils, or Safe Tray - NO utensils       Order Specific Question:   Suicide Risk     Answer:   HIGH     Order Specific Question:   Clinical rationale to override score:     Answer:   modification to the care environment     Order Specific Question:   Clinical rationale to override score:     Answer:   lack of access to a plan for self-harm     Order Specific Question:   Clinical rationale to override score:     Answer:   Collateral information supporting Suicidal/self-harm behaviors          DIagnoses:   Chantal (H)  Psychosis, unspecified psychosis type (H)  Severe manic bipolar I disorder with psychotic features (H)  Schizoaffective disorder, bipolar type          Plan:     Basil is still experiencing auditory hallucinations and paranoia. Depakote and Ripsperdal were increased on 6/12/24. He is voluntary. Valproic  acid level 6/12/2024 was 39.4. will again increase Risperdal dose and recheck Valproic Acid on Monday. Continue sexual precautions, suicide precautions, will block his room. Will continue to provide support and structure.       I was present with PA student who participated in the service and in the documentation of the note. I have verified the history and personally performed the physical exam and medical decision making. I agree with the assessment and plan of care as documented in the note.     Blaine Seo MD  [facility]    Total time spent was 51 minutes. Over 50% of times was spent counseling and coordination of care regarding coping skills, medication and discharge planning.

## 2024-06-14 NOTE — PLAN OF CARE
BEH IP Unit Acuity Rating Score (UARS)  Patient is given one point for every criteria they meet.    CRITERIA SCORING   On a 72 hour hold, court hold, committed, stay of commitment, or revocation. 0/1    Patient LOS on BEH unit exceeds 20 days. 0/1  LOS: 16   Patient under guardianship, 55+, otherwise medically complex, or under age 11. 0/1   Suicide ideation without relief of precipitating factors. 0/1   Current plan for suicide. 0/1   Current plan for homicide. 0/1   Imminent risk or actual attempt to seriously harm another without relief of factors precipitating the attempt. 0/1   Severe dysfunction in daily living (ex: complete neglect for self care, extreme disruption in vegetative function, extreme deterioration in social interactions). 1/1   Recent (last 7 days) or current physical aggression in the ED or on unit. 0/1   Restraints or seclusion episode in past 72 hours. 0/1   Recent (last 7 days) or current verbal aggression, agitation, yelling, etc., while in the ED or unit. 0/1   Active psychosis. 1/1   Need for constant or near constant redirection (from leaving, from others, etc).  1/1   Intrusive or disruptive behaviors. 1/1   Patient requires 3 or more hours of individualized nursing care per 8-hour shift (i.e. for ADLs, meds, therapeutic interventions). 0   TOTAL 4

## 2024-06-14 NOTE — PLAN OF CARE
"  Problem: Adult Behavioral Health Plan of Care  Goal: Adheres to Safety Considerations for Self and Others  Intervention: Develop and Maintain Individualized Safety Plan  Recent Flowsheet Documentation  Taken 6/14/2024 1100 by Sandy Parr RN  Safety Measures:   clinical history reviewed   environmental rounds completed   safety plan mutually developed   safety plan reviewed   safety rounds completed   1:1 observation maintained   Goal Outcome Evaluation:    Plan of Care Reviewed With: patient            Patient is placed on a SIO for sexual inappropriate and intrusive behaviors towards peers.        Presentation: The patient is observed in the lounge a majority of the shift.  The patient has a flat, blunted, and restricted affect.  He is dressed in scrubs.  The patient speech variables.  The patient can speak softly, loudly, laughs to himself, and sings.  The patients thoughts are delusional and disorganized.  The patients mood is labile.          Mental health symptoms: The patient denies SI, SIB, HI, depression and anxiety.  The patient is experiencing auditory hallucinations.  The patient is paranoid and has inappropriate sexual thoughts towards others.  The patient attempted to hold the hand of a peer and encouraged her to go to the \"quiet place.\"  Which is an alcove that is hard to observe.  The patient is seen following her.  While she is on the phone, the patient is standing 5 feet away from her, playing with a door.  The patient required staff to redirect him.  The patient is intrusive as he was demanding to another peer.  Demanding this peer, \"Sit down, relax.\"  \"Join our care team\" This peer became visibly upset and left group.    This writer notified the patient that he was placed on a SIO and described the behaviors that were inappropriate.  The patient agreed and accepted the SIO.       Medication compliance: The patient is compliant with his medications.        Medical Concerns: The " "patient denies any concerns or pain.  Blood pressure 122/75, pulse 83, temperature 97.5  F (36.4  C), temperature source Oral, resp. rate 15, height 1.854 m (6' 1\"), weight 99.3 kg (219 lb), SpO2 97%.      PRN's: Zyprexa 10 mg ODT at 1007      Precautions: sexual and suicidal    Continue with plan of care            "

## 2024-06-15 PROCEDURE — 250N000013 HC RX MED GY IP 250 OP 250 PS 637: Performed by: CLINICAL NURSE SPECIALIST

## 2024-06-15 PROCEDURE — 250N000013 HC RX MED GY IP 250 OP 250 PS 637: Performed by: PSYCHIATRY & NEUROLOGY

## 2024-06-15 PROCEDURE — 124N000002 HC R&B MH UMMC

## 2024-06-15 RX ADMIN — DIVALPROEX SODIUM 1500 MG: 500 TABLET, FILM COATED, EXTENDED RELEASE ORAL at 21:23

## 2024-06-15 RX ADMIN — RISPERIDONE 2 MG: 2 TABLET ORAL at 07:44

## 2024-06-15 RX ADMIN — RISPERIDONE 4 MG: 2 TABLET, FILM COATED ORAL at 21:23

## 2024-06-15 RX ADMIN — Medication 1 TABLET: at 07:44

## 2024-06-15 RX ADMIN — FLUTICASONE PROPIONATE 2 SPRAY: 50 SPRAY, METERED NASAL at 07:45

## 2024-06-15 RX ADMIN — IPRATROPIUM BROMIDE 2 SPRAY: 42 SPRAY, METERED NASAL at 21:23

## 2024-06-15 RX ADMIN — IPRATROPIUM BROMIDE 2 SPRAY: 42 SPRAY, METERED NASAL at 07:45

## 2024-06-15 RX ADMIN — GABAPENTIN 300 MG: 300 CAPSULE ORAL at 07:44

## 2024-06-15 RX ADMIN — DIVALPROEX SODIUM 500 MG: 500 TABLET, FILM COATED, EXTENDED RELEASE ORAL at 07:44

## 2024-06-15 RX ADMIN — GABAPENTIN 600 MG: 300 CAPSULE ORAL at 21:23

## 2024-06-15 ASSESSMENT — ACTIVITIES OF DAILY LIVING (ADL)
HYGIENE/GROOMING: INDEPENDENT
ADLS_ACUITY_SCORE: 42
DRESS: INDEPENDENT
HYGIENE/GROOMING: INDEPENDENT
ORAL_HYGIENE: INDEPENDENT
ORAL_HYGIENE: INDEPENDENT
ADLS_ACUITY_SCORE: 42
DRESS: INDEPENDENT
ADLS_ACUITY_SCORE: 42
LAUNDRY: WITH SUPERVISION
ADLS_ACUITY_SCORE: 42
LAUNDRY: WITH SUPERVISION

## 2024-06-15 NOTE — CARE PLAN
Rehab Group    Start time: 1115  End time: 1200  Patient time total: 35 minutes    attended partial group    #6 attended   Group Type: occupational therapy   Group Topic Covered: balanced lifestyle, cognitive activities, coping skills, healthy leisure time, problem solving, self-esteem, and social skills       Group Session Detail:   Education and group discussion provided on the metaphor of the activity and how it's required skills are also applicable to the skills needed for recovery; with hands on "[employer] game for concentration, visual-spatial reasoning, perception, flexible visual-spatial skills, frustration tolerance, problem solving, trial and error, follow through, the benefits of asking for support from others, coping, mood stabilization, reality-based activity, perseverance, healthy leisure, and socialization.       Patient Response/Contribution:  actively engaged       Patient Detail:  Pt joined group late and was initially a bit shy and sat off to the edge of the group just observing.  As group progressed and a couple of his peers left the group space he eventually joined the main table and the activity.  Pt needed additional time to complete his visual-spatial puzzle each round due to slower processing of this type of information.  Pt also relayed that he was having a lot of very intrusive and unwanted thoughts of feeling suicidal.  Pt did seem to benefit from comments of therapist and peers that were supportive and reassuring.  Pt was able to recognize that once his depression is under better control those thoughts should lessen in intensity.  Pt attempted to assist a female peer who also needed extra processing time, though she was unhappy to have him help and told him not to interfere.  Pt was able to accept the redirection and did not intercede again.  No charge.                Patient Active Problem List   Diagnosis    Marijuana dependence (H)    Alcohol abuse    Bipolar disorder, current episode  manic severe with psychotic features (H)    Chantal (H)    Schizoaffective disorder, bipolar type (H)    Psychosis, unspecified psychosis type (H)

## 2024-06-15 NOTE — PLAN OF CARE
Patient in bed at the start of the shift and  appeared to be comfortably asleep and breathing with ease throughout the shift. Patient slept for 7 hours of the over night shift with no s/s of acute distress. SIO in place according to order. Patient experienced no safety events or escalations during the shift, no concerns reported or observed. Safety checks completed at least every 15 minutes. Patient required no PRN medications, see MAR. Patient has a no roommate order because they remain on sexual precautions. Pt also remains on suicide precautions. Nursing will continue to monitor.

## 2024-06-15 NOTE — PLAN OF CARE
"  Problem: Adult Behavioral Health Plan of Care  Goal: Plan of Care Review  Recent Flowsheet Documentation  Taken 6/15/2024 1000 by Sandy Parr RN  Patient Agreement with Plan of Care: agrees   Goal Outcome Evaluation:    Plan of Care Reviewed With: patient                 Presentation: The patient is observed in the lounge watching tv with peers.  The patient is dressed in scrubs.  His SIO staff is within 10 feet.  The patient is not socializing with peers this shift.  The patient is accepting redirection this shift.  The patient has a flat and blunted affect.  Speech is slow to respond.  Thoughts are disorganized and the patient is responding to internal stimuli.  The patient looks at the writer during the assessment.  Smiles mostly to questions given.  The patient is often observed standing and staring off .  The patients wife visited for 30 minutes.       Mental health symptoms: The patient denies SI, SIB, Hi, depression and anxiety.  The patient continues to have auditory hallucinations and responds to internal stimuli.      Medication compliance: The patient is compliant with most medication.  He prefers to use his nasal spray twice a day.  In the am and bedtime.        Medical Concerns: The patient denies pain and medical concerns.  Blood pressure 120/75, pulse 60, temperature 97.3  F (36.3  C), temperature source Oral, resp. rate 16, height 1.854 m (6' 1\"), weight 99.3 kg (219 lb), SpO2 100%.      PRN's: none      Precautions: sexual and suicidal    Continue with plan of care        "

## 2024-06-15 NOTE — PLAN OF CARE
"Alertness: alert and oriented, but disorganized  Affect: flat/tense  Mood: His mood was very labile, he would go through all the emotions of feeling sad,scared, happy, and angry. A few of the female patients would agitate him, but not on purpose.  Appearance: WNL  Thought Process: Patient had a panic attack, staff encouraged him to use positive coping skills such as distraction, exercise, fidgets, games, PRN's, ect. He declined all interventions and stated \" I just want to leave, run out of here.\" SIO staff asked him what comforts him when he is upset, he stated, \" Big, strong, Sikh men.\" He remains on the 1:1 for sexual precautions and intrusiveness. He appears responding to internal stimuli and will laugh inappropriately at times.    Anxiety, depression, SI,SIB,HI: He endorsed extreme anxiety and depression during there time of his panic attack, but went to a 0/10 after receiving PRN Ativan 1mg. He denied SI,SIB,HI  Social: he was social with his SIO staff and his brother than came to visit, otherwise, keeps to himself. He would often ask his SIO staff how he can discharge from the hospital.   Skin: he denied any issues  Bowel/bladder: WNL  Dinner: 100%  Vital signs/pain: vitally stable and denied pain  Medications: medication compliant  PRN's this shift: Ativan 1mg      "

## 2024-06-16 PROCEDURE — 250N000013 HC RX MED GY IP 250 OP 250 PS 637: Performed by: PSYCHIATRY & NEUROLOGY

## 2024-06-16 PROCEDURE — 124N000002 HC R&B MH UMMC

## 2024-06-16 PROCEDURE — 250N000013 HC RX MED GY IP 250 OP 250 PS 637: Performed by: CLINICAL NURSE SPECIALIST

## 2024-06-16 RX ADMIN — RISPERIDONE 2 MG: 2 TABLET ORAL at 08:25

## 2024-06-16 RX ADMIN — GABAPENTIN 600 MG: 300 CAPSULE ORAL at 20:22

## 2024-06-16 RX ADMIN — DIVALPROEX SODIUM 1500 MG: 500 TABLET, FILM COATED, EXTENDED RELEASE ORAL at 20:21

## 2024-06-16 RX ADMIN — DIVALPROEX SODIUM 500 MG: 500 TABLET, FILM COATED, EXTENDED RELEASE ORAL at 08:25

## 2024-06-16 RX ADMIN — Medication 1 TABLET: at 08:25

## 2024-06-16 RX ADMIN — IPRATROPIUM BROMIDE 2 SPRAY: 42 SPRAY, METERED NASAL at 08:34

## 2024-06-16 RX ADMIN — OLANZAPINE 10 MG: 10 TABLET, ORALLY DISINTEGRATING ORAL at 09:47

## 2024-06-16 RX ADMIN — GABAPENTIN 300 MG: 300 CAPSULE ORAL at 08:25

## 2024-06-16 RX ADMIN — FLUTICASONE PROPIONATE 2 SPRAY: 50 SPRAY, METERED NASAL at 08:34

## 2024-06-16 RX ADMIN — LORAZEPAM 1 MG: 1 TABLET ORAL at 09:12

## 2024-06-16 RX ADMIN — OLANZAPINE 10 MG: 10 TABLET, ORALLY DISINTEGRATING ORAL at 17:49

## 2024-06-16 RX ADMIN — RISPERIDONE 4 MG: 2 TABLET, FILM COATED ORAL at 21:50

## 2024-06-16 ASSESSMENT — ACTIVITIES OF DAILY LIVING (ADL)
ADLS_ACUITY_SCORE: 42
DRESS: INDEPENDENT
ADLS_ACUITY_SCORE: 42
HYGIENE/GROOMING: INDEPENDENT
ADLS_ACUITY_SCORE: 42
LAUNDRY: WITH SUPERVISION
ADLS_ACUITY_SCORE: 42
ORAL_HYGIENE: INDEPENDENT
ADLS_ACUITY_SCORE: 42

## 2024-06-16 NOTE — PLAN OF CARE
Patient was mainly isolated in the room and out for dinner. Patient was reading a book in bed and listening to music on the headphones. He continues on SIO 10 ft for sexual precautions and intrusiveness. Patient denied pain, anxiety, depression, SI/SIB, and auditory/visual hallucinations. Patient endorsed having delusional thoughts; he also inquired about his current medications and dosages. The writer reviewed the patient's scheduled medications with him and the reason for taking the medications, including the PRN medications. Patient was encouraged to verbalize his feelings and request PRN medications when needed. Patient reports he wants to feel better and return home to his family. Patient ate 100% of his Supper and snacks with adequate fluid intake. Vital signs were stable. Patient showered before bedtime. Patient watched television after eating his snacks before bedtime. He was compliant with medications.    Problem: Adult Inpatient Plan of Care  Goal: Optimal Comfort and Wellbeing  Intervention: Provide Person-Centered Care  Recent Flowsheet Documentation  Taken 6/15/2024 1735 by Karie Franco RN  Trust Relationship/Rapport:   care explained   questions encouraged     Problem: Psychotic Signs/Symptoms  Goal: Improved Mood Symptoms (Psychotic Signs/Symptoms)  Outcome: Progressing  Intervention: Optimize Emotion and Mood  Recent Flowsheet Documentation  Taken 6/15/2024 1735 by Karie Franco, RN  Supportive Measures:   active listening utilized   problem-solving facilitated   verbalization of feelings encouraged  Diversional Activity:   music   reading   Goal Outcome Evaluation:    Plan of Care Reviewed With: patient

## 2024-06-16 NOTE — PLAN OF CARE
Problem: Psychotic Signs/Symptoms  Goal: Improved Behavioral Control (Psychotic Signs/Symptoms)  Outcome: Not Progressing   Goal Outcome Evaluation:    Plan of Care Reviewed With: patient          The patient had a bloody nose this morning.  The patient stated, he was looking for the love of his life, Mary as the patient attempted to walk to his room.  His SIO staff was able to redirect him.  The patient started to cry and said, I am going to die, Basil Hernandez is going to die.  The patient intentionally coughed several times.  Patient /82, P: 105, O2: 98%     The patient appears uncomfortable.  He is restless. Stares intently. Paces the hallways.  Touching doors.  Hand out to touch people while he is walking by. Patient verbalizes he is hearing voices.  The patient stated, he does not want to die.  The patient denies any medical symptoms.  His vital are stable.  This writer asked the patient if the voices where telling him to hurt others?  The patient hesitated and his facial expression changed.  The patient did not answer.  The patient is asked if the voices are telling him to hurt himself.  The patient said yes, then quickly said that he is not going to talk about it.  The patient is given Zyprexa 10 mg ODT. The Zyprexa was effective.     The patient stopped outside a peers room.  When the peer came out, the patient asked for their name.  When the peers said no.  The patient responded, well I love you anyway.     The patient continues to require and is on a SIO for safety of the patient and peers.  The patient is on sexual precautions.  The patient has not displayed any aggressive behaviors.  Appetite is good.  Denies pain and is med compliant.     Continue with plan of care.

## 2024-06-16 NOTE — PLAN OF CARE
Patient was sitting in his room at the start of the shift. Patient appeared in no acute distress. Patient listening to head phones while sitting on his bed and journaling. Patient slept for 4.75 hours of the over night shift with no s/s of acute distress. SIO in place according to order. Patient experienced no safety events or escalations during the shift, no concerns reported or observed. Safety checks completed at least every 15 minutes. Patient required no PRN medications, see MAR. Patient has a no roommate order because they remain intrusive and on sexual precautions. Nursing will continue to monitor.

## 2024-06-17 LAB — VALPROATE SERPL-MCNC: 93.4 UG/ML

## 2024-06-17 PROCEDURE — 36415 COLL VENOUS BLD VENIPUNCTURE: CPT | Performed by: PSYCHIATRY & NEUROLOGY

## 2024-06-17 PROCEDURE — 250N000013 HC RX MED GY IP 250 OP 250 PS 637: Performed by: PSYCHIATRY & NEUROLOGY

## 2024-06-17 PROCEDURE — 250N000013 HC RX MED GY IP 250 OP 250 PS 637: Performed by: CLINICAL NURSE SPECIALIST

## 2024-06-17 PROCEDURE — 80164 ASSAY DIPROPYLACETIC ACD TOT: CPT | Performed by: PSYCHIATRY & NEUROLOGY

## 2024-06-17 PROCEDURE — 99232 SBSQ HOSP IP/OBS MODERATE 35: CPT | Performed by: PSYCHIATRY & NEUROLOGY

## 2024-06-17 PROCEDURE — 90853 GROUP PSYCHOTHERAPY: CPT

## 2024-06-17 PROCEDURE — 124N000002 HC R&B MH UMMC

## 2024-06-17 RX ADMIN — Medication 1 TABLET: at 08:57

## 2024-06-17 RX ADMIN — DIVALPROEX SODIUM 500 MG: 500 TABLET, FILM COATED, EXTENDED RELEASE ORAL at 08:57

## 2024-06-17 RX ADMIN — GABAPENTIN 300 MG: 300 CAPSULE ORAL at 08:56

## 2024-06-17 RX ADMIN — DIVALPROEX SODIUM 1500 MG: 500 TABLET, FILM COATED, EXTENDED RELEASE ORAL at 20:03

## 2024-06-17 RX ADMIN — RISPERIDONE 2 MG: 2 TABLET ORAL at 08:57

## 2024-06-17 RX ADMIN — IPRATROPIUM BROMIDE 2 SPRAY: 42 SPRAY, METERED NASAL at 13:21

## 2024-06-17 RX ADMIN — IPRATROPIUM BROMIDE 2 SPRAY: 42 SPRAY, METERED NASAL at 08:57

## 2024-06-17 RX ADMIN — RISPERIDONE 4 MG: 2 TABLET, FILM COATED ORAL at 20:04

## 2024-06-17 RX ADMIN — FLUTICASONE PROPIONATE 2 SPRAY: 50 SPRAY, METERED NASAL at 08:57

## 2024-06-17 RX ADMIN — IPRATROPIUM BROMIDE 2 SPRAY: 42 SPRAY, METERED NASAL at 20:03

## 2024-06-17 RX ADMIN — IPRATROPIUM BROMIDE 2 SPRAY: 42 SPRAY, METERED NASAL at 16:16

## 2024-06-17 RX ADMIN — GABAPENTIN 600 MG: 300 CAPSULE ORAL at 20:03

## 2024-06-17 ASSESSMENT — ACTIVITIES OF DAILY LIVING (ADL)
ADLS_ACUITY_SCORE: 42
HYGIENE/GROOMING: INDEPENDENT
ADLS_ACUITY_SCORE: 42
DRESS: INDEPENDENT
ADLS_ACUITY_SCORE: 42
LAUNDRY: WITH SUPERVISION
ADLS_ACUITY_SCORE: 42
ORAL_HYGIENE: INDEPENDENT
ADLS_ACUITY_SCORE: 42

## 2024-06-17 NOTE — PLAN OF CARE
Team Note Due:  Thursday     Assessment/Intervention/Current Symtoms and Care Coordination:  Chart review and met with team, discussed pt progress, symptomology, and response to treatment. Discussed the discharge plan and any potential impediments to discharge.    Discharge Plan or Goal:  Current plan is to stabilize symptoms and develop safe disposition plan. Following hospitalization, plan is for pt to return home with family with outpatient supports.      Barriers to Discharge:  Currently presents with symptoms of psychosis. Medication management ongoing.      Referral Status:  Referral needs pending as patient stabilizes     Legal Status:  Voluntary     Contacts:  Medication Management:  Name/Organization: Donta Bob MD  [facility]  Address: [address] [place] [address], [place] [address]  Phone: [phone]    Therapist:  Name/Organization: [facility]   Phone: [phone]     Wife: Flakita Andrews, [phone] (Consent)     Upcoming Meetings and Dates/Important Information and next steps:  - Check-in with pt's wife weekly.

## 2024-06-17 NOTE — PROGRESS NOTES
"[facility] [place]   Psychiatric Progress Note        Interim History:   The patient's care was discussed with the treatment team during the daily team meeting and/or staff's chart notes were reviewed.  Staff report patient continues to respond to internal stimuli. He had a panic attack on Friday evening. He also reportedly told another female peer that she was the love of his life and was trying to touch other peers and needed to be redirected by his SIO staff. He slept for 6 hours. See nursing note below:     6/16 1822: \"Pt split his time between walking the halls and resting in his room. Just before 1800 pt requested medication for \"paranoia.\" Upon further discussion pt said he was paranoid he would be here [the hospital] forever. Pt could not elaborate on his feelings further. Pt's facial expression and tone of voice changed leading this writer believe he may start to cry. Pt did report feeling tearful. After allowing this writer to obtain vital signs pt said he believed the 112 (SBP displayed on the monitor) was \"something for me\" and the 74 (DBP) was \"for the Muslims\" Pt denied hallucinations, suicidal thoughts, and/or homicidal thoughts. Pt was reminded that our goal was to keep him safe and work towards a discharge plan as soon as possible. Pt was encouraged to continue to seek out staff whenever he needed someone to talk to or thought he may need medication. Pt was given 10 mg olanzapine. When assessed later pt reported the olanzapine did not reduce his paranoia at all. Pt also reported 10/10 severity depression and anxiety but said he could think of \"nothing at all\" that staff could do to assist in reducing his symptoms. Pt remained calm throughout the shift.\"     6/16 0904: \"The patient had a bloody nose this morning.  The patient stated, he was looking for the love of his life, Mary as the patient attempted to walk to his room.  His SIO staff was able to redirect him.  " "The patient started to cry and said, I am going to die, Basil Hernandez is going to die.  The patient intentionally coughed several times.  Patient /82, P: 105, O2: 98%  The patient appears uncomfortable.  He is restless. Stares intently. Paces the hallways.  Touching doors.  Hand out to touch people while he is walking by. Patient verbalizes he is hearing voices.  The patient stated, he does not want to die.  The patient denies any medical symptoms.  His vital are stable.  This writer asked the patient if the voices where telling him to hurt others?  The patient hesitated and his facial expression changed.  The patient did not answer.  The patient is asked if the voices are telling him to hurt himself.  The patient said yes, then quickly said that he is not going to talk about it.  The patient is given Zyprexa 10 mg ODT. The Zyprexa was effective.      The patient stopped outside a peers room.  When the peer came out, the patient asked for their name.  When the peers said no.  The patient responded, well I love you anyway\"         Met with patient: We met the patient in the conference room. Basil tells us \"I'm doing much better.\" He is continuing to have auditory hallucinations and describes them as \"hearing other patient's voices.\" He explained how he was playing cards with his 1:1 psych associate and said he was having intrusive thoughts. Basil says he has a [employer] war going on in his head and that he can't be friends with the psych associate because \"the way we look at each other makes my paranoia overwhelmed.\"    We asked Basil if he remembers telling another peer that he loved her and he said yes.He also acknowledge that was not appropriate to say but said the voices told him to. He said he cares a lot about the patients here even though it isn't his responsibility. He denied hearing voices during our conversation and said he could hear doors slamming and mumbling. He says he wants to voluntarily " "leave but knows he shouldn't. He said he was having delusions about being treated at this hospital in the outpatient setting and thinking one of the workers on the unit was going to be his psychiatrist. Basil says his delusions are \"quacky\" and he is concerned he won't be able to leave the hospital because he will always have delusions. We reassured Basil that he will eventually be discharged once he is more stable. We also had a discussion about how it is not his responsibility to approach or question other patients. He voiced understanding. All questions and concerns were addressed. Depakote levels were 93.4 as of today.          Medications:     Current Facility-Administered Medications   Medication Dose Route Frequency Provider Last Rate Last Admin    divalproex sodium extended-release (DEPAKOTE ER) 24 hr tablet 1,500 mg  1,500 mg Oral At Bedtime Blaine Seo MD   1,500 mg at 06/16/24 2021    divalproex sodium extended-release (DEPAKOTE ER) 24 hr tablet 500 mg  500 mg Oral Daily Haroon Parker APRN CNP   500 mg at 06/17/24 0857    fluticasone (FLONASE) 50 MCG/ACT spray 2 spray  2 spray Both Nostrils Daily Haroon Parker APRN CNP   2 spray at 06/17/24 0857    gabapentin (NEURONTIN) capsule 300 mg  300 mg Oral Daily Haroon Parker APRN CNP   300 mg at 06/17/24 0856    gabapentin (NEURONTIN) capsule 600 mg  600 mg Oral At Bedtime Haroon Parker APRN CNP   600 mg at 06/16/24 2022    ipratropium (ATROVENT) 0.06 % spray 2 spray  2 spray Both Nostrils 4x Daily Haroon Parker APRN CNP   2 spray at 06/17/24 1321    multivitamin w/minerals (THERA-VIT-M) tablet 1 tablet  1 tablet Oral Daily Haroon Parker APRN CNP   1 tablet at 06/17/24 0857    risperiDONE (risperDAL) tablet 2 mg  2 mg Oral QAM Blaine Seo MD   2 mg at 06/17/24 0857    risperiDONE (risperDAL) tablet 4 mg  4 mg Oral At Bedtime Blaine Seo MD   4 mg at 06/16/24 2910          Allergies:     Allergies " "  Allergen Reactions    Seasonal Allergies Other (See Comments)     Congestion. Managed with meds.          Labs:     Recent Results (from the past 24 hour(s))   Valproic acid    Collection Time: 06/17/24  8:31 AM   Result Value Ref Range    Valproic acid 93.4   ug/mL          Psychiatric Examination:     /74 (BP Location: Right arm, Patient Position: Sitting, Cuff Size: Adult Regular)   Pulse 82   Temp 97.9  F (36.6  C) (Oral)   Resp 18   Ht 1.854 m (6' 1\")   Wt 99.3 kg (219 lb)   SpO2 97%   BMI 28.89 kg/m    Weight is 219 lbs 0 oz  Body mass index is 28.89 kg/m .    Orthostatic Vitals         Most Recent      Sitting Orthostatic /79 06/17 0810    Sitting Orthostatic Pulse (bpm) 78 06/17 0810    Standing Orthostatic /79 06/17 0810    Standing Orthostatic Pulse (bpm) 91 06/17 0810            Appearance: awake, alert, dressed in hospital scrubs, and appeared as age stated  Attitude:  cooperative  Eye Contact:  fair  Mood:  slightly anxious, calm   Affect:  guarded and restricted range  Speech:  clear, coherent  Psychomotor Behavior:  no evidence of tardive dyskinesia, dystonia, or tics  Throught Process:  disorganized and illogical  Associations:  loosening of associations present  Thought Content:  no evidence of suicidal ideation or homicidal ideation, auditory hallucinations present, no visual hallucinations present, and patient appears to be responding to internal stimuli, delusions present   Insight:  partial  Judgement:  poor  Oriented to:  time, person, and place  Attention Span and Concentration:  limited  Recent and Remote Memory:  fair    Clinical Global Impressions  First: 6/4 6/17/2024       Most recent:            Precautions:     Behavioral Orders   Procedures    Code 1 - Restrict to Unit    Routine Programming     As clinically indicated    Sexual precautions    Status 15     Every 15 minutes.    Status Individual Observation     Patient SIO status reviewed with team/RN.  " Please also refer to RN/team documentation for add'l detail.    -SIO staff to monitor following which have contributed to patient being on SIO:  Severe intrusiveness, agitation, paranoia  -Possible interventions SIO staff could use to support patient's treatment progress:  Offering prn meds, redirections, suggestion to use coping skills  -When following observed, team will review discontinuation of SIO:  Decreased psychotic symptoms, more predictable behavior, can contract for safety     Order Specific Question:   CONTINUOUS 24 hours / day     Answer:   Other     Order Specific Question:   Specify distance     Answer:   10     Order Specific Question:   Indications for SIO     Answer:   Severe intrusiveness    Suicide precautions: Suicide Risk: HIGH; Clinical rationale to override score: modification to the care environment, lack of access to a plan for self-harm, Collateral information supporting Suicidal/self-harm behaviors     Patients on Suicide Precautions should have a Combination Diet ordered that includes a Diet selection(s) AND a Behavioral Tray selection for Safe Tray - with utensils, or Safe Tray - NO utensils       Order Specific Question:   Suicide Risk     Answer:   HIGH     Order Specific Question:   Clinical rationale to override score:     Answer:   modification to the care environment     Order Specific Question:   Clinical rationale to override score:     Answer:   lack of access to a plan for self-harm     Order Specific Question:   Clinical rationale to override score:     Answer:   Collateral information supporting Suicidal/self-harm behaviors          DIagnoses:     Psychosis, unspecified psychosis type (H)  Severe manic bipolar I disorder with psychotic features (H)  Schizoaffective disorder, bipolar type          Plan:   Basil is still experiencing auditory hallucinations and paranoia. Depakote and Ripsperdal were increased on 6/12/24. Risperdal was again increased 6/14/24. He is  voluntary. Valproic acid level today, 6/17/2024 was 93.4. No medication changes today 6/17/2024. Continue sexual precautions, suicide precautions, will block his room. Will continue to provide support and structure.       I was present with PA student who participated in the service and in the documentation of the note. I have verified the history and personally performed the physical exam and medical decision making. I agree with the assessment and plan of care as documented in the note.     Blaine Seo MD  [facility]    Total time spent was 37 minutes. Over 50% of times was spent counseling and coordination of care regarding coping skills, medication and discharge planning.

## 2024-06-17 NOTE — PLAN OF CARE
Problem: Psychotic Signs/Symptoms  Goal: Optimal Cognitive Function (Psychotic Signs/Symptoms)  Outcome: Progressing    Pt up all day, pacing the halls the majority of the day. When writer first checked in with him this morning he barely said but a few words. When writer walked with him later, many times his response in conversation was delayed at times. Short answers and fairly guarded. When writer covered for SIO, pt went in his bathroom and spent time singing [employer], making up words all the way. He also was talking to himself while in the bathroom. SIO had to set limits on him early in the shift for being touchy with the SIO. Pt respected this limit the rest of the shift. Minimal interactions with other pt's. Denied any negative physical symptoms

## 2024-06-17 NOTE — PLAN OF CARE
BEH IP Unit Acuity Rating Score (UARS)  Patient is given one point for every criteria they meet.    CRITERIA SCORING   On a 72 hour hold, court hold, committed, stay of commitment, or revocation. 0/1    Patient LOS on BEH unit exceeds 20 days. 0/1  LOS: 19   Patient under guardianship, 55+, otherwise medically complex, or under age 11. 0/1   Suicide ideation without relief of precipitating factors. 0/1   Current plan for suicide. 0/1   Current plan for homicide. 0/1   Imminent risk or actual attempt to seriously harm another without relief of factors precipitating the attempt. 0/1   Severe dysfunction in daily living (ex: complete neglect for self care, extreme disruption in vegetative function, extreme deterioration in social interactions). 1/1   Recent (last 7 days) or current physical aggression in the ED or on unit. 0/1   Restraints or seclusion episode in past 72 hours. 0/1   Recent (last 7 days) or current verbal aggression, agitation, yelling, etc., while in the ED or unit. 0/1   Active psychosis. 1/1   Need for constant or near constant redirection (from leaving, from others, etc).  1/1   Intrusive or disruptive behaviors. 1/1   Patient requires 3 or more hours of individualized nursing care per 8-hour shift (i.e. for ADLs, meds, therapeutic interventions). 0   TOTAL 4

## 2024-06-17 NOTE — PLAN OF CARE
Patient in bed at the start of the shift and  appeared to be comfortably asleep and breathing with ease throughout the shift. Patient slept for 6 hours of the over night shift with no s/s of acute distress. SIO in place according to order.   Patient experienced no safety events or escalations during the shift, no concerns reported or observed. Safety checks completed at least every 15 minutes. Patient required no PRN medications, see MAR. Patient has a no roommate order because they remain on sexual precautions. Nursing will continue to monitor.

## 2024-06-17 NOTE — PROGRESS NOTES
Rehab Group    Start time: 1015   End time: 1100  Patient time total: 20 minutes    attended partial group    #4 attended   Group Type: OT Clinic   Group Topic Covered: activity therapy, coping skills, and problem solving       Group Session Detail:  OT Clinic     Patient Response/Contribution:  cooperative with task, worked intermittently, and left group on several occasions       Patient Detail:    Pt actively participated in occupational therapy clinic to facilitate coping skill exploration, creative expression within personally meaningful activities, and clinical observation of social, cognitive, and kinesthetic performance skills. Pt response: Needed some assistance to initiate, gather materials, sequence, and adjust to workspace demands as needed. Demonstrated limited focus, planning, and problem solving for selected coloring task. Able to ask for assistance as needed, and limited socialization with peers and staff.  Pt left group 20 minutes into it.  Pt will continue to be encouraged to attend groups for further asssesssment and to address goals identified on plan of care.         No Charge    Patient Active Problem List   Diagnosis    Marijuana dependence (H)    Alcohol abuse    Bipolar disorder, current episode manic severe with psychotic features (H)    Chantal (H)    Schizoaffective disorder, bipolar type (H)    Psychosis, unspecified psychosis type (H)

## 2024-06-18 PROCEDURE — 250N000013 HC RX MED GY IP 250 OP 250 PS 637: Performed by: PSYCHIATRY & NEUROLOGY

## 2024-06-18 PROCEDURE — H2032 ACTIVITY THERAPY, PER 15 MIN: HCPCS

## 2024-06-18 PROCEDURE — 99233 SBSQ HOSP IP/OBS HIGH 50: CPT | Performed by: PSYCHIATRY & NEUROLOGY

## 2024-06-18 PROCEDURE — 250N000013 HC RX MED GY IP 250 OP 250 PS 637: Performed by: CLINICAL NURSE SPECIALIST

## 2024-06-18 PROCEDURE — 124N000002 HC R&B MH UMMC

## 2024-06-18 RX ORDER — HALOPERIDOL 5 MG/1
5 TABLET ORAL 2 TIMES DAILY
Status: DISCONTINUED | OUTPATIENT
Start: 2024-06-18 | End: 2024-06-20

## 2024-06-18 RX ADMIN — FLUTICASONE PROPIONATE 2 SPRAY: 50 SPRAY, METERED NASAL at 07:59

## 2024-06-18 RX ADMIN — GABAPENTIN 300 MG: 300 CAPSULE ORAL at 07:58

## 2024-06-18 RX ADMIN — HALOPERIDOL 5 MG: 5 TABLET ORAL at 20:37

## 2024-06-18 RX ADMIN — IPRATROPIUM BROMIDE 2 SPRAY: 42 SPRAY, METERED NASAL at 13:15

## 2024-06-18 RX ADMIN — IPRATROPIUM BROMIDE 2 SPRAY: 42 SPRAY, METERED NASAL at 20:43

## 2024-06-18 RX ADMIN — OLANZAPINE 10 MG: 10 TABLET, ORALLY DISINTEGRATING ORAL at 21:22

## 2024-06-18 RX ADMIN — IPRATROPIUM BROMIDE 2 SPRAY: 42 SPRAY, METERED NASAL at 16:46

## 2024-06-18 RX ADMIN — RISPERIDONE 2 MG: 2 TABLET ORAL at 07:58

## 2024-06-18 RX ADMIN — IPRATROPIUM BROMIDE 2 SPRAY: 42 SPRAY, METERED NASAL at 07:59

## 2024-06-18 RX ADMIN — HYDROXYZINE HYDROCHLORIDE 50 MG: 50 TABLET, FILM COATED ORAL at 03:47

## 2024-06-18 RX ADMIN — DIVALPROEX SODIUM 1500 MG: 500 TABLET, FILM COATED, EXTENDED RELEASE ORAL at 20:37

## 2024-06-18 RX ADMIN — GABAPENTIN 600 MG: 300 CAPSULE ORAL at 20:37

## 2024-06-18 RX ADMIN — DIVALPROEX SODIUM 500 MG: 500 TABLET, FILM COATED, EXTENDED RELEASE ORAL at 07:58

## 2024-06-18 RX ADMIN — OLANZAPINE 10 MG: 10 TABLET, ORALLY DISINTEGRATING ORAL at 13:14

## 2024-06-18 RX ADMIN — Medication 1 TABLET: at 07:56

## 2024-06-18 RX ADMIN — RISPERIDONE 4 MG: 2 TABLET, FILM COATED ORAL at 20:38

## 2024-06-18 ASSESSMENT — ACTIVITIES OF DAILY LIVING (ADL)
ADLS_ACUITY_SCORE: 42
DRESS: SCRUBS (BEHAVIORAL HEALTH)
ADLS_ACUITY_SCORE: 42
DRESS: SCRUBS (BEHAVIORAL HEALTH)
ORAL_HYGIENE: INDEPENDENT
ADLS_ACUITY_SCORE: 42
ADLS_ACUITY_SCORE: 42
HYGIENE/GROOMING: INDEPENDENT
ADLS_ACUITY_SCORE: 42
HYGIENE/GROOMING: INDEPENDENT
ADLS_ACUITY_SCORE: 42
ADLS_ACUITY_SCORE: 42
ORAL_HYGIENE: INDEPENDENT
ADLS_ACUITY_SCORE: 42

## 2024-06-18 NOTE — PLAN OF CARE
"  Problem: Adult Inpatient Plan of Care  Goal: Optimal Comfort and Wellbeing  Intervention: Provide Person-Centered Care  Recent Flowsheet Documentation  Taken 6/18/2024 1002 by Renetta Firas RN  Trust Relationship/Rapport:   emotional support provided   questions encouraged   reassurance provided   thoughts/feelings acknowledged   Goal Outcome Evaluation:    Plan of Care Reviewed With: patient          Pt continues on a SIO for severe intrusiveness and sexual behavior.     Pt presents with a labile affect. Earlier in the shift pt was tense and illogical, responding to questions by saying UM! UM! Hesitant to make eye contact, appeared to be responding. During breakfast he abruptly started shouting and accusing staff of giving him a knife, quotes \"that going to prevent me from getting out of here\". Restless pacing around the lounge. Shortly after AM medications, pt. Sat quietly in the lounge watching TV, pt reports not feeling tired after only 3.5 hrs of sleep. In the afternoon pt. Appeared more relaxed slowly pacing the hallway. Engaged appropriately with staff and peers, smiling.    Pt. Speech is currently clear and coherent, thought process is intermittently disorganized and organized, hygiene well groomed.    Pt. Denies anxiety and depression, denies pain, late afternoon pt. Reports auditory hallucinations requesting Zyprexa, helpful Paced the hallway listening to music. Medication compliance and contracted for safety.    Pt was accepting of his room change to #306.    Blood pressure 127/80, pulse 90, temperature 97.5  F (36.4  C), temperature source Oral, resp. rate 18, height 1.854 m (6' 1\"), weight 100.4 kg (221 lb 6.4 oz), SpO2 97%.                      "

## 2024-06-18 NOTE — PLAN OF CARE
"Team Note Due:  Thursday     Assessment/Intervention/Current Symtoms and Care Coordination:  Chart review and met with team, discussed pt progress, symptomology, and response to treatment. Discussed the discharge plan and any potential impediments to discharge.    Tasks Completed:  - [facility] called pt's wife, Flakita, to provide update regarding pt's SIO status and current presentation. Flakita expressed gratitude for the update and shared that pt showed \"vast improvement\" from previous weekend when she visited this weekend. However, she reports that he is still not at baseline and she does not feel he is ready for discharge at this time. Flakita reported that pt has been reporting that he would like to leave the hospital. However, he has not become agitated or insistent on leaving as he had previously. Flakita asked if pt's outpatient psychiatrist has been contacted yet. [facility] will discuss with provider. [facility] informed Flakita regarding CTC's last day and shared that Flakita can call the unit and ask for coverage CTC for updates after Friday.   - [facility] passed along to provider that Flakita is requesting contact with pt's outpatient psychiatrist.    Discharge Plan or Goal:  Current plan is to stabilize symptoms and develop safe disposition plan. Following hospitalization, plan is for pt to return home with family with outpatient supports.      Barriers to Discharge:  Currently presents with symptoms of psychosis. Medication management ongoing.      Referral Status:  Referral needs pending as patient stabilizes     Legal Status:  Voluntary     Contacts:  Medication Management:  Name/Organization: Donta Bob MD  [facility] - [facility] on file  Address: [address] [place] [address], [place] [address], [place] [address]  Phone: [phone]    Therapist:  Name/Organization: [facility]   Phone: [phone]     Family Contacts:  Name/Relationship: Flakita Andrews  Wife - [facility] on file  Phone: [phone]      Upcoming " Meetings and Dates/Important Information and next steps:  - Check-in with pt's wife weekly.

## 2024-06-18 NOTE — PLAN OF CARE
Goal Outcome Evaluation:      NOC Shift Report    Pt in bed at beginning of shift, breathing quiet and unlabored. Pt slept     intermittently through shift. Pt slept 3.5 hours.     No pt complaints or concerns at this time.     hydrOXYzine  50 mg  PRNs given at 0347 hr with pending effect. Will     continue to monitor.

## 2024-06-18 NOTE — PLAN OF CARE
"  Problem: Adult Behavioral Health Plan of Care  Goal: Adheres to Safety Considerations for Self and Others  Outcome: Progressing  Pt on on SIO for intrusive behavior.   Pt was active on the unit pacing and walking the unit hallway. Patient denies pain, anxiety and depression. Pt endorse command hallucination and delusional thought. Pt stated he hears voices asking him to save every one here on the unit from hell. Pt endorse grandiose thinking stating he is a \"saviour\" and want to be like Edilberto by saving people. Pt denies any other MI symptoms. Pt states he is doing better since he got admitted to this hospital because the  command hallucination is diminishing; not occurring often as it use to be.  Pt is compliant with medication and cooperated during assessment. He had a good appetite eating 100 percent of his meal. Pt denies any other concern. Will continue to monitor.      "

## 2024-06-18 NOTE — PROGRESS NOTES
"[facility], [place]   Psychiatric Progress Note        Interim History:     The patient's care was discussed with the treatment team during the daily team meeting and/or staff's chart notes were reviewed.  Staff report patient  slept for 3.5 hours only. Earlier in the shift pt was tense and illogical, responding to questions by saying UM! UM! Hesitant to make eye contact, appeared to be responding. During breakfast he abruptly started shouting and accusing staff of giving him a knife, quotes \"that going to prevent me from getting out of here\". Restless pacing around the lounge. He was continued on SIO and No roommate order because of confusion, intrusive behavior. Still reported to have poor boundaries with staff and peers, getting into people personal spaces, though, being compliant with meds and care. Reported Auditory hallucinations of command nature, remained grandiose, see RN's note below:    \"Pt on on SIO for intrusive behavior.   Pt was active on the unit pacing and walking the unit hallway. Patient denies pain, anxiety and depression. Pt endorse command hallucination and delusional thought. Pt stated he hears voices asking him to save every one here on the unit from hell. Pt endorse grandiose thinking stating he is a \"saviour\" and want to be like Edilberto by saving people. Pt denies any other MI symptoms. Pt states he is doing better since he got admitted to this hospital because the  command hallucination is diminishing; not occurring often as it use to be.  Pt is compliant with medication and cooperated during assessment. He had a good appetite eating 100 percent of his meal. Pt denies any other concern. Will continue to monitor.\"    Met with patient: he was seen in the conference room in presence of 2 students and SIO staff. Basil went to the room willingly and was cooperative throughout our whole meeting. Openly told us about hearing voices of what sounded like sexual nature. " "He talked in length about what he called \"people growing\" apparently, referring to erection and how he was fully aware of that and could control that?! He confirmed that he felt that he needed to protect people on this unit and didn't appear to be too interested when we told him that it was not his role on this unit and that he needed, instead, focus on getting better himself and not helping other patients. He, nevertheless, listened to us politely, agreed with our recommendations to add Abilify to his current med regimen. When asked to provide more details about voices, told us that overall, voices became less intrusive, possibly, not as frequent as they used to be?! He appeared to be somewhat confused and frustrated that he could not provide more information. He did appear to have some insight that having voices was not normal and said that he felt that he needed to stay on this floor to get better.     Shortly after visit with Basil we received a message from his wife who asked to call his ACP psychiatrist Dr. Bob and discuss with him Basil's treatment.    I called and left message for Dr. Bob who called me back. We discussed Basil's current manic symptoms and his prior treatment and response to outpatient meds. Per Dr. Bob Basil has always had complaints about psychotropic meds side effects, mostly, sexual and weight gain. Per Dr. Bob a number of meds had been tried and stopped because of the above complaints. Dr. Bob told me that Basil was tried on Abilify and it was stopped because of complaints of weight gain. Dr. Bob recommended to try Basil on Haldol.          Medications:     Current Facility-Administered Medications   Medication Dose Route Frequency Provider Last Rate Last Admin    divalproex sodium extended-release (DEPAKOTE ER) 24 hr tablet 1,500 mg  1,500 mg Oral At Bedtime Blaine Seo MD   1,500 mg at 06/17/24 2003    divalproex sodium extended-release (DEPAKOTE ER) " "24 hr tablet 500 mg  500 mg Oral Daily Keith Haroon, APRN CNP   500 mg at 06/18/24 0758    fluticasone (FLONASE) 50 MCG/ACT spray 2 spray  2 spray Both Nostrils Daily Keith Haroon, APRN CNP   2 spray at 06/18/24 0759    gabapentin (NEURONTIN) capsule 300 mg  300 mg Oral Daily Keith NATHALIE Patiño CNP   300 mg at 06/18/24 0758    gabapentin (NEURONTIN) capsule 600 mg  600 mg Oral At Bedtime KeithHaroon APRN CNP   600 mg at 06/17/24 2003    ipratropium (ATROVENT) 0.06 % spray 2 spray  2 spray Both Nostrils 4x Daily KeithHaroon APRN CNP   2 spray at 06/18/24 1315    multivitamin w/minerals (THERA-VIT-M) tablet 1 tablet  1 tablet Oral Daily KeithHaroon APRN CNP   1 tablet at 06/18/24 0756    risperiDONE (risperDAL) tablet 2 mg  2 mg Oral Blaine Demarco MD   2 mg at 06/18/24 0758    risperiDONE (risperDAL) tablet 4 mg  4 mg Oral At Bedtime Blaine Seo MD   4 mg at 06/17/24 2004          Allergies:     Allergies   Allergen Reactions    Seasonal Allergies Other (See Comments)     Congestion. Managed with meds.          Labs:     No results found for this or any previous visit (from the past 24 hour(s)).         Psychiatric Examination:     /80 (BP Location: Right arm)   Pulse 90   Temp 97.5  F (36.4  C) (Oral)   Resp 18   Ht 1.854 m (6' 1\")   Wt 100.4 kg (221 lb 6.4 oz)   SpO2 97%   BMI 29.21 kg/m    Weight is 221 lbs 6.4 oz  Body mass index is 29.21 kg/m .    Orthostatic Vitals         Most Recent      Sitting Orthostatic /79 06/17 0810    Sitting Orthostatic Pulse (bpm) 78 06/17 0810    Standing Orthostatic /85 06/18 0901    Standing Orthostatic Pulse (bpm) 106 06/18 0901           Appearance: awake, alert, dressed in hospital scrubs, and appeared as age stated  Attitude:  cooperative  Eye Contact:  fair  Mood:  slightly anxious,     Affect:  guarded and restricted range  Speech:  clear, coherent  Psychomotor Behavior:  no evidence of tardive " dyskinesia, dystonia, or tics  Throught Process:  disorganized and illogical  Associations:  loosening of associations present  Thought Content:  no evidence of suicidal ideation or homicidal ideation, auditory hallucinations present, no visual hallucinations present, and patient appears to be responding to internal stimuli, delusions present   Insight:  partial  Judgement:  poor  Oriented to:  time, person, and place  Attention Span and Concentration:  limited  Recent and Remote Memory:  fair    Clinical Global Impressions  First: 6/4 6/17/2024       Most recent:            Precautions:     Behavioral Orders   Procedures    Code 1 - Restrict to Unit    Routine Programming     As clinically indicated    Sexual precautions    Status 15     Every 15 minutes.    Status Individual Observation     Patient SIO status reviewed with team/RN.  Please also refer to RN/team documentation for add'l detail.    -SIO staff to monitor following which have contributed to patient being on SIO:  Severe intrusiveness, agitation, paranoia  -Possible interventions SIO staff could use to support patient's treatment progress:  Offering prn meds, redirections, suggestion to use coping skills  -When following observed, team will review discontinuation of SIO:  Decreased psychotic symptoms, more predictable behavior, can contract for safety     Order Specific Question:   CONTINUOUS 24 hours / day     Answer:   Other     Order Specific Question:   Specify distance     Answer:   10     Order Specific Question:   Indications for SIO     Answer:   Severe intrusiveness    Suicide precautions: Suicide Risk: HIGH; Clinical rationale to override score: modification to the care environment, lack of access to a plan for self-harm, Collateral information supporting Suicidal/self-harm behaviors     Patients on Suicide Precautions should have a Combination Diet ordered that includes a Diet selection(s) AND a Behavioral Tray selection for Safe Tray - with  utensils, or Safe Tray - NO utensils       Order Specific Question:   Suicide Risk     Answer:   HIGH     Order Specific Question:   Clinical rationale to override score:     Answer:   modification to the care environment     Order Specific Question:   Clinical rationale to override score:     Answer:   lack of access to a plan for self-harm     Order Specific Question:   Clinical rationale to override score:     Answer:   Collateral information supporting Suicidal/self-harm behaviors          DIagnoses:     Psychosis, unspecified psychosis type (H)  Severe manic bipolar I disorder with psychotic features (H)  Schizoaffective disorder, bipolar type          Plan:   Basil is still experiencing auditory hallucinations and paranoia. Depakote and Ripsperdal were increased on 6/12/24. Risperdal was again increased 6/14/24. He is voluntary. Valproic acid level on 6/17/2024 was 93.4. Will start on Haldol today 6/18/2024 with hope if it works to gradually taper off Risperdal. Continue sexual precautions, suicide precautions, will block his room, SIO. Will continue to provide support and structure.       I was present with PA student who participated in the service and in the documentation of the note. I have verified the history and personally performed the physical exam and medical decision making. I agree with the assessment and plan of care as documented in the note.     Blaine Seo MD  [facility] Psychiat[facility]    Total time spent was 52 minutes. Over 50% of times was spent counseling and coordination of care regarding coping skills, medication and discharge planning.

## 2024-06-18 NOTE — PLAN OF CARE
BEH IP Unit Acuity Rating Score (UARS)  Patient is given one point for every criteria they meet.    CRITERIA SCORING   On a 72 hour hold, court hold, committed, stay of commitment, or revocation. 0/1    Patient LOS on BEH unit exceeds 20 days. 0/1  LOS: 20   Patient under guardianship, 55+, otherwise medically complex, or under age 11. 0/1   Suicide ideation without relief of precipitating factors. 0/1   Current plan for suicide. 0/1   Current plan for homicide. 0/1   Imminent risk or actual attempt to seriously harm another without relief of factors precipitating the attempt. 0/1   Severe dysfunction in daily living (ex: complete neglect for self care, extreme disruption in vegetative function, extreme deterioration in social interactions). 1/1   Recent (last 7 days) or current physical aggression in the ED or on unit. 0/1   Restraints or seclusion episode in past 72 hours. 0/1   Recent (last 7 days) or current verbal aggression, agitation, yelling, etc., while in the ED or unit. 0/1   Active psychosis. 1/1   Need for constant or near constant redirection (from leaving, from others, etc).  1/1   Intrusive or disruptive behaviors. 1/1   Patient requires 3 or more hours of individualized nursing care per 8-hour shift (i.e. for ADLs, meds, therapeutic interventions). 0   TOTAL 4

## 2024-06-18 NOTE — PLAN OF CARE
Goal Outcome Evaluation:      Group Attendance:  attended full group    Time session began: 5:20 pm  Time session ended: 6:00 pm  Patient's total time in group: 40    Total # Attendees   5   Group Type psychotherapeutic     Group Topic Covered symptom management  Nonjudgmental stance: self/others      Group Session Detail Process     Patient's response to the group topic/interactions:  cooperative with task, socially appropriately, listened actively, actively engaged, and he has quite literal understanding of topics when presented, loose associations, tries to be humorous, fit in socially     Patient Details: Feeling a lot better, safer reported           60792 - Psychotherapy (with patient) - 45 (38-52*) min    Patient Active Problem List   Diagnosis    Marijuana dependence (H)    Alcohol abuse    Bipolar disorder, current episode manic severe with psychotic features (H)    Chantal (H)    Schizoaffective disorder, bipolar type (H)    Psychosis, unspecified psychosis type (H)

## 2024-06-19 PROCEDURE — G0177 OPPS/PHP; TRAIN & EDUC SERV: HCPCS

## 2024-06-19 PROCEDURE — 250N000013 HC RX MED GY IP 250 OP 250 PS 637: Performed by: PSYCHIATRY & NEUROLOGY

## 2024-06-19 PROCEDURE — 99232 SBSQ HOSP IP/OBS MODERATE 35: CPT | Performed by: PSYCHIATRY & NEUROLOGY

## 2024-06-19 PROCEDURE — 250N000013 HC RX MED GY IP 250 OP 250 PS 637: Performed by: CLINICAL NURSE SPECIALIST

## 2024-06-19 PROCEDURE — 124N000002 HC R&B MH UMMC

## 2024-06-19 PROCEDURE — 90853 GROUP PSYCHOTHERAPY: CPT

## 2024-06-19 RX ADMIN — HYDROXYZINE HYDROCHLORIDE 25 MG: 25 TABLET ORAL at 22:00

## 2024-06-19 RX ADMIN — IPRATROPIUM BROMIDE 2 SPRAY: 42 SPRAY, METERED NASAL at 08:05

## 2024-06-19 RX ADMIN — HALOPERIDOL 5 MG: 5 TABLET ORAL at 20:45

## 2024-06-19 RX ADMIN — HALOPERIDOL 5 MG: 5 TABLET ORAL at 08:02

## 2024-06-19 RX ADMIN — FLUTICASONE PROPIONATE 2 SPRAY: 50 SPRAY, METERED NASAL at 08:04

## 2024-06-19 RX ADMIN — TRAZODONE HYDROCHLORIDE 50 MG: 50 TABLET ORAL at 22:00

## 2024-06-19 RX ADMIN — RISPERIDONE 4 MG: 2 TABLET, FILM COATED ORAL at 20:45

## 2024-06-19 RX ADMIN — IPRATROPIUM BROMIDE 2 SPRAY: 42 SPRAY, METERED NASAL at 20:45

## 2024-06-19 RX ADMIN — DIVALPROEX SODIUM 500 MG: 500 TABLET, FILM COATED, EXTENDED RELEASE ORAL at 08:03

## 2024-06-19 RX ADMIN — GABAPENTIN 600 MG: 300 CAPSULE ORAL at 20:45

## 2024-06-19 RX ADMIN — IPRATROPIUM BROMIDE 2 SPRAY: 42 SPRAY, METERED NASAL at 13:25

## 2024-06-19 RX ADMIN — GABAPENTIN 300 MG: 300 CAPSULE ORAL at 08:02

## 2024-06-19 RX ADMIN — DIVALPROEX SODIUM 1500 MG: 500 TABLET, FILM COATED, EXTENDED RELEASE ORAL at 20:45

## 2024-06-19 RX ADMIN — Medication 1 TABLET: at 08:02

## 2024-06-19 RX ADMIN — RISPERIDONE 2 MG: 2 TABLET ORAL at 08:02

## 2024-06-19 ASSESSMENT — ACTIVITIES OF DAILY LIVING (ADL)
ADLS_ACUITY_SCORE: 42
LAUNDRY: WITH SUPERVISION
ADLS_ACUITY_SCORE: 42
HYGIENE/GROOMING: INDEPENDENT;SHOWER
ADLS_ACUITY_SCORE: 42
ORAL_HYGIENE: INDEPENDENT
ADLS_ACUITY_SCORE: 42
ORAL_HYGIENE: INDEPENDENT
ADLS_ACUITY_SCORE: 42
DRESS: SCRUBS (BEHAVIORAL HEALTH)
ADLS_ACUITY_SCORE: 42
HYGIENE/GROOMING: INDEPENDENT
ADLS_ACUITY_SCORE: 42
ADLS_ACUITY_SCORE: 42
DRESS: INDEPENDENT

## 2024-06-19 NOTE — PLAN OF CARE
Problem: Psychotic Signs/Symptoms  Goal: Optimal Cognitive Function (Psychotic Signs/Symptoms)  Intervention: Support and Promote Cognitive Ability  Recent Flowsheet Documentation  Taken 6/19/2024 1973 by Renetta Frias RN  Trust Relationship/Rapport:   emotional support provided   questions encouraged   reassurance provided   thoughts/feelings acknowledged   Goal Outcome Evaluation:    Plan of Care Reviewed With: patient        Pt. Continues on a SIO for severe intrusiveness, agitation and paranoia.     Pt presents with a flat and guarded affect, pleasant on approach. Pt has good insight into his mental illness, requesting AM medications due to increasing agitation. Pt. Reports the medications relaxing him. Frequently paces the hallway listening to music. Out in the lounge engaged appropriately with staff and peers, attended and participated in group.  Pt reports sleeping well last night and not hearing voices last night or currently. Pt reports feeling happy, smiling. Visible in the lounge for meals good appetites. Speech is clear and coherent, thought process is organized when answering questions, able to follow directions. Made few calls to friends and family. Hygiene well kempt showered before lunch. Calm and cooperative all shift.    Pt denies anxiety and depression, denies pain, denies SI/HI/SIB and hallucinations. Medication compliance and contracted for safety.

## 2024-06-19 NOTE — PLAN OF CARE
Problem: Sleep Disturbance  Goal: Adequate Sleep/Rest  Outcome: Progressing  Intervention: Promote Sleep/Rest  Recent Flowsheet Documentation  Taken 6/19/2024 0039 by Khushi Betancourt RN  Sleep/Rest Enhancement: noise level reduced     Problem: Suicide Risk  Goal: Absence of Self-Harm  Intervention: Promote Psychosocial Wellbeing  Recent Flowsheet Documentation  Taken 6/19/2024 0039 by Khushi Betancourt RN  Sleep/Rest Enhancement: noise level reduced   Goal Outcome Evaluation:         Pt was up when the shift started, eating snacks in the dinning room, after which he went back to bed. He is on SIO 1:1 10 ft for severe intrusivenes and regular safety checks were conducted every 15 minutes, and pt slept approximately 5.5 hours, noted peaceful and non-labored breathing, with visible chest rise. No safety or medical concerns was reported or observed.

## 2024-06-19 NOTE — PLAN OF CARE
Team Note Due:  Thursday     Assessment/Intervention/Current Symtoms and Care Coordination:  Chart review and met with team, discussed pt progress, symptomology, and response to treatment. Discussed the discharge plan and any potential impediments to discharge.    Discharge Plan or Goal:  Current plan is to stabilize symptoms and develop safe disposition plan. Following hospitalization, plan is for pt to return home with family with outpatient supports.      Barriers to Discharge:  Currently presents with symptoms of psychosis. Medication management ongoing.      Referral Status:  Referral needs pending as patient stabilizes     Legal Status:  Voluntary     Contacts:  Medication Management:  Name/Organization: Donta Bob MD  [facility] on file  Address: [address] [place] [address], [place] [address]  Phone: [phone]    Therapist:  Name/Organization: [facility]   Phone: [phone]     Family Contacts:  Name/Relationship: Flakita Andrews  Wife - [facility] on file  Phone: [phone]      Upcoming Meetings and Dates/Important Information and next steps:  - Check-in with pt's wife weekly.

## 2024-06-19 NOTE — PROGRESS NOTES
Rehab Group    Start time: 1700  End time: 1750  Patient time total: 45 minutes    attended full group    #5 attended   Group Type: recreation   Group Topic Covered: relaxation        Group Session Detail:  Stress relief     Patient Response/Contribution:  cooperative with task, attentive, and actively engaged       Patient Detail:    Pt participated in a structured Therapeutic Recreation group with a focus on leisure participation, socializing, and exercise. Pt followed along to the guided chair exercise routine and added to the discussion prompts throughout the routine. Pt was encouraged to use positive imagery with the deep breathing and stretching to foster relaxation, improves focus, and reduce stress.        Activity Therapy Per 15 minutes () Other Rehab therapies    Patient Active Problem List   Diagnosis    Marijuana dependence (H)    Alcohol abuse    Bipolar disorder, current episode manic severe with psychotic features (H)    Chantal (H)    Schizoaffective disorder, bipolar type (H)    Psychosis, unspecified psychosis type (H)

## 2024-06-19 NOTE — PLAN OF CARE
Goal Outcome Evaluation:    Pt spent most of the shift pacing the hallways with SIO staff. Pt continues to present with full range affect and calm mood upon approach. Pt was observed attending and participating in unit groups this shift. Pt continues on SIO 1:1 this shift for intrusiveness but no redirections noted. Pt endorsed some anxiety and depression from missing his family and discharge planing. Pt denied SI/SIB/HI/AVH this shift. Pt appears fairly clean. Pt observed eating 100% of his meals and reported sleep as adequate. Pt was medication compliant this shift and denied any side effects. Pt had a visit this shift from his  and he reported he enjoyed it. Staff will continue to monitor and support with current POC.     Plan of Care Reviewed With: patient

## 2024-06-19 NOTE — CONSULTS
"SPIRITUAL HEALTH SERVICES  SPIRITUAL ASSESSMENT Consult Note  [facility] ([facility]) 30[facility]     REFERRAL SOURCE: Routine consult    Met with Basil in the smith, he explained that he is Mandaen and would like to see a  for \"confession\" because \"I feel like I have a lot to talk about that's been bothering me, and I don't want to dump it on anyone here, I think it would be nice to talk to a  so I can be absolved, I think that would really help.\"     I assured him that was possible and that I would get in touch with our , Fr. Yusuf, regarding his request. Basil expressed understanding that it might not be today since we only have one  for the hospital, and expressed gratitude that it was possible and was willing to wait.     PLAN: Will contact Fr. Yusuf regarding sacramental request. Spiritual health services remains available for any follow-up or requests. For further visits please place spiritual health consults.    Sera Adams, [facility]   Pager: [phone]    "

## 2024-06-19 NOTE — PLAN OF CARE
BEH IP Unit Acuity Rating Score (UARS)  Patient is given one point for every criteria they meet.    CRITERIA SCORING   On a 72 hour hold, court hold, committed, stay of commitment, or revocation. 0/1    Patient LOS on BEH unit exceeds 20 days. 1/1  LOS: 21   Patient under guardianship, 55+, otherwise medically complex, or under age 11. 0/1   Suicide ideation without relief of precipitating factors. 0/1   Current plan for suicide. 0/1   Current plan for homicide. 0/1   Imminent risk or actual attempt to seriously harm another without relief of factors precipitating the attempt. 0/1   Severe dysfunction in daily living (ex: complete neglect for self care, extreme disruption in vegetative function, extreme deterioration in social interactions). 1/1   Recent (last 7 days) or current physical aggression in the ED or on unit. 0/1   Restraints or seclusion episode in past 72 hours. 0/1   Recent (last 7 days) or current verbal aggression, agitation, yelling, etc., while in the ED or unit. 0/1   Active psychosis. 1/1   Need for constant or near constant redirection (from leaving, from others, etc).  1/1   Intrusive or disruptive behaviors. 0/1   Patient requires 3 or more hours of individualized nursing care per 8-hour shift (i.e. for ADLs, meds, therapeutic interventions). 0   TOTAL 3

## 2024-06-19 NOTE — PLAN OF CARE
Rehab Group    Start time: 10:15  End time: 11:45  Patient time total: 60 minutes    attended partial group    #9 attended   Group Type: OT Clinic   Group Topic Covered: balanced lifestyle, coping skills, emotional regulation, healthy leisure time, and social skills     Group Session Detail:  Pt actively participated in occupational therapy clinic to facilitate coping skill exploration, creative expression within personally meaningful activities, and clinical observation of social, cognitive, and kinesthetic performance skills.      Patient Response/Contribution:  cooperative with task, socially appropriately, safe use of materials/supplies, attentive, and actively engaged     Patient Detail: Patient was accompanied to group by SIO staff. Pt response: supervision-Duc to initiate, gather materials, sequence, and adjust to workspace demands as needed. Demonstrated good focus, planning, and problem solving for selected creative task. Patient was able to sustain attention on task for majority of group and was appropriate with peers in group. Able to ask for assistance as needed. No safety or behavioral concerns identified in group.        Patient Active Problem List   Diagnosis    Marijuana dependence (H)    Alcohol abuse    Bipolar disorder, current episode manic severe with psychotic features (H)    Chantal (H)    Schizoaffective disorder, bipolar type (H)    Psychosis, unspecified psychosis type (H)

## 2024-06-19 NOTE — PLAN OF CARE
"Goal Outcome Evaluation:    Plan of Care Reviewed With: patient      Patient continues to be on SIO for intrusiveness and was was observed pacing in the hallways, sometimes speaking to self. Patient appears preoccupied, illogical, endorses hearing voices, denies SI/SIB/HI, denies anxiety and depression. Patient eats and drinks okay, compliant with medication administration and VS assessment. Patient attended and participated in group during shift. Patient was visited by his father and met in the OT room. Visit went on well, no anger or behavioral outbursts witnessed during shift. Patient did verbalize desire to switch to a different unit citing a disagreement with another peer whom he did not mention. Patient compliant with medications and was given prn Zyprexa 10 mg at 9.26pm for agitation.  Patient had a shower and changed to clean scrubs during shift.  Blood pressure 120/72, pulse 78, temperature 97.9  F (36.6  C), temperature source Temporal, resp. rate 18, height 1.854 m (6' 1\"), weight 100.4 kg (221 lb 6.4 oz), SpO2 97%.      "

## 2024-06-19 NOTE — PLAN OF CARE
Goal Outcome Evaluation:      Group Attendance:  attended partial group    Time session began: 1:00 pm  Time session ended: 1:40 pm  Patient's total time in group: 40    Total # Attendees   6   Group Type psychotherapeutic     Group Topic Covered insight improvement  Using cheerleading statements      Group Session Detail Strengths     Patient's response to the group topic/interactions:  cooperative with task, left group on several occasions, distracted , nonsensical verbalizations, needed prompts to redirect, and verbalizations were off topic     Patient Details: Mood hopeful,  was coming to visit so he could confess.       Pt has very intense and uncomfortable stare with this writer and female peers. The group was about strengths and writer specifically said talk about character strengths and, not looks and he complimented a female on looks and the entire group redirected him    27378 - Psychotherapy (with patient) - 45 (38-52*) min    Patient Active Problem List   Diagnosis    Marijuana dependence (H)    Alcohol abuse    Bipolar disorder, current episode manic severe with psychotic features (H)    Chantal (H)    Schizoaffective disorder, bipolar type (H)    Psychosis, unspecified psychosis type (H)

## 2024-06-19 NOTE — PROGRESS NOTES
"[facility], [place]   Psychiatric Progress Note        Interim History:     The patient's care was discussed with the treatment team during the daily team meeting and/or staff's chart notes were reviewed.  Staff report patient said he slept well and that he did not \"see any demons.\" He slept for 5.5 hours. Patient appears to be more aware of his symptoms this morning and approached nursing staff for medications. Yesterday evening, he apparently was requesting to move units because he got into a disagreement with another peer. See nursing notes below:     6/18: \"Patient continues to be on SIO for intrusiveness and was was observed pacing in the hallways, sometimes speaking to self. Patient appears preoccupied, illogical, endorses hearing voices, denies SI/SIB/HI, denies anxiety and depression. Patient eats and drinks okay, compliant with medication administration and VS assessment. Patient attended and participated in group during shift. Patient was visited by his father and met in the OT room. Visit went on well, no anger or behavioral outbursts witnessed during shift. Patient did verbalize desire to switch to a different unit citing a disagreement with another peer whom he did not mention. Patient compliant with medications and was given prn Zyprexa 10 mg at 9.26pm for agitation.  Patient had a shower and changed to clean scrubs during shift.\"    Met with patient: We met with the patient in the conference room. He tells us that he wants to meet with his family  for confession. Basil says he has sins that are weighing on his mind and preventing him from getting better. He talked about getting absolution from the . Last time he spoke with his family  on the phone, his  told him he was not ready for absolution yet. Basil said he's aware of Father TRINA but now thinks he is a \"phony\" and does not think he is a real . Basil could not explain why he thinks so. " "In addition, he says the voices have decreased to \"almost zero\" and he thinks he will feel better once he gets absolution from his .     Discussed with patient that we spoke to his psychiatrist, Dr. Bob who recommend Basil to try Haldol. This was started yesterday. Basil originally responded by saying he doesn't get \"good vibes\" from Dr. Bob, but ultimately he agrees that this is a good option for him. He denied med side effects and said that he had no other questions or concerns for us.         Medications:     Current Facility-Administered Medications   Medication Dose Route Frequency Provider Last Rate Last Admin    divalproex sodium extended-release (DEPAKOTE ER) 24 hr tablet 1,500 mg  1,500 mg Oral At Bedtime Blaine Seo MD   1,500 mg at 06/18/24 2037    divalproex sodium extended-release (DEPAKOTE ER) 24 hr tablet 500 mg  500 mg Oral Daily Haroon Parker APRN CNP   500 mg at 06/19/24 0803    fluticasone (FLONASE) 50 MCG/ACT spray 2 spray  2 spray Both Nostrils Daily Haroon Parker APRN CNP   2 spray at 06/19/24 0804    gabapentin (NEURONTIN) capsule 300 mg  300 mg Oral Daily Haroon Parker APRN CNP   300 mg at 06/19/24 0802    gabapentin (NEURONTIN) capsule 600 mg  600 mg Oral At Bedtime Haroon Parker APRN CNP   600 mg at 06/18/24 2037    haloperidol (HALDOL) tablet 5 mg  5 mg Oral BID Blaine Seo MD   5 mg at 06/19/24 0802    ipratropium (ATROVENT) 0.06 % spray 2 spray  2 spray Both Nostrils 4x Daily Haroon Parker APRN CNP   2 spray at 06/19/24 0805    multivitamin w/minerals (THERA-VIT-M) tablet 1 tablet  1 tablet Oral Daily Haroon Parker APRN CNP   1 tablet at 06/19/24 0802    risperiDONE (risperDAL) tablet 2 mg  2 mg Oral QAM Blaine Seo MD   2 mg at 06/19/24 0802    risperiDONE (risperDAL) tablet 4 mg  4 mg Oral At Bedtime Blaine Seo MD   4 mg at 06/18/24 2038          Allergies:     Allergies   Allergen Reactions    Seasonal " "Allergies Other (See Comments)     Congestion. Managed with meds.          Labs:   No results found for this or any previous visit (from the past 24 hour(s)).       Psychiatric Examination:     /78   Pulse 63   Temp 97.9  F (36.6  C) (Temporal)   Resp 18   Ht 1.854 m (6' 1\")   Wt 100.4 kg (221 lb 6.4 oz)   SpO2 97%   BMI 29.21 kg/m    Weight is 221 lbs 6.4 oz  Body mass index is 29.21 kg/m .    Orthostatic Vitals         Most Recent      Standing Orthostatic /85 06/18 0901    Standing Orthostatic Pulse (bpm) 106 06/18 0901          Appearance: awake, alert, dressed in hospital scrubs, and appeared as age stated  Attitude: more cooperative  Eye Contact:  fair  Mood:  slightly anxious, tearful at times   Affect:  guarded and restricted range  Speech:  clear, coherent  Psychomotor Behavior:  no evidence of tardive dyskinesia, dystonia, or tics  Throught Process: less disorganized and illogical  Associations:  loosening of associations present  Thought Content:  no evidence of suicidal ideation or homicidal ideation, almost no auditory hallucinations present, and no visual hallucinations present, delusions present. Pt appears to be occasionally responding to internal stimuli.  Insight:  partial, improving?  Judgement:  poor, improving  Oriented to:  time, person, and place  Attention Span and Concentration:  limited  Recent and Remote Memory:  fair    Clinical Global Impressions  First: 6/4 6/19/2024       Most recent:            Precautions:     Behavioral Orders   Procedures    Code 1 - Restrict to Unit    Routine Programming     As clinically indicated    Sexual precautions    Status 15     Every 15 minutes.    Status Individual Observation     Patient SIO status reviewed with team/RN.  Please also refer to RN/team documentation for add'l detail.    -SIO staff to monitor following which have contributed to patient being on SIO:  Severe intrusiveness, agitation, paranoia  -Possible interventions SIO " staff could use to support patient's treatment progress:  Offering prn meds, redirections, suggestion to use coping skills  -When following observed, team will review discontinuation of SIO:  Decreased psychotic symptoms, more predictable behavior, can contract for safety     Order Specific Question:   CONTINUOUS 24 hours / day     Answer:   Other     Order Specific Question:   Specify distance     Answer:   10     Order Specific Question:   Indications for SIO     Answer:   Severe intrusiveness    Suicide precautions: Suicide Risk: HIGH; Clinical rationale to override score: modification to the care environment, lack of access to a plan for self-harm, Collateral information supporting Suicidal/self-harm behaviors     Patients on Suicide Precautions should have a Combination Diet ordered that includes a Diet selection(s) AND a Behavioral Tray selection for Safe Tray - with utensils, or Safe Tray - NO utensils       Order Specific Question:   Suicide Risk     Answer:   HIGH     Order Specific Question:   Clinical rationale to override score:     Answer:   modification to the care environment     Order Specific Question:   Clinical rationale to override score:     Answer:   lack of access to a plan for self-harm     Order Specific Question:   Clinical rationale to override score:     Answer:   Collateral information supporting Suicidal/self-harm behaviors          DIagnoses:     Psychosis, unspecified psychosis type (H)  Severe manic bipolar I disorder with psychotic features (H)  Schizoaffective disorder, bipolar type          Plan:     Basil is still experiencing auditory hallucinations and paranoia. Depakote and Ripsperdal were increased on 6/12/24. Risperdal was again increased 6/14/24. He is voluntary. Valproic acid level on 6/17/2024 was 93.4. Haldol started 6/18/24. If Haldol works, will plan to gradually taper off Risperdal. Continue sexual precautions, suicide precautions, will block his room, SIO. Will  continue to provide support and structure.       I was present with PA student who participated in the service and in the documentation of the note. I have verified the history and personally performed the physical exam and medical decision making. I agree with the assessment and plan of care as documented in the note.     Blaine Seo MD  [facility] Psychiatry    Total time spent was 37 minutes. Over 50% of times was spent counseling and coordination of care regarding coping skills, medication and discharge planning.

## 2024-06-20 PROCEDURE — 250N000013 HC RX MED GY IP 250 OP 250 PS 637: Performed by: PSYCHIATRY & NEUROLOGY

## 2024-06-20 PROCEDURE — 250N000013 HC RX MED GY IP 250 OP 250 PS 637: Performed by: CLINICAL NURSE SPECIALIST

## 2024-06-20 PROCEDURE — 99232 SBSQ HOSP IP/OBS MODERATE 35: CPT | Performed by: PSYCHIATRY & NEUROLOGY

## 2024-06-20 PROCEDURE — 124N000002 HC R&B MH UMMC

## 2024-06-20 RX ORDER — DIPHENHYDRAMINE HCL 50 MG
50 CAPSULE ORAL EVERY 8 HOURS PRN
Status: DISCONTINUED | OUTPATIENT
Start: 2024-06-20 | End: 2024-06-24

## 2024-06-20 RX ORDER — LORAZEPAM 2 MG/1
2 TABLET ORAL EVERY 8 HOURS PRN
Status: DISCONTINUED | OUTPATIENT
Start: 2024-06-20 | End: 2024-06-24

## 2024-06-20 RX ORDER — DIPHENHYDRAMINE HYDROCHLORIDE 50 MG/ML
50 INJECTION INTRAMUSCULAR; INTRAVENOUS EVERY 8 HOURS PRN
Status: DISCONTINUED | OUTPATIENT
Start: 2024-06-20 | End: 2024-07-03 | Stop reason: HOSPADM

## 2024-06-20 RX ORDER — HALOPERIDOL 5 MG/ML
5 INJECTION INTRAMUSCULAR EVERY 8 HOURS PRN
Status: DISCONTINUED | OUTPATIENT
Start: 2024-06-20 | End: 2024-07-03 | Stop reason: HOSPADM

## 2024-06-20 RX ORDER — HALOPERIDOL 5 MG/1
5 TABLET ORAL EVERY 8 HOURS PRN
Status: DISCONTINUED | OUTPATIENT
Start: 2024-06-20 | End: 2024-07-03 | Stop reason: HOSPADM

## 2024-06-20 RX ORDER — LORAZEPAM 2 MG/ML
2 INJECTION INTRAMUSCULAR EVERY 8 HOURS PRN
Status: DISCONTINUED | OUTPATIENT
Start: 2024-06-20 | End: 2024-07-03 | Stop reason: HOSPADM

## 2024-06-20 RX ADMIN — IPRATROPIUM BROMIDE 2 SPRAY: 42 SPRAY, METERED NASAL at 12:48

## 2024-06-20 RX ADMIN — OLANZAPINE 10 MG: 10 TABLET, ORALLY DISINTEGRATING ORAL at 16:34

## 2024-06-20 RX ADMIN — OLANZAPINE 10 MG: 10 TABLET, ORALLY DISINTEGRATING ORAL at 09:44

## 2024-06-20 RX ADMIN — IPRATROPIUM BROMIDE 2 SPRAY: 42 SPRAY, METERED NASAL at 07:56

## 2024-06-20 RX ADMIN — Medication 1 TABLET: at 07:57

## 2024-06-20 RX ADMIN — GABAPENTIN 300 MG: 300 CAPSULE ORAL at 07:57

## 2024-06-20 RX ADMIN — DIVALPROEX SODIUM 500 MG: 500 TABLET, FILM COATED, EXTENDED RELEASE ORAL at 07:57

## 2024-06-20 RX ADMIN — IPRATROPIUM BROMIDE 2 SPRAY: 42 SPRAY, METERED NASAL at 21:14

## 2024-06-20 RX ADMIN — GABAPENTIN 600 MG: 300 CAPSULE ORAL at 21:13

## 2024-06-20 RX ADMIN — Medication 7.5 MG: at 21:14

## 2024-06-20 RX ADMIN — RISPERIDONE 4 MG: 2 TABLET, FILM COATED ORAL at 21:13

## 2024-06-20 RX ADMIN — RISPERIDONE 2 MG: 2 TABLET ORAL at 07:57

## 2024-06-20 RX ADMIN — DIVALPROEX SODIUM 1500 MG: 500 TABLET, FILM COATED, EXTENDED RELEASE ORAL at 21:12

## 2024-06-20 RX ADMIN — HYDROXYZINE HYDROCHLORIDE 50 MG: 50 TABLET, FILM COATED ORAL at 12:48

## 2024-06-20 RX ADMIN — FLUTICASONE PROPIONATE 2 SPRAY: 50 SPRAY, METERED NASAL at 07:56

## 2024-06-20 RX ADMIN — HALOPERIDOL 5 MG: 5 TABLET ORAL at 07:57

## 2024-06-20 ASSESSMENT — ACTIVITIES OF DAILY LIVING (ADL)
ADLS_ACUITY_SCORE: 42
ADLS_ACUITY_SCORE: 42
DRESS: INDEPENDENT
ADLS_ACUITY_SCORE: 42
HYGIENE/GROOMING: INDEPENDENT;SHOWER
ADLS_ACUITY_SCORE: 42
ORAL_HYGIENE: INDEPENDENT
ADLS_ACUITY_SCORE: 42
LAUNDRY: WITH SUPERVISION

## 2024-06-20 NOTE — PLAN OF CARE
"  Problem: Psychotic Signs/Symptoms  Goal: Optimal Cognitive Function (Psychotic Signs/Symptoms)  Intervention: Support and Promote Cognitive Ability  Recent Flowsheet Documentation  Taken 6/20/2024 1008 by Renetta Frias RN  Trust Relationship/Rapport:   emotional support provided   questions encouraged   reassurance provided   thoughts/feelings acknowledged   care explained   choices provided   empathic listening provided   questions answered   Goal Outcome Evaluation:    Plan of Care Reviewed With: patient          Pt. Continues on a SIO for severe intrusiveness, agitation and paranoia.    Pt. Presents with a confused and tense affect. Accused SIO staff of lying about knowing Edinson Armenta, when staff had previously told him that he didn't know him, long periods of pacing the hallway listening to music, making bizarre Cheondoism statements to staff, needs reinforcement with personal boundaries, easily redirected, made a statements about wanting to hit somebody over the head.  PRN Zyprexa administered helpful. Attended and participated in group, mumbling something about custodial. Visible in the lounge for meals excellent appetite. Speech is soft/quiet communicates at a close distance, maintains eye contact. Thought process is disorganized, hygiene well groomed.    Pt endorses anxiety and depression, requesting medication PRN Hydroxyzine administered. Reports hearing voices from the Cheondoism Jt, appears preoccupied and suspicious of staff and peers. Denies pain, Denies SI/HI/SIB. Medication compliance and contracted for safety.     Blood pressure 129/79, pulse 82, temperature 97  F (36.1  C), temperature source Oral, resp. rate 18, height 1.854 m (6' 1\"), weight 100.5 kg (221 lb 8 oz), SpO2 95%.                         "

## 2024-06-20 NOTE — PLAN OF CARE
"                                                   Night Nursing Note   6/19/24 - 6/20/24        Basil was in bed at beginning of shift and appeared asleep.  Monitored on 1:1 @ 10 ft for severe intrusiveness and q15 mins for safety.  While breaking the 1:1 staff @ 0300, Basil called out and asked who is there?\"  This writer responded and introduced self as his 1:1 staff. Basil stated \"I am paranoid\"  he sat up in bed. Appeared to be responding to internal stimuli.  Offered him reassurance and he stated \"I will be okay.\" Laid back  down and appeared to sleep remainder of night without difficulty.  Continue to monitor, offer support and reassurance per care plan.    Slept 6.5 hrs.                        "

## 2024-06-20 NOTE — PLAN OF CARE
Team Note Due:  Thursday     Assessment/Intervention/Current Symtoms and Care Coordination:  Chart review and met with team, discussed pt progress, symptomology, and response to treatment. Discussed the discharge plan and any potential impediments to discharge.    Tasks Completed:  - CTC received email from pt's wife, Flakita, asking about recent medication changes. CTC provided update via secure email. CTC will discuss with provider and request phone call to pt's wife to discuss medication changes in more detail, as well.     Discharge Plan or Goal:  Current plan is to stabilize symptoms and develop safe disposition plan. Following hospitalization, plan is for pt to return home with family with outpatient supports.      Barriers to Discharge:  Currently presents with symptoms of psychosis. Medication management ongoing.      Referral Status:  Referral needs pending as patient stabilizes     Legal Status:  Voluntary     Contacts:  Medication Management:  Name/Organization: Donta Bob MD  [facility] on file  Address: [address] [place] [address], [place] [address]  Phone: [phone]    Therapist:  Name/Organization: [facility]   Phone: [phone]     Family Contacts:  Name/Relationship: Flakita Andrews  Wife - [facility] on file  Phone: [phone]      Upcoming Meetings and Dates/Important Information and next steps:  - Check-in with pt's wife weekly.

## 2024-06-20 NOTE — PROGRESS NOTES
"[facility], [place]   Psychiatric Progress Note        Interim History:     The patient's care was discussed with the treatment team during the daily team meeting and/or staff's chart notes were reviewed.  Staff report patient slept for about 6.5 hours last night. He was reported to be compliant with meds and care. Yesterday met with  from community. It was reported to be a good meeting. Continued to periodically make bizarre statements, religiously preoccupied.     Met with patient: We met with the patient in the conference room in presence of student and SIO staff. Basil was pretty cooperative and polite during our visit. He, though, at times smiled inappropriately to the conversation. He reported experiencing Auditory hallucinations, though, initially, had difficulties identifying them as such: \"I am searching for directions and guidance. I get it\". He, eventually, told us that voices were providing him with advices about his behavior and, recently, were not telling him to do anything inappropriate. We reminded him that as recently as few days ago he was reported to tell female peer that she was a life of his life. Basil first disputed saying that, then, said that he didn't remember if he said that, then, said that if he did say that, it might have been suggested him by voices. He, then, said that his wife Ludy is a love of his life (patient's wife's name is Flakita). He appeared to be somewhat confused, but eager to please. He easily agreed with our recommendation to increase his Haldol dose to address Auditory hallucinations and had no more questions or concerns.          Medications:     Current Facility-Administered Medications   Medication Dose Route Frequency Provider Last Rate Last Admin    divalproex sodium extended-release (DEPAKOTE ER) 24 hr tablet 1,500 mg  1,500 mg Oral At Bedtime Blaine Seo MD   1,500 mg at 06/19/24 2045    divalproex sodium " "extended-release (DEPAKOTE ER) 24 hr tablet 500 mg  500 mg Oral Daily Haroon Parker APRAYDIN CNP   500 mg at 06/20/24 0757    fluticasone (FLONASE) 50 MCG/ACT spray 2 spray  2 spray Both Nostrils Daily Haroon Parker APRAYDIN CNP   2 spray at 06/20/24 0756    gabapentin (NEURONTIN) capsule 300 mg  300 mg Oral Daily Keith HaroonKYLIE luongAYDIN CNP   300 mg at 06/20/24 0757    gabapentin (NEURONTIN) capsule 600 mg  600 mg Oral At Bedtime Haroon Parker APRN CNP   600 mg at 06/19/24 2045    haloperidol (HALDOL) half-tab 7.5 mg  7.5 mg Oral BID Blaine Seo MD        ipratropium (ATROVENT) 0.06 % spray 2 spray  2 spray Both Nostrils 4x Daily Keith HaroonKYLIE luongAYDIN CNP   2 spray at 06/20/24 1248    multivitamin w/minerals (THERA-VIT-M) tablet 1 tablet  1 tablet Oral Daily KeithPiloHaroon, APRAYDIN CNP   1 tablet at 06/20/24 0757    risperiDONE (risperDAL) tablet 2 mg  2 mg Oral QAM Blaine Seo MD   2 mg at 06/20/24 0757    risperiDONE (risperDAL) tablet 4 mg  4 mg Oral At Bedtime Blaine Seo MD   4 mg at 06/19/24 2045          Allergies:     Allergies   Allergen Reactions    Seasonal Allergies Other (See Comments)     Congestion. Managed with meds.          Labs:   No results found for this or any previous visit (from the past 24 hour(s)).       Psychiatric Examination:     /79   Pulse 82   Temp 97  F (36.1  C) (Oral)   Resp 18   Ht 1.854 m (6' 1\")   Wt 100.5 kg (221 lb 8 oz)   SpO2 95%   BMI 29.22 kg/m    Weight is 221 lbs 8 oz  Body mass index is 29.22 kg/m .    Orthostatic Vitals         Most Recent      Sitting Orthostatic /81 06/20 0830    Sitting Orthostatic Pulse (bpm) 73 06/20 0830    Standing Orthostatic /77 06/20 0830    Standing Orthostatic Pulse (bpm) 96 06/20 0830           Appearance: awake, alert, dressed in hospital scrubs, and appeared as age stated  Attitude: more cooperative  Eye Contact:  fair  Mood:  slightly anxious, labile  Affect:  guarded and " restricted range  Speech:  clear, coherent  Psychomotor Behavior:  no evidence of tardive dyskinesia, dystonia, or tics  Throught Process: poorly disorganized and illogical  Associations:  loosening of associations present  Thought Content:  no evidence of suicidal ideation or homicidal ideation, auditory hallucinations present, and no visual hallucinations present, delusions present. Pt appears to be occasionally responding to internal stimuli.  Insight:  partial, improving?  Judgement:  poor, improving  Oriented to:  time, person, and place  Attention Span and Concentration:  limited  Recent and Remote Memory:  fair    Clinical Global Impressions  First: 6/4 6/19/2024       Most recent:            Precautions:     Behavioral Orders   Procedures    Code 1 - Restrict to Unit    Routine Programming     As clinically indicated    Sexual precautions    Status 15     Every 15 minutes.    Status Individual Observation     Patient SIO status reviewed with team/RN.  Please also refer to RN/team documentation for add'l detail.    -SIO staff to monitor following which have contributed to patient being on SIO:  Severe intrusiveness, agitation, paranoia  -Possible interventions SIO staff could use to support patient's treatment progress:  Offering prn meds, redirections, suggestion to use coping skills  -When following observed, team will review discontinuation of SIO:  Decreased psychotic symptoms, more predictable behavior, can contract for safety     Order Specific Question:   CONTINUOUS 24 hours / day     Answer:   Other     Order Specific Question:   Specify distance     Answer:   10     Order Specific Question:   Indications for SIO     Answer:   Severe intrusiveness    Suicide precautions: Suicide Risk: HIGH; Clinical rationale to override score: modification to the care environment, lack of access to a plan for self-harm, Collateral information supporting Suicidal/self-harm behaviors     Patients on Suicide Precautions  should have a Combination Diet ordered that includes a Diet selection(s) AND a Behavioral Tray selection for Safe Tray - with utensils, or Safe Tray - NO utensils       Order Specific Question:   Suicide Risk     Answer:   HIGH     Order Specific Question:   Clinical rationale to override score:     Answer:   modification to the care environment     Order Specific Question:   Clinical rationale to override score:     Answer:   lack of access to a plan for self-harm     Order Specific Question:   Clinical rationale to override score:     Answer:   Collateral information supporting Suicidal/self-harm behaviors          DIagnoses:     Psychosis, unspecified psychosis type (H)  Severe manic bipolar I disorder with psychotic features (H)  Schizoaffective disorder, bipolar type          Plan:     Basil is still experiencing auditory hallucinations and paranoia. Depakote and Ripsperdal were increased on 6/12/24. Risperdal was again increased 6/14/24. He is voluntary. Valproic acid level on 6/17/2024 was 93.4. Haldol started 6/18/24. Will increase dose of Haldol 6/20/2024. If Haldol works, will plan to gradually taper off Risperdal. Continue sexual precautions, suicide precautions, will block his room, SIO. Will continue to provide support and structure.       I was present with PA student who participated in the service and in the documentation of the note. I have verified the history and personally performed the physical exam and medical decision making. I agree with the assessment and plan of care as documented in the note.     Blaine Seo MD  [facility] Psychiatry    Total time spent was 37 minutes. Over 50% of times was spent counseling and coordination of care regarding coping skills, medication and discharge planning.      Total time spent was 37 minutes. Over 50% of times was spent counseling and coordination of care regarding coping skills, medication and discharge planning.

## 2024-06-20 NOTE — PLAN OF CARE
"  Rehab Group    Start time: 10:30  End time: 12:05  Patient time total: 25-30 minutes    attended partial group    #8 attended   Group Type: OT Clinic   Group Topic Covered: balanced lifestyle, cognitive activities, coping skills, emotional regulation, and social skills     Group Session Detail:  Pt actively participated in occupational therapy clinic to facilitate coping skill exploration, creative expression within personally meaningful activities, and clinical observation of social, cognitive, and kinesthetic performance skills.      Patient Response/Contribution:  worked intermittently, disorganized, and verbalizations were off topic     Patient Detail:  Patient entered group with SIO staff. Patient requires encouragement and reminders for task selection. 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 Patient works in a messy and disorganized manner. Patient made random comments aloud to the room; directed at no one in particular regarding: half-way, feeling impulsive, and inquiring about whether he should be transferred to a different unit so he doesn't hurt himself. Patient reported feeling \"tired\" and left group early. No charge.          Patient Active Problem List   Diagnosis    Marijuana dependence (H)    Alcohol abuse    Bipolar disorder, current episode manic severe with psychotic features (H)    Chantal (H)    Schizoaffective disorder, bipolar type (H)    Psychosis, unspecified psychosis type (H)        "

## 2024-06-20 NOTE — PROVIDER NOTIFICATION
06/20/24 1251   Individualization/Patient Specific Goals   Patient Personal Strengths expressive of emotions;relationship stability;stable living environment;spiritual/Spiritism support;family/social support;independent living skills;positive vocational history;interests/hobbies   Patient Vulnerabilities traumatic event;poor impulse control   Interprofessional Rounds   Summary Pt had a good evening yesterday. Pt has displayed poor boundaries and intrusive behaviors on the unit. Pt remains on SIO.   Participants CTC;nursing;OT;psychiatrist;other (see comments)   Behavioral Team Discussion   Participants Blaine Seo MD; Renetta Frias, RN; Kaylan Petersen, [facility]; Austin Ugalde, LMFT, ATR-BC; Emerald Whatley, OTR/L; NP student   Progress Pt slowly progresses toward his goals. Pt continues to show delusional thought content and endorses auditory hallucinations. Further stabilization needed.   Anticipated length of stay 5-7 days   Continued Stay Criteria/Rationale Pt continues to show delusional thought content and endorses auditory hallucinations. Further stabilization needed.   Medical/Physical No issues noted   Precautions Sexual and suicide precautions   Plan Multidisciplinary team evaluation, medication management, care coordination   Rationale for change in precautions or plan N/A   Safety Plan Per unit protocol; CTC therapist completed with pt on the unit.   Anticipated Discharge Disposition home or self-care     PRECAUTIONS AND SAFETY    Behavioral Orders   Procedures    Code 1 - Restrict to Unit    Routine Programming     As clinically indicated    Sexual precautions    Status 15     Every 15 minutes.    Status Individual Observation     Patient SIO status reviewed with team/RN.  Please also refer to RN/team documentation for add'l detail.    -SIO staff to monitor following which have contributed to patient being on SIO:  Severe intrusiveness, agitation, paranoia  -Possible interventions SIO staff could  use to support patient's treatment progress:  Offering prn meds, redirections, suggestion to use coping skills  -When following observed, team will review discontinuation of SIO:  Decreased psychotic symptoms, more predictable behavior, can contract for safety     Order Specific Question:   CONTINUOUS 24 hours / day     Answer:   Other     Order Specific Question:   Specify distance     Answer:   10     Order Specific Question:   Indications for SIO     Answer:   Severe intrusiveness    Suicide precautions: Suicide Risk: HIGH; Clinical rationale to override score: modification to the care environment, lack of access to a plan for self-harm, Collateral information supporting Suicidal/self-harm behaviors     Patients on Suicide Precautions should have a Combination Diet ordered that includes a Diet selection(s) AND a Behavioral Tray selection for Safe Tray - with utensils, or Safe Tray - NO utensils       Order Specific Question:   Suicide Risk     Answer:   HIGH     Order Specific Question:   Clinical rationale to override score:     Answer:   modification to the care environment     Order Specific Question:   Clinical rationale to override score:     Answer:   lack of access to a plan for self-harm     Order Specific Question:   Clinical rationale to override score:     Answer:   Collateral information supporting Suicidal/self-harm behaviors       Safety  Safety WDL: WDL  Patient Location: [facility]  Observed Behavior: pacing, calm  Observed Behavior (Comment): pacing the hallway  Safety Measures: clinical history reviewed, safety rounds completed  Diversional Activity: journaling, music  De-Escalation Techniques: appropriate behavior reinforced, diversional activity encouraged  Suicidality: Status 15, SIO (Status Individual Observation)  (NOTE - order will specify distance  Withdrawal: monitor withdrawal process  Seizure precautions: clutter free environment  Assault: status 15, private room, behavioral  scrubs (pajamas), minimal furniture in room, minimal personal belongings in room  Elopement Assessment: Statements about wanting to leave  Elopement Interventions: status 15, no shoes  Sexual: status 15

## 2024-06-20 NOTE — PLAN OF CARE
BEH IP Unit Acuity Rating Score (UARS)  Patient is given one point for every criteria they meet.    CRITERIA SCORING   On a 72 hour hold, court hold, committed, stay of commitment, or revocation. 0/1    Patient LOS on BEH unit exceeds 20 days. 1/1  LOS: 22   Patient under guardianship, 55+, otherwise medically complex, or under age 11. 0/1   Suicide ideation without relief of precipitating factors. 0/1   Current plan for suicide. 0/1   Current plan for homicide. 0/1   Imminent risk or actual attempt to seriously harm another without relief of factors precipitating the attempt. 0/1   Severe dysfunction in daily living (ex: complete neglect for self care, extreme disruption in vegetative function, extreme deterioration in social interactions). 1/1   Recent (last 7 days) or current physical aggression in the ED or on unit. 0/1   Restraints or seclusion episode in past 72 hours. 0/1   Recent (last 7 days) or current verbal aggression, agitation, yelling, etc., while in the ED or unit. 0/1   Active psychosis. 1/1   Need for constant or near constant redirection (from leaving, from others, etc).  1/1   Intrusive or disruptive behaviors. 0/1   Patient requires 3 or more hours of individualized nursing care per 8-hour shift (i.e. for ADLs, meds, therapeutic interventions). 0   TOTAL 4

## 2024-06-21 PROCEDURE — 250N000013 HC RX MED GY IP 250 OP 250 PS 637: Performed by: CLINICAL NURSE SPECIALIST

## 2024-06-21 PROCEDURE — 99233 SBSQ HOSP IP/OBS HIGH 50: CPT | Performed by: PSYCHIATRY & NEUROLOGY

## 2024-06-21 PROCEDURE — 250N000013 HC RX MED GY IP 250 OP 250 PS 637: Performed by: PSYCHIATRY & NEUROLOGY

## 2024-06-21 PROCEDURE — 124N000002 HC R&B MH UMMC

## 2024-06-21 RX ADMIN — FLUTICASONE PROPIONATE 2 SPRAY: 50 SPRAY, METERED NASAL at 08:54

## 2024-06-21 RX ADMIN — RISPERIDONE 2 MG: 2 TABLET ORAL at 08:53

## 2024-06-21 RX ADMIN — GABAPENTIN 600 MG: 300 CAPSULE ORAL at 21:12

## 2024-06-21 RX ADMIN — DIVALPROEX SODIUM 500 MG: 500 TABLET, FILM COATED, EXTENDED RELEASE ORAL at 08:48

## 2024-06-21 RX ADMIN — HYDROXYZINE HYDROCHLORIDE 50 MG: 50 TABLET, FILM COATED ORAL at 17:16

## 2024-06-21 RX ADMIN — RISPERIDONE 4 MG: 2 TABLET, FILM COATED ORAL at 21:12

## 2024-06-21 RX ADMIN — Medication 7.5 MG: at 21:12

## 2024-06-21 RX ADMIN — LORAZEPAM 1 MG: 1 TABLET ORAL at 03:11

## 2024-06-21 RX ADMIN — DIVALPROEX SODIUM 1500 MG: 500 TABLET, FILM COATED, EXTENDED RELEASE ORAL at 21:11

## 2024-06-21 RX ADMIN — IPRATROPIUM BROMIDE 2 SPRAY: 42 SPRAY, METERED NASAL at 11:54

## 2024-06-21 RX ADMIN — Medication 7.5 MG: at 08:53

## 2024-06-21 RX ADMIN — GABAPENTIN 300 MG: 300 CAPSULE ORAL at 08:48

## 2024-06-21 RX ADMIN — OLANZAPINE 10 MG: 10 TABLET, ORALLY DISINTEGRATING ORAL at 18:23

## 2024-06-21 RX ADMIN — Medication 1 TABLET: at 08:53

## 2024-06-21 RX ADMIN — IPRATROPIUM BROMIDE 2 SPRAY: 42 SPRAY, METERED NASAL at 08:54

## 2024-06-21 ASSESSMENT — ACTIVITIES OF DAILY LIVING (ADL)
ADLS_ACUITY_SCORE: 54
ADLS_ACUITY_SCORE: 42
HYGIENE/GROOMING: HANDWASHING;SHOWER;INDEPENDENT
ADLS_ACUITY_SCORE: 54
DRESS: SCRUBS (BEHAVIORAL HEALTH);INDEPENDENT
ADLS_ACUITY_SCORE: 42
ADLS_ACUITY_SCORE: 42
ADLS_ACUITY_SCORE: 54
HYGIENE/GROOMING: INDEPENDENT
ADLS_ACUITY_SCORE: 54
ADLS_ACUITY_SCORE: 54
ORAL_HYGIENE: INDEPENDENT
ADLS_ACUITY_SCORE: 54
ADLS_ACUITY_SCORE: 54
ADLS_ACUITY_SCORE: 42
ADLS_ACUITY_SCORE: 42
ORAL_HYGIENE: INDEPENDENT
ADLS_ACUITY_SCORE: 42
ADLS_ACUITY_SCORE: 54
ADLS_ACUITY_SCORE: 42
DRESS: INDEPENDENT
ADLS_ACUITY_SCORE: 54
ADLS_ACUITY_SCORE: 42
ADLS_ACUITY_SCORE: 42
ADLS_ACUITY_SCORE: 54
ADLS_ACUITY_SCORE: 42
LAUNDRY: UNABLE TO COMPLETE
LAUNDRY: WITH SUPERVISION
ADLS_ACUITY_SCORE: 42

## 2024-06-21 NOTE — PLAN OF CARE
Team Note Due:  Thursday     Assessment/Intervention/Current Symtoms and Care Coordination:  Chart review and met with team, discussed pt progress, symptomology, and response to treatment. Discussed the discharge plan and any potential impediments to discharge.    Discharge Plan or Goal:  Current plan is to stabilize symptoms and develop safe disposition plan. Following hospitalization, plan is for pt to return home with family with outpatient supports.      Barriers to Discharge:  Currently presents with symptoms of psychosis. Medication management ongoing.      Referral Status:  Referral needs pending as patient stabilizes     Legal Status:  Voluntary     Contacts:  Medication Management:  Name/Organization: Donta Bob MD  [facility] on file  Address: [address] [place] [address], [place] [address]  Phone: [phone]    Therapist:  Name/Organization: [facility]   Phone: [phone]     Family Contacts:  Name/Relationship: Flakita Andrews  Wife - [facility] on file  Phone: [phone]      Upcoming Meetings and Dates/Important Information and next steps:  - Check-in with pt's wife weekly.

## 2024-06-21 NOTE — PLAN OF CARE
"Goal Outcome Evaluation:    Pt spent most of the shift pacing the hallways with SIO staff listening to music on headphones. Pt continues to present with tense, labile affect and calm mood upon approach. Pt continues on SIO 1:1 this shift for intrusiveness and needed multiple redirections. Pt was observed responding to AH this shift and when asked he said the voices are intense telling him to \"fuck fuck fuck.\" Pt was noted making racial inappropriate comments \" Elke, I wanna see lottie iyer's ass.\" Pt was offered PRN and redirected to his room. Pt denied SI/SIB/HI/AVH as well as anxiety and depression this shift. Pt appears showered this shift. Pt observed eating 100% of his meals and reported sleep as adequate. Pt was medication compliant this shift and denied any side effects. Pt had a visit this shift from his brother and he reported he enjoyed it. Staff will continue to monitor and support with current POC.     Plan of Care Reviewed With: patient          "

## 2024-06-21 NOTE — PROGRESS NOTES
"[facility], [place]   Psychiatric Progress Note        Interim History:     The patient's care was discussed with the treatment team during the daily team meeting and/or staff's chart notes were reviewed.  Staff report patient slept for about 5.75 hours last night. Was reported to be compliant with meds and care, admitted to hearing voices off on and feeling anxious when he was hearing them, see RN's note below:    \"Patient in bed at the start of the shift and  appeared to be comfortably asleep and breathing with ease for majority of the shift. Patient slept for 5.75 hours of the over night shift with no s/s of acute distress. SIO in place according to order. Pt woke up at 0300 and tolerated a snack of cereal with milk and two cheese sticks. Patient denied hearing voices although stated he was feeling anxious and fearful. Patient stated he is worried about waking up \"feeling rah and scared\". Pt offered a PRN for anxiety in which accepted. Patient experienced no safety events or escalations during the shift, no concerns reported or observed. Safety checks completed at least every 15 minutes. Patient required PRN Ativan 1 mg PO at 0311, see MAR. Patient has a no roommate order because they remain on sexual precautions and remain extremely paranoid. Nursing will continue to monitor.\"     Met with patient: We met with the patient in the conference room in presence of student and SIO staff. Basil was pretty cooperative and polite during our visit. He, first, told us that he had not been hearing voices lately. When we pressed him for more information, he told us that he was hearing \"voices of staff and random people from outside\". Admitted that voices were frequently of sexual nature and made him anxious and confused. He reported that he had been using headphones to muffle voices and recently had been carrying them on his neck instead of his head because voices got better. He had " difficulties identifying how much better, but, overall, appeared to be more rational and forthcoming with the information. He agreed that he needed to stay at this hospital and had no more questions or concerns. He didn't voice any paranoid ideas during our today visit and didn't appear to be so religiously preoccupied as he was before.    After visit with patient I had about 15 min long phone conversation with his wife Flakita. We discussed Basil's symptoms. Flakita confirmed that Basil looked better than before. She agreed with my assessment that he still has room for improvement and not ready for discharge. We discussed in length outpatient psychiatrist Dr. Bob recommendations and recent med changes. Flakita said that she was thankful for my call and didn't have additional questions or concerns.         Medications:     Current Facility-Administered Medications   Medication Dose Route Frequency Provider Last Rate Last Admin    divalproex sodium extended-release (DEPAKOTE ER) 24 hr tablet 1,500 mg  1,500 mg Oral At Bedtime Blaine Seo MD   1,500 mg at 06/20/24 2112    divalproex sodium extended-release (DEPAKOTE ER) 24 hr tablet 500 mg  500 mg Oral Daily Haroon Parker APRN CNP   500 mg at 06/21/24 0848    fluticasone (FLONASE) 50 MCG/ACT spray 2 spray  2 spray Both Nostrils Daily Haroon Parker APRN CNP   2 spray at 06/21/24 0854    gabapentin (NEURONTIN) capsule 300 mg  300 mg Oral Daily Haroon Parker APRN CNP   300 mg at 06/21/24 0848    gabapentin (NEURONTIN) capsule 600 mg  600 mg Oral At Bedtime Haroon Parker APRN CNP   600 mg at 06/20/24 2113    haloperidol (HALDOL) half-tab 7.5 mg  7.5 mg Oral BID Blaine Seo MD   7.5 mg at 06/21/24 0853    ipratropium (ATROVENT) 0.06 % spray 2 spray  2 spray Both Nostrils 4x Daily Haroon Parker APRN CNP   2 spray at 06/21/24 1154    multivitamin w/minerals (THERA-VIT-M) tablet 1 tablet  1 tablet Oral Daily Haroon Parker APRN CNP    "1 tablet at 06/21/24 0853    risperiDONE (risperDAL) tablet 2 mg  2 mg Oral Blaine Demarco MD   2 mg at 06/21/24 0853    risperiDONE (risperDAL) tablet 4 mg  4 mg Oral At Bedtime Blaine Seo MD   4 mg at 06/20/24 2113          Allergies:     Allergies   Allergen Reactions    Seasonal Allergies Other (See Comments)     Congestion. Managed with meds.          Labs:   No results found for this or any previous visit (from the past 24 hour(s)).       Psychiatric Examination:     /72   Pulse 78   Temp 97  F (36.1  C) (Temporal)   Resp 18   Ht 1.854 m (6' 1\")   Wt 100.5 kg (221 lb 8 oz)   SpO2 98%   BMI 29.22 kg/m    Weight is 221 lbs 8 oz  Body mass index is 29.22 kg/m .    Orthostatic Vitals         Most Recent      Sitting Orthostatic /81 06/20 0830    Sitting Orthostatic Pulse (bpm) 73 06/20 0830    Standing Orthostatic /72 06/21 1100    Standing Orthostatic Pulse (bpm) 101 06/21 1100           Appearance: awake, alert, dressed in hospital scrubs, and appeared as age stated  Attitude: more cooperative  Eye Contact:  fair  Mood:  calmer, less irritable  Affect:  guarded and restricted range  Speech:  clear, coherent  Psychomotor Behavior:  no evidence of tardive dyskinesia, dystonia, or tics  Throught Process: less disorganized and illogical  Associations:  loosening of associations present  Thought Content:  no evidence of suicidal ideation or homicidal ideation, auditory hallucinations present, and no visual hallucinations present, delusions present. Pt appears to be occasionally responding to internal stimuli.  Insight:  partial, improving  Judgement:  poor, improving  Oriented to:  time, person, and place  Attention Span and Concentration:  better  Recent and Remote Memory:  fair    Clinical Global Impressions  First: 6/4 6/19/2024       Most recent:            Precautions:     Behavioral Orders   Procedures    Code 1 - Restrict to Unit    Routine Programming     As " clinically indicated    Sexual precautions    Status 15     Every 15 minutes.    Status Individual Observation     Patient SIO status reviewed with team/RN.  Please also refer to RN/team documentation for add'l detail.    -SIO staff to monitor following which have contributed to patient being on SIO:  Severe intrusiveness, agitation, paranoia  -Possible interventions SIO staff could use to support patient's treatment progress:  Offering prn meds, redirections, suggestion to use coping skills  -When following observed, team will review discontinuation of SIO:  Decreased psychotic symptoms, more predictable behavior, can contract for safety     Order Specific Question:   CONTINUOUS 24 hours / day     Answer:   Other     Order Specific Question:   Specify distance     Answer:   10     Order Specific Question:   Indications for SIO     Answer:   Severe intrusiveness    Suicide precautions: Suicide Risk: HIGH; Clinical rationale to override score: modification to the care environment, lack of access to a plan for self-harm, Collateral information supporting Suicidal/self-harm behaviors     Patients on Suicide Precautions should have a Combination Diet ordered that includes a Diet selection(s) AND a Behavioral Tray selection for Safe Tray - with utensils, or Safe Tray - NO utensils       Order Specific Question:   Suicide Risk     Answer:   HIGH     Order Specific Question:   Clinical rationale to override score:     Answer:   modification to the care environment     Order Specific Question:   Clinical rationale to override score:     Answer:   lack of access to a plan for self-harm     Order Specific Question:   Clinical rationale to override score:     Answer:   Collateral information supporting Suicidal/self-harm behaviors          DIagnoses:     Psychosis, unspecified psychosis type (H)  Severe manic bipolar I disorder with psychotic features (H)  Schizoaffective disorder, bipolar type          Plan:     Basil brewer  still experiencing auditory hallucinations and paranoia. Depakote and Ripsperdal were increased on 6/12/24. Risperdal was again increased 6/14/24. He is voluntary. Valproic acid level on 6/17/2024 was 93.4. Haldol started 6/18/24. We increased dose of Haldol 6/20/2024. If Haldol works, will plan to gradually taper off Risperdal. No medication changes today 6/21/2024. Continue sexual precautions, suicide precautions, will block his room, SIO. Will continue to provide support and structure.       I was present with PA student who participated in the service and in the documentation of the note. I have verified the history and personally performed the physical exam and medical decision making. I agree with the assessment and plan of care as documented in the note.     Blaine Seo MD  [facility] Psychiatry      Total time spent was 51 minutes. Over 50% of times was spent counseling and coordination of care regarding coping skills, medication and discharge planning/phone conversation with wife.

## 2024-06-21 NOTE — PLAN OF CARE
Goal Outcome Evaluation:    Plan of Care Reviewed With: patient        Pt continues on SIO for intrusive behavior. Pt was medication compliant. Pt denies having any auditory hallucinations today. Pt was minimally social with peers. This afternoon  arrived to meet with pt and pt introduced himself as renard. He did this repeatedly with the . Pt did eventually identify self as bharti and met with . Pt did continue to express desire to change identity so that he might be discharged earlier. Pt denies anxiety/ pain discomfort.

## 2024-06-21 NOTE — PLAN OF CARE
"Patient in bed at the start of the shift and  appeared to be comfortably asleep and breathing with ease for majority of the shift. Patient slept for 5.75 hours of the over night shift with no s/s of acute distress. SIO in place according to order. Pt woke up at 0300 and tolerated a snack of cereal with milk and two cheese sticks. Patient denied hearing voices although stated he was feeling anxious and fearful. Patient stated he is worried about waking up \"feeling rah and scared\". Pt offered a PRN for anxiety in which accepted. Patient experienced no safety events or escalations during the shift, no concerns reported or observed. Safety checks completed at least every 15 minutes. Patient required PRN Ativan 1 mg PO at 0311, see MAR. Patient has a no roommate order because they remain on sexual precautions and remain extremely paranoid. Nursing will continue to monitor.    "

## 2024-06-21 NOTE — PLAN OF CARE
BEH IP Unit Acuity Rating Score (UARS)  Patient is given one point for every criteria they meet.    CRITERIA SCORING   On a 72 hour hold, court hold, committed, stay of commitment, or revocation. 0/1    Patient LOS on BEH unit exceeds 20 days. 1/1  LOS: 23   Patient under guardianship, 55+, otherwise medically complex, or under age 11. 0/1   Suicide ideation without relief of precipitating factors. 0/1   Current plan for suicide. 0/1   Current plan for homicide. 0/1   Imminent risk or actual attempt to seriously harm another without relief of factors precipitating the attempt. 0/1   Severe dysfunction in daily living (ex: complete neglect for self care, extreme disruption in vegetative function, extreme deterioration in social interactions). 1/1   Recent (last 7 days) or current physical aggression in the ED or on unit. 0/1   Restraints or seclusion episode in past 72 hours. 0/1   Recent (last 7 days) or current verbal aggression, agitation, yelling, etc., while in the ED or unit. 0/1   Active psychosis. 1/1   Need for constant or near constant redirection (from leaving, from others, etc).  1/1   Intrusive or disruptive behaviors. 0/1   Patient requires 3 or more hours of individualized nursing care per 8-hour shift (i.e. for ADLs, meds, therapeutic interventions). 0   TOTAL 4

## 2024-06-22 PROCEDURE — H2032 ACTIVITY THERAPY, PER 15 MIN: HCPCS

## 2024-06-22 PROCEDURE — 250N000013 HC RX MED GY IP 250 OP 250 PS 637: Performed by: CLINICAL NURSE SPECIALIST

## 2024-06-22 PROCEDURE — 250N000013 HC RX MED GY IP 250 OP 250 PS 637: Performed by: PSYCHIATRY & NEUROLOGY

## 2024-06-22 PROCEDURE — 124N000002 HC R&B MH UMMC

## 2024-06-22 RX ADMIN — GABAPENTIN 300 MG: 300 CAPSULE ORAL at 08:42

## 2024-06-22 RX ADMIN — Medication 7.5 MG: at 08:41

## 2024-06-22 RX ADMIN — DIVALPROEX SODIUM 500 MG: 500 TABLET, FILM COATED, EXTENDED RELEASE ORAL at 08:42

## 2024-06-22 RX ADMIN — DIVALPROEX SODIUM 1500 MG: 500 TABLET, FILM COATED, EXTENDED RELEASE ORAL at 19:50

## 2024-06-22 RX ADMIN — FLUTICASONE PROPIONATE 2 SPRAY: 50 SPRAY, METERED NASAL at 08:41

## 2024-06-22 RX ADMIN — RISPERIDONE 4 MG: 2 TABLET, FILM COATED ORAL at 19:50

## 2024-06-22 RX ADMIN — IPRATROPIUM BROMIDE 2 SPRAY: 42 SPRAY, METERED NASAL at 13:33

## 2024-06-22 RX ADMIN — OLANZAPINE 10 MG: 10 TABLET, ORALLY DISINTEGRATING ORAL at 10:31

## 2024-06-22 RX ADMIN — Medication 1 TABLET: at 08:42

## 2024-06-22 RX ADMIN — GABAPENTIN 600 MG: 300 CAPSULE ORAL at 19:50

## 2024-06-22 RX ADMIN — IPRATROPIUM BROMIDE 2 SPRAY: 42 SPRAY, METERED NASAL at 19:50

## 2024-06-22 RX ADMIN — Medication 7.5 MG: at 19:49

## 2024-06-22 RX ADMIN — HALOPERIDOL 5 MG: 5 TABLET ORAL at 14:18

## 2024-06-22 RX ADMIN — RISPERIDONE 2 MG: 2 TABLET ORAL at 08:42

## 2024-06-22 RX ADMIN — IPRATROPIUM BROMIDE 2 SPRAY: 42 SPRAY, METERED NASAL at 08:41

## 2024-06-22 ASSESSMENT — ACTIVITIES OF DAILY LIVING (ADL)
ADLS_ACUITY_SCORE: 54
LAUNDRY: WITH SUPERVISION
ADLS_ACUITY_SCORE: 54
DRESS: INDEPENDENT
ADLS_ACUITY_SCORE: 54
HYGIENE/GROOMING: INDEPENDENT
ADLS_ACUITY_SCORE: 54
ORAL_HYGIENE: INDEPENDENT

## 2024-06-22 NOTE — PLAN OF CARE
Goal Outcome Evaluation:    Pt spent most of the shift pacing the hallways with SIO staff listening to music on headphones. Pt continues to present with labile affect and anxious and depressed mood upon approach. Pt continues on SIO 1:1 this shift for intrusiveness. Pt continues to endorse some AH this shift and requested some PRNs. Pt continues to endorse some anxiety and depression that he attributes to noise from one female peer. Pt requested to transfer units this shift because the one female peer triggering his voices. Pt was reminded to voice the concerns to attending on Monday and he was receptive with the plan.  Pt denied SI/SIB/HI/VH. Pt appears fairly kempt this shift. Pt observed eating 100% of his meals and reported sleep as adequate. Pt was medication compliant this shift and denied any side effects. Pt had a visit this shift from his mom and he reported he enjoyed it. Staff will continue to monitor and support with current POC.     PRNs: Hydroxyzine and Zyprexa     Plan of Care Reviewed With: patient

## 2024-06-22 NOTE — PLAN OF CARE
"Goal Outcome Evaluation:    Plan of Care Reviewed With: (P) patient        Pt spent more than 50% of the shift in common areas of the unit. Pt had blunted affect. Pt reports auditory hallucinations. Pt c/o voice \"A\" and voice \"E\" and reported they were really bothering him. Pt requested and received Zyprexa 10 mg zydis prn for voices mid morning.  Pt reported that this was effective in reducing volume and intensity of voice. Pt reported voices were manageable when using headphones for distraction. Pt took a nap around 1130 and slept til lunch. Pt reported voice A +E were still there after lunch and was tired of using headphones so requested and received haldol 5 mg po prn. Pt continues on SIO for intrusive behavior.  Pt received visit from his wife this afternoon. Pt reported visit went well.            "

## 2024-06-22 NOTE — PLAN OF CARE
"                                                        Night Nursing Note  6/21/24 - 6/22/24        Basil was observed awake in bed at 0030 during q15 mins safety round checks.  Monitored on 1:1 for severe intrusiveness and 15 mins for safety.  Offered no complaints.  Appeared to sleep majority of night without difficulty.  Continue to monitor, offer support and reassurance per care plan.    0715:  Basil became verbally agitated with another male peer when accused   of calling another male peer a \"bitch.\" He did apologize to the male peer.    Slept 7 hrs.                      "

## 2024-06-23 PROCEDURE — 250N000013 HC RX MED GY IP 250 OP 250 PS 637: Performed by: PSYCHIATRY & NEUROLOGY

## 2024-06-23 PROCEDURE — 124N000002 HC R&B MH UMMC

## 2024-06-23 PROCEDURE — 250N000013 HC RX MED GY IP 250 OP 250 PS 637: Performed by: CLINICAL NURSE SPECIALIST

## 2024-06-23 RX ADMIN — Medication 7.5 MG: at 21:16

## 2024-06-23 RX ADMIN — Medication 7.5 MG: at 08:15

## 2024-06-23 RX ADMIN — Medication 1 TABLET: at 08:14

## 2024-06-23 RX ADMIN — IPRATROPIUM BROMIDE 2 SPRAY: 42 SPRAY, METERED NASAL at 11:49

## 2024-06-23 RX ADMIN — FLUTICASONE PROPIONATE 2 SPRAY: 50 SPRAY, METERED NASAL at 08:15

## 2024-06-23 RX ADMIN — GABAPENTIN 300 MG: 300 CAPSULE ORAL at 08:15

## 2024-06-23 RX ADMIN — IPRATROPIUM BROMIDE 2 SPRAY: 42 SPRAY, METERED NASAL at 08:16

## 2024-06-23 RX ADMIN — IPRATROPIUM BROMIDE 2 SPRAY: 42 SPRAY, METERED NASAL at 21:17

## 2024-06-23 RX ADMIN — DIVALPROEX SODIUM 500 MG: 500 TABLET, FILM COATED, EXTENDED RELEASE ORAL at 08:14

## 2024-06-23 RX ADMIN — OLANZAPINE 10 MG: 10 TABLET, ORALLY DISINTEGRATING ORAL at 18:20

## 2024-06-23 RX ADMIN — GABAPENTIN 600 MG: 300 CAPSULE ORAL at 21:16

## 2024-06-23 RX ADMIN — RISPERIDONE 2 MG: 2 TABLET ORAL at 08:15

## 2024-06-23 RX ADMIN — RISPERIDONE 4 MG: 2 TABLET, FILM COATED ORAL at 21:17

## 2024-06-23 RX ADMIN — DIVALPROEX SODIUM 1500 MG: 500 TABLET, FILM COATED, EXTENDED RELEASE ORAL at 21:16

## 2024-06-23 ASSESSMENT — ACTIVITIES OF DAILY LIVING (ADL)
ADLS_ACUITY_SCORE: 54
DRESS: STREET CLOTHES;INDEPENDENT
ADLS_ACUITY_SCORE: 54
LAUNDRY: UNABLE TO COMPLETE
ADLS_ACUITY_SCORE: 54
HYGIENE/GROOMING: INDEPENDENT
HYGIENE/GROOMING: HANDWASHING;SHOWER;INDEPENDENT
ADLS_ACUITY_SCORE: 54
ORAL_HYGIENE: INDEPENDENT
ADLS_ACUITY_SCORE: 54
ADLS_ACUITY_SCORE: 54
ORAL_HYGIENE: INDEPENDENT
ADLS_ACUITY_SCORE: 54
ADLS_ACUITY_SCORE: 54
DRESS: INDEPENDENT
LAUNDRY: WITH SUPERVISION
ADLS_ACUITY_SCORE: 54

## 2024-06-23 NOTE — PLAN OF CARE
Goal Outcome Evaluation:    Plan of Care Reviewed With: patient        Pt spent more than 50% of the shift in common areas of the unit. Pt was medication compliant . Pt continues on SIO for intrusive behavior and intrusive sexually intrusive thoughts impulses. Pt was cooperative with his SIO staff.  Pt continues to report auditory hallucinations . Pt identifies hearing voices of select female pt's in his head. At times pt feels sexual feelings about voices and this can be disturbing. Pt reports voices generally softer today and at time doesn't hear them at all. Pt has full range affect and pleasant on approach. Pt expressed to writer that he  feels some suspicions or paranoia when SIO staff appear that they might be Mormon as feels there may be a spiritual war going on. Pt states is very [employer]. Explained the unit is like star trek, we are all different but work as 1 team for you. Pt was open about delusional thoughts and help seeking and available responsive to support.  Wife came to visit and visit appeared to go well . Pt has goat to get off SIO tomorrow.

## 2024-06-23 NOTE — PLAN OF CARE
Goal Outcome Evaluation:    Pt spent most of the shift pacing the hallways with SIO staff listening to music on headphones. Pt attended and participated in group this shift. Pt presented with bright affect and anxious and depressed mood upon approach. Pt continues on SIO 1:1 this shift for intrusiveness but did not need any redirections this shift. Pt continues to endorse some AH this shift but reported they were less intense and frequent and easy to distract. Pt reported the voices A,E and W represent voices of fellow peer females in the unit. Pt continues to endorse some anxiety and depression of 4/10 that he attributes to noise from the female peers. Pt denied SI/SIB/HI/VH. Pt appears fairly kempt this shift. Pt observed eating 100% of his meals and reported sleep as adequate. Pt was medication compliant this shift and denied any side effects. Pt had a face time call with his son tonight and he reported he enjoyed it. Staff will continue to monitor and support with current POC.       Plan of Care Reviewed With: patient

## 2024-06-23 NOTE — PLAN OF CARE
Goal Outcome Evaluation:      Rehab Group    Start time: 1700  End time: 1800  Patient time total: 45 minutes    attended full group    #6 attended   Group Type: art   Group Topic Covered: cognitive therapy       Group Session Detail:  Art Therapy directive was to create an inside/outside drawing to express aspects of self and create a personal self narrative.  Goals of directive: to identify personal strengths and goals, emotional expression, emotional regulation.     Patient Response/Contribution:  cooperative with task       Patient Detail:    Pt was an engaged participant, at times made delusional and odd statements. Pt finished his artwork and briefly verbally processed with author and group.  Further assessment is needed.        Group Therapy (47991)    Patient Active Problem List   Diagnosis    Marijuana dependence (H)    Alcohol abuse    Bipolar disorder, current episode manic severe with psychotic features (H)    Chantal (H)    Schizoaffective disorder, bipolar type (H)    Psychosis, unspecified psychosis type (H)

## 2024-06-23 NOTE — PLAN OF CARE
Night Nursing Note   6/22/24 - 6/23/24        Basil was in bed at beginning of shift and appeared asleep.  Monitored on 1:1 @ 10 ft for severe intrusiveness and q15 mins for safety.  Appeared to sleep majority of night without difficulty.  Continue to monitor, offer support and reassurance per care plan.    Slept 6.75 hrs.

## 2024-06-24 PROCEDURE — 124N000002 HC R&B MH UMMC

## 2024-06-24 PROCEDURE — H2032 ACTIVITY THERAPY, PER 15 MIN: HCPCS

## 2024-06-24 PROCEDURE — 250N000013 HC RX MED GY IP 250 OP 250 PS 637: Performed by: CLINICAL NURSE SPECIALIST

## 2024-06-24 PROCEDURE — 99232 SBSQ HOSP IP/OBS MODERATE 35: CPT | Performed by: PSYCHIATRY & NEUROLOGY

## 2024-06-24 PROCEDURE — 250N000013 HC RX MED GY IP 250 OP 250 PS 637: Performed by: PSYCHIATRY & NEUROLOGY

## 2024-06-24 RX ORDER — BENZTROPINE MESYLATE 1 MG/1
1 TABLET ORAL 2 TIMES DAILY
Status: DISCONTINUED | OUTPATIENT
Start: 2024-06-24 | End: 2024-07-03 | Stop reason: HOSPADM

## 2024-06-24 RX ORDER — HALOPERIDOL 10 MG/1
10 TABLET ORAL 2 TIMES DAILY
Status: DISCONTINUED | OUTPATIENT
Start: 2024-06-24 | End: 2024-07-03 | Stop reason: HOSPADM

## 2024-06-24 RX ADMIN — IPRATROPIUM BROMIDE 2 SPRAY: 42 SPRAY, METERED NASAL at 11:48

## 2024-06-24 RX ADMIN — Medication 7.5 MG: at 08:53

## 2024-06-24 RX ADMIN — IPRATROPIUM BROMIDE 2 SPRAY: 42 SPRAY, METERED NASAL at 16:18

## 2024-06-24 RX ADMIN — BENZTROPINE MESYLATE 1 MG: 1 TABLET ORAL at 19:36

## 2024-06-24 RX ADMIN — HYDROXYZINE HYDROCHLORIDE 25 MG: 25 TABLET ORAL at 18:02

## 2024-06-24 RX ADMIN — BENZTROPINE MESYLATE 1 MG: 1 TABLET ORAL at 11:48

## 2024-06-24 RX ADMIN — FLUTICASONE PROPIONATE 2 SPRAY: 50 SPRAY, METERED NASAL at 08:53

## 2024-06-24 RX ADMIN — IPRATROPIUM BROMIDE 2 SPRAY: 42 SPRAY, METERED NASAL at 19:37

## 2024-06-24 RX ADMIN — RISPERIDONE 4 MG: 2 TABLET, FILM COATED ORAL at 20:34

## 2024-06-24 RX ADMIN — GABAPENTIN 600 MG: 300 CAPSULE ORAL at 19:36

## 2024-06-24 RX ADMIN — GABAPENTIN 300 MG: 300 CAPSULE ORAL at 08:54

## 2024-06-24 RX ADMIN — IPRATROPIUM BROMIDE 2 SPRAY: 42 SPRAY, METERED NASAL at 08:53

## 2024-06-24 RX ADMIN — Medication 1 TABLET: at 08:53

## 2024-06-24 RX ADMIN — DIVALPROEX SODIUM 500 MG: 500 TABLET, FILM COATED, EXTENDED RELEASE ORAL at 08:53

## 2024-06-24 RX ADMIN — HALOPERIDOL 10 MG: 10 TABLET ORAL at 19:36

## 2024-06-24 RX ADMIN — DIVALPROEX SODIUM 1500 MG: 500 TABLET, FILM COATED, EXTENDED RELEASE ORAL at 19:36

## 2024-06-24 RX ADMIN — RISPERIDONE 2 MG: 2 TABLET ORAL at 08:53

## 2024-06-24 ASSESSMENT — ACTIVITIES OF DAILY LIVING (ADL)
ADLS_ACUITY_SCORE: 54
LAUNDRY: UNABLE TO COMPLETE
ADLS_ACUITY_SCORE: 54
ADLS_ACUITY_SCORE: 54
DRESS: SCRUBS (BEHAVIORAL HEALTH);INDEPENDENT
ADLS_ACUITY_SCORE: 54
ORAL_HYGIENE: INDEPENDENT
ADLS_ACUITY_SCORE: 54
HYGIENE/GROOMING: HANDWASHING;SHOWER;INDEPENDENT
ADLS_ACUITY_SCORE: 54
ADLS_ACUITY_SCORE: 54

## 2024-06-24 NOTE — PLAN OF CARE
"Goal Outcome Evaluation:    Pt spent most of the shift pacing the hallways with SIO staff listening to music on headphones and at times watching TV. Pt attended and participated in group this shift. Pt presented with bright affect and anxious and depressed mood upon approach. Pt continues on SIO 1:1 this shift for intrusiveness and needed some redirections this shift. Pt continues to endorse some AH this shift and they were intense and bothering him and requested PRN. Pt reported the voices A,E and W represent voices of fellow peer females in the unit. Pt continues to endorse some anxiety and depression of 4/10 that he attributes to noise from the female peers. Pt was noted to be dis-respective to female staff this shift by making comments \" Fuck you bitch, my balls are itching.\" Writer asked pt why he could make such comments towards female staff and he reported the voices told him to say so. Pt apologized to the female staff and PRN was administered. Pt denied SI/SIB/HI/VH. Pt showered this shift. Pt observed eating 100% of his meals and reported sleep as adequate. Pt was medication compliant this shift and denied any side effects. Pt had a face time call with his son tonight and he reported he enjoyed it. Staff will continue to monitor and support with current POC.      Plan of Care Reviewed With: patient          "

## 2024-06-24 NOTE — PLAN OF CARE
Night Nursing Note  6/23/24 - 6/24/24          Basil was in bed at beginning of shift and appeared asleep.  Monitored on 1:1 @ 10 ft for severe intrusiveness and q15 mins for safety.  Complained of door being opened during safety round checks.  Will monitor through blinds until he is sleeping.  Appeared to sleep majority of night without difficulty.  Continue to monitor, offer support and reassurance per care plan.    Slept 6.25 hrs.

## 2024-06-24 NOTE — PROGRESS NOTES
[facility], [place]   Psychiatric Progress Note        Interim History:     The patient's care was discussed with the treatment team during the daily team meeting and/or staff's chart notes were reviewed.  Staff report patient slept for about 6.25 hours last night. He was reported to be compliant with meds and care, still in need of SIO and No roommate order due to psychotic/manic symptoms and needing frequent redirections. Patient was reported over weekend to insult male peer and almost got into a fight with another peer who took a side of the above peer. They were  and Basil later apologized. He was visited over weekend by his wife, it was reported that visit went OK.    Met with patient: he was seen in the conference room in presence of SIO staff. Reported that his mood was good. When asked to clarify, told me that he still had Auditory hallucinations frequently of sexual nature telling him to do certain things, then, talking about experiencing periodical sexual excitement and thinking that everyone notices that. I thanked him for being open with us, suggested that it was unlikely that everyone was noticing, encouraged him to continue to report to us presence of voices. We discussed whether we could discontinue his night time SIO. Basil said that he was all for it. He denied med side effects and had no more questions or concerns.    Short time after our visit I was informed by RN that Basil inappropriate pat his female SIO staff.           Medications:     Current Facility-Administered Medications   Medication Dose Route Frequency Provider Last Rate Last Admin    benztropine (COGENTIN) tablet 1 mg  1 mg Oral BID Blaine Seo MD   1 mg at 06/24/24 1148    divalproex sodium extended-release (DEPAKOTE ER) 24 hr tablet 1,500 mg  1,500 mg Oral At Bedtime Blaine Seo MD   1,500 mg at 06/23/24 2116    divalproex sodium extended-release (DEPAKOTE ER) 24 hr  "tablet 500 mg  500 mg Oral Daily Haroon Parker APRN CNP   500 mg at 06/24/24 0853    fluticasone (FLONASE) 50 MCG/ACT spray 2 spray  2 spray Both Nostrils Daily KeithHaroon APRN CNP   2 spray at 06/24/24 0853    gabapentin (NEURONTIN) capsule 300 mg  300 mg Oral Daily ParkerHaroon APRN CNP   300 mg at 06/24/24 0854    gabapentin (NEURONTIN) capsule 600 mg  600 mg Oral At Bedtime ParkerHaroon APRN CNP   600 mg at 06/23/24 2116    haloperidol (HALDOL) tablet 10 mg  10 mg Oral BID Blaine Seo MD        ipratropium (ATROVENT) 0.06 % spray 2 spray  2 spray Both Nostrils 4x Daily Haroon Parker APRN CNP   2 spray at 06/24/24 1148    multivitamin w/minerals (THERA-VIT-M) tablet 1 tablet  1 tablet Oral Daily Haroon Parker APRN CNP   1 tablet at 06/24/24 0853    risperiDONE (risperDAL) tablet 2 mg  2 mg Oral QAM Blaine Seo MD   2 mg at 06/24/24 0853    risperiDONE (risperDAL) tablet 4 mg  4 mg Oral At Bedtime Blaine Seo MD   4 mg at 06/23/24 2117          Allergies:     Allergies   Allergen Reactions    Seasonal Allergies Other (See Comments)     Congestion. Managed with meds.          Labs:   No results found for this or any previous visit (from the past 24 hour(s)).       Psychiatric Examination:     /74   Pulse 85   Temp 98.1  F (36.7  C) (Oral)   Resp 18   Ht 1.854 m (6' 1\")   Wt 103.1 kg (227 lb 4.8 oz)   SpO2 97%   BMI 29.99 kg/m    Weight is 227 lbs 4.8 oz  Body mass index is 29.99 kg/m .    Orthostatic Vitals       None           Appearance: awake, alert, dressed in hospital scrubs, and appeared as age stated  Attitude: more cooperative  Eye Contact:  fair  Mood:  calmer, less irritable  Affect:  guarded and restricted range  Speech:  clear, coherent  Psychomotor Behavior:  no evidence of tardive dyskinesia, dystonia, or tics  Throught Process: less disorganized and illogical  Associations: less loose  Thought Content:  no evidence of suicidal " ideation or homicidal ideation, auditory hallucinations present, and no visual hallucinations present, delusions present. Pt appears to be occasionally responding to internal stimuli.  Insight:  partial, improving  Judgement:  poor, improving  Oriented to:  time, person, and place  Attention Span and Concentration:  better  Recent and Remote Memory:  fair    Clinical Global Impressions  First: 6/4 6/19/2024       Most recent:            Precautions:     Behavioral Orders   Procedures    Code 1 - Restrict to Unit    Routine Programming     As clinically indicated    Sexual precautions    Status 15     Every 15 minutes.    Status Individual Observation     Patient SIO status reviewed with team/RN.  Please also refer to RN/team documentation for add'l detail.  Male staff preferred, SIO during day time only.   -SIO staff to monitor following which have contributed to patient being on SIO:  Severe intrusiveness, agitation, paranoia  -Possible interventions SIO staff could use to support patient's treatment progress:  Offering prn meds, redirections, suggestion to use coping skills  -When following observed, team will review discontinuation of SIO:  Decreased psychotic symptoms, more predictable behavior, can contract for safety     Order Specific Question:   CONTINUOUS 24 hours / day     Answer:   Other     Order Specific Question:   Specify distance     Answer:   10     Order Specific Question:   Indications for SIO     Answer:   Severe intrusiveness    Suicide precautions: Suicide Risk: HIGH; Clinical rationale to override score: modification to the care environment, lack of access to a plan for self-harm, Collateral information supporting Suicidal/self-harm behaviors     Patients on Suicide Precautions should have a Combination Diet ordered that includes a Diet selection(s) AND a Behavioral Tray selection for Safe Tray - with utensils, or Safe Tray - NO utensils       Order Specific Question:   Suicide Risk      Answer:   HIGH     Order Specific Question:   Clinical rationale to override score:     Answer:   modification to the care environment     Order Specific Question:   Clinical rationale to override score:     Answer:   lack of access to a plan for self-harm     Order Specific Question:   Clinical rationale to override score:     Answer:   Collateral information supporting Suicidal/self-harm behaviors          DIagnoses:     Psychosis, unspecified psychosis type (H)  Severe manic bipolar I disorder with psychotic features (H)  Schizoaffective disorder, bipolar type          Plan:     Basil is still experiencing auditory hallucinations and paranoia. Depakote and Ripsperdal were increased on 6/12/24. Risperdal was again increased 6/14/24. He is voluntary. Valproic acid level on 6/17/2024 was 93.4. Haldol started 6/18/24. We increased dose of Haldol 6/20/2024. If Haldol works, will plan to gradually taper off Risperdal. Will add Cogentin on 6/24/2024. Continue sexual precautions, suicide precautions, will block his room, SIO with males only. Will continue to provide support and structure.       I was present with PA student who participated in the service and in the documentation of the note. I have verified the history and personally performed the physical exam and medical decision making. I agree with the assessment and plan of care as documented in the note.     Blaine Seo MD  [facility]      Total time spent was 38 minutes. Over 50% of times was spent counseling and coordination of care regarding coping skills, medication and discharge planning/phone conversation with wife.

## 2024-06-24 NOTE — PLAN OF CARE
BEH IP Unit Acuity Rating Score (UARS)  Patient is given one point for every criteria they meet.    CRITERIA SCORING   On a 72 hour hold, court hold, committed, stay of commitment, or revocation. 0/1    Patient LOS on BEH unit exceeds 20 days. 1/1  LOS: 26   Patient under guardianship, 55+, otherwise medically complex, or under age 11. 0/1   Suicide ideation without relief of precipitating factors. 0/1   Current plan for suicide. 0/1   Current plan for homicide. 0/1   Imminent risk or actual attempt to seriously harm another without relief of factors precipitating the attempt. 0/1   Severe dysfunction in daily living (ex: complete neglect for self care, extreme disruption in vegetative function, extreme deterioration in social interactions). 1/1   Recent (last 7 days) or current physical aggression in the ED or on unit. 0/1   Restraints or seclusion episode in past 72 hours. 0/1   Recent (last 7 days) or current verbal aggression, agitation, yelling, etc., while in the ED or unit. 0/1   Active psychosis. 1/1   Need for constant or near constant redirection (from leaving, from others, etc).  1/1   Intrusive or disruptive behaviors. 0/1   Patient requires 3 or more hours of individualized nursing care per 8-hour shift (i.e. for ADLs, meds, therapeutic interventions). 0   TOTAL 4

## 2024-06-24 NOTE — PLAN OF CARE
Team Note Due:  Thursday     Assessment/Intervention/Current Symtoms and Care Coordination:  Chart review and met with team, discussed pt progress, symptomology, and response to treatment. Discussed the discharge plan and any potential impediments to discharge.    Pt continues on 1:1.but there is some symptom improvement.    Discharge Plan or Goal:  Current plan is to stabilize symptoms and develop safe disposition plan. Following hospitalization, plan is for pt to return home with family with outpatient supports.      Barriers to Discharge:  Currently presents with symptoms of psychosis. Medication management ongoing.      Referral Status:  Referral needs pending as patient stabilizes     Legal Status:  Voluntary     Contacts:  Medication Management:  Name/Organization: Donta Bob MD  [facility] - [facility] on file  Address: [address] [place] [address], [facility] [address], [place] [address]  Phone: [phone]    Therapist:  Name/Organization: [facility]   Phone: [phone]     Family Contacts:  Name/Relationship: Flakita Andrews  Wife - NERY on file  Phone: [phone]      Upcoming Meetings and Dates/Important Information and next steps:  - Check-in with pt's wife weekly.

## 2024-06-24 NOTE — PLAN OF CARE
Goal Outcome Evaluation:    Plan of Care Reviewed With: patient        Pt continues on SIO for sexually inappropriate and intrusive behavior. Pt spent most of the morning in common areas of the unit. Pt reports that voices are much diminished today.  Pt reports voices are in the background and can't make out any words. Pt did not use headphones for distraction this shift as reported didn't need them. Pt has full range of affect. Female staff reported pt touched her on the butt this afternoon. Pt reported this was an accident and accidentally brushed hand against her. Pt was redirected and asked to be careful to know where hands are. Pt will have male only SIO. Pt has full range of affect. Pt was pleasant on approach and expressed gratitude for care.

## 2024-06-25 PROCEDURE — 250N000013 HC RX MED GY IP 250 OP 250 PS 637: Performed by: CLINICAL NURSE SPECIALIST

## 2024-06-25 PROCEDURE — 99232 SBSQ HOSP IP/OBS MODERATE 35: CPT | Performed by: PSYCHIATRY & NEUROLOGY

## 2024-06-25 PROCEDURE — 250N000013 HC RX MED GY IP 250 OP 250 PS 637: Performed by: PSYCHIATRY & NEUROLOGY

## 2024-06-25 PROCEDURE — 124N000002 HC R&B MH UMMC

## 2024-06-25 PROCEDURE — H2032 ACTIVITY THERAPY, PER 15 MIN: HCPCS

## 2024-06-25 RX ADMIN — FLUTICASONE PROPIONATE 2 SPRAY: 50 SPRAY, METERED NASAL at 08:33

## 2024-06-25 RX ADMIN — GABAPENTIN 600 MG: 300 CAPSULE ORAL at 20:05

## 2024-06-25 RX ADMIN — BENZTROPINE MESYLATE 1 MG: 1 TABLET ORAL at 20:04

## 2024-06-25 RX ADMIN — HALOPERIDOL 10 MG: 10 TABLET ORAL at 20:05

## 2024-06-25 RX ADMIN — IPRATROPIUM BROMIDE 2 SPRAY: 42 SPRAY, METERED NASAL at 20:13

## 2024-06-25 RX ADMIN — Medication 1 TABLET: at 08:32

## 2024-06-25 RX ADMIN — IPRATROPIUM BROMIDE 2 SPRAY: 42 SPRAY, METERED NASAL at 12:51

## 2024-06-25 RX ADMIN — IPRATROPIUM BROMIDE 2 SPRAY: 42 SPRAY, METERED NASAL at 18:20

## 2024-06-25 RX ADMIN — HYDROXYZINE HYDROCHLORIDE 25 MG: 25 TABLET ORAL at 22:06

## 2024-06-25 RX ADMIN — HALOPERIDOL 10 MG: 10 TABLET ORAL at 08:32

## 2024-06-25 RX ADMIN — RISPERIDONE 2 MG: 2 TABLET ORAL at 08:32

## 2024-06-25 RX ADMIN — BENZTROPINE MESYLATE 1 MG: 1 TABLET ORAL at 08:33

## 2024-06-25 RX ADMIN — HYDROXYZINE HYDROCHLORIDE 25 MG: 25 TABLET ORAL at 18:24

## 2024-06-25 RX ADMIN — Medication 3 MG: at 22:07

## 2024-06-25 RX ADMIN — IPRATROPIUM BROMIDE 2 SPRAY: 42 SPRAY, METERED NASAL at 08:33

## 2024-06-25 RX ADMIN — DIVALPROEX SODIUM 500 MG: 500 TABLET, FILM COATED, EXTENDED RELEASE ORAL at 08:32

## 2024-06-25 RX ADMIN — RISPERIDONE 4 MG: 2 TABLET, FILM COATED ORAL at 20:05

## 2024-06-25 RX ADMIN — DIVALPROEX SODIUM 1500 MG: 500 TABLET, FILM COATED, EXTENDED RELEASE ORAL at 20:04

## 2024-06-25 RX ADMIN — GABAPENTIN 300 MG: 300 CAPSULE ORAL at 08:33

## 2024-06-25 ASSESSMENT — ACTIVITIES OF DAILY LIVING (ADL)
ADLS_ACUITY_SCORE: 54
ADLS_ACUITY_SCORE: 54
HYGIENE/GROOMING: INDEPENDENT
ADLS_ACUITY_SCORE: 54
ORAL_HYGIENE: INDEPENDENT
ADLS_ACUITY_SCORE: 54
LAUNDRY: WITH SUPERVISION
DRESS: SCRUBS (BEHAVIORAL HEALTH);INDEPENDENT
ADLS_ACUITY_SCORE: 54

## 2024-06-25 NOTE — PLAN OF CARE
Goal Outcome Evaluation:    Plan of Care Reviewed With: patient      Problem: Adult Inpatient Plan of Care  Goal: Absence of Hospital-Acquired Illness or Injury  Intervention: Prevent Skin Injury     Problem: Adult Behavioral Health Plan of Care  Goal: Plan of Care Review  Outcome: Progressing  Problem: Suicide Risk  Goal: Absence of Self-Harm  Intervention: Assess Risk to Self and Maintain Safety  Patient is alert and oriented x 3. Patient was calm. Patient's mood was blunted with flat affect. Patient attended the group, he was appropriate, no sexual utterances concerning staff or upsetting other peers. Patient is disorganized, poorly gloomed. Patient was given hydroxyzine prn with positive efficacy. Patient denies SI/SIB/HI. Patient was medication compliant, ate 100%. Patient continues with SIO, Will continue to monitor.

## 2024-06-25 NOTE — PLAN OF CARE
Problem: Sleep Disturbance  Goal: Adequate Sleep/Rest  Outcome: Progressing   Goal Outcome Evaluation:  Patient was in assigned room at the start of shift and appeared to be sleeping. No PRN medications requested or administered. Safety rounds completed. No additional issues or concerns reported or observed.   Sleep hours- 5.5.  Nursing will continue to monitor.

## 2024-06-25 NOTE — PLAN OF CARE
"  Problem: Psychotic Signs/Symptoms  Goal: Enhanced Social, Occupational or Functional Skills (Psychotic Signs/Symptoms)  Outcome: Progressing  Intervention: Promote Social, Occupational and Functional Ability  Recent Flowsheet Documentation  Taken 6/25/2024 0931 by Tonja Santacruz RN  Trust Relationship/Rapport: care explained   Goal Outcome Evaluation:    Plan of Care Reviewed With: patient      Patient spent most of the shift out of his room just hanging out with  SIO staff around the unit. Patient reported  hearing voices this morning. Patient stated, \"the voices are still coming, I wouldn't tell you what the voices are telling me, but I will tell the doctor\".      Affect flat and blunted, mood was calmed and cooperative, denied all other mental health symptoms. No report of pain or any discomfort. Took all scheduled medications with no issues. No concern with behavior on this shift.           "

## 2024-06-25 NOTE — PLAN OF CARE
Team Note Due:  Thursday     Assessment/Intervention/Current Symtoms and Care Coordination:  Chart review and met with team, discussed pt progress, symptomology, and response to treatment. Discussed the discharge plan and any potential impediments to discharge.    Pt continues on 1:1.but there is some symptom improvement.  Due to behaviors toward female staff pt now has male only 1:1s.    Discharge Plan or Goal:  Current plan is to stabilize symptoms and develop safe disposition plan. Following hospitalization, plan is for pt to return home with family with outpatient supports.      Barriers to Discharge:  Currently presents with symptoms of psychosis. Medication management ongoing.      Referral Status:  Referral needs pending as patient stabilizes     Legal Status:  Voluntary     Contacts:  Medication Management:  Name/Organization: Donta Bob MD  [facility] - [facility] on file  Address: [address] [place] [address], [facility] [address] [address]  Phone: [phone]    Therapist:  Name/Organization: [facility]   Phone: [phone]     Family Contacts:  Name/Relationship: Flakita Andrews  Wife - NERY on file  Phone: [phone]      Upcoming Meetings and Dates/Important Information and next steps:  - Check-in with pt's wife weekly.

## 2024-06-25 NOTE — PLAN OF CARE
BEH IP Unit Acuity Rating Score (UARS)  Patient is given one point for every criteria they meet.    CRITERIA SCORING   On a 72 hour hold, court hold, committed, stay of commitment, or revocation. 0/1    Patient LOS on BEH unit exceeds 20 days. 1/1  LOS: 27   Patient under guardianship, 55+, otherwise medically complex, or under age 11. 0/1   Suicide ideation without relief of precipitating factors. 0/1   Current plan for suicide. 0/1   Current plan for homicide. 0/1   Imminent risk or actual attempt to seriously harm another without relief of factors precipitating the attempt. 0/1   Severe dysfunction in daily living (ex: complete neglect for self care, extreme disruption in vegetative function, extreme deterioration in social interactions). 1/1   Recent (last 7 days) or current physical aggression in the ED or on unit. 0/1   Restraints or seclusion episode in past 72 hours. 0/1   Recent (last 7 days) or current verbal aggression, agitation, yelling, etc., while in the ED or unit. 0/1   Active psychosis. 1/1   Need for constant or near constant redirection (from leaving, from others, etc).  1/1   Intrusive or disruptive behaviors. 0/1   Patient requires 3 or more hours of individualized nursing care per 8-hour shift (i.e. for ADLs, meds, therapeutic interventions). 0   TOTAL 4

## 2024-06-26 PROCEDURE — 124N000002 HC R&B MH UMMC

## 2024-06-26 PROCEDURE — 250N000013 HC RX MED GY IP 250 OP 250 PS 637: Performed by: CLINICAL NURSE SPECIALIST

## 2024-06-26 PROCEDURE — 90853 GROUP PSYCHOTHERAPY: CPT

## 2024-06-26 PROCEDURE — 250N000013 HC RX MED GY IP 250 OP 250 PS 637: Performed by: PSYCHIATRY & NEUROLOGY

## 2024-06-26 PROCEDURE — 99232 SBSQ HOSP IP/OBS MODERATE 35: CPT | Performed by: PSYCHIATRY & NEUROLOGY

## 2024-06-26 RX ADMIN — GABAPENTIN 600 MG: 300 CAPSULE ORAL at 20:21

## 2024-06-26 RX ADMIN — HALOPERIDOL 10 MG: 10 TABLET ORAL at 08:27

## 2024-06-26 RX ADMIN — BENZTROPINE MESYLATE 1 MG: 1 TABLET ORAL at 20:22

## 2024-06-26 RX ADMIN — GABAPENTIN 300 MG: 300 CAPSULE ORAL at 08:27

## 2024-06-26 RX ADMIN — IPRATROPIUM BROMIDE 2 SPRAY: 42 SPRAY, METERED NASAL at 08:27

## 2024-06-26 RX ADMIN — BENZTROPINE MESYLATE 1 MG: 1 TABLET ORAL at 08:27

## 2024-06-26 RX ADMIN — HALOPERIDOL 10 MG: 10 TABLET ORAL at 20:21

## 2024-06-26 RX ADMIN — RISPERIDONE 2 MG: 2 TABLET ORAL at 08:27

## 2024-06-26 RX ADMIN — DIVALPROEX SODIUM 500 MG: 500 TABLET, FILM COATED, EXTENDED RELEASE ORAL at 08:27

## 2024-06-26 RX ADMIN — Medication 1 TABLET: at 08:27

## 2024-06-26 RX ADMIN — IPRATROPIUM BROMIDE 2 SPRAY: 42 SPRAY, METERED NASAL at 20:23

## 2024-06-26 RX ADMIN — IPRATROPIUM BROMIDE 2 SPRAY: 42 SPRAY, METERED NASAL at 16:12

## 2024-06-26 RX ADMIN — RISPERIDONE 4 MG: 2 TABLET, FILM COATED ORAL at 20:22

## 2024-06-26 RX ADMIN — DIVALPROEX SODIUM 1500 MG: 500 TABLET, FILM COATED, EXTENDED RELEASE ORAL at 20:22

## 2024-06-26 RX ADMIN — FLUTICASONE PROPIONATE 2 SPRAY: 50 SPRAY, METERED NASAL at 08:26

## 2024-06-26 RX ADMIN — IPRATROPIUM BROMIDE 2 SPRAY: 42 SPRAY, METERED NASAL at 12:56

## 2024-06-26 ASSESSMENT — ACTIVITIES OF DAILY LIVING (ADL)
ADLS_ACUITY_SCORE: 54
ORAL_HYGIENE: INDEPENDENT
ADLS_ACUITY_SCORE: 54
LAUNDRY: WITH SUPERVISION
ADLS_ACUITY_SCORE: 54
ORAL_HYGIENE: INDEPENDENT
ADLS_ACUITY_SCORE: 54
HYGIENE/GROOMING: INDEPENDENT
ADLS_ACUITY_SCORE: 54
ADLS_ACUITY_SCORE: 54
DRESS: INDEPENDENT
ADLS_ACUITY_SCORE: 54
ADLS_ACUITY_SCORE: 54
HYGIENE/GROOMING: INDEPENDENT
ADLS_ACUITY_SCORE: 54
DRESS: SCRUBS (BEHAVIORAL HEALTH);INDEPENDENT
ADLS_ACUITY_SCORE: 54

## 2024-06-26 NOTE — PROGRESS NOTES
[facility], [place]   Psychiatric Progress Note        Interim History:     The patient's care was discussed with the treatment team during the daily team meeting and/or staff's chart notes were reviewed.  Staff report patient slept for about 7 hours last night. He was reported to be cooperative with meds and care. No inappropriate behavior, statements were reported, though, per  during group time Basil took offense to a comment made by another participant on the topic of Zoroastrian and politely asked to leave the group but did agree to stay after clarification of comment was made.     Met with patient: he was seen in the conference room in presence of male SIO staff. Basil presented as very pleasant and cooperative. He appeared to be calmer and said that he didn't have any Auditory hallucinations since yesterday which is a new thing for him. He asked about SIO. I and SIO staff told him that we would still prefer him to be on SIO for at least couple more days because of his recent hypersexual behavior (he patted bottom of his female SIO staff). He was receptive, agreed that it was inappropriate. We praised him for being so compliant with meds, encouraged him that he was on a road to getting better and being discharged from this hospital. He appeared to be happy to hear that and had no more questions or concerns.             Medications:     Current Facility-Administered Medications   Medication Dose Route Frequency Provider Last Rate Last Admin    benztropine (COGENTIN) tablet 1 mg  1 mg Oral BID Blaine Seo MD   1 mg at 06/26/24 0827    divalproex sodium extended-release (DEPAKOTE ER) 24 hr tablet 1,500 mg  1,500 mg Oral At Bedtime Blaine Seo MD   1,500 mg at 06/25/24 2004    divalproex sodium extended-release (DEPAKOTE ER) 24 hr tablet 500 mg  500 mg Oral Daily Haroon Parker APRN CNP   500 mg at 06/26/24 0827    fluticasone (FLONASE) 50 MCG/ACT  "spray 2 spray  2 spray Both Nostrils Daily Keith NATHALIE Patiño CNP   2 spray at 06/26/24 0826    gabapentin (NEURONTIN) capsule 300 mg  300 mg Oral Daily Haroon Parker APRN CNP   300 mg at 06/26/24 0827    gabapentin (NEURONTIN) capsule 600 mg  600 mg Oral At Bedtime KeithHaroon APRN CNP   600 mg at 06/25/24 2005    haloperidol (HALDOL) tablet 10 mg  10 mg Oral BID Blaine Seo MD   10 mg at 06/26/24 0827    ipratropium (ATROVENT) 0.06 % spray 2 spray  2 spray Both Nostrils 4x Daily Haroon Parker APRN CNP   2 spray at 06/26/24 1612    multivitamin w/minerals (THERA-VIT-M) tablet 1 tablet  1 tablet Oral Daily ParkerHaroon APRN CNP   1 tablet at 06/26/24 0827    risperiDONE (risperDAL) tablet 2 mg  2 mg Oral QAM Blaine Seo MD   2 mg at 06/26/24 0827    risperiDONE (risperDAL) tablet 4 mg  4 mg Oral At Bedtime Blaine Seo MD   4 mg at 06/25/24 2005          Allergies:     Allergies   Allergen Reactions    Seasonal Allergies Other (See Comments)     Congestion. Managed with meds.          Labs:   No results found for this or any previous visit (from the past 24 hour(s)).       Psychiatric Examination:     /67   Pulse 74   Temp 98.1  F (36.7  C) (Oral)   Resp 18   Ht 1.854 m (6' 1\")   Wt 103.4 kg (228 lb)   SpO2 95%   BMI 30.08 kg/m    Weight is 228 lbs 0 oz  Body mass index is 30.08 kg/m .    Orthostatic Vitals       None           Appearance: awake, alert, dressed in hospital scrubs, and appeared as age stated  Attitude:  cooperative  Eye Contact:  fair  Mood:  calmer,    Affect:  less guarded, more animated  Speech:  clear, coherent  Psychomotor Behavior:  no evidence of tardive dyskinesia, dystonia, or tics  Throught Process: less disorganized and illogical  Associations: less loose  Thought Content:  no evidence of suicidal ideation or homicidal ideation, auditory hallucinations today not present, and no visual hallucinations present, delusions " present?    Insight:  partial, improving  Judgement:  poor, improving  Oriented to:  time, person, and place  Attention Span and Concentration:  better  Recent and Remote Memory:  fair    Clinical Global Impressions  First: 6/4 6/19/2024       Most recent:            Precautions:     Behavioral Orders   Procedures    Code 1 - Restrict to Unit    Routine Programming     As clinically indicated    Sexual precautions    Status 15     Every 15 minutes.    Status Individual Observation     Patient SIO status reviewed with team/RN.  Please also refer to RN/team documentation for add'l detail.  Male staff only, SIO during day and evening shifts only.   -SIO staff to monitor following which have contributed to patient being on SIO:  Severe intrusiveness, agitation, paranoia  -Possible interventions SIO staff could use to support patient's treatment progress:  Offering prn meds, redirections, suggestion to use coping skills  -When following observed, team will review discontinuation of SIO:  Decreased psychotic symptoms, more predictable behavior, can contract for safety     Order Specific Question:   CONTINUOUS 24 hours / day     Answer:   Other     Order Specific Question:   Specify distance     Answer:   10     Order Specific Question:   Indications for SIO     Answer:   Severe intrusiveness    Suicide precautions: Suicide Risk: HIGH; Clinical rationale to override score: modification to the care environment, lack of access to a plan for self-harm, Collateral information supporting Suicidal/self-harm behaviors     Patients on Suicide Precautions should have a Combination Diet ordered that includes a Diet selection(s) AND a Behavioral Tray selection for Safe Tray - with utensils, or Safe Tray - NO utensils       Order Specific Question:   Suicide Risk     Answer:   HIGH     Order Specific Question:   Clinical rationale to override score:     Answer:   modification to the care environment     Order Specific Question:    Clinical rationale to override score:     Answer:   lack of access to a plan for self-harm     Order Specific Question:   Clinical rationale to override score:     Answer:   Collateral information supporting Suicidal/self-harm behaviors          DIagnoses:     Psychosis, unspecified psychosis type (H)  Severe manic bipolar I disorder with psychotic features (H)  Schizoaffective disorder, bipolar type          Plan:     Basil is improving with no Auditory hallucinations, no inappropriate behavior over last couple of days. No medication changes today 6/26/2024. Continue sexual precautions, suicide precautions, will block his room, SIO with males only for at least couple more days. Will continue to provide support and structure.      Blaine Seo MD  [facility]    Total time spent was 36 minutes. Over 50% of times was spent counseling and coordination of care regarding coping skills, medication and discharge planning.

## 2024-06-26 NOTE — PLAN OF CARE
Goal Outcome Evaluation:      Group Attendance:  attended partial group    Time session began: 1:00 pm  Time session ended: 1:45 pm  Patient's total time in group: 45    Total # Attendees   7   Group Type psychotherapeutic     Group Topic Covered healthy coping skills        Group Session Detail Process and mindfulness art     Patient's response to the group topic/interactions:  cooperative with task and left group on several occasions     Patient Details: Hopeful to discharge soon, trying not to piss people off and ruffle feathers and be dameon so I can discharge.           [id number] - Psychotherapy (with patient) - 45 (38-52*) min    Patient Active Problem List   Diagnosis    Marijuana dependence (H)    Alcohol abuse    Bipolar disorder, current episode manic severe with psychotic features (H)    Chantal (H)    Schizoaffective disorder, bipolar type (H)    Psychosis, unspecified psychosis type (H)

## 2024-06-26 NOTE — PLAN OF CARE
06/25/24 2100   Group Therapy Session   Group Attendance attended group session     Goal Outcome Evaluation:      Rehab Group    Start time: 1700  End time: 1800  Patient time total: 45 minutes    attended full group    #8 attended   Group Type: art   Group Topic Covered: emotional regulation       Group Session Detail:  Art Therapy directive was to create group collaborative artwork, contributing to each art piece using a variety of art materials.  Goals of directive: to assess how the individual functions within a group dynamic, media exploration, emotional expression, emotional regulation.     Patient Response/Contribution:  cooperative with task       Patient Detail:    Pt was an engaged participant, made odd remarks at times. Pt contributed positively to each group drawing.  Pts mood was calm.        Group Therapy ([id number])    Patient Active Problem List   Diagnosis    Marijuana dependence (H)    Alcohol abuse    Bipolar disorder, current episode manic severe with psychotic features (H)    Chantal (H)    Schizoaffective disorder, bipolar type (H)    Psychosis, unspecified psychosis type (H)

## 2024-06-26 NOTE — PLAN OF CARE
BEH IP Unit Acuity Rating Score (UARS)  Patient is given one point for every criteria they meet.    CRITERIA SCORING   On a 72 hour hold, court hold, committed, stay of commitment, or revocation. 0/1    Patient LOS on BEH unit exceeds 20 days. 1/1  LOS: 28   Patient under guardianship, 55+, otherwise medically complex, or under age 11. 0/1   Suicide ideation without relief of precipitating factors. 0/1   Current plan for suicide. 0/1   Current plan for homicide. 0/1   Imminent risk or actual attempt to seriously harm another without relief of factors precipitating the attempt. 0/1   Severe dysfunction in daily living (ex: complete neglect for self care, extreme disruption in vegetative function, extreme deterioration in social interactions). 1/1   Recent (last 7 days) or current physical aggression in the ED or on unit. 0/1   Restraints or seclusion episode in past 72 hours. 0/1   Recent (last 7 days) or current verbal aggression, agitation, yelling, etc., while in the ED or unit. 0/1   Active psychosis. 1/1   Need for constant or near constant redirection (from leaving, from others, etc).  1/1   Intrusive or disruptive behaviors. 0/1   Patient requires 3 or more hours of individualized nursing care per 8-hour shift (i.e. for ADLs, meds, therapeutic interventions). 0   TOTAL 4

## 2024-06-26 NOTE — PLAN OF CARE
Team Note Due:  Thursday     Assessment/Intervention/Current Symtoms and Care Coordination:  Chart review and met with team, discussed pt progress, symptomology, and response to treatment. Discussed the discharge plan and any potential impediments to discharge.    Pt continues on 1:1.but there is some symptom improvement.  Due to behaviors toward female staff pt now has male only 1:1s.  There is some symptom improvement.    Discharge Plan or Goal:  Current plan is to stabilize symptoms and develop safe disposition plan. Following hospitalization, plan is for pt to return home with family with outpatient supports.      Barriers to Discharge:  Currently presents with symptoms of psychosis. Medication management ongoing.      Referral Status:  Referral needs pending as patient stabilizes     Legal Status:  Voluntary     Contacts:  Medication Management:  Name/Organization: Donta Bob MD  [facility] on file  Address: [address] [place] [address], [place] [address]  Phone: [phone]    Therapist:  Name/Organization: [facility]   Phone: [phone]     Family Contacts:  Name/Relationship: Flakita Andrews  Wife - NERY on file  Phone: [phone]      Upcoming Meetings and Dates/Important Information and next steps:  - Check-in with pt's wife weekly.

## 2024-06-26 NOTE — PLAN OF CARE
"Goal Outcome Evaluation:    Plan of Care Reviewed With: patient      Patient is pleasant, polite and cooperative.  Presents with a flat, blunted affect,mood is calm,and speech is clear.  Patient denies having pain, and endorses minimal anxiety, AH, and denies all other psych symptoms.  Patient observed out on the unit pacing with the SIO staff,attended groups and does not engage with peers.  Patient is wondering if he can be taken off SIO, and he was advised to discuss with provider tomorrow.  Med compliant and received PRN Hydroxyzine for anxiety and melatonin for sleep per request.  Safety checks completed as ordered.  Appeared to be comfortable and stable this shift, will continue to monitor.    Vitals./76   Pulse 91   Temp 98.4  F (36.9  C) (Temporal)   Resp 18   Ht 1.854 m (6' 1\")   Wt 103.4 kg (228 lb)   SpO2 98%   BMI 30.08 kg/m                     "

## 2024-06-26 NOTE — PLAN OF CARE
Group Attendance:  attended partial group    Time session began: 1045  Time session ended: 1100  Patient's total time in group: 15    Total # Attendees   2   Group Type psychotherapeutic     Group Topic Covered insight improvement        Group Session Detail Jenga game with reflection questions on the topic of emotions     Patient's response to the group topic/interactions:  positive affect and cooperative with task     Patient Details: Patient took offense to a comment made by another participant on the topic of Catholic and politely asked to leave the group but did agree to stay after clarification of comment was made.            No Charge (0-15 min)    Patient Active Problem List   Diagnosis    Marijuana dependence (H)    Alcohol abuse    Bipolar disorder, current episode manic severe with psychotic features (H)    Chantal (H)    Schizoaffective disorder, bipolar type (H)    Psychosis, unspecified psychosis type (H)

## 2024-06-26 NOTE — PLAN OF CARE
Goal Outcome Evaluation:    Initial meeting note:    Therapist introduced self to patient and discussed psychotherapy service available to patient.     Pt response: Pt not interested currently in meeting 1:1; therapist will continue remaining available for pt     Plan: Pt was encouraged to attend groups and therapist will remain available for 1:1 sessions    Pt asked yesterday for 1:1 session, when writer checked in today, he declined and said he didn't have anything to talk about.

## 2024-06-26 NOTE — PLAN OF CARE
"  Problem: Adult Inpatient Plan of Care  Goal: Plan of Care Review  Description: The Plan of Care Review/Shift note should be completed every shift.  The Outcome Evaluation is a brief statement about your assessment that the patient is improving, declining, or no change.  This information will be displayed automatically on your shift  note.  Outcome: Progressing   Goal Outcome Evaluation:    Plan of Care Reviewed With: patient                 Patient alert and oriented on approach. He denied Psych symptoms noting \"If I had any symptoms it would be rated at 0.1 on the 0-10 scale\". He contracted for safety. Patient denied pain and has been safe on the unit. SIO staff continues to monitor patient. Patient remains on SIO for Severe intrusiveness and on Sexual and Suicide precautions.   Vitals wnl /67   Pulse 74   Temp 98.1  F (36.7  C) (Oral)   Resp 18   Ht 1.854 m (6' 1\")   Wt 103.4 kg (228 lb)   SpO2 95%   BMI 30.08 kg/m      "

## 2024-06-27 PROCEDURE — 124N000002 HC R&B MH UMMC

## 2024-06-27 PROCEDURE — 250N000013 HC RX MED GY IP 250 OP 250 PS 637: Performed by: PSYCHIATRY & NEUROLOGY

## 2024-06-27 PROCEDURE — G0177 OPPS/PHP; TRAIN & EDUC SERV: HCPCS

## 2024-06-27 PROCEDURE — 250N000013 HC RX MED GY IP 250 OP 250 PS 637: Performed by: CLINICAL NURSE SPECIALIST

## 2024-06-27 PROCEDURE — 99232 SBSQ HOSP IP/OBS MODERATE 35: CPT | Performed by: PSYCHIATRY & NEUROLOGY

## 2024-06-27 RX ADMIN — Medication 1 TABLET: at 08:03

## 2024-06-27 RX ADMIN — BENZTROPINE MESYLATE 1 MG: 1 TABLET ORAL at 21:23

## 2024-06-27 RX ADMIN — DIVALPROEX SODIUM 1500 MG: 500 TABLET, FILM COATED, EXTENDED RELEASE ORAL at 21:22

## 2024-06-27 RX ADMIN — IPRATROPIUM BROMIDE 2 SPRAY: 42 SPRAY, METERED NASAL at 08:04

## 2024-06-27 RX ADMIN — TRAZODONE HYDROCHLORIDE 50 MG: 50 TABLET ORAL at 22:17

## 2024-06-27 RX ADMIN — GABAPENTIN 300 MG: 300 CAPSULE ORAL at 08:03

## 2024-06-27 RX ADMIN — IPRATROPIUM BROMIDE 2 SPRAY: 42 SPRAY, METERED NASAL at 16:34

## 2024-06-27 RX ADMIN — IPRATROPIUM BROMIDE 2 SPRAY: 42 SPRAY, METERED NASAL at 13:06

## 2024-06-27 RX ADMIN — HYDROXYZINE HYDROCHLORIDE 50 MG: 50 TABLET, FILM COATED ORAL at 22:18

## 2024-06-27 RX ADMIN — DIVALPROEX SODIUM 500 MG: 500 TABLET, FILM COATED, EXTENDED RELEASE ORAL at 08:03

## 2024-06-27 RX ADMIN — Medication 3 MG: at 22:18

## 2024-06-27 RX ADMIN — RISPERIDONE 2 MG: 2 TABLET ORAL at 08:04

## 2024-06-27 RX ADMIN — HALOPERIDOL 10 MG: 10 TABLET ORAL at 21:23

## 2024-06-27 RX ADMIN — BENZTROPINE MESYLATE 1 MG: 1 TABLET ORAL at 08:03

## 2024-06-27 RX ADMIN — GABAPENTIN 600 MG: 300 CAPSULE ORAL at 21:22

## 2024-06-27 RX ADMIN — FLUTICASONE PROPIONATE 2 SPRAY: 50 SPRAY, METERED NASAL at 08:05

## 2024-06-27 RX ADMIN — HALOPERIDOL 10 MG: 10 TABLET ORAL at 08:03

## 2024-06-27 RX ADMIN — RISPERIDONE 4 MG: 2 TABLET, FILM COATED ORAL at 21:23

## 2024-06-27 RX ADMIN — IPRATROPIUM BROMIDE 2 SPRAY: 42 SPRAY, METERED NASAL at 21:25

## 2024-06-27 ASSESSMENT — ACTIVITIES OF DAILY LIVING (ADL)
ADLS_ACUITY_SCORE: 54
DRESS: SCRUBS (BEHAVIORAL HEALTH);INDEPENDENT
ADLS_ACUITY_SCORE: 54
ORAL_HYGIENE: INDEPENDENT
ADLS_ACUITY_SCORE: 54
LAUNDRY: WITH SUPERVISION
ADLS_ACUITY_SCORE: 54
HYGIENE/GROOMING: INDEPENDENT
ADLS_ACUITY_SCORE: 54

## 2024-06-27 NOTE — PLAN OF CARE
06/27/24 1536   Individualization/Patient Specific Goals   Patient Personal Strengths expressive of emotions;relationship stability;stable living environment;spiritual/Gnosticism support;family/social support;independent living skills;positive vocational history;interests/hobbies   Patient Vulnerabilities traumatic event;poor impulse control   Anxieties, Fears or Concerns pt is interested in discharge planning   Individualized Care Needs Work with pt to return to his home and family   Patient/Family-Specific Goals (Include Timeframe) Return to familiy home and his job   Interprofessional Rounds   Summary Pt has Bipolar/Schizoaffective DO. He is a voluntary patient. He has been experieincing misha with sexuall inappropriate behaviors.   Participants CTC;nursing;psychiatrist   Behavioral Team Discussion   Participants Blaine Seo MD; Pieter LANG RN; INDIGO Godinez; SARAH Kee, ATR-BC;   Progress Pt has some symptom inprovement. He is showing remorse for some of his inapproriate behaviors.   Anticipated length of stay 5-7 days   Continued Stay Criteria/Rationale The pt needs to stable with no symptoms before returning to his home and job.   Medical/Physical No issues noted   Precautions Sexual and suicide precautions   Plan Continue to coordainte with wife, see if team can offer a ballpark figure on discharge date so wife can start processing the disabilty benefits   Rationale for change in precautions or plan N/A   Safety Plan This was completed on the unit with the pt by therapist   Anticipated Discharge Disposition home with family     Goal Outcome Evaluation:

## 2024-06-27 NOTE — PLAN OF CARE
"Goal Outcome Evaluation:    Plan of Care Reviewed With: patient      Problem: Psychotic Signs/Symptoms  Goal: Optimal Cognitive Function (Psychotic Signs/Symptoms)  Intervention: Support and Promote Cognitive Ability  Recent Flowsheet Documentation  Taken 6/27/2024 1015 by Robles Manzano RN  Trust Relationship/Rapport:   questions encouraged   questions answered   care explained   choices provided     Pt was up early, active and social in the milieu. Pleasant and cooperative with staff. VS stable. Affect is guarded.  Pt reports okay appetite, ate about 100% of breakfast and 100  % of lunch with adequate fluid intake    Pt checked in as doing okay, rated anxiety at 2 and depression at 0 with 10 being worst. Pt rated overall mood at calm. Pt denies SI/SIB/HI. Denies visual and auditory hallucinations.     Pt was medication compliant, denied pain, medication side effects and medical concerns.    /68   Pulse 82   Temp 97.5  F (36.4  C) (Oral)   Resp 18   Ht 1.854 m (6' 1\")   Wt 103.4 kg (228 lb)   SpO2 96%   BMI 30.08 kg/m     "

## 2024-06-27 NOTE — PLAN OF CARE
"Goal Outcome Evaluation:    Plan of Care Reviewed With: patient      Problem: Anxiety  Goal: Anxiety Reduction or Resolution  Outcome: Not Progressing       Patient is up on the unit, pleasant, attends groups and social with peers. Patient denies any pain, anxiety,SI/HI and depression. Patient alert and oriented to person, place, and time, appeared well groomed. Pt calm, blunted affect, good eye contact, cooperative, and medication compliant. Patient has a good appetite and offers no concerns this evening, and no behaviors noted. Patient continues on SIO for severe intrusiveness. No PRN's given this shift .   Vitals./68   Pulse 82   Temp 97.5  F (36.4  C) (Oral)   Resp 18   Ht 1.854 m (6' 1\")   Wt 103.4 kg (228 lb)   SpO2 96%   BMI 30.08 kg/m         "

## 2024-06-27 NOTE — PROGRESS NOTES
"[facility], [place]   Psychiatric Progress Note        Interim History:     The patient's care was discussed with the treatment team during the daily team meeting and/or staff's chart notes were reviewed.  Staff report patient again slept for about 7 hours last night. He was reported to be cooperative with meds and care. No inappropriate behavior, statements were reported, per OT patient made a remark that \"some people were rubbing him the wrong way\". He voice any other concerns. Denied Auditory hallucinations. Patient's wife called and talked to [facility], see note below:    \"Pt continues on 1:1.but there is some symptom improvement.  Due to behaviors toward female staff pt now has male only 1:1s.    When I walked in this morning he asked if we could do some discharge planning. I said when he is off the SIO.     Wife called and we discussed the update. She is not able to visit due to having a 3 year old.  Pt is welcome to return home if he is not symptomatic.  She is not sure if he wants to return to his psychiatrist.  She thinks he will be agreeable to outpatient care.  I approached the pt to give him this update but he as sleeping.  She has done the FMLA paperwork and is trying to do the STD/LTD paperwork  She needs a [employer] discharge date before she can get them to talk to her about the disability paperwork.\"    Met with patient: he was seen in the conference room in presence of male SIO staff. Basil presented as very pleasant and cooperative. He smiled widely during our visit, described his mood as very good and happily confirmed that he didn't have Auditory hallucinations for the last 2.5 days. He told me that one of female peers was irritating him: \"she is following me\". I asked his SIO staff if this was true. Staff said that that peer was not exactly following Basil, but had difficulties with personal space and tended to make off the wall remarks that Basil disliked. We " confirmed that it was actual peer behavior and remarks and not Auditory hallucinations of peer's voice that bothered Basil. We praised him for staying away from the above female peer and not acting out. We then, discussed that tomorrow if things go well we can stop SIO and watch him over weekend and, then, hopefully, to discharge him home next week. Basil was happy to hear that and said that he had no questions for us.            Medications:     Current Facility-Administered Medications   Medication Dose Route Frequency Provider Last Rate Last Admin    benztropine (COGENTIN) tablet 1 mg  1 mg Oral BID Blaine Seo MD   1 mg at 06/27/24 0803    divalproex sodium extended-release (DEPAKOTE ER) 24 hr tablet 1,500 mg  1,500 mg Oral At Bedtime Blaine Seo MD   1,500 mg at 06/26/24 2022    divalproex sodium extended-release (DEPAKOTE ER) 24 hr tablet 500 mg  500 mg Oral Daily Haroon Parker APRN CNP   500 mg at 06/27/24 0803    fluticasone (FLONASE) 50 MCG/ACT spray 2 spray  2 spray Both Nostrils Daily Haroon Parker APRN CNP   2 spray at 06/27/24 0805    gabapentin (NEURONTIN) capsule 300 mg  300 mg Oral Daily Haroon Parker APRN CNP   300 mg at 06/27/24 0803    gabapentin (NEURONTIN) capsule 600 mg  600 mg Oral At Bedtime Haroon Parker APRN CNP   600 mg at 06/26/24 2021    haloperidol (HALDOL) tablet 10 mg  10 mg Oral BID Blaine Seo MD   10 mg at 06/27/24 0803    ipratropium (ATROVENT) 0.06 % spray 2 spray  2 spray Both Nostrils 4x Daily Haroon Parker APRN CNP   2 spray at 06/27/24 1634    multivitamin w/minerals (THERA-VIT-M) tablet 1 tablet  1 tablet Oral Daily Haroon Parker APRN CNP   1 tablet at 06/27/24 0803    risperiDONE (risperDAL) tablet 2 mg  2 mg Oral QAM Blaine Seo MD   2 mg at 06/27/24 0804    risperiDONE (risperDAL) tablet 4 mg  4 mg Oral At Bedtime Blaine Seo MD   4 mg at 06/26/24 2022          Allergies:     Allergies  "  Allergen Reactions    Seasonal Allergies Other (See Comments)     Congestion. Managed with meds.          Labs:   No results found for this or any previous visit (from the past 24 hour(s)).       Psychiatric Examination:     /64   Pulse 79   Temp 97.8  F (36.6  C) (Temporal)   Resp 18   Ht 1.854 m (6' 1\")   Wt 103.4 kg (228 lb)   SpO2 96%   BMI 30.08 kg/m    Weight is 228 lbs 0 oz  Body mass index is 30.08 kg/m .    Orthostatic Vitals       None           Appearance: awake, alert, dressed in hospital scrubs, and appeared as age stated  Attitude:  cooperative  Eye Contact:  fair  Mood:  calmer,    Affect: more animated  Speech:  clear, coherent  Psychomotor Behavior:  no evidence of tardive dyskinesia, dystonia, or tics  Throught Process: more organized and logical  Associations: less loose  Thought Content:  no evidence of suicidal ideation or homicidal ideation, auditory hallucinations today not present, and no visual hallucinations present, no delusions present    Insight:  partial, improving  Judgement:  poor, improving  Oriented to:  time, person, and place  Attention Span and Concentration:  better  Recent and Remote Memory:  fair    Clinical Global Impressions  First: 6/4 6/19/2024       Most recent:            Precautions:     Behavioral Orders   Procedures    Code 1 - Restrict to Unit    Routine Programming     As clinically indicated    Sexual precautions    Status 15     Every 15 minutes.    Status Individual Observation     Patient SIO status reviewed with team/RN.  Please also refer to RN/team documentation for add'l detail.  Male staff only, SIO during day and evening shifts only.   -SIO staff to monitor following which have contributed to patient being on SIO:  Severe intrusiveness, agitation, paranoia  -Possible interventions SIO staff could use to support patient's treatment progress:  Offering prn meds, redirections, suggestion to use coping skills  -When following observed, team will " review discontinuation of SIO:  Decreased psychotic symptoms, more predictable behavior, can contract for safety     Order Specific Question:   CONTINUOUS 24 hours / day     Answer:   Other     Order Specific Question:   Specify distance     Answer:   10     Order Specific Question:   Indications for SIO     Answer:   Severe intrusiveness    Suicide precautions: Suicide Risk: HIGH; Clinical rationale to override score: modification to the care environment, lack of access to a plan for self-harm, Collateral information supporting Suicidal/self-harm behaviors     Patients on Suicide Precautions should have a Combination Diet ordered that includes a Diet selection(s) AND a Behavioral Tray selection for Safe Tray - with utensils, or Safe Tray - NO utensils       Order Specific Question:   Suicide Risk     Answer:   HIGH     Order Specific Question:   Clinical rationale to override score:     Answer:   modification to the care environment     Order Specific Question:   Clinical rationale to override score:     Answer:   lack of access to a plan for self-harm     Order Specific Question:   Clinical rationale to override score:     Answer:   Collateral information supporting Suicidal/self-harm behaviors          DIagnoses:     Psychosis, unspecified psychosis type (H)  Severe manic bipolar I disorder with psychotic features (H)  Schizoaffective disorder, bipolar type          Plan:     Basil is improving with no Auditory hallucinations, no inappropriate behavior over last couple of days. No medication changes today 6/27/2024. Continue sexual precautions, suicide precautions, will block his room, SIO with males only for at least one more day. Will continue to provide support and structure.      Blaine Seo MD  [facility] Psychiatry    Total time spent was 36 minutes. Over 50% of times was spent counseling and coordination of care regarding coping skills, medication and discharge planning.

## 2024-06-27 NOTE — PLAN OF CARE
BEH IP Unit Acuity Rating Score (UARS)  Patient is given one point for every criteria they meet.    CRITERIA SCORING   On a 72 hour hold, court hold, committed, stay of commitment, or revocation. 0    Patient LOS on BEH unit exceeds 20 days. 1  LOS: 29   Patient under guardianship, 55+, otherwise medically complex, or under age 11. 0   Suicide ideation without relief of precipitating factors. 0   Current plan for suicide. 0   Current plan for homicide. 0   Imminent risk or actual attempt to seriously harm another without relief of factors precipitating the attempt. 0   Severe dysfunction in daily living (ex: complete neglect for self care, extreme disruption in vegetative function, extreme deterioration in social interactions). 1   Recent (last 7 days) or current physical aggression in the ED or on unit. 0   Restraints or seclusion episode in past 72 hours. 0   Recent (last 7 days) or current verbal aggression, agitation, yelling, etc., while in the ED or unit. 0   Active psychosis. 1   Need for constant or near constant redirection (from leaving, from others, etc).  1   Intrusive or disruptive behaviors. 1   Patient requires 3 or more hours of individualized nursing care per 8-hour shift (i.e. for ADLs, meds, therapeutic interventions). 0   TOTAL 5

## 2024-06-27 NOTE — PLAN OF CARE
Patient in bed at the start of the shift and  appeared to be comfortably asleep and breathing with ease throughout the shift. Patient slept for 7 hours of the over night shift with no s/s of acute distress. Patient experienced no safety events or escalations during the shift, no concerns reported or observed. Safety checks completed at least every 15 minutes. Patient required no PRN medications, see MAR. Patient has a no roommate order because they remain extremely paranoid and on sexual precautions. Nursing will continue to monitor.

## 2024-06-27 NOTE — PLAN OF CARE
"  Rehab Group    Start time: 1015  End time: 1100  Patient time total: 45 minutes    attended full group    #5 attended   Group Type: OT Clinic   Group Topic Covered: coping skills     Group Session Detail:  Intervention: Pt participated in a OT Clinic group to facilitate coping skills exploration and creative expression through personally meaningful activities, and to encourage utilization of these healthy coping skills to promote overall health and wellness. Group included clinical observation of social, cognitive and kinesthetic performance skills to inform treatment and safe discharge planning.    Mood/Affect: Pleasant       Plan: Patient encouraged to maintain attendance for continued ongoing support in working towards occupational therapy goals to support overall treatment/care.        Patient Detail:    Joined with SIO staff present for duration of time in group. Worked on a project started in a previous group. Worked off and on, taking intermittent breaks. Would often make big sighs. Appeared to be perseverating on meeting with the provider and anxious for discharge. At point made a comment that \"some people in this hospital just rub me the wrong way\". Writer acknowledged this and stated you could say that about anywhere you are in life in that we don't always get alone with everyone around us which is okay as long as we remain respectful. To which patient stated \"I guess that is true\".      Patient Active Problem List   Diagnosis    Marijuana dependence (H)    Alcohol abuse    Bipolar disorder, current episode manic severe with psychotic features (H)    Chantal (H)    Schizoaffective disorder, bipolar type (H)    Psychosis, unspecified psychosis type (H)      "

## 2024-06-27 NOTE — PLAN OF CARE
Team Note Due:  Thursday     Assessment/Intervention/Current Symtoms and Care Coordination:  Chart review and met with team, discussed pt progress, symptomology, and response to treatment. Discussed the discharge plan and any potential impediments to discharge.    Pt continues on 1:1.but there is some symptom improvement.  Due to behaviors toward female staff pt now has male only 1:1s.    When I walked in this morning he asked if we could do some discharge planning. I said when he is off the SIO.    Wife called and we discussed the update. She is not able to visit due to having a 3 year old.  Pt is welcome to return home if he is not symptomatic.  She is not sure if he wants to return to his psychiatrist.  She thinks he will be agreeable to outpatient care.  I approached the pt to give him this update but he as sleeping.  She has done the FMLA paperwork and is trying to do the STD/LTD paperwork  She needs a [employer] discharge date before she can get them to talk to her about the disability paperwork.    Discharge Plan or Goal:  Current plan is to stabilize symptoms and develop safe disposition plan. Following hospitalization, plan is for pt to return home with family with outpatient supports.      Barriers to Discharge:  Currently presents with symptoms of psychosis. Medication management ongoing.      Referral Status:  Referral needs pending as patient stabilizes     Legal Status:  Voluntary     Contacts:  Medication Management:  Name/Organization: Donta Bob MD  [facility] on file  Address: [address] [place] [address], [address], [place] [address]  Phone: [phone]    Therapist:  Name/Organization: [facility]   Phone: [phone]     Family Contacts:  Name/Relationship: Flakita Andrews  Wife - [facility] on file  Phone: [phone]      Upcoming Meetings and Dates/Important Information and next steps:  - Check-in with pt's wife weekly.  Determine discharge date.

## 2024-06-28 PROCEDURE — 250N000013 HC RX MED GY IP 250 OP 250 PS 637: Performed by: CLINICAL NURSE SPECIALIST

## 2024-06-28 PROCEDURE — 250N000013 HC RX MED GY IP 250 OP 250 PS 637: Performed by: PSYCHIATRY & NEUROLOGY

## 2024-06-28 PROCEDURE — 99232 SBSQ HOSP IP/OBS MODERATE 35: CPT | Performed by: PSYCHIATRY & NEUROLOGY

## 2024-06-28 PROCEDURE — 124N000002 HC R&B MH UMMC

## 2024-06-28 RX ADMIN — HALOPERIDOL 10 MG: 10 TABLET ORAL at 08:47

## 2024-06-28 RX ADMIN — FLUTICASONE PROPIONATE 2 SPRAY: 50 SPRAY, METERED NASAL at 08:48

## 2024-06-28 RX ADMIN — HALOPERIDOL 10 MG: 10 TABLET ORAL at 20:41

## 2024-06-28 RX ADMIN — Medication 1 TABLET: at 08:47

## 2024-06-28 RX ADMIN — GABAPENTIN 600 MG: 300 CAPSULE ORAL at 20:41

## 2024-06-28 RX ADMIN — DIVALPROEX SODIUM 500 MG: 500 TABLET, FILM COATED, EXTENDED RELEASE ORAL at 08:47

## 2024-06-28 RX ADMIN — DIVALPROEX SODIUM 1500 MG: 500 TABLET, FILM COATED, EXTENDED RELEASE ORAL at 20:40

## 2024-06-28 RX ADMIN — RISPERIDONE 2 MG: 2 TABLET ORAL at 08:47

## 2024-06-28 RX ADMIN — BENZTROPINE MESYLATE 1 MG: 1 TABLET ORAL at 08:47

## 2024-06-28 RX ADMIN — GABAPENTIN 300 MG: 300 CAPSULE ORAL at 08:47

## 2024-06-28 RX ADMIN — RISPERIDONE 4 MG: 2 TABLET, FILM COATED ORAL at 20:41

## 2024-06-28 RX ADMIN — IPRATROPIUM BROMIDE 2 SPRAY: 42 SPRAY, METERED NASAL at 18:54

## 2024-06-28 RX ADMIN — IPRATROPIUM BROMIDE 2 SPRAY: 42 SPRAY, METERED NASAL at 13:49

## 2024-06-28 RX ADMIN — TRAZODONE HYDROCHLORIDE 50 MG: 50 TABLET ORAL at 22:32

## 2024-06-28 RX ADMIN — TRAZODONE HYDROCHLORIDE 50 MG: 50 TABLET ORAL at 20:43

## 2024-06-28 RX ADMIN — BENZTROPINE MESYLATE 1 MG: 1 TABLET ORAL at 20:41

## 2024-06-28 RX ADMIN — IPRATROPIUM BROMIDE 2 SPRAY: 42 SPRAY, METERED NASAL at 08:48

## 2024-06-28 ASSESSMENT — ACTIVITIES OF DAILY LIVING (ADL)
ADLS_ACUITY_SCORE: 54
ORAL_HYGIENE: INDEPENDENT
DRESS: INDEPENDENT
LAUNDRY: WITH SUPERVISION
ADLS_ACUITY_SCORE: 54
ADLS_ACUITY_SCORE: 54
LAUNDRY: WITH SUPERVISION
ADLS_ACUITY_SCORE: 54
HYGIENE/GROOMING: INDEPENDENT
ADLS_ACUITY_SCORE: 54
HYGIENE/GROOMING: INDEPENDENT
ADLS_ACUITY_SCORE: 54
ORAL_HYGIENE: INDEPENDENT
ADLS_ACUITY_SCORE: 54
DRESS: INDEPENDENT

## 2024-06-28 NOTE — PROGRESS NOTES
"[facility], [place]   Psychiatric Progress Note        Interim History:     The patient's care was discussed with the treatment team during the daily team meeting and/or staff's chart notes were reviewed.  Staff report patient again slept for about 7 hours last night. He was social with peers, compliant with meds and care. He denied Auditory hallucinations, Visual hallucinations, Suicidal ideation, Homicidal thoughts, didn't voice delusional ideas. No inappropriate behavior was noted, see RN's note below:    \"Patient has spent majority of the shift in the milieu. Attending groups. Social and upbeat with peers and staff. SIO was discontinued around 12:30. Denies suicidal ideation and self injurious thoughts. Denies anxiety and depression. States that he is hopeful and grateful that he can discharge next week. Denies auditory and visual hallucinations. Med compliant. Ate breakfast and lunch.      Patient evaluation continues. Assessed mood,anxiety,thoughts and behavior.      Patient gradually  progressing towards goals.     Patient is encouraged to participate in groups and assisted to develop healthy coping skills.\"    Met with patient: he was seen in his room with SIO staff sitting outside of his room. Basil was asleep but woke up and talked to me. He was slightly somnolent in the beginning of our visit, less so by the end and his mood appeared to more even kill, not as expansive as yesterday. He confirmed that he had not experienced Auditory hallucinations for the last 3 days and sounded pretty happy about that. He added that he was not focused on Hinduism matters as before which showed good insight. He asked again about discharge plans. We talked about stopping SIO as a first step for discharge and talked about going to [facility] after discharge. Basil was in agreement with the all above. I promised that we would stay in touch with his family and if things went well we could " potentially, discharge him home some time next week. aBsil was happy to hear that and had no more questions or concerns.            Medications:     Current Facility-Administered Medications   Medication Dose Route Frequency Provider Last Rate Last Admin    benztropine (COGENTIN) tablet 1 mg  1 mg Oral BID Blaine Seo MD   1 mg at 06/28/24 0847    divalproex sodium extended-release (DEPAKOTE ER) 24 hr tablet 1,500 mg  1,500 mg Oral At Bedtime Blaine Seo MD   1,500 mg at 06/27/24 2122    divalproex sodium extended-release (DEPAKOTE ER) 24 hr tablet 500 mg  500 mg Oral Daily Haroon Parker APRN CNP   500 mg at 06/28/24 0847    fluticasone (FLONASE) 50 MCG/ACT spray 2 spray  2 spray Both Nostrils Daily Haroon Parker APRN CNP   2 spray at 06/28/24 0848    gabapentin (NEURONTIN) capsule 300 mg  300 mg Oral Daily Haroon Parker APRN CNP   300 mg at 06/28/24 0847    gabapentin (NEURONTIN) capsule 600 mg  600 mg Oral At Bedtime Haroon Parker APRN CNP   600 mg at 06/27/24 2122    haloperidol (HALDOL) tablet 10 mg  10 mg Oral BID Blaine Seo MD   10 mg at 06/28/24 0847    ipratropium (ATROVENT) 0.06 % spray 2 spray  2 spray Both Nostrils 4x Daily Haroon Parker APRN CNP   2 spray at 06/28/24 1349    multivitamin w/minerals (THERA-VIT-M) tablet 1 tablet  1 tablet Oral Daily Haroon Parker APRN CNP   1 tablet at 06/28/24 0847    risperiDONE (risperDAL) tablet 2 mg  2 mg Oral QAM Blaine Seo MD   2 mg at 06/28/24 0847    risperiDONE (risperDAL) tablet 4 mg  4 mg Oral At Bedtime Blaine Seo MD   4 mg at 06/27/24 2123          Allergies:     Allergies   Allergen Reactions    Seasonal Allergies Other (See Comments)     Congestion. Managed with meds.          Labs:   No results found for this or any previous visit (from the past 24 hour(s)).       Psychiatric Examination:     /64   Pulse 95   Temp 98.4  F (36.9  C) (Oral)   Resp 18   Ht 1.854 m  "(6' 1\")   Wt 103.4 kg (228 lb)   SpO2 97%   BMI 30.08 kg/m    Weight is 228 lbs 0 oz  Body mass index is 30.08 kg/m .    Orthostatic Vitals         Most Recent      Sitting Orthostatic /65 06/28 0829    Sitting Orthostatic Pulse (bpm) 95 06/28 0829    Standing Orthostatic /71 06/28 0829    Standing Orthostatic Pulse (bpm) 100 06/28 0829           Appearance: awake, alert, dressed in hospital scrubs, and appeared as age stated  Attitude:  cooperative  Eye Contact:  fair  Mood:  calmer,    Affect: more animated  Speech:  clear, coherent  Psychomotor Behavior:  no evidence of tardive dyskinesia, dystonia, or tics  Throught Process: more organized and logical  Associations: less loose  Thought Content:  no evidence of suicidal ideation or homicidal ideation, auditory hallucinations today not present, and no visual hallucinations present, no delusions present    Insight:  partial, improving  Judgement:  poor, improving  Oriented to:  time, person, and place  Attention Span and Concentration:  better  Recent and Remote Memory:  fair    Clinical Global Impressions  First: 6/4 6/19/2024       Most recent:            Precautions:     Behavioral Orders   Procedures    Code 1 - Restrict to Unit    Routine Programming     As clinically indicated    Sexual precautions    Status 15     Every 15 minutes.    Suicide precautions: Suicide Risk: HIGH; Clinical rationale to override score: modification to the care environment, lack of access to a plan for self-harm, Collateral information supporting Suicidal/self-harm behaviors     Patients on Suicide Precautions should have a Combination Diet ordered that includes a Diet selection(s) AND a Behavioral Tray selection for Safe Tray - with utensils, or Safe Tray - NO utensils       Order Specific Question:   Suicide Risk     Answer:   HIGH     Order Specific Question:   Clinical rationale to override score:     Answer:   modification to the care environment     Order " Specific Question:   Clinical rationale to override score:     Answer:   lack of access to a plan for self-harm     Order Specific Question:   Clinical rationale to override score:     Answer:   Collateral information supporting Suicidal/self-harm behaviors          DIagnoses:     Psychosis, unspecified psychosis type (H)  Severe manic bipolar I disorder with psychotic features (H)  Schizoaffective disorder, bipolar type          Plan:     Basil is improving with no Auditory hallucinations, no inappropriate behavior over last couple of days. No medication changes today 6/28/2024. Continue sexual precautions, suicide precautions, will block his room, will discontinue SIO as of 6/28/2024. Will continue to provide support and structure. Will discuss going to [facility] after discharge.    Blaine Seo MD  [facility] Psychiatry    Total time spent was 36 minutes. Over 50% of times was spent counseling and coordination of care regarding coping skills, medication and discharge planning.

## 2024-06-28 NOTE — PLAN OF CARE
Team Note Due:  Thursday     Assessment/Intervention/Current Symtoms and Care Coordination:  Chart review and met with team, discussed pt progress, symptomology, and response to treatment. Discussed the discharge plan and any potential impediments to discharge.    1:1 was discontinued today.    Wife called.  She needs letter to the patient;s boss about his stay.  I gave her the [employer] discharge timeframe of the end of next week.      Looks like plan is for a DA for outpatient programming.    Discharge Plan or Goal:  Current plan is to stabilize symptoms and develop safe disposition plan. Following hospitalization, plan is for pt to return home with family with outpatient supports.      Barriers to Discharge:  Currently presents with symptoms of psychosis. Medication management ongoing.      Referral Status:  Referral needs pending as patient stabilizes     Legal Status:  Voluntary     Contacts:  Medication Management:  Name/Organization: Donta Bob MD  [facility] - [facility] on file  Address: [address] [place] [address], [place] [address]  Phone: [phone]    Therapist:  Name/Organization: [facility]   Phone: [phone]     Family Contacts:  Name/Relationship: Flakita Andrews  Wife - [facility] on file  Phone: [phone]      Upcoming Meetings and Dates/Important Information and next steps:  - Check-in with pt's wife weekly.  Determine discharge date.  Send letter about employment.

## 2024-06-28 NOTE — PLAN OF CARE
"Patient has spent majority of the shift in the milieu. Attending groups. Social and upbeat with peers and staff. SIO was discontinued around 12:30. Denies suicidal ideation and self injurious thoughts. Denies anxiety and depression. States that he is hopeful and grateful that he can discharge next week. Denies auditory and visual hallucinations. Med compliant. Ate breakfast and lunch.     Patient evaluation continues. Assessed mood,anxiety,thoughts and behavior.     Patient gradually  progressing towards goals.    Patient is encouraged to participate in groups and assisted to develop healthy coping skills.     VS reviewed: /64   Pulse 95   Temp 98.4  F (36.9  C) (Oral)   Resp 18   Ht 1.854 m (6' 1\")   Wt 103.4 kg (228 lb)   SpO2 97%   BMI 30.08 kg/m      Length of stay: 30    Refer to daily team meeting notes for individualized plan of care. Nursing will continue to assess.    "

## 2024-06-28 NOTE — PLAN OF CARE
"Goal Outcome Evaluation:    Plan of Care Reviewed With: patient      Pt was up on the unit pacing, active and social with both staff and selective peers. Pt was alert and oriented x 4, pleasant and cooperative with staff. VS stable. Affect is bright. Pt reports good appetite, ate about 100% of dinner and snacks.     Pt checked in as doing okay, rated anxiety at 0, and depression at 0 with 10 being worst. Pt rated overall mood at 8/10. Pt denies SI/SIB/HI. Denies visual and endorses minimal auditory hallucinations. Patient reports feeling good,and less paranoid about other religions and expresses understanding respecting other religions. and hopes to discharge soon. Patient also expressed he would consider doing outpatient here at [place]. At  patient reported unable to sleep after talking to his wife who was on the phone crying regarding changes at her job. Patient expressed concerned and anxious about his wife's status and requested for prn Hydroxyzine, Melatonin and Trazodone to help him fall asleep. Patient acknowledges there's nothing he can do to help her while here, and expressed the need to go home and support his wife. Writer listened, acknowledged and validated patient's feelings.    Pt was medication compliant, denied pain, medication side effects and medical concerns.  Vitals./64   Pulse 79   Temp 97.8  F (36.6  C) (Temporal)   Resp 18   Ht 1.854 m (6' 1\")   Wt 103.4 kg (228 lb)   SpO2 96%   BMI 30.08 kg/m             "

## 2024-06-28 NOTE — PLAN OF CARE
Patient in bed at the start of the shift and  appeared to be comfortably asleep and breathing with ease throughout the shift. Patient slept for 7 hours of the over night shift with no s/s of acute distress. Patient experienced no safety events or escalations during the shift, no concerns reported or observed. Safety checks completed at least every 15 minutes. Patient required no PRN medications, see MAR. Patient has a no roommate order because they remain on sexual precautions. Nursing will continue to monitor.

## 2024-06-28 NOTE — PLAN OF CARE
BEH IP Unit Acuity Rating Score (UARS)  Patient is given one point for every criteria they meet.    CRITERIA SCORING   On a 72 hour hold, court hold, committed, stay of commitment, or revocation. 0    Patient LOS on BEH unit exceeds 20 days. 1  LOS: 30   Patient under guardianship, 55+, otherwise medically complex, or under age 11. 0   Suicide ideation without relief of precipitating factors. 0   Current plan for suicide. 0   Current plan for homicide. 0   Imminent risk or actual attempt to seriously harm another without relief of factors precipitating the attempt. 0   Severe dysfunction in daily living (ex: complete neglect for self care, extreme disruption in vegetative function, extreme deterioration in social interactions). 1   Recent (last 7 days) or current physical aggression in the ED or on unit. 0   Restraints or seclusion episode in past 72 hours. 0   Recent (last 7 days) or current verbal aggression, agitation, yelling, etc., while in the ED or unit. 0   Active psychosis. 1   Need for constant or near constant redirection (from leaving, from others, etc).  1   Intrusive or disruptive behaviors. 1   Patient requires 3 or more hours of individualized nursing care per 8-hour shift (i.e. for ADLs, meds, therapeutic interventions). 0   TOTAL 5

## 2024-06-29 PROCEDURE — 250N000013 HC RX MED GY IP 250 OP 250 PS 637: Performed by: CLINICAL NURSE SPECIALIST

## 2024-06-29 PROCEDURE — 124N000002 HC R&B MH UMMC

## 2024-06-29 PROCEDURE — 250N000013 HC RX MED GY IP 250 OP 250 PS 637: Performed by: PSYCHIATRY & NEUROLOGY

## 2024-06-29 RX ORDER — TRAZODONE HYDROCHLORIDE 50 MG/1
50 TABLET, FILM COATED ORAL
Status: DISCONTINUED | OUTPATIENT
Start: 2024-06-29 | End: 2024-07-03 | Stop reason: HOSPADM

## 2024-06-29 RX ADMIN — GABAPENTIN 600 MG: 300 CAPSULE ORAL at 20:41

## 2024-06-29 RX ADMIN — HALOPERIDOL 10 MG: 10 TABLET ORAL at 08:26

## 2024-06-29 RX ADMIN — DIVALPROEX SODIUM 1500 MG: 500 TABLET, FILM COATED, EXTENDED RELEASE ORAL at 20:41

## 2024-06-29 RX ADMIN — RISPERIDONE 2 MG: 2 TABLET ORAL at 08:26

## 2024-06-29 RX ADMIN — OLANZAPINE 10 MG: 10 TABLET, ORALLY DISINTEGRATING ORAL at 09:15

## 2024-06-29 RX ADMIN — OLANZAPINE 10 MG: 10 TABLET, ORALLY DISINTEGRATING ORAL at 18:47

## 2024-06-29 RX ADMIN — TRAZODONE HYDROCHLORIDE 50 MG: 50 TABLET ORAL at 23:26

## 2024-06-29 RX ADMIN — IPRATROPIUM BROMIDE 2 SPRAY: 42 SPRAY, METERED NASAL at 11:58

## 2024-06-29 RX ADMIN — HALOPERIDOL 10 MG: 10 TABLET ORAL at 20:41

## 2024-06-29 RX ADMIN — FLUTICASONE PROPIONATE 2 SPRAY: 50 SPRAY, METERED NASAL at 08:28

## 2024-06-29 RX ADMIN — IPRATROPIUM BROMIDE 2 SPRAY: 42 SPRAY, METERED NASAL at 20:41

## 2024-06-29 RX ADMIN — DIVALPROEX SODIUM 500 MG: 500 TABLET, FILM COATED, EXTENDED RELEASE ORAL at 08:27

## 2024-06-29 RX ADMIN — IPRATROPIUM BROMIDE 2 SPRAY: 42 SPRAY, METERED NASAL at 16:41

## 2024-06-29 RX ADMIN — BENZTROPINE MESYLATE 1 MG: 1 TABLET ORAL at 20:41

## 2024-06-29 RX ADMIN — IPRATROPIUM BROMIDE 2 SPRAY: 42 SPRAY, METERED NASAL at 08:28

## 2024-06-29 RX ADMIN — TRAZODONE HYDROCHLORIDE 50 MG: 50 TABLET ORAL at 20:41

## 2024-06-29 RX ADMIN — RISPERIDONE 4 MG: 2 TABLET, FILM COATED ORAL at 20:41

## 2024-06-29 RX ADMIN — BENZTROPINE MESYLATE 1 MG: 1 TABLET ORAL at 08:27

## 2024-06-29 RX ADMIN — Medication 1 TABLET: at 08:26

## 2024-06-29 RX ADMIN — GABAPENTIN 300 MG: 300 CAPSULE ORAL at 08:27

## 2024-06-29 ASSESSMENT — ACTIVITIES OF DAILY LIVING (ADL)
ADLS_ACUITY_SCORE: 54
HYGIENE/GROOMING: INDEPENDENT
ADLS_ACUITY_SCORE: 54
LAUNDRY: WITH SUPERVISION
ADLS_ACUITY_SCORE: 54
DRESS: INDEPENDENT
ORAL_HYGIENE: INDEPENDENT
ADLS_ACUITY_SCORE: 54

## 2024-06-29 NOTE — PLAN OF CARE
Problem: Psychotic Signs/Symptoms  Goal: Improved Behavioral Control (Psychotic Signs/Symptoms)  Outcome: Progressing  Flowsheets (Taken 6/29/2024 4869)  Mutually Determined Action Steps (Improved Behavioral Control):   identifies symptoms triggers   identifies future-oriented goal   Goal Outcome Evaluation:       Patient up early, ate breakfast, was medication compliant. Good eye contact during check. In. States he is feeling ' much better'  Appears restless, walking halls, requested Zyprexa after he talked to his wife about financial concerns. States concentration better, Mood anxious,affect brighter. No outburst, no boundaries issues. .

## 2024-06-29 NOTE — CARE PLAN
Rehab Group    Start time: 1415  End time: 1500  Patient time total: 10 minutes    came in and out of group session    #6 attended   Group Type: occupational therapy   Group Topic Covered: balanced lifestyle, cognitive activities, coping skills, healthy leisure time, Physical activity, problem solving, and social skills       Group Session Detail:   Exercise and Cognitive Jenga for concentration, cognitive brain teasers, flexible thinking, large motor movement, FM skills, tracking, coping, reality-based activity, coping, follow through, an opportunity to experience success and expansion of social skills.        Patient Response/Contribution:  distracted , inattentive, and withdrawn       Patient Detail:  Pt joined group late and had low energy.  He was not invested in the group activity and was in and out of group a couple of times.  Pt halfheartedly participated for only one turn.  No charge.                Patient Active Problem List   Diagnosis    Marijuana dependence (H)    Alcohol abuse    Bipolar disorder, current episode manic severe with psychotic features (H)    Chantal (H)    Schizoaffective disorder, bipolar type (H)    Psychosis, unspecified psychosis type (H)

## 2024-06-29 NOTE — PLAN OF CARE
Problem: Psychotic Signs/Symptoms  Goal: Improved Sleep (Psychotic Signs/Symptoms)  Outcome: Adequate for Care Transition  Flowsheets (Taken 6/29/2024 0625)  Mutually Determined Action Steps (Improved Sleep): sleeps 4-6 hours at night  Intervention: Promote Healthy Sleep Hygiene  Recent Flowsheet Documentation  Taken 6/29/2024 0600 by Renny Amaya RN  Sleep Hygiene Promotion: noise level reduced   Goal Outcome Evaluation:    The patient appeared to have slept for about 7 hours throughout the night with no behavioral or safety concerns.

## 2024-06-29 NOTE — PLAN OF CARE
"Patient spent time going in between his room and the lounge. Social with select peers. Denies suicidal ideation and self injurious thoughts. Denies anxiety and depression. Denies auditory and visual hallucination. Med compliant. Ate dinner and snack. Had a visitor which went well. Boundaries were appropriate. Cooperative on the unit.     Patient evaluation continues. Assessed mood,anxiety,thoughts and behavior.     Patient gradually progressing towards goals.    Patient is encouraged to participate in groups and assisted to develop healthy coping skills.     VS reviewed: /57   Pulse 85   Temp 98.1  F (36.7  C) (Oral)   Resp 18   Ht 1.854 m (6' 1\")   Wt 103.4 kg (228 lb)   SpO2 96%   BMI 30.08 kg/m      Length of stay: 30    Refer to daily team meeting notes for individualized plan of care. Nursing will continue to assess.      "

## 2024-06-30 PROCEDURE — 250N000013 HC RX MED GY IP 250 OP 250 PS 637: Performed by: CLINICAL NURSE SPECIALIST

## 2024-06-30 PROCEDURE — 124N000002 HC R&B MH UMMC

## 2024-06-30 PROCEDURE — 250N000013 HC RX MED GY IP 250 OP 250 PS 637: Performed by: PSYCHIATRY & NEUROLOGY

## 2024-06-30 RX ADMIN — GABAPENTIN 600 MG: 300 CAPSULE ORAL at 19:56

## 2024-06-30 RX ADMIN — IPRATROPIUM BROMIDE 2 SPRAY: 42 SPRAY, METERED NASAL at 16:44

## 2024-06-30 RX ADMIN — BENZTROPINE MESYLATE 1 MG: 1 TABLET ORAL at 19:57

## 2024-06-30 RX ADMIN — OLANZAPINE 10 MG: 10 TABLET, ORALLY DISINTEGRATING ORAL at 12:40

## 2024-06-30 RX ADMIN — RISPERIDONE 2 MG: 2 TABLET ORAL at 08:04

## 2024-06-30 RX ADMIN — FLUTICASONE PROPIONATE 2 SPRAY: 50 SPRAY, METERED NASAL at 08:04

## 2024-06-30 RX ADMIN — IPRATROPIUM BROMIDE 2 SPRAY: 42 SPRAY, METERED NASAL at 12:41

## 2024-06-30 RX ADMIN — HALOPERIDOL 10 MG: 10 TABLET ORAL at 19:57

## 2024-06-30 RX ADMIN — TRAZODONE HYDROCHLORIDE 50 MG: 50 TABLET ORAL at 22:07

## 2024-06-30 RX ADMIN — RISPERIDONE 4 MG: 2 TABLET, FILM COATED ORAL at 20:18

## 2024-06-30 RX ADMIN — TRAZODONE HYDROCHLORIDE 50 MG: 50 TABLET ORAL at 19:57

## 2024-06-30 RX ADMIN — DIVALPROEX SODIUM 1500 MG: 500 TABLET, FILM COATED, EXTENDED RELEASE ORAL at 19:56

## 2024-06-30 RX ADMIN — LORAZEPAM 1 MG: 1 TABLET ORAL at 22:58

## 2024-06-30 RX ADMIN — IPRATROPIUM BROMIDE 2 SPRAY: 42 SPRAY, METERED NASAL at 08:04

## 2024-06-30 RX ADMIN — BENZTROPINE MESYLATE 1 MG: 1 TABLET ORAL at 08:03

## 2024-06-30 RX ADMIN — LORAZEPAM 1 MG: 1 TABLET ORAL at 15:50

## 2024-06-30 RX ADMIN — HALOPERIDOL 10 MG: 10 TABLET ORAL at 08:04

## 2024-06-30 RX ADMIN — DIVALPROEX SODIUM 500 MG: 500 TABLET, FILM COATED, EXTENDED RELEASE ORAL at 08:03

## 2024-06-30 RX ADMIN — GABAPENTIN 300 MG: 300 CAPSULE ORAL at 08:05

## 2024-06-30 RX ADMIN — Medication 1 TABLET: at 08:04

## 2024-06-30 ASSESSMENT — ACTIVITIES OF DAILY LIVING (ADL)
ADLS_ACUITY_SCORE: 54
ADLS_ACUITY_SCORE: 54
DRESS: INDEPENDENT
ADLS_ACUITY_SCORE: 54
ORAL_HYGIENE: INDEPENDENT
ADLS_ACUITY_SCORE: 54
HYGIENE/GROOMING: INDEPENDENT
ADLS_ACUITY_SCORE: 54
LAUNDRY: WITH SUPERVISION
ADLS_ACUITY_SCORE: 54

## 2024-06-30 NOTE — PLAN OF CARE
"Patient has spent majority of the shift in the milieu. Attended group. Denies suicidal ideation and self injurious thoughts. Reports some anxiety.  Denies auditory and visual hallucinations. Requested prn zyprexa for agitation. Stated he had gotten off the phone with his wife and was concerned about finances. Is hoping to be discharged next week so he can tend to these matter.  Ate dinner and snack. Med compliant. Affect is flat but pleasant on approach.    Patient evaluation continues. Assessed mood,anxiety,thoughts and behavior.     Patient gradually progressing towards goals.    Patient is encouraged to participate in groups and assisted to develop healthy coping skills.     VS reviewed: /61   Pulse 83   Temp 97.3  F (36.3  C) (Oral)   Resp 16   Ht 1.854 m (6' 1\")   Wt 103.4 kg (228 lb)   SpO2 97%   BMI 30.08 kg/m      Length of stay: 31    Refer to daily team meeting notes for individualized plan of care. Nursing will continue to assess.    "

## 2024-06-30 NOTE — PLAN OF CARE
Problem: Adult Behavioral Health Plan of Care  Goal: Adheres to Safety Considerations for Self and Others  Outcome: Progressing     Problem: Sleep Disturbance  Goal: Adequate Sleep/Rest  Outcome: Progressing  Sleeping in bed at the beginning of the shift. Breathing is even and unlabored during 15 minutes safety checks. He appears to sleep through the night for 6.25 hours. No safety concerns noted this shift.

## 2024-06-30 NOTE — PLAN OF CARE
Problem: Psychotic Signs/Symptoms  Goal: Improved Behavioral Control (Psychotic Signs/Symptoms)  Outcome: Progressing  Flowsheets (Taken 6/30/2024 7770)  Mutually Determined Action Steps (Improved Behavioral Control):   identifies symptoms triggers   verbalizes personal treatment goal   Goal Outcome Evaluation:  Patient has been withdrawing to his room, ' too much confusion and noise out in the lounge. ' He was having a hard time trying to read, lack of concentration. He has been cooperative, medication compliant.  States sleeping better, appetite good.  Denies any safety concerns .                          03-Jul-2018 21:08

## 2024-07-01 PROCEDURE — G0177 OPPS/PHP; TRAIN & EDUC SERV: HCPCS

## 2024-07-01 PROCEDURE — 99232 SBSQ HOSP IP/OBS MODERATE 35: CPT | Performed by: PSYCHIATRY & NEUROLOGY

## 2024-07-01 PROCEDURE — 124N000002 HC R&B MH UMMC

## 2024-07-01 PROCEDURE — 250N000013 HC RX MED GY IP 250 OP 250 PS 637: Performed by: CLINICAL NURSE SPECIALIST

## 2024-07-01 PROCEDURE — 250N000013 HC RX MED GY IP 250 OP 250 PS 637: Performed by: PSYCHIATRY & NEUROLOGY

## 2024-07-01 RX ORDER — RISPERIDONE 1 MG/1
1 TABLET ORAL EVERY MORNING
Status: DISCONTINUED | OUTPATIENT
Start: 2024-07-02 | End: 2024-07-03 | Stop reason: HOSPADM

## 2024-07-01 RX ADMIN — HALOPERIDOL 10 MG: 10 TABLET ORAL at 08:21

## 2024-07-01 RX ADMIN — IPRATROPIUM BROMIDE 2 SPRAY: 42 SPRAY, METERED NASAL at 16:26

## 2024-07-01 RX ADMIN — GABAPENTIN 300 MG: 300 CAPSULE ORAL at 08:22

## 2024-07-01 RX ADMIN — TRAZODONE HYDROCHLORIDE 50 MG: 50 TABLET ORAL at 20:50

## 2024-07-01 RX ADMIN — FLUTICASONE PROPIONATE 2 SPRAY: 50 SPRAY, METERED NASAL at 08:22

## 2024-07-01 RX ADMIN — DIVALPROEX SODIUM 1500 MG: 500 TABLET, FILM COATED, EXTENDED RELEASE ORAL at 19:57

## 2024-07-01 RX ADMIN — GABAPENTIN 600 MG: 300 CAPSULE ORAL at 19:56

## 2024-07-01 RX ADMIN — Medication 1 TABLET: at 08:22

## 2024-07-01 RX ADMIN — IPRATROPIUM BROMIDE 2 SPRAY: 42 SPRAY, METERED NASAL at 08:22

## 2024-07-01 RX ADMIN — BENZTROPINE MESYLATE 1 MG: 1 TABLET ORAL at 08:22

## 2024-07-01 RX ADMIN — RISPERIDONE 2 MG: 2 TABLET ORAL at 08:22

## 2024-07-01 RX ADMIN — RISPERIDONE 3 MG: 2 TABLET, FILM COATED ORAL at 20:50

## 2024-07-01 RX ADMIN — IPRATROPIUM BROMIDE 2 SPRAY: 42 SPRAY, METERED NASAL at 19:56

## 2024-07-01 RX ADMIN — HALOPERIDOL 10 MG: 10 TABLET ORAL at 19:57

## 2024-07-01 RX ADMIN — BENZTROPINE MESYLATE 1 MG: 1 TABLET ORAL at 19:56

## 2024-07-01 RX ADMIN — DIVALPROEX SODIUM 500 MG: 500 TABLET, FILM COATED, EXTENDED RELEASE ORAL at 08:21

## 2024-07-01 ASSESSMENT — ACTIVITIES OF DAILY LIVING (ADL)
ADLS_ACUITY_SCORE: 54
HYGIENE/GROOMING: INDEPENDENT
ADLS_ACUITY_SCORE: 54

## 2024-07-01 NOTE — PLAN OF CARE
Team Note Due:  Thursday     Assessment/Intervention/Current Symtoms and Care Coordination:  Chart review and met with team, discussed pt progress, symptomology, and response to treatment. Discussed the discharge plan and any potential impediments to discharge.    Pt continues on 1:1.but there is some symptom improvement.  Due to behaviors toward female staff pt now has male only 1:1s.    When I walked in this morning he asked if we could do some discharge planning. I said when he is off the SIO.    Wife called and we discussed the update. She is not able to visit due to having a 3 year old.  Pt is welcome to return home if he is not symptomatic.  She is not sure if he wants to return to his psychiatrist.  She thinks he will be agreeable to outpatient care.  I approached the pt to give him this update but he as sleeping.  She has done the FMLA paperwork and is trying to do the STD/LTD paperwork  She needs a [employer] [facility] discharge date before she can get them to talk to her about the disability paperwork.    Discharge Plan or Goal:  Current plan is to stabilize symptoms and develop safe disposition plan. Following hospitalization, plan is for pt to return home with family with outpatient supports.      Barriers to Discharge:  Currently presents with symptoms of psychosis. Medication management ongoing.      Referral Status:  Referral needs pending as patient stabilizes     Legal Status:  Voluntary     Contacts:  Medication Management:  Name/Organization: Donta Bob MD  [facility] - [facility] on file  Address: [address] [place] [address], [address], [place] [address]  Phone: [phone]    Therapist:  Name/Organization: [facility]   Phone: [phone]     Family Contacts:  Name/Relationship: Flakita Andrews  Wife - [facility] on file  Phone: [phone]      Upcoming Meetings and Dates/Important Information and next steps:  - Check-in with pt's wife weekly.  Determine discharge date.

## 2024-07-01 NOTE — PLAN OF CARE
"Patient has spent majority of the shift in the milieu. Denies suicidal ideation and self injurious thoughts. Reports having some anxiety. Was trying not to focus on stressors that he has at home. Requested prn for restless leg and anxiety, given ativan. Ate dinner and snack. Med compliant. Pleasant and cooperative on the unit.     Patient evaluation continues. Assessed mood,anxiety,thoughts and behavior.     Patient gradually progressing towards goals.    Patient is encouraged to participate in groups and assisted to develop healthy coping skills.     VS reviewed: /75   Pulse 83   Temp 97.2  F (36.2  C) (Oral)   Resp 16   Ht 1.854 m (6' 1\")   Wt 103.4 kg (228 lb)   SpO2 96%   BMI 30.08 kg/m      Length of stay: 32    Refer to daily team meeting notes for individualized plan of care. Nursing will continue to assess.    "

## 2024-07-01 NOTE — PLAN OF CARE
Rehab Group    Start time: 1015  End time: 1145  Patient time total: 70 minutes    attended full group    #5 attended   Group Type: OT Clinic   Group Topic Covered: coping skills     Group Session Detail:    Intervention: OT Clinic with 4 peers. Pt participated in a OT Clinic group to facilitate coping skills exploration and creative expression through personally meaningful activities, and to encourage utilization of these healthy coping skills to promote overall health and wellness. Group included clinical observation of social, cognitive and kinesthetic performance skills to inform treatment and safe discharge planning.    Mood/Affect: Pleasant      Plan: Patient encouraged to maintain attendance for continued ongoing support in working towards occupational therapy goals to support overall treatment/care.        Patient Detail:    Joined at start of group requesting to begin working on a new project. Was social off and on with others during this time. Appeared to have greater insight during this group as compared to writer's last previous interactions with patient on 6/27 when discussing discharge plans with another patient. Shared some positive things he wants to work on after discharge such as getting back into running as it has been helpful for him in the past.   Shared that he may discharge later this week and that he feels go about this.       Train & Education Service Per Session 45 + Minutes () OT Group code    Patient Active Problem List   Diagnosis    Marijuana dependence (H)    Alcohol abuse    Bipolar disorder, current episode manic severe with psychotic features (H)    Chantal (H)    Schizoaffective disorder, bipolar type (H)    Psychosis, unspecified psychosis type (H)

## 2024-07-01 NOTE — PLAN OF CARE
BEH IP Unit Acuity Rating Score (UARS)  Patient is given one point for every criteria they meet.    CRITERIA SCORING   On a 72 hour hold, court hold, committed, stay of commitment, or revocation. 0    Patient LOS on BEH unit exceeds 20 days. 1  LOS: 33   Patient under guardianship, 55+, otherwise medically complex, or under age 11. 0   Suicide ideation without relief of precipitating factors. 0   Current plan for suicide. 0   Current plan for homicide. 0   Imminent risk or actual attempt to seriously harm another without relief of factors precipitating the attempt. 0   Severe dysfunction in daily living (ex: complete neglect for self care, extreme disruption in vegetative function, extreme deterioration in social interactions). 1   Recent (last 7 days) or current physical aggression in the ED or on unit. 0   Restraints or seclusion episode in past 72 hours. 0   Recent (last 7 days) or current verbal aggression, agitation, yelling, etc., while in the ED or unit. 0   Active psychosis. 1   Need for constant or near constant redirection (from leaving, from others, etc).  0   Intrusive or disruptive behaviors. 0   Patient requires 3 or more hours of individualized nursing care per 8-hour shift (i.e. for ADLs, meds, therapeutic interventions). 0   TOTAL 3

## 2024-07-01 NOTE — PROGRESS NOTES
"[facility], [place]   Psychiatric Progress Note        Interim History:     The patient's care was discussed with the treatment team during the daily team meeting and/or staff's chart notes were reviewed.  Staff report patient slept for about 5.25 hours last night and had a quiet and unremarkable weekend. He socialized with select peers, staff members, was completely compliant with meds and care. There was no inappropriate behavior reported since SIO was discontinued on Friday.     Met with patient: he was seen in the conference room. He walked in willingly and was pretty pleasant and cooperative throughout our whole visit. Maintained fair eye contact, denied presence of paranoia, Auditory hallucinations of any kind for a number of days. Asked about discharge and confirmed that he would like to go to [facility], I put a referral there.  In regards to his day of discharge we agreed that we would, first, talk to his wife, wait for PHP intake to be done, but that highly likely he would be discharged home this week. Basil was happy to hear all that and said that he had no other questions or concerns, but shortly after our visit he approached me in communal area and told me that few days ago he started experiencing restlessness: \"I have restless legs\". I promised him to evaluate his meds, he thanked.           Medications:     Current Facility-Administered Medications   Medication Dose Route Frequency Provider Last Rate Last Admin    benztropine (COGENTIN) tablet 1 mg  1 mg Oral BID Blaine Seo MD   1 mg at 07/01/24 0822    divalproex sodium extended-release (DEPAKOTE ER) 24 hr tablet 1,500 mg  1,500 mg Oral At Bedtime Blaine Seo MD   1,500 mg at 06/30/24 1956    divalproex sodium extended-release (DEPAKOTE ER) 24 hr tablet 500 mg  500 mg Oral Daily Haroon Parker APRN CNP   500 mg at 07/01/24 0821    fluticasone (FLONASE) 50 MCG/ACT spray 2 spray  2 spray Both Nostrils " "Daily Keith NATHALIE Patiño CNP   2 spray at 07/01/24 0822    gabapentin (NEURONTIN) capsule 300 mg  300 mg Oral Daily ParkerHaroon APRN CNP   300 mg at 07/01/24 0822    gabapentin (NEURONTIN) capsule 600 mg  600 mg Oral At Bedtime Keith Haroon, APRN CNP   600 mg at 06/30/24 1956    haloperidol (HALDOL) tablet 10 mg  10 mg Oral BID Blaine Seo MD   10 mg at 07/01/24 0821    ipratropium (ATROVENT) 0.06 % spray 2 spray  2 spray Both Nostrils 4x Daily Haroon Parker APRN CNP   2 spray at 07/01/24 0822    multivitamin w/minerals (THERA-VIT-M) tablet 1 tablet  1 tablet Oral Daily ParkerHaroon APRN CNP   1 tablet at 07/01/24 0822    [START ON 7/2/2024] risperiDONE (risperDAL) tablet 1 mg  1 mg Oral QA Blaine Seo MD        risperiDONE (risperDAL) tablet 3 mg  3 mg Oral At Bedtime Blaine Seo MD              Allergies:     Allergies   Allergen Reactions    Seasonal Allergies Other (See Comments)     Congestion. Managed with meds.          Labs:   No results found for this or any previous visit (from the past 24 hour(s)).       Psychiatric Examination:     /75   Pulse 90   Temp 98.2  F (36.8  C) (Oral)   Resp 16   Ht 1.854 m (6' 1\")   Wt 103.4 kg (228 lb)   SpO2 96%   BMI 30.08 kg/m    Weight is 228 lbs 0 oz  Body mass index is 30.08 kg/m .    Orthostatic Vitals         Most Recent      Sitting Orthostatic /67 07/01 0849    Sitting Orthostatic Pulse (bpm) 90 07/01 0849    Standing Orthostatic BP 93/58 07/01 0849    Standing Orthostatic Pulse (bpm) 118 07/01 0849           Appearance: awake, alert, dressed in hospital scrubs, and appeared as age stated  Attitude:  cooperative  Eye Contact: good  Mood:  calmer,    Affect: more animated  Speech:  clear, coherent  Psychomotor Behavior:  no evidence of tardive dyskinesia, dystonia, or tics  Throught Process: more organized and logical  Associations: no looseness  Thought Content:  no evidence of suicidal " ideation or homicidal ideation, auditory hallucinations today not present, and no visual hallucinations present, no delusions present    Insight:  better  Judgement:  fair  Oriented to:  time, person, and place  Attention Span and Concentration:  better  Recent and Remote Memory:  fair    Clinical Global Impressions  First: 6/4 6/19/2024       Most recent:            Precautions:     Behavioral Orders   Procedures    Code 1 - Restrict to Unit    Routine Programming     As clinically indicated    Sexual precautions    Status 15     Every 15 minutes.    Suicide precautions: Suicide Risk: HIGH; Clinical rationale to override score: modification to the care environment, lack of access to a plan for self-harm, Collateral information supporting Suicidal/self-harm behaviors     Patients on Suicide Precautions should have a Combination Diet ordered that includes a Diet selection(s) AND a Behavioral Tray selection for Safe Tray - with utensils, or Safe Tray - NO utensils       Order Specific Question:   Suicide Risk     Answer:   HIGH     Order Specific Question:   Clinical rationale to override score:     Answer:   modification to the care environment     Order Specific Question:   Clinical rationale to override score:     Answer:   lack of access to a plan for self-harm     Order Specific Question:   Clinical rationale to override score:     Answer:   Collateral information supporting Suicidal/self-harm behaviors          DIagnoses:     Psychosis, unspecified psychosis type (H)  Severe manic bipolar I disorder with psychotic features (H)  Schizoaffective disorder, bipolar type          Plan:     Basil is improving with no Auditory hallucinations, no inappropriate behavior over last couple of day. He complains of restlessness. Will decrease his Risperdal dose 7/1/2024. Continue sexual precautions, suicide precautions, will block his room, he is off SIO. Will continue to provide support and structure. Will refer to PHP,  will plan to discharge some time this week.    Blaine Seo MD  [facility]    Total time spent was 36 minutes. Over 50% of times was spent counseling and coordination of care regarding coping skills, medication and discharge planning.

## 2024-07-01 NOTE — PLAN OF CARE
Pt appeared to sleep for a total of 5.25 hours overnight. No PRN medications were administered and no concerns were noted.     Problem: Sleep Disturbance  Goal: Adequate Sleep/Rest  Outcome: Progressing

## 2024-07-01 NOTE — PLAN OF CARE
"  Problem: Anxiety  Goal: Anxiety Reduction or Resolution  Outcome: Progressing   Goal Outcome Evaluation:        Pt has been visible in the milieu.  Affect is full range.  Mood is calm.  Pt denies auditory and visual hallucinations.  Pt denies suicidal ideation.  Pt reports sleep and appetite as being good.  Pt reports looking forward to discharging sometime this week.  Pt was medication compliant.  No medication side effects reported or noted.  No concerns reported.  Will continue to support plan of care.         ../75   Pulse 90   Temp 98.2  F (36.8  C) (Oral)   Resp 16   Ht 1.854 m (6' 1\")   Wt 103.4 kg (228 lb)   SpO2 96%   BMI 30.08 kg/m                         "

## 2024-07-02 ENCOUNTER — BEH TREATMENT PLAN (OUTPATIENT)
Dept: BEHAVIORAL HEALTH | Facility: CLINIC | Age: 34
End: 2024-07-02
Attending: PSYCHIATRY & NEUROLOGY

## 2024-07-02 PROCEDURE — 250N000013 HC RX MED GY IP 250 OP 250 PS 637: Performed by: CLINICAL NURSE SPECIALIST

## 2024-07-02 PROCEDURE — 250N000013 HC RX MED GY IP 250 OP 250 PS 637: Performed by: PSYCHIATRY & NEUROLOGY

## 2024-07-02 PROCEDURE — 99232 SBSQ HOSP IP/OBS MODERATE 35: CPT | Performed by: PSYCHIATRY & NEUROLOGY

## 2024-07-02 PROCEDURE — 90791 PSYCH DIAGNOSTIC EVALUATION: CPT | Performed by: COUNSELOR

## 2024-07-02 PROCEDURE — 124N000002 HC R&B MH UMMC

## 2024-07-02 RX ORDER — DIVALPROEX SODIUM 500 MG/1
1500 TABLET, EXTENDED RELEASE ORAL AT BEDTIME
Qty: 90 TABLET | Refills: 1 | Status: SHIPPED | OUTPATIENT
Start: 2024-07-02 | End: 2024-09-13

## 2024-07-02 RX ORDER — RISPERIDONE 1 MG/1
1 TABLET ORAL EVERY MORNING
Qty: 30 TABLET | Refills: 1 | Status: SHIPPED | OUTPATIENT
Start: 2024-07-03 | End: 2024-07-16

## 2024-07-02 RX ORDER — PROPRANOLOL HYDROCHLORIDE 10 MG/1
10 TABLET ORAL 3 TIMES DAILY PRN
Status: DISCONTINUED | OUTPATIENT
Start: 2024-07-02 | End: 2024-07-03 | Stop reason: HOSPADM

## 2024-07-02 RX ORDER — DIVALPROEX SODIUM 500 MG/1
500 TABLET, EXTENDED RELEASE ORAL DAILY
Qty: 30 TABLET | Refills: 1 | Status: SHIPPED | OUTPATIENT
Start: 2024-07-03

## 2024-07-02 RX ORDER — RISPERIDONE 3 MG/1
3 TABLET ORAL AT BEDTIME
Qty: 30 TABLET | Refills: 1 | Status: SHIPPED | OUTPATIENT
Start: 2024-07-02 | End: 2024-07-22

## 2024-07-02 RX ORDER — HALOPERIDOL 5 MG/1
5 TABLET ORAL EVERY 8 HOURS PRN
Qty: 60 TABLET | Refills: 1 | Status: SHIPPED | OUTPATIENT
Start: 2024-07-02 | End: 2024-07-22

## 2024-07-02 RX ORDER — HALOPERIDOL 10 MG/1
10 TABLET ORAL 2 TIMES DAILY
Qty: 60 TABLET | Refills: 1 | Status: SHIPPED | OUTPATIENT
Start: 2024-07-02 | End: 2024-09-13

## 2024-07-02 RX ORDER — BENZTROPINE MESYLATE 1 MG/1
1 TABLET ORAL 2 TIMES DAILY
Qty: 60 TABLET | Refills: 1 | Status: SHIPPED | OUTPATIENT
Start: 2024-07-02 | End: 2024-07-29

## 2024-07-02 RX ORDER — PROPRANOLOL HYDROCHLORIDE 10 MG/1
10 TABLET ORAL 3 TIMES DAILY PRN
Qty: 60 TABLET | Refills: 1 | Status: SHIPPED | OUTPATIENT
Start: 2024-07-02

## 2024-07-02 RX ADMIN — RISPERIDONE 3 MG: 2 TABLET, FILM COATED ORAL at 20:05

## 2024-07-02 RX ADMIN — HALOPERIDOL 10 MG: 10 TABLET ORAL at 08:29

## 2024-07-02 RX ADMIN — Medication 1 TABLET: at 08:29

## 2024-07-02 RX ADMIN — GABAPENTIN 600 MG: 300 CAPSULE ORAL at 19:59

## 2024-07-02 RX ADMIN — RISPERIDONE 1 MG: 1 TABLET, FILM COATED ORAL at 08:29

## 2024-07-02 RX ADMIN — DIVALPROEX SODIUM 500 MG: 500 TABLET, FILM COATED, EXTENDED RELEASE ORAL at 08:28

## 2024-07-02 RX ADMIN — BENZTROPINE MESYLATE 1 MG: 1 TABLET ORAL at 19:59

## 2024-07-02 RX ADMIN — IPRATROPIUM BROMIDE 2 SPRAY: 42 SPRAY, METERED NASAL at 08:29

## 2024-07-02 RX ADMIN — IPRATROPIUM BROMIDE 2 SPRAY: 42 SPRAY, METERED NASAL at 12:56

## 2024-07-02 RX ADMIN — HALOPERIDOL 10 MG: 10 TABLET ORAL at 19:59

## 2024-07-02 RX ADMIN — IPRATROPIUM BROMIDE 2 SPRAY: 42 SPRAY, METERED NASAL at 19:59

## 2024-07-02 RX ADMIN — BENZTROPINE MESYLATE 1 MG: 1 TABLET ORAL at 08:29

## 2024-07-02 RX ADMIN — FLUTICASONE PROPIONATE 2 SPRAY: 50 SPRAY, METERED NASAL at 08:29

## 2024-07-02 RX ADMIN — GABAPENTIN 300 MG: 300 CAPSULE ORAL at 08:29

## 2024-07-02 RX ADMIN — DIVALPROEX SODIUM 1500 MG: 500 TABLET, FILM COATED, EXTENDED RELEASE ORAL at 19:59

## 2024-07-02 RX ADMIN — IPRATROPIUM BROMIDE 2 SPRAY: 42 SPRAY, METERED NASAL at 16:24

## 2024-07-02 ASSESSMENT — ACTIVITIES OF DAILY LIVING (ADL)
ADLS_ACUITY_SCORE: 54

## 2024-07-02 ASSESSMENT — PATIENT HEALTH QUESTIONNAIRE - PHQ9: SUM OF ALL RESPONSES TO PHQ QUESTIONS 1-9: 8

## 2024-07-02 ASSESSMENT — ANXIETY QUESTIONNAIRES
6. BECOMING EASILY ANNOYED OR IRRITABLE: SEVERAL DAYS
1. FEELING NERVOUS, ANXIOUS, OR ON EDGE: SEVERAL DAYS
GAD7 TOTAL SCORE: 5
5. BEING SO RESTLESS THAT IT IS HARD TO SIT STILL: SEVERAL DAYS
3. WORRYING TOO MUCH ABOUT DIFFERENT THINGS: SEVERAL DAYS
2. NOT BEING ABLE TO STOP OR CONTROL WORRYING: NOT AT ALL
4. TROUBLE RELAXING: SEVERAL DAYS
GAD7 TOTAL SCORE: 5
7. FEELING AFRAID AS IF SOMETHING AWFUL MIGHT HAPPEN: NOT AT ALL

## 2024-07-02 NOTE — PLAN OF CARE
BEH IP Unit Acuity Rating Score (UARS)  Patient is given one point for every criteria they meet.    CRITERIA SCORING   On a 72 hour hold, court hold, committed, stay of commitment, or revocation. 0    Patient LOS on BEH unit exceeds 20 days. 1  LOS: 34   Patient under guardianship, 55+, otherwise medically complex, or under age 11. 0   Suicide ideation without relief of precipitating factors. 0   Current plan for suicide. 0   Current plan for homicide. 0   Imminent risk or actual attempt to seriously harm another without relief of factors precipitating the attempt. 0   Severe dysfunction in daily living (ex: complete neglect for self care, extreme disruption in vegetative function, extreme deterioration in social interactions). 1   Recent (last 7 days) or current physical aggression in the ED or on unit. 0   Restraints or seclusion episode in past 72 hours. 0   Recent (last 7 days) or current verbal aggression, agitation, yelling, etc., while in the ED or unit. 0   Active psychosis. 1   Need for constant or near constant redirection (from leaving, from others, etc).  0   Intrusive or disruptive behaviors. 0   Patient requires 3 or more hours of individualized nursing care per 8-hour shift (i.e. for ADLs, meds, therapeutic interventions). 0   TOTAL 3

## 2024-07-02 NOTE — PROGRESS NOTES
"DarylKieran Safety Plan       Creation Date: 6/13/24        Step 1: Warning signs:     Warning Signs     sexual thoughts, always hungry, zero sleep, spending money \" alot of craziness\"      Step 2: Internal coping strategies - Things I can do to take my mind off my problems without contacting another person:     Strategies     letting self know when I am overwhelmed or anxious     play basketball, football, bowling     boating      Step 3: People and social settings that provide distraction:     Name Contact Information     family, friends and relatives          Places     therapist     [facility]      Step 4: People whom I can ask for help during a crisis:     Name Contact Information     Mom- Madyson Valderrama [phone]     Dad- En [phone]      Step 5: Professionals or agencies I can contact during a crisis:     Clinician/Agency Name Phone Emergency Contact     Therapist- Nabil Nguyen         Psychiatrist-            [facility] Emergency Department Emergency Department Address Emergency Department Phone     [facility] Crisis   [phone]      Suicide Prevention Lifeline Phone: Call or Text 315  Crisis Text Line: Text HOME to 010880     Step 6: Making the environment safer (plan for lethal means safety):   no     Optional: What is most important to me and worth living for?:   Every single one of family members gets baptized  "

## 2024-07-02 NOTE — PROGRESS NOTES
"Outpatient Mental Health Services - Adult    MY COPING PLAN FOR SAFETY    PATIENT'S NAME: Basil Mejia  MRN:   [id number]    SAFETY PLAN:    Step 1: Warning signs / cues (Thoughts, images, mood, situation, behavior) that a crisis may be developing:    Thoughts:  none reported  Images:  none reported  Thinking Processes: racing thoughts and paranoia:    Mood: disinhibited (not caring about things or consequences)  Behaviors:  none reported  Situations: changes in symptoms: depression, misha, hallucinations      Step 2: Coping strategies - Things I can do to take my mind off of my problems without contacting another person (relaxation technique, physical activity):    Distress Tolerance Strategies:  family functions, meditation, television/movies/videos  Physical Activities: go for a walk  Focus on helpful thoughts:  \"Ride the wave\"    Step 3: People and social settings that provide distraction:     Name: Flakita mejia Phone: NA   Name: En Mejia Phone: NA   community center     Step 4: Remind myself of people and things that are important to me and worth living for:  \"my family and my child and hopefully future children    Step 5: When I am in crisis, I can ask these people to help me use my safety plan:     Name: Spouse Phone: NA   Name: NENITA Phone: NA    Step 6: Making the environment safe:     be around others    Step 7: Professionals or agencies I can contact during a crisis:    Suicide Prevention Lifeline: 2-618-455-YVTD (5437)  Crisis Text Line Service (available 24 hours a day, 7 days a week): Text [place] to 573541  Call  **CRISIS (544651) from a cell phone to talk to a team of professionals who can help you.    Crisis Services By [facility]: Phone Number:   [place]     [phone]   [place]    [phone]   [place]    [phone]   [place]    [phone]   [place]    [phone]   [place] [phone]   [place]     [phone]     Call 911 or go to my nearest emergency department.     I helped develop this safety " plan and agree to use it when needed.  I have been given a copy of this plan.      Client signature _________________________________________________________________  Today s date:  7/2/2024  Adapted from Safety Plan Template 2008 Johanna Brito and Anurag Alcaraz is reprinted with the express permission of the authors.  No portion of the Safety Plan Template may be reproduced without the express, written permission.  You can contact the authors at [email or web address] or [email or web address]

## 2024-07-02 NOTE — PROGRESS NOTES
LOCUS Worksheet     Name: Basil Andrews MRN: [id number]    : 1990      Gender:  male    PMI:  NA   Provider Name: [facility]   Provider NPI:  [id number]    Actual level of Care Provided:  therapy and medication management.     Service(s) receiving or referred to:  PHP and then step down to IOP.    Reason for Variance: due to worsening mental health symptoms.      Rating completed by: Christian Paez LPCC/LADC      I. Risk of Harm:   2      Low Risk of Harm    II. Functional Status:   3      Moderate Impairment    III. Co-Morbidity:   2      Minor Co-Morbidity    IV - A. Recovery Environment - Level of Stress:   4      Highly Stress Environment    IV - B. Recovery Environment - Level of Support:   3      Limited Support in Environment    V. Treatment and Recovery History:   3      Moderate to Equivocal Response to Treatment and Recovery Management    VI. Engagement and Recovery Project:   3      Limited Engagement and Recovery       20 Composite Score    Level of Care Recommendation:   20 to 22       Medically Monitored Non-Residential Services

## 2024-07-02 NOTE — PROGRESS NOTES
"RN Review of Medical History / Physical Health Screen  Outpatient Mental Health Programs - [facility] Adult Partial Hospitalization Program    PATIENT'S NAME: Basil Andrews  Preferred name: Basil   33 year old  MRN:   [id number]  :   1990  ACCT. NUMBER: [id number]  CURRENT AGE:  33 year old    DATE OF DIAGNOSTIC ASSESSMENT: 24  DATE OF ADMISSION: 2024    Please see Diagnostic Assessment for additional Medical History.     General Health:   Have you had any exposure to any communicable disease in the past 2-3 weeks? no     Do you have a history of seizures?     If so, do you have a seizure plan? Known triggers?     Notify patient that we will call 911 (if virtual) or a code (if in-person), if we were to witness seizure during group. no         Nutrition:    Are you on a special diet? If yes, please explain:  no   Do you have any concerns regarding your nutritional status? If yes, please explain:  no   Have you had any appetite changes in the last 3 months?  Yes, appetite fluctuating     Have you had any weight loss or weight gain in the last 3 months?  Yes, weight went from 200 to 240 while in the hospital.  Back down to 235 lb.            Height/Weight Review:  Patient reported height:  6'1\"      Patient reports weight:  Date last checked:  235 lb   2024       Patient height and weight recorded by RN in epic flowsheet: No; Unable to measure  Programmatic Care currently provided via telehealth. All pt weights and heights will be collected through patient self-report and recorded in physical health screening progress note upon admission to the program.      BMI Review:  Was the patient informed of BMI? No.     Findings Normal, No Intervention         Fall Risk:   Have you had any falls in the past 3 months? no     Do you currently use any assistive devices for mobility?   no      Does the patient have medication concerns? Yes, sense of restlessness all throughout the day     Was " an MTM referral placed? no      Does the patient have any acute or chronic pain concerns that might impact participation in the program? no       Additional Comments/Assessment: Will report restlessness/dizziness to team    Per completion of the Medical History / Physical Health Screen, is there a recommendation to see / follow up with a primary care physician/clinic or dentist?    Yes, Recommendations:   nutritional risk          Denisha Daniels RN  7/2/2024

## 2024-07-02 NOTE — PLAN OF CARE
Team Note Due:  Thursday     Assessment/Intervention/Current Symtoms and Care Coordination:  Chart review and met with team, discussed pt progress, symptomology, and response to treatment. Discussed the discharge plan and any potential impediments to discharge.     I met with pt and we called wife.  Pt signed an NERY for employer to get proof of hospitalization letter.  I read them the letter and the approved.  I said I would lobby the team for discharge on Wednesday as MD said later this week.  We talked about outpatient services.    Discharge Plan or Goal:  Current plan is to stabilize symptoms and develop safe disposition plan. Following hospitalization, plan is for pt to return home with family with outpatient supports.      Barriers to Discharge:  Currently presents with symptoms of psychosis. Medication management ongoing.      Referral Status:  Referral needs pending as patient stabilizes     Legal Status:  Voluntary     Contacts:  Medication Management:  Name/Organization: Donta Bob MD  [facility] - NERY on file  Address: [address] [place] [address], [place] [address]  Phone: [phone]    Therapist:  Name/Organization: [facility]   Phone: [phone]     Family Contacts:  Name/Relationship: Flakita Andrews  Wife - NERY on file  Phone: [phone]      Upcoming Meetings and Dates/Important Information and next steps:  Determine discharge date.  Send letter about employment.

## 2024-07-02 NOTE — PLAN OF CARE
Care Coordinator Note(s):    Care Request(s):   Psychiatry   Preferences: Time Frame: 3 Weeks  Notes: Name/Organization: Donta Bob MD  [facility] - [facility] on file  Address: [address] [place] [address]  Phone: [phone]       Therapy  Preferences: Time Frame: 1 Month +  Notes: Name/Organization: [facility] Phone: [phone]        Care Outcome(s):    CC Progress Note(s)/ Documentation:     Donta Bob MD next available appt 9/10/24     Nabil Craig's  states e-mail is preferred contact method  [email or web address], writer will e-mail request for follow up appt.   Nabil is out of the office from June 28th until July 9th, and will be returning emails then.        Appointment: Psychiatry   Date/time: Tuesday September 10th, 2024 @ 8:00 AM Telephone  Provider:  Donta Bob MD  Address: [facility] [place] [address]  Phone: [phone]  Fax: [phone]      Appointment: Therapy  Date/time: @  TBD  Provider:  Nabil Craig   Address: [facility] [address] [place] [address]  Phone: [phone]  Fax: [phone]   Note:  Provider is out of office 6/28- 7/9 please call or e-mail [email or web address] to schedule a follow up once he is back in office.        -Leelee Valverde  Adult Behavioral Health Care Coordinator

## 2024-07-02 NOTE — PROGRESS NOTES
[facility], [place]   Psychiatric Progress Note        Interim History:     The patient's care was discussed with the treatment team during the daily team meeting and/or staff's chart notes were reviewed.  Staff report patient slept for about 6.25 hours last night. He was somewhat isolative yesterday evening, but more social today am. Fully cooperative with meds and care. Still reported restlessness. Today am had PHP intake done. No inappropriate behavior was reported.    Met with patient: he was seen in the conference room. He walked in willingly and was pretty pleasant and cooperative throughout our whole visit. Maintained fair eye contact, denied presence of paranoia, Auditory hallucinations of any kind for a number of days. He did complain of still feeling restless and we talked to him about this being a side effects of his neuroleptics (he is on Risperdal and Haldol). I informed Basil that yesterday I decreased by 2 mg his dose of Risperdal and advised him that he could continue to taper it off under supervision of [facility]. We agreed that Haldol was probably, a med that helped the most with Auditory hallucinations. I also suggested to start propranolol prn for restlessness. Most common side effects were discussed with the patient to the patient's satisfaction and he agreed to take it. Said that he was looking forward to his discharge home tomorrow (this was OK per his wife who talked to [facility]). We discussed his discharge meds and he had no more questions or concerns.           Medications:     Current Facility-Administered Medications   Medication Dose Route Frequency Provider Last Rate Last Admin    benztropine (COGENTIN) tablet 1 mg  1 mg Oral BID Blaine Seo MD   1 mg at 07/02/24 0829    divalproex sodium extended-release (DEPAKOTE ER) 24 hr tablet 1,500 mg  1,500 mg Oral At Bedtime Blaine Seo MD   1,500 mg at 07/01/24 1957  "   divalproex sodium extended-release (DEPAKOTE ER) 24 hr tablet 500 mg  500 mg Oral Daily Haroon Parker APRN CNP   500 mg at 07/02/24 0828    fluticasone (FLONASE) 50 MCG/ACT spray 2 spray  2 spray Both Nostrils Daily Haroon Parker APRN CNP   2 spray at 07/02/24 0829    gabapentin (NEURONTIN) capsule 300 mg  300 mg Oral Daily Haroon Parker APRN CNP   300 mg at 07/02/24 0829    gabapentin (NEURONTIN) capsule 600 mg  600 mg Oral At Bedtime Haroon Parker APRN CNP   600 mg at 07/01/24 1956    haloperidol (HALDOL) tablet 10 mg  10 mg Oral BID Blaine Seo MD   10 mg at 07/02/24 0829    ipratropium (ATROVENT) 0.06 % spray 2 spray  2 spray Both Nostrils 4x Daily Haroon Parker APRN CNP   2 spray at 07/02/24 1256    multivitamin w/minerals (THERA-VIT-M) tablet 1 tablet  1 tablet Oral Daily Haroon Parker APRN CNP   1 tablet at 07/02/24 0829    risperiDONE (risperDAL) tablet 1 mg  1 mg Oral Blaine Demarco MD   1 mg at 07/02/24 0829    risperiDONE (risperDAL) tablet 3 mg  3 mg Oral At Bedtime Blaine Seo MD   3 mg at 07/01/24 2050          Allergies:     Allergies   Allergen Reactions    Seasonal Allergies Other (See Comments)     Congestion. Managed with meds.          Labs:   No results found for this or any previous visit (from the past 24 hour(s)).       Psychiatric Examination:     /74   Pulse 85   Temp 98  F (36.7  C) (Oral)   Resp 16   Ht 1.854 m (6' 1\")   Wt 108.6 kg (239 lb 8 oz)   SpO2 96%   BMI 31.60 kg/m    Weight is 239 lbs 8 oz  Body mass index is 31.6 kg/m .    Orthostatic Vitals         Most Recent      Sitting Orthostatic /75 07/02 0938    Sitting Orthostatic Pulse (bpm) 80 07/02 0938    Standing Orthostatic /71 07/02 0938    Standing Orthostatic Pulse (bpm) 94 07/02 0938           Appearance: awake, alert, dressed in hospital scrubs, and appeared as age stated  Attitude: cooperative  Eye Contact: good  Mood:  calmer,    Affect: " full range, animated  Speech:  clear, coherent  Psychomotor Behavior:  no evidence of tardive dyskinesia, dystonia, or tics  Throught Process: organized and logical  Associations: no looseness  Thought Content:  no evidence of suicidal ideation or homicidal ideation, auditory hallucinations today not present, and no visual hallucinations present, no delusions present    Insight:  better  Judgement:  good  Oriented to:  time, person, and place  Attention Span and Concentration:  better  Recent and Remote Memory:  fair    Clinical Global Impressions  First: 6/4 6/19/2024       Most recent:            Precautions:     Behavioral Orders   Procedures    Code 1 - Restrict to Unit    Routine Programming     As clinically indicated    Sexual precautions    Status 15     Every 15 minutes.    Suicide precautions: Suicide Risk: HIGH; Clinical rationale to override score: modification to the care environment, lack of access to a plan for self-harm, Collateral information supporting Suicidal/self-harm behaviors     Patients on Suicide Precautions should have a Combination Diet ordered that includes a Diet selection(s) AND a Behavioral Tray selection for Safe Tray - with utensils, or Safe Tray - NO utensils       Order Specific Question:   Suicide Risk     Answer:   HIGH     Order Specific Question:   Clinical rationale to override score:     Answer:   modification to the care environment     Order Specific Question:   Clinical rationale to override score:     Answer:   lack of access to a plan for self-harm     Order Specific Question:   Clinical rationale to override score:     Answer:   Collateral information supporting Suicidal/self-harm behaviors          DIagnoses:     Severe manic bipolar I disorder with psychotic features (H)  Schizoaffective disorder, bipolar type          Plan:     Basil is improving with no Auditory hallucinations, no inappropriate behavior over last couple of day. He complains of restlessness. Will  start on Propranolol prn as of 7/2/2024. Continue sexual precautions, suicide precautions, will block his room, he is off SIO. Will continue to provide support and structure. Intake was done by PHP, will plan to discharge tomorrow. Discharge meds were reconciled and sent to discharge pharmacy.    Blaine Seo MD  [facility] Psychiatry    Total time spent was 36 minutes. Over 50% of times was spent counseling and coordination of care regarding coping skills, medication and discharge planning.

## 2024-07-02 NOTE — PROGRESS NOTES
"    [facility]        PATIENT'S NAME: Basil Andrews  PREFERRED NAME: Basil  PRONOUNS:       MRN: [id number]  : 1990  [address]:  [id number]  DATE OF SERVICE: 24  START TIME: 8:15 AM  END TIME: 9:56 AM  PREFERRED PHONE: [phone]  May we leave a program related message: Yes  EMERGENCY CONTACT: was obtained for spouse,  Esme Andrews @ [phone] .  SERVICE MODALITY:  In-person    [place] ADULT Mental Health DIAGNOSTIC ASSESSMENT    Identifying Information:  Patient is a 33 year old,    individual.  Patient was referred for an assessment by  [address] [place] [address] .  Patient attended the session alone.    Chief Complaint:   The reason for seeking services at this time is: \" misha, paranoia, delusions, hallucinations with persistent inappropriate sexual thoughts. Speech is tangential, pressured, and at times, disorganized thoughts. Patient reports sexual urges, fantasies and behaviors, including inappropriate sexual behaviors in the presence of family. The family became concerned due to patient's thoughts of him having sexual interaction with his mother-in-law and inappropriate boundaries in their home, such as walking around the house in his underwear only. Patient denies suicidal or homicidal thoughts. He denies any use of substances. Patient's medications were recently changed, and the sexual behaviors started shortly afterwards. Collateral source reports the misha symptoms started 2 or more weeks ago, including not sleeping at night, being restless, talking fast and non-sensical. The inappropriate sexual behaviors started a week ago and triggers or events are unclear. Historically, patient presents with misha to the hospital during the month of May and for the past 3 years. \"   The problem(s) began in . Patient has attempted to resolve these concerns in the past through therapy, " "psychiatry, ADT, IOP .    Social/Family History:  Patient reported that he grew up in [place], [place].  He was raised by biological parents.  Parents stayed .. Patient reported that his childhood was really good.  Patient described his current relationships with family of origin as really good.      The patient describes his cultural background as .  Cultural influences and impact on patient's life structure, values, norms, and healthcare:  none identified .  Contextual influences on patient's health include: Contextual Factors: Individual Factors MICD .  Cultural, Contextual, and socioeconomic factors do not affect the patient's access to services.  These factors will be addressed in the Preliminary Treatment plan.  Patient identified his preferred language to be English. Patient reported that he does not  need the assistance of an  or other support involved in therapy.     Patient reported had no significant delays in developmental tasks.   Patient's highest education level was college graduate. Patient identified the following learning problems: none reported.  Modifications will be used to assist communication in therapy.  Patient reports that he is able to understand written materials.    Patient reported the following relationship history \"NA\".  Patient's current relationship status is  for 5 years.   Patient identified their sexual orientation as heterosexual.  Patient reported having one child(grant). Patient identified Wife, Other (specify) (Patient's in-laws) as part of his support system.  Patient identified the quality of these relationships as good.     Patient's current living/housing situation involves staying in own home/apartment. Patient lives with his wife and child and they report that housing is stable.     Patient is currently employed full time and reports that he is able to function appropriately at work.  Patient reports their finances are obtained through " salary/wages.  Patient does not identify finances as a current stressor.      Patient reported that he has not been involved with the legal system.  Patient denies being on probation / parole / under the jurisdiction of the court.    Patient's Strengths and Limitations:  Patient identified the following strengths or resources that will help them succeed in treatment: commitment to health and well being, community involvement, exercise routine, friends / good social support, family support, insight, intelligence, and motivation. Things that may interfere with the patient's success in treatment include: none identified.     Assessments:  The following assessments were completed by patient for this visit:  PHQ9:       6/18/2013     3:00 PM 7/1/2015    12:00 PM 8/26/2015    11:00 AM 7/2/2024     8:00 AM   PHQ-9 SCORE   PHQ-9 Total Score 5      PHQ-9 Total Score   4 8   PHQ-9 Total Score  3       GAD7:       6/18/2013     3:00 PM 7/1/2015    12:00 PM 8/26/2015    11:00 AM 7/2/2024     8:00 AM   HUGH-7 SCORE   Total Score   0 5   Total Score BEH Adult 3 1       CAGE-AID:       7/2/2024     8:00 AM   CAGE-AID Total Score   Total Score 0     PROMIS 10-Global Health (all questions and answers displayed):       7/2/2024     8:00 AM   PROMIS 10   In general, would you say your health is: 4   In general, would you say your quality of life is: 3   In general, how would you rate your physical health? 3   In general, how would you rate your mental health, including your mood and your ability to think? 3   In general, how would you rate your satisfaction with your social activities and relationships? 2   In general, please rate how well you carry out your usual social activities and roles. (This includes activities at home, at work and in your community, and responsibilities as a parent, child, spouse, employee, friend, etc.) 4   To what extent are you able to carry out your everyday physical activities such as walking, climbing  stairs, carrying groceries, or moving a chair? 1   In the past 7 days, how often have you been bothered by emotional problems such as feeling anxious, depressed, or irritable? 3   In the past 7 days, how would you rate your fatigue on average? 3   In the past 7 days, how would you rate your pain on average, where 0 means no pain, and 10 means worst imaginable pain? 0   Global Mental Health Score 11   Global Physical Health Score 12   PROMIS TOTAL - SUBSCORES 23     [place] Suicide Severity Rating Scale (Lifetime/Recent)      6/2/2015     4:16 PM 5/26/2024    12:18 PM 5/26/2024     4:01 PM 5/26/2024     5:36 PM 5/28/2024     9:16 PM 5/28/2024     9:21 PM 5/29/2024    11:00 PM   [place] Suicide Severity Rating (Lifetime/Recent)   Q1 Wish to be Dead (Lifetime)       No   Q2 Non-Specific Active Suicidal Thoughts (Lifetime)       No   Q1 Wished to be Dead (Past Month)  0-->no 0-->no 0-->no 0-->no 0-->no 0-->no   Q2 Suicidal Thoughts (Past Month)  0-->no 0-->no 0-->no   0-->no   Q6 Suicide Behavior (Lifetime)  0-->no 0-->no   0-->no 0-->no   Level of Risk per Screen  no risks indicated no risks indicated no risks indicated no risks indicated no risks indicated no risks indicated   Do you have guns available to you? No         Description of Most Severe Ideation (Past 1 Month)    None/N.A      Deterrents (Past 1 Month)    0      Reasons for Ideation (Past 1 Month)    0      Actual Attempt (Past 3 Months)    N      Total Number of Actual Attempts (Past 3 Months)    0      Has subject engaged in non-suicidal self-injurious behavior? (Past 3 Months)    N      Interrupted Attempts (Past 3 Months)    N      Total Number of Interrupted Attempts (Past 3 Months)    0      Interrupted Attempt Description (Past 3 Months)    None/N.A      Aborted or Self-Interrupted Attempt (Past 3 Months)    N      Total Number of Aborted or Self-Interrupted Attempts (Past 3 Months)    0      Aborted or Self-Interrupted Attempt Description (Past 3  Months)    None/N.A      Preparatory Acts or Behavior (Past 3 Months)    N      Total Number of Preparatory Acts (Past 3 Months)    0      Preparatory Acts or Behavior Description (Past 3 Months)    None/N.A      Calculated C-SSRS Risk Score (Lifetime/Recent)    No Risk Indicated          Personal and Family Medical History:  Patient does report a family history of mental health concerns.  Patient reports family history includes Depression in an other family member; Parkinsonism in his paternal grandfather; Substance Abuse in his maternal grandfather, maternal grandmother, paternal grandfather, and paternal grandmother; Suicide (age of onset: 20) in an other family member..     Patient does report Mental Health Diagnosis and/or Treatment. Patient has a prior mental health dx of Bipolar D/O with misha, severe psychotic features, Unspecified psychosis, and Addiction. Patient has a hx of substance use treatment in 2015.Patient works for the [employer]. He has been wearing headphones at work to avoid listening to inappropriate sexual references about women. He states that he finds it helpful when he sees a statute of Mother Madyson, which helps calms his thoughts. He has been going to work regularly since the start of symptoms. Patient has a hx of manic episodes during the month of May for the past 3 years, but family is unclear what the triggers are. Patient reported symptoms began in 2011.   Patient has received mental health services in the past: Individual therapy, Inpatient Hospitalization, Psychiatric Medication Management.  Psychiatric Hospitalizations: [facility] over a month .  Patient denies a history of civil commitment.  Patient is receiving other mental health services.   Patient currently participates in weekly therapy. Patient receives medication management. Patient attends [facility].    Patient has had a physical exam to rule out medical causes for current symptoms.  Date of last  physical exam was within the past year. Client was encouraged to follow up with PCP if symptoms were to develop. The patient does not have a Primary Care Provider and was encouraged to establish care with a PCP..  Patient reports no current medical and/or dental concerns.  Patient denies any issues with pain..   There are significant appetite / nutritional concerns / weight changes. These may include: no concerns. Patient reports the following sleep concerns:  No concerns.   Patient does not report a history of head injury / trauma / cognitive impairment.      Patient reports current meds as:   Current Facility-Administered Medications   Medication Dose Route Frequency Provider Last Rate Last Admin    benztropine (COGENTIN) tablet 1 mg  1 mg Oral BID Blaine Seo MD   1 mg at 07/02/24 0829    haloperidol lactate (HALDOL) injection 5 mg  5 mg Intramuscular Q8H PRN Blaine Seo MD        And    LORazepam (ATIVAN) injection 2 mg  2 mg Intramuscular Q8H PRN Blaine Seo MD        And    diphenhydrAMINE (BENADRYL) injection 50 mg  50 mg Intramuscular Q8H PRN Blaine Seo MD        divalproex sodium extended-release (DEPAKOTE ER) 24 hr tablet 1,500 mg  1,500 mg Oral At Bedtime Blaine Seo MD   1,500 mg at 07/01/24 1957    divalproex sodium extended-release (DEPAKOTE ER) 24 hr tablet 500 mg  500 mg Oral Daily Haroon Parker APRN CNP   500 mg at 07/02/24 0828    fluticasone (FLONASE) 50 MCG/ACT spray 2 spray  2 spray Both Nostrils Daily Haroon Parker APRN CNP   2 spray at 07/02/24 0829    gabapentin (NEURONTIN) capsule 300 mg  300 mg Oral Daily Haroon Parker APRN CNP   300 mg at 07/02/24 0829    gabapentin (NEURONTIN) capsule 600 mg  600 mg Oral At Bedtime Haroon Parker APRN CNP   600 mg at 07/01/24 1956    haloperidol (HALDOL) tablet 10 mg  10 mg Oral BID Blaine Seo MD   10 mg at 07/02/24 0829    haloperidol (HALDOL) tablet 5 mg  5 mg Oral Q8H  PRN Blaine Seo MD   5 mg at 06/22/24 1418    hydrOXYzine HCl (ATARAX) tablet 25 mg  25 mg Oral Q6H PRN Haroon Parker APRN CNP   25 mg at 06/25/24 2206    Or    hydrOXYzine HCl (ATARAX) tablet 50 mg  50 mg Oral Q6H PRN Haroon Parker APRN CNP   50 mg at 06/27/24 2218    ipratropium (ATROVENT) 0.06 % spray 2 spray  2 spray Both Nostrils 4x Daily Haroon Parker APRN CNP   2 spray at 07/02/24 0829    LORazepam (ATIVAN) tablet 1 mg  1 mg Oral BID PRN Haroon Parker APRN CNP   1 mg at 06/30/24 2258    melatonin tablet 3 mg  3 mg Oral At Bedtime PRN Haroon Parker APRN CNP   3 mg at 06/27/24 2218    multivitamin w/minerals (THERA-VIT-M) tablet 1 tablet  1 tablet Oral Daily Haroon Parker APRN CNP   1 tablet at 07/02/24 0829    OLANZapine zydis (zyPREXA) ODT tab 10 mg  10 mg Oral TID PRN Haroon Parker APRN CNP   10 mg at 06/30/24 1240    Or    OLANZapine (zyPREXA) injection 10 mg  10 mg Intramuscular TID PRN Haroon Parker APRN CNP        risperiDONE (risperDAL) tablet 1 mg  1 mg Oral QABlaine Jones MD   1 mg at 07/02/24 0829    risperiDONE (risperDAL) tablet 3 mg  3 mg Oral At Bedtime Blaine Seo MD   3 mg at 07/01/24 2050    traZODone (DESYREL) tablet 50 mg  50 mg Oral At Bedtime PRN Haroon Parker APRN CNP   50 mg at 07/01/24 2050       Medication Adherence:  Patient reports taking prescribed medications as prescribed.    Patient Allergies:    Allergies   Allergen Reactions    Seasonal Allergies Other (See Comments)     Congestion. Managed with meds.       Medical History:    Past Medical History:   Diagnosis Date    Bipolar affective (H)     Chemical dependency (H)     Hyperkinetic syndrome of adult          Current Mental Status Exam:   Appearance:  Appropriate    Eye Contact:  Good   Psychomotor:  Normal       Gait / station:  no problem  Attitude / Demeanor: Cooperative   Speech      Rate / Production: Normal/ Responsive      Volume:  Normal  volume       Language:  intact  Mood:   Normal  Affect:   Appropriate    Thought Content: Clear   Thought Process: Coherent       Associations: No loosening of associations  Insight:   Fair   Judgment:  Intact   Orientation:  All  Attention/concentration: Fair    Substance Use:   Patient did not report a family history of substance use concerns; see medical history section for details.  Patient has received chemical dependency treatment in the past at  at [facility] for a month in 2011 .Patient has a hx of substance use but currently denies any substance use.  Patient has not ever been to detox.      Patient is not currently receiving any chemical dependency treatment. Patient reported the following problems as a result of his substance use:   none at this time .    Patient denies using alcohol.  Patient denies using tobacco.  Patient denies using cannabis.  Patient reports using caffeine 4 times per day and drinks 1 at a time. Patient started using caffeine at age 5.  Patient reports using/abusing the following substance(s). Patient reported no other substance use.     Substance Use: No symptoms    Based on the negative CAGE score and clinical interview there  are not indications of drug or alcohol abuse.    Significant Losses / Trauma / Abuse / Neglect Issues:   Patient   did not serve in the .  There are indications or report of significant loss, trauma, abuse or neglect issues related to: are no indications and client denies any losses, trauma, abuse, or neglect concerns.  Concerns for possible neglect are not present.     Safety Assessment:   Patient denies current homicidal ideation and behaviors.  Patient denies current self-injurious ideation and behaviors.    Patient denied risk behaviors associated with substance use.   Patient denies any high risk behaviors associated with mental health symptoms.  Patient reports the following current concerns for his personal safety: None.  Patient reports there   firearms in  the house.       There are no firearms in the home..    History of Safety Concerns:  Patient denied a history of homicidal ideation.     Patient denied a history of personal safety concerns.    Patient denied a history of assaultive behaviors.    Patient denied a history of sexual assault behaviors.     Patient denied a history of risk behaviors associated with substance use.  Patient denies any history of high risk behaviors associated with mental health symptoms.  Patient reports the following protective factors:  strong bond to family unit, community support, or employment, able to access care without barriers, good treatment engagement, lives in a responsibly safe and stable environment, supportive ongoing medical and mental health care relationships, help seeking, cultural, spiritual, or Anabaptist beliefs associated with meaning and value in life    Risk Plan:  See Recommendations for Safety and Risk Management Plan    Review of Symptoms per patient report:   Depression: Change in sleep, Lack of interest, Excessive or inappropriate guilt, Change in energy level, Difficulties concentrating, Change in appetite, Psychomotor slowing or agitation, Feelings of helplessness, and Feeling sad, down, or depressed, difficulty concentrating, impaired decision making, helplessness  Chantal: hyperverbal, grandiosity, inattentive, impulsive, high-risk behavior,   Psychosis:auditory hallucinations, visual hallucinations  Anxiety: Excessive worry, Nervousness, and Irritability  Panic:  No symptoms  Post Traumatic Stress Disorder:  No Symptoms   Eating Disorder: No Symptoms  ADD / ADHD:  No symptoms  Conduct Disorder: No symptoms  Autism Spectrum Disorder: No symptoms  Obsessive Compulsive Disorder: obsessions/compulsions, racing thoughts, anxious    Patient reports the following compulsive behaviors and treatment history:  none .      Diagnostic Criteria:   Bipolar I Manic Episode  **Must meet all criteria below (A-F) for Dx of  Bipolar I Disorder**  MANIC EPISODE - At least one lifetime manic episode is required for the dx of Bipolar I Disorder as evidenced by present symptoms or by history  A. A distinct period of abnormally and persistently elevated, expansive, or irritable mood, lasting at least 1 week (or any duration if hospitalization is necessary).   B. During the period of mood disturbance, three (or more) of the following symptoms (four if the mood is only irritable) have persisted and have been present to a significant degree:   - inflated self-esteem or grandiosity    - decreased need for sleep (e.g., feels rested after only 3 hours of sleep)    - more talkative than usual or pressure to keep talking    - flight of ideas or subjectivie experience that thoughts are racing   - distractibility   - increase in goal-directed activity   - excessive involvement in pleasurable activities that have a high potential for painful consequences, such as spending money or sexual indiscretion.  C. The mood disturbance is sufficiently severe to cause marked impairment in social or occupational functioning or to necessitate hospitalization to prevent harm to self or others, or there are severe psychotic features  D. The symptoms are not attributable to the physiologicial effects of a substance or to another medical condition  E. The episode is sufficiently severe enough to cause marked impairment in social or occupational functioning or to necessitate hospitalization to prevent harm to self or others, or there are psychotic features  F. The symptoms are not due to the direct physiological effects of a substance (eg, a drug of abuse, a medication, or other treatment) or a general medical condition (eg, hyperthyroidism). Schizoaffective Disorder Bipolar Type - This subtype applies if a manic episode is part of the presentation. Major depressive episodes may also occur., A.  An uninterrupted period of illness during which there is a major mood  episode (major depressive or manic) concurrent with Criteria A of schizophrenia. The major depressive episode must include Criteria A1: Depressed mood., B.  Delusions or hallucinations for 2 or more week in the absence of a major mood episode (depressive or manic) during the lifetime duration of the illness., C.  Symptoms that meet criteria for a major mood episode are present for the majority of the total duration of the active and residual portions of the illness. , and D.  The disturbance is not attributable to the effects of a substance or another medical condition.    Functional Status:  Patient reports the following functional impairments:  relationship(s), self-care, and work / vocational responsibilities.     Programmatic care:  Current LOCUS was assigned and patient needs the following level of care based on score 20  .    Clinical Summary:  1. Psychosocial, Cultural and Contextual Factors: helplessness/hopelessness.  2. Principal DSM5 Diagnoses  (Sustained by DSM5 Criteria Listed Above):   296.44 Bipolar I Disorder Current or Most Recent Episode Manic, with psycholtic features.  3. Other Diagnoses that is relevant to services:   295.70  (F25) Schizoaffective Disorder Bipolar Type.  4. 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  5. Prognosis: Relieve Acute Symptoms.  6. Likely consequences of symptoms if not treated: patient's ongoing symptoms are more than likely to get worse and experience a decreased daily in functioning and may require a higher level of care..  7. Client strengths include:  caring, committed to sobriety, creative, educated, employed, has a previous history of therapy, insightful, intelligent, motivated, open to learning, open to suggestions / feedback, responsible parent, and support of family, friends and providers .     Recommendations:     1. Plan for Safety and Risk Management:   Safety and Risk: A safety and  "risk management plan has been developed including: Patient consented to co-developed safety plan.  Safety and risk management plan was completed - see below.  Patient agreed to use safety plan should any safety concerns arise.  A copy was given to the patient..          Report to child / adult protection services was NA.     2. Patient's identified no lucie / Scientologist / spiritual influences relevant to programming at this time.     3. Initial Treatment will focus on:   Depressed Mood -   Functional Impairment at: work  Mood Instability -   Risk Management / Safety Concerns related to: none  Thought Disturbance including: delusions, hallucinations, and paranoia.     4. Resources/Service Plan:    services are not indicated.   Modifications to assist communication are not indicated.   Additional disability accommodations are not indicated.      5. Collaboration:   Collaboration / coordination of treatment will be initiated with the following  support professionals: Targeted Case Management (TCM).      6.  Referrals:   The following referral(s) will be initiated: Partial Hospitalization Program.       A Release of Information has been obtained for the following: Targeted Case Management (TCM).     Clinical Substantiation/medical necessity for the above recommendations:  Patient is a 33-year-old   male with prior mental health dx of Bipolar D/O with misha, severe psychotic features, Unspecified psychosis, and Addiction. Patient is seeking help due to \" misha, paranoia, delusions, hallucinations with persistent inappropriate sexual thoughts. Speech is tangential, pressured, and at times, disorganized thoughts. Patient reports sexual urges, fantasies, and behaviors, including inappropriate sexual behaviors in the presence of family. The family became concerned due to patient's thoughts of him having sexual interaction with his mother-in-law and inappropriate boundaries in their home, such as " "walking around the house in his underwear only. Patient denies suicidal or homicidal thoughts. He denies any use of substances. Patient's medications were recently changed, and the sexual behaviors started shortly afterwards. Collateral source reports the misha symptoms started 2 or more weeks ago, including not sleeping at night, being restless, talking fast and non-sensical. The inappropriate sexual behaviors started a week ago and triggers or events are unclear. Historically, patient presents with misha to the hospital during the month of May and for the past 3 years.\"  Patient has a hx of substance use treatment in 2015.Patient works for the [employer] service. He has been wearing headphones at work to avoid listening to inappropriate sexual references about women. He states that he finds it helpful when he sees a statute of Mother Madyson, which helps calms his thoughts. He has been going to work regularly since the start of symptoms. Patient has a hx of manic episodes during the month of May for the past 3 years, but family is unclear what the triggers are.   Patient has received mental health services in the past: Individual therapy, Inpatient Hospitalization, Psychiatric Medication Management.  Psychiatric Hospitalizations: [facility] over a month.  Patient denies a history of civil commitment.  Patient is receiving other mental health services.   Patient currently participates in weekly therapy. Patient receives medication management. Patient attends [facility]. Patient agrees with referral to [facility] and a hand of in basket was placed to navigators for services.     Patient endorses with the following symptoms: Change in sleep, Lack of interest, Excessive or inappropriate guilt, change in energy level, Difficulties concentrating, change in appetite, Psychomotor slowing or agitation, Feelings of helplessness, and feeling sad, down, or depressed, difficulty concentrating, impaired " "decision making, helplessness, hyperverbal, grandiosity, inattentive, impulsive, high-risk behaviors, auditory hallucinations, visual hallucinations.     The patient's acute suicide risk was determined to be low due to the following factors: denial of current suicidal thoughts, and no history of suicide attempts. Patient is not currently under the influence of alcohol or illicit substances, denies experiencing command hallucinations, and has no immediate access to firearms. The patient's acute risk could be higher if noncompliant with their follow-up appointments or using illicit substances or alcohol. Protective factors include strong bond to family unit, community support, or employment, able to access care without barriers, good treatment engagement, lives in a responsibly safe and stable environment, supportive ongoing medical and mental health care relationships, help seeking, cultural, spiritual, or Quaker beliefs associated with meaning and value in life.    7. PAU:    PAU:  Discussed the general effects of drugs and alcohol on health and well-being. Provider gave patient printed information about the effects of chemical use on their health and well being. Recommendations:  none .     8. Records:   These were reviewed at time of assessment.   Information in this assessment was obtained from the medical record and  provided by patient who is a fair historian.    Patient will have open access to their mental health medical record.    9.   Interactive Complexity: No    10. Safety Plan:   [facility] Safety Plan      Creation Date: 6/13/24       Step 1: Warning signs:    Warning Signs    sexual thoughts, always hungry, zero sleep, spending money \" alot of craziness\"      Step 2: Internal coping strategies - Things I can do to take my mind off my problems without contacting another person:    Strategies    letting self know when I am overwhelmed or anxious    play basketball, football, bowling    boating    "   Step 3: People and social settings that provide distraction:    Name Contact Information    family, friends and relatives        Places    therapist    [facility]      Step 4: People whom I can ask for help during a crisis:    Name Contact Information    Mom- Madyson     Wife-Flakita [phone]    Dad- En [phone]      Step 5: Professionals or agencies I can contact during a crisis:    Clinician/Agency Name Phone Emergency Contact    Therapist- Nabil Nguyen      Psychiatrist-        Local Emergency Department Emergency Department Address Emergency Department Phone    [facility]  [phone]      Suicide Prevention Lifeline Phone: Call or Text 182  Crisis Text Line: Text HOME to 069843     Step 6: Making the environment safer (plan for lethal means safety):   no     Optional: What is most important to me and worth living for?:   Every single one of family members gets baptized     [facility] Safety Plan. Johanna Brito and Anurag Alcaraz. Used with permission of the authors.           Provider Name/ Credentials:  Christian Paez, JOSUÉ,  MARGOT  [facility]   Phone: [phone]  Fax: [phone]     July 2, 2024

## 2024-07-02 NOTE — PLAN OF CARE
Care Coordinator scheduled the following appointment:     Primary Care appointment: Thursday, August 1, 2024 @ 12:45pm In-person   Provider: Dmitry Tee PA-C  [facility], [address] [place] [address]  Phone: [phone]  Fax: [phone]  HUC TO FAX AVS to above appointment, please    CC updated CTC and AVS

## 2024-07-02 NOTE — PLAN OF CARE
"  Problem: Psychotic Signs/Symptoms  Goal: Improved Behavioral Control (Psychotic Signs/Symptoms)  Outcome: Progressing  Flowsheets (Taken 7/2/2024 1148)  Mutually Determined Action Steps (Improved Behavioral Control):   verbalizes personal treatment goal   identifies future-oriented goal   Goal Outcome Evaluation:        Pt has been visible in the unit.  Social and appropriate with others.  Affect is full range.  Pt denies auditory and visual hallucinations.  Pt denies depression and anxiety.  Medication compliant, no medication side effects reported or noted.  Good appetite.  No concerns with sleep reported.  Pt reports looking forward to discharging tomorrow back home with family.  Pt has been calm and co-operative on the unit.  No behavioral concerns.  Will continue to support plan of care.         .../74   Pulse 85   Temp 98  F (36.7  C) (Oral)   Resp 16   Ht 1.854 m (6' 1\")   Wt 108.6 kg (239 lb 8 oz)   SpO2 96%   BMI 31.60 kg/m                         "

## 2024-07-02 NOTE — PLAN OF CARE
"Goal Outcome Evaluation:    Plan of Care Reviewed With: patient      Patient has been isolative and withdrawn to his room most of the shift, presents a flat affect that brightens on approach. Patient denies SI/SIB/HI/AVH, denies anxiety and depression and feels safe. Patient verbalized excitement knowing that he will be discharging soon. Patient was compliant with medications, requested and was given prn trazodone 50MG at bedtime. Patient eats and drinks okay.  Blood pressure 126/74, pulse 85, temperature 97.8  F (36.6  C), temperature source Oral, resp. rate 16, height 1.854 m (6' 1\"), weight 103.4 kg (228 lb), SpO2 94%.        "

## 2024-07-02 NOTE — CONSULTS
DA IP Consult completed. Patient has been referred to PHP and then to step down to IOP at [facility].

## 2024-07-02 NOTE — PROGRESS NOTES
"    Admission SBAR NOTE  Adult  Outpatient Programs          SITUATION:     Admission Date: 2024    Provider verified identity through the following two step process.  Patient provided: verbal spelling of full first and last name and Patient     Patient name:  Basil Andrews  Preferred name: Basil He/Him/His/Himself 33 year old  Diagnosis/-es (copy from [employer], including ICD-10):   296.44 Bipolar I Disorder Current or Most Recent Episode Manic, with psycholtic features.    Other Diagnoses that is relevant to services:   295.70  (F25) Schizoaffective Disorder Bipolar Type.    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    Assigned Program/Track: PHP1    Reviewed patient's schedule and informed them of any variation due to holidays. yes    Does the patient have any planned absences and/or barriers to admission/treatment? no  NOTE: impact of transportation, technology, childcare, work, or housing concerns.    Insurance: Payor: [facility] / Plan: [facility] CHOICE / Product Type: Indemnity /  Changes/Concerns: no    Does patient need an appointment with the program provider? yes  NOTE: If yes, please confirm/schedule provider visit.      BACKGROUND:     Patient's stated goal/reason for treatment (copy from [employer]; confirm with patient): \"\" misha, paranoia, delusions, hallucinations with persistent inappropriate sexual thoughts. Speech is tangential, pressured, and at times, disorganized thoughts. Patient reports sexual urges, fantasies and behaviors, including inappropriate sexual behaviors in the presence of family. The family became concerned due to patient's thoughts of him having sexual interaction with his mother-in-law and inappropriate boundaries in their home, such as walking around the house in his underwear only. Patient denies suicidal or homicidal thoughts. He denies any use of substances. Patient's medications were recently " "changed, and the sexual behaviors started shortly afterwards. Collateral source reports the misha symptoms started 2 or more weeks ago, including not sleeping at night, being restless, talking fast and non-sensical. The inappropriate sexual behaviors started a week ago and triggers or events are unclear. Historically, patient presents with misha to the hospital during the month of May and for the past 3 years. \"\"      ASSESSMENT:     Please consult  if any of the following concerns may impact admission/participation in program:     PHQ, HUGH and PROMIS done within 7 days OR send upon admission if over 7 days.      [place] Suicide Severity Rating (select Lifetime/Recent): [place] Suicide Severity Rating Scale (Lifetime/Recent)      6/2/2015     4:16 PM 5/26/2024    12:18 PM 5/26/2024     4:01 PM 5/26/2024     5:36 PM 5/28/2024     9:16 PM 5/28/2024     9:21 PM 5/29/2024    11:00 PM   [place] Suicide Severity Rating (Lifetime/Recent)   Q1 Wish to be Dead (Lifetime)       No   Q2 Non-Specific Active Suicidal Thoughts (Lifetime)       No   Q1 Wished to be Dead (Past Month)  0-->no 0-->no 0-->no 0-->no 0-->no 0-->no   Q2 Suicidal Thoughts (Past Month)  0-->no 0-->no 0-->no   0-->no   Q6 Suicide Behavior (Lifetime)  0-->no 0-->no   0-->no 0-->no   Level of Risk per Screen  no risks indicated no risks indicated no risks indicated no risks indicated no risks indicated no risks indicated   Do you have guns available to you? No         Description of Most Severe Ideation (Past 1 Month)    None/N.A      Deterrents (Past 1 Month)    0      Reasons for Ideation (Past 1 Month)    0      Actual Attempt (Past 3 Months)    N      Total Number of Actual Attempts (Past 3 Months)    0      Has subject engaged in non-suicidal self-injurious behavior? (Past 3 Months)    N      Interrupted Attempts (Past 3 Months)    N      Total Number of Interrupted Attempts (Past 3 Months)    0      Interrupted Attempt Description " (Past 3 Months)    None/N.A      Aborted or Self-Interrupted Attempt (Past 3 Months)    N      Total Number of Aborted or Self-Interrupted Attempts (Past 3 Months)    0      Aborted or Self-Interrupted Attempt Description (Past 3 Months)    None/N.A      Preparatory Acts or Behavior (Past 3 Months)    N      Total Number of Preparatory Acts (Past 3 Months)    0      Preparatory Acts or Behavior Description (Past 3 Months)    None/N.A      Calculated C-SSRS Risk Score (Lifetime/Recent)    No Risk Indicated          LOCUS completed for recommended level of care? yes  IOP: 17-19; PHP: 20-22     Copy/Paste current Safety Plan to the BEH TX PLAN ENCOUNTER. yes    Safety status/concerns: Denies     Substance use concerns: no  NOTE: if no substance use concerns, OK to delete below:    Based on the negative CAGE score and clinical interview there  are not indications of drug or alcohol abuse.    Patient has a hx of substance use treatment in 2015.Patient works for the [employer] service. He has been wearing headphones at work to avoid listening to inappropriate sexual references about women. He states that he finds it helpful when he sees a statute of Mother Madyson, which helps calms his thoughts.    Pertinent Medical/Nutritional concerns: no    Confirm Emergency Contact listed in the Snap[employer]ot/Demographics with patient and notify OBC if an update is required. no    Paper or Docusign requirements for ROIs, e-NERY, emergency contact, etc have been completed? no  If not, do upon admission.     Does patient have FMLA or Short-Term Disability requests/plans? yes encouraged patient to go to outpatient psychiatrist    NOTE: Whenever possible, FMLA or Short-Term Disability paperwork needs to be managed/completed by the patient's community provider.   Exceptions: Patient does not have a community provider AND request is specific to mental health and time off for the duration of the program participation.    Notify RN Triage as soon as  possible.     Care Providers/Medication Management Needs:     Does patient have a current community or other [facility] provider prescribing medications for mental health? yes  NOTE: Delete below if not applicable:    Psychiatric Provider (or PCP if managing MH meds)/Name: Dr. Bob  Clinic name/location: [facility]  Last appointment:  4 to 6 weeks ago  Next appointment:  Will see Dr. Bob within the next week     NOTE: Inform patients, program is temporary and we will not be transferring care. Patient's should continue to see their community provider.       Individual Therapist/Name:  Nabil Nguyen  [facility] name/location: [facility]  Last appointment:  4 to 6 weeks ago  Next appointment:  None upcoming     Patient will continue to see above provider while participating in program: yes - Dr. Bob        RECOMMENDATIONS:     Patient Admission Completed: yes    Care Team, referrals made/needed: no - none needed  PCP: No Ref-Primary, Physician  NOTE: Notify RN, as needed, to make internal referrals.                                                             Completed by: Denisha Daniels RN

## 2024-07-02 NOTE — PLAN OF CARE
Rehab Group    Start time: 10:15  End time: 11:05  Patient time total: 30 minutes    attended partial group    #7 attended   Group Type: OT Clinic   Group Topic Covered: balanced lifestyle, cognitive activities, coping skills, emotional regulation, and social skills     Group Session Detail:  Pt actively participated in occupational therapy clinic to facilitate coping skill exploration, creative expression within personally meaningful activities, and clinical observation of social, cognitive, and kinesthetic performance skills.      Patient Response/Contribution:  cooperative with task, socially appropriate, and worked intermittently     Patient Detail:  Pt response: independent to initiate, gather materials, sequence, and adjust to workspace demands as needed. Demonstrated fair-good focus, planning, and problem solving for selected creative task. Able to ask for assistance as needed, and appropriate with peers and staff. Patient was quite social in group; able to track the conversation well and respond appropriately and in a linear fashion. Affect: bright. Mood: calm, cooperative.            No Charge    Patient Active Problem List   Diagnosis    Marijuana dependence (H)    Alcohol abuse    Bipolar disorder, current episode manic severe with psychotic features (H)    Chantal (H)    Schizoaffective disorder, bipolar type (H)    Psychosis, unspecified psychosis type (H)

## 2024-07-02 NOTE — PLAN OF CARE
Team Note Due:  Thursday     Assessment/Intervention/Current Symtoms and Care Coordination:  Chart review and met with team, discussed pt progress, symptomology, and response to treatment. Discussed the discharge plan and any potential impediments to discharge.    Pt completed DA for PHP.    I informed pt that I did not get a confirmation e-mail from his  manager.    Appointments in place by CCs.    Pt informed me that he will have wife pick him up at 11:30AM.    Discharge Plan or Goal:  Current plan is to stabilize symptoms and develop safe disposition plan. Following hospitalization, plan is for pt to return home with family with outpatient supports.      Barriers to Discharge:  Currently presents with symptoms of psychosis. Medication management ongoing.      Referral Status:  Referral needs pending as patient stabilizes     Legal Status:  Voluntary     Contacts:  Medication Management:  Name/Organization: Donta Bob MD  [facility] on file  Address: [address] [place] [address], [place] [address]  Phone: [phone]    Therapist:  Name/Organization: [facility]   Phone: [phone]     Family Contacts:  Name/Relationship: Flakita Andrews  Wife - [facility] on file  Phone: [phone]      Upcoming Meetings and Dates/Important Information and next steps:    Discharge tomorrow.  Send letter about employment.or just give letter to family.

## 2024-07-02 NOTE — PLAN OF CARE
Problem: Adult Inpatient Plan of Care  Goal: Plan of Care Review  Description: The Plan of Care Review/Shift note should be completed every shift.  The Outcome Evaluation is a brief statement about your assessment that the patient is improving, declining, or no change.  This information will be displayed automatically on your shift  note.  Outcome: Progressing     Problem: Sleep Disturbance  Goal: Adequate Sleep/Rest  Outcome: Progressing   Goal Outcome Evaluation:       Pt is noted sleeping comfortably with even and non labored respirations during all safety checks. Pt slept for 6.25 hours. No safety or behavioral concerns noted. Will continue to monitor and treat as ordered.

## 2024-07-03 ENCOUNTER — TELEPHONE (OUTPATIENT)
Dept: BEHAVIORAL HEALTH | Facility: CLINIC | Age: 34
End: 2024-07-03
Payer: COMMERCIAL

## 2024-07-03 VITALS
RESPIRATION RATE: 16 BRPM | DIASTOLIC BLOOD PRESSURE: 73 MMHG | OXYGEN SATURATION: 98 % | TEMPERATURE: 97.4 F | WEIGHT: 239.5 LBS | HEART RATE: 70 BPM | SYSTOLIC BLOOD PRESSURE: 119 MMHG | BODY MASS INDEX: 31.74 KG/M2 | HEIGHT: 73 IN

## 2024-07-03 PROCEDURE — 99239 HOSP IP/OBS DSCHRG MGMT >30: CPT | Performed by: PSYCHIATRY & NEUROLOGY

## 2024-07-03 PROCEDURE — 250N000013 HC RX MED GY IP 250 OP 250 PS 637: Performed by: PSYCHIATRY & NEUROLOGY

## 2024-07-03 PROCEDURE — 250N000013 HC RX MED GY IP 250 OP 250 PS 637: Performed by: CLINICAL NURSE SPECIALIST

## 2024-07-03 RX ADMIN — FLUTICASONE PROPIONATE 2 SPRAY: 50 SPRAY, METERED NASAL at 07:59

## 2024-07-03 RX ADMIN — BENZTROPINE MESYLATE 1 MG: 1 TABLET ORAL at 07:58

## 2024-07-03 RX ADMIN — DIVALPROEX SODIUM 500 MG: 500 TABLET, FILM COATED, EXTENDED RELEASE ORAL at 07:58

## 2024-07-03 RX ADMIN — HALOPERIDOL 10 MG: 10 TABLET ORAL at 07:58

## 2024-07-03 RX ADMIN — Medication 1 TABLET: at 07:58

## 2024-07-03 RX ADMIN — GABAPENTIN 300 MG: 300 CAPSULE ORAL at 07:58

## 2024-07-03 RX ADMIN — RISPERIDONE 1 MG: 1 TABLET, FILM COATED ORAL at 07:58

## 2024-07-03 RX ADMIN — IPRATROPIUM BROMIDE 2 SPRAY: 42 SPRAY, METERED NASAL at 07:59

## 2024-07-03 ASSESSMENT — ACTIVITIES OF DAILY LIVING (ADL)
ADLS_ACUITY_SCORE: 54
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
## 2024-07-03 NOTE — PLAN OF CARE
Pt appeared to sleep for a total of 6 hours overnight. No PRN medications were administered and no concerns were noted.     Problem: Sleep Disturbance  Goal: Adequate Sleep/Rest  Outcome: Progressing

## 2024-07-03 NOTE — PLAN OF CARE
Rehab Group    Start time: 11:15  End time: 12:00  Patient time total: 20 minutes    attended partial group    #4 attended   Group Type: occupational therapy and recreation   Group Topic Covered: balanced lifestyle, cognitive activities, healthy leisure time, and social skills     Group Session Detail:  Focus of today's group was on healthy leisure exploration and engagement. Patient actively participated in a group game in order to: improve social skills and decrease isolative behaviors, exercise cognitive skills, improve concentration, and encourage new learning and retention.      Patient Response/Contribution:  organized, supportive of peers, socially appropriate, listened actively, attentive, actively engaged, and engaged socially when prompted     Patient Detail:  Patient was agreeable to participate in group until pickup for discharge. Patient appeared upbeat and bright. Patient listened actively to instructions of activity and was quickly able to engage independently. Patient asked appropriate clarifying questions and appeared attentive and focused on task. Patient was able to identify multiple clever and appropriate responses to game prompts. Patient was socially appropriate and supportive of peers in group. Pleasant and cooperative group attendee. Patient was pulled from group early to discharge.        No Charge    Patient Active Problem List   Diagnosis    Marijuana dependence (H)    Alcohol abuse    Bipolar disorder, current episode manic severe with psychotic features (H)    Chantal (H)    Schizoaffective disorder, bipolar type (H)    Psychosis, unspecified psychosis type (H)

## 2024-07-03 NOTE — PLAN OF CARE
BEH IP Unit Acuity Rating Score (UARS)  Patient is given one point for every criteria they meet.    CRITERIA SCORING   On a 72 hour hold, court hold, committed, stay of commitment, or revocation. 0    Patient LOS on BEH unit exceeds 20 days. 1  LOS: 35   Patient under guardianship, 55+, otherwise medically complex, or under age 11. 0   Suicide ideation without relief of precipitating factors. 0   Current plan for suicide. 0   Current plan for homicide. 0   Imminent risk or actual attempt to seriously harm another without relief of factors precipitating the attempt. 0   Severe dysfunction in daily living (ex: complete neglect for self care, extreme disruption in vegetative function, extreme deterioration in social interactions). 1   Recent (last 7 days) or current physical aggression in the ED or on unit. 0   Restraints or seclusion episode in past 72 hours. 0   Recent (last 7 days) or current verbal aggression, agitation, yelling, etc., while in the ED or unit. 0   Active psychosis. 1   Need for constant or near constant redirection (from leaving, from others, etc).  0   Intrusive or disruptive behaviors. 0   Patient requires 3 or more hours of individualized nursing care per 8-hour shift (i.e. for ADLs, meds, therapeutic interventions). 0   TOTAL 3

## 2024-07-03 NOTE — TELEPHONE ENCOUNTER
Attempted to contact patient regarding PHP program. No answer. Left voicemail with name and phone number.    Laure Lee [facility] on 7/3/2024 at 5:54 PM

## 2024-07-03 NOTE — PLAN OF CARE
Discharge Date: 7/3/24  Discharge Time: 1135         Patient discharged to: Home  Patient accompanied by: Spouse   Transported by:  Spouse    Valuables were taken home by patient:  Yes       Discharge AVS instructions given and explained to patient:  Yes      Discharge Patient Education Plan completed, taught, and provided to patient/caregiver at discharge:      -Medication schedule and 30 supply sent with.     -Pt is aware they need to see their prescribing provider within 30 days to have prescriptions refilled.     -Discussed medication risks with patient    -Patient understands medications usage and side effects    -Patient understands diagnosis    Notified of folllow up appointments.    Patients general condition on discharge:    Denies SI, SIB, HI, and hallucinations.   Mood is good      No concerns at the time of discharge

## 2024-07-03 NOTE — TELEPHONE ENCOUNTER
Patient returned phone call. Let him know that he would not be able to start PHP on Friday. He stated that was okay and he thought it was a very quick start. Asked if he'd be able to attend a virtual appointment with Dr. Tesfaye, our program psychiatrist, so he could get more information and determine if he's a good fit for our program and our program is a good fit for his goals. He agreed to the appointment. Verified the email address on file and he stated it was correct. Let him know that he'd receive information to attend the appointment on Friday at 2:00.    JOSUÉ Manley on 7/3/2024 at 5:53 PM

## 2024-07-03 NOTE — PLAN OF CARE
Problem: Psychotic Signs/Symptoms  Goal: Improved Behavioral Control (Psychotic Signs/Symptoms)  Outcome: Progressing   Goal Outcome Evaluation:    Plan of Care Reviewed With: patient      Pt was taking nap at the start of shift, He presented with  flat affect. Pleasant on approach, cooperative with care. He was isolative and withdrawn to himself. He Denies pain, SI/HI and contracted for safety. He ate 100% of dinner in the lounge. Listened to headphones. Medication compliant, took shower, attended group. Looking forward to his discharge tomorrow. Plan of care on going, will continue to monitor.

## 2024-07-03 NOTE — TELEPHONE ENCOUNTER
----- Message from Valentino ZACARIAS sent at 2024  2:31 PM CDT -----  Regardin/5 PHP Admit  Adult Mental Health Programmatic Care Schedule Request    Patient Name: Basil Andrews  Location of programming: [facility]  Start Date: 2024    Adult Program Group: Adult Program Group: PHP 1 Track [[id number]]?   Schedule:  -F 9am-3pm  Number of visits to be scheduled: 15 days    Attending Provider (MD):  Dr Basil Tesfaye.  Visit Type:  In-Person    Accommodations Needed: N/A  Alerts Identified/Substantiation: N/A  Consulted with Supervisor: N/A

## 2024-07-03 NOTE — PLAN OF CARE
Rehab Group    Start time: 10:15  End time: 11:10  Patient time total: 25 minutes    attended partial group    #5 attended   Group Type: OT Clinic   Group Topic Covered: balanced lifestyle, cognitive activities, coping skills, emotional regulation, healthy leisure time, and social skills     Group Session Detail:  Pt actively participated in occupational therapy clinic to facilitate coping skill exploration, creative expression within personally meaningful activities, and clinical observation of social, cognitive, and kinesthetic performance skills.      Patient Response/Contribution:  organized, socially appropriate, safe use of materials/supplies, attentive, actively engaged, and engaged socially when prompted     Patient Detail:  Pt response: independent to initiate, gather materials, sequence, and adjust to workspace demands as needed. Demonstrated good focus, planning, and problem solving for selected 1 step creative task. Able to ask for assistance as needed, and appropriate with peers and staff. Patient reported excitement for discharge this date. No safety or behavioral concerns identified in group.           No Charge    Patient Active Problem List   Diagnosis    Marijuana dependence (H)    Alcohol abuse    Bipolar disorder, current episode manic severe with psychotic features (H)    Chantal (H)    Schizoaffective disorder, bipolar type (H)    Psychosis, unspecified psychosis type (H)

## 2024-07-03 NOTE — PLAN OF CARE
Checked in with pt to tell him I put copies of the return to work letter in an envelope for him. I told him I did not send the letter to his boss via e-mail per their request as he did not respond to my test e-mail.    Behavioral Discharge Planning and Instructions    Summary: You were admitted on 5/26/2024 due to Psychotic Symptomology and Manic Symptomology.  You were treated by Blaine Seo MD and discharged on July 3, 2024 from [facility] to Home  Your wife provided transportation home.    Main Diagnosis:   Psychosis, unspecified psychosis type   Severe manic bipolar I disorder with psychotic features   Schizoaffective disorder, bipolar type     Health Care Follow-up:   You have health insurance through [facility].      You are starting the [facility] Partial Hospitalization Program on July 5, 2024  You have been given a Welcome Letter that gives you all the details to access this program. This information is also in MyChart.  You can access the psychiatric provider in these 3 weeks if you need help adjusting your medications.      Primary Care appointment: Thursday, August 1, 2024 @ 12:45pm In-person  Your primary care familia can help you fill your psychiatric medications if there is a lapse.  Provider: Dmitry Tee PA-C  Location: [facility], [address] [place] [address]  Phone: [phone]  Fax: [phone]  HUC TO FAX AVS to above appointment, please        Appointment: Psychiatry   Date/time: Tuesday September 10th, 2024 @ 8:00 AM Telephone  Provider:  Donta Bob MD  Address: [facility]  [address] [place] [address], [address] [address]  Phone: [phone]  Fax: [phone]      Appointment: Therapy  Date/time: @  [facility]  Provider:  Nabil Craig   Address: [facility] [address] [place] [address]  Phone: [phone]  Fax: [phone]   Note:  Provider is out of office 6/28- 7/9  please call or e-mail [email or web address] to schedule a follow up once he is back in office.          Information will be faxed to your outpatient providers to ensure a healthy continuity of care for you.     Attend all scheduled appointments with your outpatient providers. Call at least 24 hours in advance if you need to reschedule an appointment to ensure continued access to your outpatient providers.     Major Treatments, Procedures and Findings:  You were provided with: a psychiatric assessment, assessed for medical stability, medication evaluation and/or management, group therapy, family therapy, individual therapy, milieu management, and medical interventions    You had lab work done. You can review this with your doctor and in MyChart.    Symptoms to Report: feeling more aggressive, increased confusion, losing more sleep, mood getting worse, or thoughts of suicide    Early warning signs can include: increased depression or anxiety sleep disturbances increased thoughts or behaviors of suicide or self-harm  increased unusual thinking, such as paranoia or hearing voices    Safety and Wellness:  Take all medicines as directed. Make no changes unless your doctor suggests them.  Follow treatment recommendations. Refrain from alcohol and non-prescribed drugs. Ask your support system to help you reduce your access to items that could harm yourself or others. Items could include:    Firearms  Medicines (both prescribed and over-the-counter)  Knives and other sharp objects  Ropes and like materials  Car keys  If there is a concern for safety, call [phone].     Resources:   Mental Health Crisis Resources  Throughout [place]: call **CRISIS (**033976)  Crisis Text Line: is available for free, 24/7 by texting [place] to 614523  Suicide Awareness Voices of Education (SAVE) (www.save.org): 541-815-XHQK (8472)  The National Suicide Prevention Lifeline is now: 988 Suicide and Crisis Lifeline. Call 988 anytime.  National [place] on  Mental Illness (www.[place].hong.org): 114-790-8353 or 419-071-2285.  [employer]: text the word LIFE to 73598 for immediate support and crisis intervention  Mental Health Consumer/Survivor Network of [place] (www.mhcsn.net): 885.190.3534 or 012-714-0955  Mental Health Association of [place] (www.mentalhealth.org): 882.651.5334 or 649-180-5817  Peer Support Connection [place] Warmline (PSC) 1-339.421.9272 Available from 5pm - 9am (7 days a week/365 days a year)  [facility] [phone]    General Medication Instructions:   See your medication sheet(s) for instructions.   Take all medicines as directed.  Make no changes unless your doctor suggests them.   Go to all your doctor visits.  Be sure to have all your required lab tests. This way, your medicines can be refilled on time.  Do not use any drugs not prescribed by your doctor.  Avoid alcohol.    Advance Directives:   Scanned document on file with [employer]? No scanned doc  Is document scanned? Pt states no documents  Honoring Choices Your Rights Handout: Informed and given  Was more information offered? Pt declined    The Treatment team has appreciated the opportunity to work with you. If you have any questions or concerns about your recent admission, you can contact the unit which can receive your call 24 hours a day, 7 days a week. They will be able to get in touch with a Provider if needed. The unit number is [phone].

## 2024-07-05 ENCOUNTER — HOSPITAL ENCOUNTER (OUTPATIENT)
Dept: BEHAVIORAL HEALTH | Facility: CLINIC | Age: 34
Discharge: HOME | End: 2024-07-05
Attending: PSYCHIATRY & NEUROLOGY
Payer: COMMERCIAL

## 2024-07-05 ENCOUNTER — TELEPHONE (OUTPATIENT)
Dept: BEHAVIORAL HEALTH | Facility: CLINIC | Age: 34
End: 2024-07-05
Payer: COMMERCIAL

## 2024-07-05 DIAGNOSIS — F31.2 BIPOLAR DISORDER, CURRENT EPISODE MANIC SEVERE WITH PSYCHOTIC FEATURES (H): Primary | ICD-10-CM

## 2024-07-05 PROCEDURE — 99417 PROLNG OP E/M EACH 15 MIN: CPT | Mod: 95 | Performed by: PSYCHIATRY & NEUROLOGY

## 2024-07-05 PROCEDURE — 99205 OFFICE O/P NEW HI 60 MIN: CPT | Mod: 95 | Performed by: PSYCHIATRY & NEUROLOGY

## 2024-07-05 ASSESSMENT — ANXIETY QUESTIONNAIRES
GAD7 TOTAL SCORE: 17
1. FEELING NERVOUS, ANXIOUS, OR ON EDGE: NEARLY EVERY DAY
5. BEING SO RESTLESS THAT IT IS HARD TO SIT STILL: NEARLY EVERY DAY
8. IF YOU CHECKED OFF ANY PROBLEMS, HOW DIFFICULT HAVE THESE MADE IT FOR YOU TO DO YOUR WORK, TAKE CARE OF THINGS AT HOME, OR GET ALONG WITH OTHER PEOPLE?: EXTREMELY DIFFICULT
4. TROUBLE RELAXING: NEARLY EVERY DAY
7. FEELING AFRAID AS IF SOMETHING AWFUL MIGHT HAPPEN: SEVERAL DAYS
7. FEELING AFRAID AS IF SOMETHING AWFUL MIGHT HAPPEN: SEVERAL DAYS
3. WORRYING TOO MUCH ABOUT DIFFERENT THINGS: NEARLY EVERY DAY
2. NOT BEING ABLE TO STOP OR CONTROL WORRYING: SEVERAL DAYS
6. BECOMING EASILY ANNOYED OR IRRITABLE: NEARLY EVERY DAY
GAD7 TOTAL SCORE: 17
IF YOU CHECKED OFF ANY PROBLEMS ON THIS QUESTIONNAIRE, HOW DIFFICULT HAVE THESE PROBLEMS MADE IT FOR YOU TO DO YOUR WORK, TAKE CARE OF THINGS AT HOME, OR GET ALONG WITH OTHER PEOPLE: EXTREMELY DIFFICULT
GAD7 TOTAL SCORE: 17

## 2024-07-05 ASSESSMENT — PATIENT HEALTH QUESTIONNAIRE - PHQ9
SUM OF ALL RESPONSES TO PHQ QUESTIONS 1-9: 15
SUM OF ALL RESPONSES TO PHQ QUESTIONS 1-9: 15
10. IF YOU CHECKED OFF ANY PROBLEMS, HOW DIFFICULT HAVE THESE PROBLEMS MADE IT FOR YOU TO DO YOUR WORK, TAKE CARE OF THINGS AT HOME, OR GET ALONG WITH OTHER PEOPLE: VERY DIFFICULT

## 2024-07-05 NOTE — TELEPHONE ENCOUNTER
----- Message from Laure Lee sent at 7/5/2024  3:32 PM CDT -----  Regarding: Change PHP Group  Can Basil Andrews please be moved to PHP 2 instead of PHP 1? His start date will be 07/08/2024.    Thank you!

## 2024-07-05 NOTE — H&P
"[facility]   Partial Hospitalization Program  Initial Psychiatric Evaluation    Patient: Basil Andrews  Preferred Name: Basil  MRN: [id number]  : 1990  Acct. No.: [id number]  Date of Service: 24  Visit Start Time: 1400  Visit End Time:  1457  PHP Track:  1            Date of Diagnostic Assessment/Psychosocial Evaluation: 24 by Christian Paez    Identifying Data:  Basil Andrews, a 33 year old-year-old with reported history of bipolar I disorder, presents for psychiatric evaluation as part of oversight of partial hospitalization services. Patient attended the session with wife, Flakita, whom patient requested be present. Patient uses he/him pronouns, and prefers to be called: \"Basil\"    Presenting Concern:  \"I came into the hospital because of the oversexualization.\"   Per diagnostic assessment: \"Interacting with people (always afraid people will think every look and breath i take may be sexual in nature)\"    History of Present Illness:  Chart reviewed, history as documented reviewed with Basil. Discussed today:  \"I think it might have started from the naltrexone and citalopram\"  Feels medication changes were the likely precipitant of his recent manic episode  Identifies he was acting in a sexually inappropriate manner with mother-in-law and sister-in-law  \"When I was in the hospital I was manic\"  \"I wanted to Catholicofy people\"  Endorses some combination of command auditory hallucinations and ideas of reference were contributing to his beliefs (while manic) that he needed to convert other patients to Catholicism, to get another patient arrested, etc.   Today, \"I am very grateful and hopeful\" about continued healing and improvement  [facility]  Did not experience any urges to try and convert this woman to his or any other Oriental orthodox  \"I didn't feel like any of these voices were happening\"  Able to focus on being with wife, not worried that " "other people were listening to our conversation  Feeling more confident about going shopping now  Since being home, \"feel on edge\"  \"I don't know how to forgive myself being hypersexual back then, so that's frustrating\"  \"I think it was the [Haldol] that's helping\"  Taking as needed for irritability/restlessness as well  Endorses feeling that, \"I need to go see my  and have confession after confession until I feel comfortable\"  Denies hallucinations today or for the past several days  Hasn't been out on his own since discharge; has been shopping, etc. with his wife  Wife, Flakita, reports, \"it feels like Basil is at 75% of his normal\"  Substantial improvement overall and not yet returned to baseline  Also reports he is evidently restless and attributes it to medication   Basil endorses: \"I'd like for evertyhing to get back to where it was\"  Saturday visits with his family  Facetime dinners with wife's familiy  Back to [employer]  Etc.  Currently feels, \"I can't breathe freely without any judgment\"    Goals for Treatment (initial discussion):  \"Getting back to [employer] and my parents visiting; going back to how it was before\"  \"Feel like I can breathe and not feel like I'm being judged on every breath or thought\"  Flakita: Less agitation, less feeling overwhelmed; not taking on too many problems at once  \"Focus on the anxiety\"  \"And be able to focus on one thing at a time\"    Review of Symptoms  Review of systems as recorded in diagnostic assessment reviewed with patient.  Today notes:  Sleep: \"a million times better than the hospital\"  Through the night except to use the bathroom and often a nap during the day  Appetite: \"I am pretty hungry\"  Suicidal ideation: denies current or recent suicidal ideation or behavior  Thoughts of non-suicidal self-injury: denied  Recent self-injurious behavior: denied  Homicidal ideation: denied  Other safety concerns: denied    Activities of Daily Living and Related Systems Impacted " "by Illness:  Hygiene: \"I'm way more relaxed... than when I was manic\"  Socialization: see above  Activities of Daily Living: (cleaning, shopping, bills, etc.): improving; returning to baseline  Concerns related to work: not working currently    Substance use:  Denies    Current Medications:  Current Outpatient Medications   Medication Sig Dispense Refill    benztropine (COGENTIN) 1 MG tablet Take 1 tablet (1 mg) by mouth 2 times daily 60 tablet 1    divalproex sodium extended-release (DEPAKOTE ER) 500 MG 24 hr tablet Take 3 tablets (1,500 mg) by mouth at bedtime 90 tablet 1    divalproex sodium extended-release (DEPAKOTE ER) 500 MG 24 hr tablet Take 1 tablet (500 mg) by mouth daily 30 tablet 1    haloperidol (HALDOL) 10 MG tablet Take 1 tablet (10 mg) by mouth 2 times daily 60 tablet 1    haloperidol (HALDOL) 5 MG tablet Take 1 tablet (5 mg) by mouth every 8 hours as needed for agitation 60 tablet 1    risperiDONE (RISPERDAL) 3 MG tablet Take 1 tablet (3 mg) by mouth at bedtime 30 tablet 1    fluticasone (FLONASE) 50 MCG/ACT nasal spray Spray 2 sprays into both nostrils daily      gabapentin (NEURONTIN) 300 MG capsule Take 300 mg by mouth in the morning and 600 mg by mouth at bedtime      ipratropium (ATROVENT) 0.06 % nasal spray Spray 2 sprays into both nostrils 4 times daily      multivitamin, therapeutic with minerals (MULTI-VITAMIN) TABS Take 1 tablet by mouth daily      propranolol (INDERAL) 10 MG tablet Take 1 tablet (10 mg) by mouth 3 times daily as needed (restlessness) (Patient not taking: Reported on 7/5/2024) 60 tablet 1    risperiDONE (RISPERDAL) 1 MG tablet Take 1 tablet (1 mg) by mouth every morning 30 tablet 1       The above list was reviewed and updated in EPIC with patient today.     Patient is taking medications as prescribed and reports adverse effects as noted above    Remainder of history, including psychiatric, substance, family, social as documented in diagnostic assessment/psychosocial " "evaluation and/or above    Medical Review of Systems:  Pertinent: None    Laboratory Results:  Most recent labs reviewed. Pertinent updates/findings: None.     Mental Status Examination (limited due to video virtual visit format):  Vital Signs: There were no vitals taken for this virtual visit.  Appearance: adequately groomed, appears stated age, and in no apparent distress.  Attitude: cooperative   Eye Contact: good to the extent that can be determined in a video visit  Muscle Strength and Tone: no gross abnormalities based on remote observation  Psychomotor Behavior:  no evidence of tardive dyskinesia, dystonia, or tics based on remote observation  Gait and Station: normal, no gross abnormalities based on remote observation  Speech: clear, coherent, decreased prosody, regular rate, regular rhythm, and fluent  Associations: No loosening of associations and Perseverative  Thought Process: coherent and perseverating  Thought Content: no evidence of suicidal ideation or homicidal ideation, no evidence of psychotic thought, no auditory hallucinations present, and no visual hallucinations present  Mood: \"anxious\"  Affect: mood congruent, intensity is normal, constricted mobility, and reactive  Insight: good  Judgment: intact, adequate for safety  Impulse Control: intact  Oriented to: time, place, person, and situation  Attention Span and Concentration: normal  Language: Intact  Recent and Remote Memory: intact to interview. Not formally assessed. No amnesia.  Fund of Knowledge: appropriate      DSM5 Diagnosis/es:    ICD-10-CM    1. Bipolar disorder, current episode manic severe with psychotic features (H)  F31.2           Assessment/Plan:  Basil presents today for initial psychiatric evaluation as part of oversight of partial hospitalization services. Patient presents with some apparent residua of his recent manic/psychotic episode. Thoughts are perseverative and while there is no gross disorder or blocking he does " appear to be struggling a bit to process information. Clearly haloperidol has been a great success. It is equally clear that SSRIs should be avoided at all costs in this patient given how severe his symptoms were, how profoundly they have impacted his life, and how long it took on the inpatient unit for them to be controlled.    Given severity of presentation and presence of residual symptoms I agree with the recommendation for the partial hospitalization level of care. We will start Monday.    From a diagnostic standpoint I feel bipolar I best describes symptoms and phenomenology.     No changes to medication today. While he is in program we can continue the cross-taper from risperidone to haloperidol. Advised he continue to take benztropine twice daily as prescribed. Discussed ways to approach use and timing of other as-needed medication.       Safety Assessment:  Basil reports suicidal ideation and/or non-suicidal self-injury or thoughts thereof as noted above  Basil is future-oriented and is engaged in treatment planning   I do not feel that Basil meets criteria for a 72-hour involuntary hold and remains appropriate for an outpatient level of care    Visit Details:  Type of service:  Video Visit    Start/End Time: see above    Originating Location (pt. Location): Home in [place]    Distant Location (provider location):  [facility] Outpatient Setting: [facility] Programmatic Care     Platform used for Video Visit: Lucie    Physician has received verbal consent for a Video Visit from the patient? Yes    Level of Medical Decision Making:   - At least 1 chronic problem that is not stable  - Engaged in prescription drug management during visit (discussed any medication benefits, side effects, alternatives, etc.)  Discussion of management or test interpretation with external physician/other qualified healthcare professional/appropriate source - programmatic care  multidisciplinary treatment team    Chart review as part of intake process includes diagnostic assessment/psychosocial evaluation and any recent updates, as well as recent ED and/or inpatient documentation, where applicable.The reader is referred to those sources for additional information.    75 min spent on the date of the encounter in chart review, patient visit, review of tests, documentation, care coordination, and/or discussion with other providers about the issues documented above.

## 2024-07-05 NOTE — TELEPHONE ENCOUNTER
----- Message from Laure Lee sent at 7/5/2024  8:33 AM CDT -----  Regarding: Remove from [facility]  Basil Andrews isn't starting PHP today. Can you please remove him from the [facility] for today?    Thank you!

## 2024-07-08 ENCOUNTER — HOSPITAL ENCOUNTER (OUTPATIENT)
Dept: BEHAVIORAL HEALTH | Facility: CLINIC | Age: 34
Discharge: HOME | End: 2024-07-08
Attending: PSYCHIATRY & NEUROLOGY
Payer: COMMERCIAL

## 2024-07-08 PROCEDURE — H0035 MH PARTIAL HOSP TX UNDER 24H: HCPCS

## 2024-07-08 PROCEDURE — H0035 MH PARTIAL HOSP TX UNDER 24H: HCPCS | Performed by: COUNSELOR

## 2024-07-08 ASSESSMENT — COLUMBIA-SUICIDE SEVERITY RATING SCALE - C-SSRS
1. SINCE LAST CONTACT, HAVE YOU WISHED YOU WERE DEAD OR WISHED YOU COULD GO TO SLEEP AND NOT WAKE UP?: YES
2. HAVE YOU ACTUALLY HAD ANY THOUGHTS OF KILLING YOURSELF?: NO

## 2024-07-08 ASSESSMENT — ANXIETY QUESTIONNAIRES
IF YOU CHECKED OFF ANY PROBLEMS ON THIS QUESTIONNAIRE, HOW DIFFICULT HAVE THESE PROBLEMS MADE IT FOR YOU TO DO YOUR WORK, TAKE CARE OF THINGS AT HOME, OR GET ALONG WITH OTHER PEOPLE: VERY DIFFICULT
3. WORRYING TOO MUCH ABOUT DIFFERENT THINGS: NEARLY EVERY DAY
GAD7 TOTAL SCORE: 16
GAD7 TOTAL SCORE: 16
2. NOT BEING ABLE TO STOP OR CONTROL WORRYING: NOT AT ALL
1. FEELING NERVOUS, ANXIOUS, OR ON EDGE: NEARLY EVERY DAY
7. FEELING AFRAID AS IF SOMETHING AWFUL MIGHT HAPPEN: SEVERAL DAYS
6. BECOMING EASILY ANNOYED OR IRRITABLE: NEARLY EVERY DAY
5. BEING SO RESTLESS THAT IT IS HARD TO SIT STILL: NEARLY EVERY DAY

## 2024-07-08 ASSESSMENT — PATIENT HEALTH QUESTIONNAIRE - PHQ9
SUM OF ALL RESPONSES TO PHQ QUESTIONS 1-9: 21
5. POOR APPETITE OR OVEREATING: NEARLY EVERY DAY

## 2024-07-08 NOTE — PROGRESS NOTES
Partial Hospital Program   Physician Certification of Medical Necessity    Patient Legal Name: Basil Andrews   Patient Preferred Name: Basil  Patient : 1990  Patient MRN: [id number]    Attending physician: Basil Tesfaye MD      Admission Certification:    I certify the above-named patient would require inpatient psychiatric care if partial hospitalization services were not provided and that the patient requires such PHP services for a minimum of 20 hours per week. These services are provided under the care and supervision of a physician. An individualized, written plan of treatment has been created and will be periodically reviewed by the physician and the patient in concert with a multidisciplinary treatment team.    From admission date: 2024 to  day: 2024    Basil Tesfaye MD on 2024 at 9:47 AM

## 2024-07-08 NOTE — GROUP NOTE
Psychoeducation Group Note    PATIENT'S NAME: Basil Andrews  MRN:   [id number]  :   1990  ACCT. NUMBER: [id number]  DATE OF SERVICE: 24  START TIME:  2:00 PM  END TIME:  2:50 PM  FACILITATOR: Denisha Daniels RN  TOPIC:  Wellness Group: Mental Health Maintenance  [facility] Adult Partial Hospitalization Program  TRACK: PHP 1    NUMBER OF PARTICIPANTS: 6    Summary of Group / Topics Discussed:  Mental Health Maintenance: Assessment of Strengths: Patients discussed times in their life when they displayed their personal strengths and were proud of themselves. Patients completed a self-reflection on personal strengths worksheet. Patients wrote positive attributes they see in other group members on cards for each other. The concept of personal strength as it relates to resilience was explored. Patients shared responses with the group and participated in discussion.         Patient Session Goals / Objectives:     Patients identified 1-3 qualities they consider a personal strength.     Patients identified positive attributes/strengths in each other as group members.      Patients understood the concept of personal strengths and the connection it has to resiliency         Patient Participation / Response:  Fully participated with the group by sharing personal reflections / insights and openly received / provided feedback with other participants.    Verbalized understanding of mental health maintenance topic    Treatment Plan:  Patient has a current master individualized treatment plan.  See Epic treatment plan for more information.    Denisha Daniels RN

## 2024-07-08 NOTE — PROGRESS NOTES
"Individualized Treatment Plan       Patient's Name: Basil Andrews   Preferred Name: Basil  Pronouns:  He/Him   :   1990  MRN:   [id number]    Program Admission Date: 2024     Estimated Program Discharge Date: 2024 (Please note, this date is subject to change based on needs and progress toward identified goals.)      Date of Initial Treatment Plan: 2024    Chief Concern: I am in this program because: \"Sexual issues, awkward issues sexuality, and how to act in front of everyone\"       Long Term Goal: \"How to stay surface-level, get myself into the cheese topic conversations of the world not Yazidi or my mental health.\"    Goal Areas:  GOAL AREA 1: Personal Safety       Goal Status:  monitoring    Description:   Suicidal ideation: On 24:  Basil reported \"Sometimes I have those passive thoughts I'm better dead, but no thoughts of actually doing anything.\"   Self-injury urges: n/a  Last acted on self-injury: na  Skills that help me cope with self-injury: n/a  While in program I would like to accomplish: \" feel safe\"    SMART goals/tasks:  Basil will listen to light, uplifting music when he is feeling down    Progress updates:  24:  Basil reported \"Sometimes I have those passive thoughts I'm better dead, but no thoughts of actually doing anything.\"  Basil is committed to safety if any safety concerns come up.  : I have passive thoughts more saying \"what if I got hit by a bus\" would it be easier? Every few days, fleeting thoughts. I like listening to classical music. Used to like to listen to [employer] talk show. When I was manic it was triggering, but I've been able to listen to it normally.\" Caring and Sharing Hands\"      GOAL AREA 2: Symptom Management      Goal Status: Closed- will continue in IOP  Description:   impulsivity, unhelpful or intrusive thoughts, and \"trouble letting things go or getting fixated\" , lack of interest or pleasure in activities   While in program " "I would like to accomplish: \"be able to stop fixating on things\"  I will know I am making progress when: \"I feel at ease, be able to get into the rhythm of things in social situations\"  I think I will be ready to discharge from the program when: \"I feel at ease and find peace\"  SMART goals/tasks:  Continue to experiment with taking breaks when needed to help center myself and continue checking in with group on process    Progress updates:  7/16/24 progress:  Basil reported \"I feel like my main issues are to work on active listening.\"  \"When I'm at home, one thing I do, when I get agitated, I go lay down with a blanket on the pullout couch.\"  If he is with his son he will have his son watch a little TV while Basil lays down, or has his son lay down while his son plays around him.  7/23: intrusive thoughts have gone away, there is still a lot of numerology and paranoia about certain things. I've been taking breaks still but more about being restless rather than the intrusive thoughts.     GOAL AREA 3: Daily Life Skills         Goal Status: Closed- will continue in IOP  Description:   structure and routine, home management, leisure participation (activities for enjoyment, hobbies), self-care activities, life roles (parenting, working, going to school, care giving, volunteering, etc.), social participation, and community engagement  While in program I would like to accomplish: \"find my rhythm, peace\"  I will know I am making progress when: \"I don't feel awkward.\"  I think I will be ready to discharge from the program when: \"I can return to work\"  SMART goals/tasks:  Allow myself to feel the heat of embarrassment.   Added 7/16/24:  At night Baisl will \"shave, shower and brush my teeth\" after his son falls asleep.  He will also take his medication at that time.    Progress updates:  Progress 7/16/24:  Currently Basil showers and brushes his teeth daily but he only shaves every other day.  7/23: Picking up son from " " now by myself! I brush my teeth morning and night I have been shaving and showering in the morning after he goes to day care. In the AM from 7:30-8:10.  I was using this time to nap, but found it made me more sleepy, so I have been using that time for self care instead.     GOAL AREA 4: Wellness  Goal Status: Closed- will continue in IOP  Description:   Social wellness and communication      I will know I am making progress when: \"For my wife and I, I'll know I'm making progress when we stop arguing so much. For group, I just need to make sure that I'm being patient.\"  I think I will be ready to discharge from the program when: \"When those skills are easier to practice.\"  SMART goals/tasks:  \"I would like to move from just hearing to listening.  I'd like to provide validation to my wife when communicating with her.\"  \"I'd like to let at least one group participant speak before I do when the facilitator poses a question to the group.\"     Progress updates:  7/18: I want to drink a glass of water tomorrow.--Pt completed this goal by 7/19 and reported he felt good, after drinking about 40oz water! Said he has been waking up with cramps in his legs and is hopeful hydration may help address that.  Progress 7/25/24:  Basil reported the communication with his wife is \"better than it was.\" He has been working to \"stay calm when we are going through arguments or disagreements.\"  He thinks more about his wife's perspective.      GOAL AREA 5: Aftercare Plan  Goal Status: Closed- will continue in IOP  Description:   Intensive outpatient therapy groups    After program is completed I will: \"return to work and care for my family\"  I will know I am making progress when: \"My extended family and I experience healing\"  I think I will be ready to discharge from the program when: \"I'm consistently able to attend the support group.\"  SMART goals/tasks:  \"Go to [facility] and other groups that Eron suggested I attend.  I'd like to " "attend every week until I feel like I don't need to.\"     Progress updates:  7/18: Referral sent for IOPDT2 (psychosis track) today 7/18. Pt would then consider going back to work part time. Have to make a decision within 10 days   Update 7/25/24:  Basil does plan to start IOPDT2 on Monday 7/29/24.  Basil shared he worried about finances and after PHP is over, he plans to meet with a financial counselor for debt help.  \    GOAL AREA 6: Other Goal(s); Substance Use, Cultural Needs, and/or Something Else I Need  Goal Status: Stopped   Description:       My Strengths:   caring, empathetic, goal-focused, and good listener  Ways I can use these to further my goals: communicate openly    My Limitations or Vulnerabilities:  Manic symptoms   How I might need to modify my plans to account for my limits: Take medications    My Other Health Issues:  None reported    Supports:    I want to include the following support people in my treatment: \"wife\"  Please note we cannot share information with anyone unless you sign a release of information. You can specify what information can and cannot be shared and you can retract that written permission at any time.    Staff and support people can help me by: \"listening\"    Cultural Values and Needs: none     Assessments: The following assessments were completed by patient for this visit:  PHQ9:       6/18/2013     3:00 PM 7/1/2015    12:00 PM 8/26/2015    11:00 AM 7/2/2024     8:00 AM 7/5/2024    11:18 AM 7/8/2024     9:44 AM   PHQ-9 SCORE   PHQ-9 Total Score MyChart     15 (Moderately severe depression)    PHQ-9 Total Score 5        PHQ-9 Total Score   4 8 15 21   PHQ-9 Total Score  3         GAD7:       6/18/2013     3:00 PM 7/1/2015    12:00 PM 8/26/2015    11:00 AM 7/2/2024     8:00 AM 7/5/2024    11:47 AM 7/8/2024     9:44 AM   HUGH-7 SCORE   Total Score     17 (severe anxiety)    Total Score   0 5 17    17    17    17    17    17 16   Total Score BEH Adult 3 1         PROMIS " 10-Global Health (all questions and answers displayed):       7/2/2024     8:00 AM 7/5/2024    11:50 AM 7/8/2024     9:44 AM   PROMIS 10   In general, would you say your health is:  Good    In general, would you say your quality of life is:  Fair    In general, how would you rate your physical health?  Good    In general, how would you rate your mental health, including your mood and your ability to think?  Fair    In general, how would you rate your satisfaction with your social activities and relationships?  Fair    In general, please rate how well you carry out your usual social activities and roles  Good    To what extent are you able to carry out your everyday physical activities such as walking, climbing stairs, carrying groceries, or moving a chair?  Completely    In the past 7 days, how often have you been bothered by emotional problems such as feeling anxious, depressed, or irritable?  Often    In the past 7 days, how would you rate your fatigue on average?  Moderate    In the past 7 days, how would you rate your pain on average, where 0 means no pain, and 10 means worst imaginable pain?  0    In general, would you say your health is: 4 3 2   In general, would you say your quality of life is: 3 2 2   In general, how would you rate your physical health? 3 3 1   In general, how would you rate your mental health, including your mood and your ability to think? 3 2 1   In general, how would you rate your satisfaction with your social activities and relationships? 2 2 1   In general, please rate how well you carry out your usual social activities and roles. (This includes activities at home, at work and in your community, and responsibilities as a parent, child, spouse, employee, friend, etc.) 4 3 1   To what extent are you able to carry out your everyday physical activities such as walking, climbing stairs, carrying groceries, or moving a chair? 1 5 4   In the past 7 days, how often have you been bothered by  emotional problems such as feeling anxious, depressed, or irritable? 3 4 5   In the past 7 days, how would you rate your fatigue on average? 3 3 4   In the past 7 days, how would you rate your pain on average, where 0 means no pain, and 10 means worst imaginable pain? 0 0 0   Global Mental Health Score 11 8    8    8    8 5   Global Physical Health Score 12 16    16    16    16 12   PROMIS TOTAL - SUBSCORES 23 24    24    24    24 17        Diagnosis/es:    Bipolar I     Level of Care: Adult Mental Health Partial Hospitalization Program  Program Track: [facility] 2    Multidisciplinary Team Members: Team PHP 2:  Basil Tesfaye MD; Eron Gant LMFT; Faiza Howell [facility]; Katherin Pham RN; Liz Simmons RN; America Perrin, OTR/L;  Patricia Frey [facility], BC-DMT     Program services: Group and Individual Psychotherapy (at least 1 hour per day), Patient Education and Training Related to Treatment (at least one hour per day), Occupational Therapy and/or Nurse Liaison Education and Support (at least one hour per day), Psychiatric Evaluation and Management (at intake and least once per month), Patient-Centered Support Network Counseling (at least every other week)    Staff Interventions:  Assist to identify treatment goals, including identifying and problem-solving barriers. Assist in identifying strengths and how to mobilize them in meeting treatment goals. Assist in identifying limitations and other health concerns, and ways to manage those in the recovery process and beyond. Assist in identifying and helping to establish, maintain, and strengthen support network. Assist with and facilitate discharge planning, including connection with available and appropriate community services.      Ongoing psychotherapeutic and multidisciplinary assessment. Regular meetings with psychiatrist or other independent psychiatric provider. Assessment by registered nurse liaisons at admission and ongoing/as needed. Complete OT assessment  where applicable/as needed. Complete functional assessment(s) where applicable/as needed.    Plan for and help with maintaining safety, including revising and updating written safety plan where applicable. Assist in identifying and applying coping skills. Assist in identifying and problem solving barriers to treatment and clinical progress. Utilize motivational interviewing techniques to promote change. Provide education to promote informed decisions and decision-making. Utilize motivational interviewing techniques to promote change.     Provide education regarding how to effectively use group process. Teach skills to help identify and manage thoughts, behaviors, and emotions, with specific skills/topics including: emotional regulation, identification of values, understanding and modifying distorted thinking, understanding and coping with grief and loss, shame, self-compassion, self-awareness, mindfulness, radical acceptance, distress tolerance skills, hope, problem-solving, communication, interpersonal effectiveness, distress tolerance. Facilitate increased self-awareness.       Provide education regarding: life balance/structure/routine, goal setting and integration, prioritizing and planning, leisure values, behavior activation, motivation, energy management, stress management, neuroscience of change, sensory modulation, window of tolerance, ANS and vagus nerve activation, sensory enhanced mindfulness, sensory/body based grounding skills.    Provide education regarding: eight dimensions of wellness, sleep hygiene, medication education and management, stigma, nutrition, signs and symptoms, community resources, support network education.       Abuse Prevention Plan:   Treatment team will provide education regarding skill development to address symptom management, life skills, wellness, discharge planning, and personal safety.  Collaborate with patients internal and external providers to coordinate care.  Treatment  "will be provided in a safe, therapeutic environment.  Program provider will offer medication adjustment/management as needed/indicated.     Patient Participation in Plan:  This plan was developed by the patient named at the top of the document with assistance from the multidisciplinary care team. The patient agrees with this care plan, developed goals, and has received a copy. Supports and/or family have been invited to participate in the creation of this plan at the discretion of the patient.     Discharge Criteria:   Patient will discharge from the program when the goals above have been met, the patient is otherwise deemed to no longer need and/or benefit from the program/this level of care, another treatment modality is identified that would better meet treatment needs and goals, and/or the patient opts to discharge from the program for any reason.    Acknowledgement of Current Treatment Plan       I have reviewed my treatment plan with my treatment team members.  I agree with the plan as it is written in the electronic health record.     Name:                   Signature:                                                          Date:  Basil Tesfaye MD  Psychiatrist/Medical Director         NOTE: Patient signatures are completed manually and scanned into the electronic medical record. See \"Media\" tab in epic.    "

## 2024-07-08 NOTE — GROUP NOTE
Psychoeducation Group Note    PATIENT'S NAME: Basil Andrews  MRN:   [id number]  :   1990  [id number]  DATE OF SERVICE: 24  START TIME: 11:00 AM  END TIME: 11:50 AM  FACILITATOR: America Perrin OTR  TOPIC: MH PHP OT Group: Self- Regulation Skills  [facility] Adult Partial Hospitalization Program  TRACK: PHP 2    NUMBER OF PARTICIPANTS: 5    Summary of Group / Topics Discussed:  Self-Regulation Skills: Coping Through the Senses Introduction: Patients were introduced and taught about neurosensory based skills and strategies related to supporting effective self regulation skills.  Patients were taught about the eight sensory systems (external and internal) and how they can be used for coping with mental health symptoms and stressors.  Patients were provided with an experiential opportunity to increase self-awareness of helpful sensory input and self care activities. Patients were introduced on how to create supportive environments that encourage use of these skills.    Patient Session Goals / Objectives:  Review/learn the 8 sensory systems and how they relate to self-regulation  Identified specific and individualized neurosensory skills to help when distressed    Identified skills learned and how this applies to current daily life  Established a plan for practice of these skills in their own environments      Patient Participation / Response:  Fully participated with the group by sharing personal reflections / insights and openly received / provided feedback with other participants.  Demonstrated understanding of content through engaging in all sensory strategies and self reflecting on helpful ones he can use in his daily life.  Patient's affect was appropriate to situation and mood congruent.     Treatment Plan:  Patient has an initial individualized treatment plan that was created as part of their diagnostic assessment / admission process.  A master individualized treatment plan is  in the process of being developed with the patient and multi-disciplinary care team.    America Perrin, OTR

## 2024-07-08 NOTE — PROGRESS NOTES
"   Partial Hospitalization Program  Occupational Therapy Evaluation     Patient: Basil Andrews  Preferred Name: Basil  Pronouns:  he/memo   MRN: [id number]  : 1990  Age: 33 year old  Date of Occupational Therapy Evaluation: 2024  Program start date: 2024  Anticipated discharge: 2024  Assessment format: Observation of patient, Form, Chart review, Consultation with team members      Medical and Therapy History  Mental Health Diagnosis:  (Per most recent H&P)     Bipolar disorder, current episode manic severe with psychotic features (H)      Safety, Substance Use, Trauma History: As noted below or in diagnostic assessment   Presenting Concern: (Per most recent H&P) \"I came into the hospital because of the oversexualization.\"     Occupational Participation   Occupational Context   Occupations and roles in which patient currently feel successful:  \"Driving is no problem. Night and morning routine\"   Components of a good life from patient's perspective: \"Family healed. Fun\"   Self-identified barriers to engagement in occupations:  \"My black and white, wrong and right thinking\"  Patient also identifies current difficulties with: motivation, concentration, memory, perfectionism, self compassion, structure and routine, frustration tolerance.   Considerations of personal values, interests, culture:  \"I want to hide my lucie life because it is decisive and hinders me and my family getting past my illness\"         Self-identified Occupational Performance Inventory  Area of Occupation Level of Importance   1-10 Current Performance   1-10 Motivation to Change   1-10 Additional Details    Self-Care: nutrition, eating, hygiene, medication, movement, mobility, sexual activity, sleep, rest, spiritual/Evangelical practice 7 2 10 [Patient left blank]     Home Management: care of people and pets, meal planning, cooking, cleaning, laundry, finances, paperwork, home maintenance 7 2 10 [Patient left blank]   "   Community Management: transportation, shopping, appointments, being in public, community groups and clubs 3 2 10 [Patient left blank]     Employment and Education:  working, applying for jobs, volunteering, school, homework   9 2 10 [Patient left blank]     Leisure: hobbies, sports, identifying interests, finding affordable activities   3 2 10 [Patient left blank]     Social Participation: building and maintaining relationships, asserting needs, boundaries, socializing 5 2 10 [Patient left blank]       Plan of Care:   Please refer to multidisciplinary treatment plan for additional information regarding plan of care.     Clinical Occupational Summary:  Patient is a 33 year old who uses he/him pronouns and is diagnosed with Bipolar disorder.  Patient has been an active participant in occupational therapy group thus far. Patient reports highest levels of importance and motivation to change in the areas of self care, home management, community management, employment/education, leisure and social participation.  Based on the information patient provided, patient is currently demonstrating impairment in the occupation categories of ADLs, IADLs, work/education, health management, leisure, and social participation.  Patient would benefit from skilled occupational therapy services to progress functional status and increase participation in meaningful activities.  Treatment diagnosis: Ineffective coping and decline in functional status.   Occupational Therapy Goal(s):  Please refer to individualized treatment plan for multidisciplinary team goals.     Skilled Occupational Therapy Interventions: Including but not limited to:  Evaluation, goal setting, ongoing assessment, treatment planning, discharge planning.  Treatment groups: Facilitation and group cohesion development.  Psychoeducation and Experiential groups focusing on: Self-awareness & self-expression, lifestyle health, neurosensory applications/interventions,  mindfulness, motivation, energy management, meaningful & purposeful intervention activities, self-regulation skill development, self-compassion and resiliency development, therapeutic use of self, community resources.  Medical Necessity:  Low complexity   CPT codes & descriptors Occupational Profile and  Medical/Therapy History Assessment of Occupational Performance Clinical   Decision Making   Overall Level       Low Complexity (02694) Brief history relating to presenting  Problem   Problem-focused. 1-3 performance  deficits relating to  physical, cognitive, psychosocial  limitations/restrictions Low complexity, limited  amount of treatment options,  no assessment modification,  no co-morbidities     X  X   x Moderate Complexity (96604)   Expanded review of therapy/  medical records. Additional  review of physical, cognitive,  psychosocial performance Detailed. 3-5 performance  deficits relating to physical,  cognitive, psychosocial limitations/  restrictions Moderate analytical complexity,  detailed assessments,  minimal to moderate modification  of assessments, may have  comorbidities.      X     High Complexity (04746)   Extensive review of physical,  cognitive, psychosocial performance Comprehensive, 5 or more  performance deficits relating  to physical, cognitive, and  psychosocial limitations/restrictions. High analytical complexity,  comprehensive assessments,  multiple treatment options,  significant modifications of  assessment, comorbidities  affecting performance.            Would patient benefit from skilled Occupational Therapy Intervention to work on the above goals?  Yes    Patients potential for improvement towards goals: good     Signature: America Perrin, OTR

## 2024-07-08 NOTE — GROUP NOTE
Psychoeducation Group Note    PATIENT'S NAME: Basil Andrews  MRN:   [id number]  :   1990  ACCT. NUMBER: [id number]  DATE OF SERVICE: 24  START TIME:  1:00 PM  END TIME:  1:50 PM  FACILITATOR: Faiza Howell LPCC  TOPIC: MH Wellness Group: Mental Health Maintenance  [facility] Partial Hospitalization Program  TRACK: PHP2    NUMBER OF PARTICIPANTS: 7    Summary of Group / Topics Discussed:  Mental Health Maintenance:  Vulnerability: In this group, the concept of vulnerability was explored through the viewing, discussion, and self-reflection of the Jean Paul Awad Talk Titled,  The Power of Vulnerability.      Patient Session Goals / Objectives:  Defined and described definition of vulnerability   Identified 2 or more ways of practicing authenticity       Patient Participation / Response:  Fully participated with the group by sharing personal reflections / insights and openly received / provided feedback with other participants.    Demonstrated understanding of topics discussed through group discussion and participation, Identified / Expressed personal readiness to practice skills, and Verbalized understanding of mental health maintenance topic    Treatment Plan:  Patient has an initial individualized treatment plan that was created as part of their diagnostic assessment / admission process.  A master individualized treatment plan is in the process of being developed with the patient and multi-disciplinary care team.    JOSUÉ Barillas

## 2024-07-08 NOTE — GROUP NOTE
Process Group Note    PATIENT'S NAME: Basil Andrews  MRN:   [id number]  :   1990  [id number]  DATE OF SERVICE: 24  START TIME:  9:00 AM  END TIME: 10:50 AM  FACILITATOR: Faiza Howell LPCC  TOPIC:  Process Group    Diagnoses:  1.  Bipolar disorder, current episode manic severe with psychotic features (H)   F31.2       M [facility] Partial Hospitalization Program  TRACK: [facility]    NUMBER OF PARTICIPANTS: 7        Data:    Session content: At the start of this group, patients were invited to check in by identifying themselves, describing their current emotional status, and identifying issues to address in this group.   Area(s) of treatment focus addressed in this session included Symptom Management and Personal Safety.    Patient reported feeling 'self-conscious, like I have no privacy.'   Patient did not identify a specific goal.   Skills they plan to practice to address the goal include 'not wanting to come off like a creep in group.'   They identified a potential barrier as mortgage and  costs.   Patient reported no safety concerns or SIB.  Patient reported no substance use.  Patient reported taking medications as prescribed.  Patient reported feeling proud/grateful for 'trying to do everything that I need to do.'     Pt took process time to discuss history of putting pressure on himself to make sure his entire family is happy and also deciding to use new boundaries with his parents, who currently have a strained relationship with his wife. Pt was receptive to feedback and support from the group.       Therapeutic Interventions/Treatment Strategies:  Psychotherapist offered support, feedback and validation, set limits, provided redirection, and reinforced use of skills. Treatment modalities used include Dialectical Behavioral Therapy. Interventions include Symptoms Management: Promoted understanding of their diagnoses and how it impacts their functioning and  Relationship Skills: Assisted patients in implementing more effective communication skills in their relationships and Encouraged development and maintenance  of healthy boundaries.    Assessment:    Patient response:   Patient responded to session by accepting feedback, giving feedback, listening, focusing on goals, being attentive, accepting support, and verbalizing understanding    Possible barriers to participation / learning include: and no barriers identified    Health Issues:   None reported       Substance Use Review:   Substance Use: No active concerns identified.    Mental Status/Behavioral Observations  Appearance:   Appropriate   Eye Contact:   Good   Psychomotor Behavior: Normal   Attitude:   Cooperative   Orientation:   All  Speech   Rate / Production: Monotone  Talkative   Volume:  Normal   Mood:    Anxious   Affect:    Blunted  Worrisome   Thought Content:   Rumination and Paranoia  Thought Form:  Tangential  Circumstantial    Insight:    Fair     Plan:   Safety Plan: No current safety concerns identified.  Recommended that patient call 911 or go to the local ED should there be a change in any of these risk factors.   Barriers to treatment: None identified  Patient Contracts (see media tab):  None  Substance Use: Not addressed in session   Continue or Discharge: Patient will continue in Adult Partial Hospitalization Program (PHP)  as planned. Patient is likely to benefit from learning and using skills as they work toward the goals identified in their treatment plan.      JOSUÉ Barillas  July 8, 2024

## 2024-07-09 ENCOUNTER — HOSPITAL ENCOUNTER (OUTPATIENT)
Dept: BEHAVIORAL HEALTH | Facility: CLINIC | Age: 34
Discharge: HOME | End: 2024-07-09
Attending: PSYCHIATRY & NEUROLOGY
Payer: COMMERCIAL

## 2024-07-09 DIAGNOSIS — F31.2 BIPOLAR DISORDER, CURRENT EPISODE MANIC SEVERE WITH PSYCHOTIC FEATURES (H): Primary | ICD-10-CM

## 2024-07-09 PROCEDURE — H0035 MH PARTIAL HOSP TX UNDER 24H: HCPCS

## 2024-07-09 PROCEDURE — H0035 MH PARTIAL HOSP TX UNDER 24H: HCPCS | Performed by: COUNSELOR

## 2024-07-09 PROCEDURE — 99214 OFFICE O/P EST MOD 30 MIN: CPT | Performed by: PSYCHIATRY & NEUROLOGY

## 2024-07-09 NOTE — GROUP NOTE
"Psychoeducation Group Note    PATIENT'S NAME: Basil Andrews  MRN:   [id number]  :   1990  [id number]  DATE OF SERVICE: 24  START TIME: 11:00 AM  END TIME: 11:50 AM  FACILITATOR: Denisha Daniels RN  TOPIC:  Wellness Group: Mental Health Maintenance  [facility] Adult Partial Hospitalization Program  TRACK: PHP 2    NUMBER OF PARTICIPANTS: 6    Summary of Group / Topics Discussed:  Mental Health Maintenance: Resilience. Patients spent time exploring what resiliency means to them. Patients were encouraged to bring to mind someone they look up to in life and assess resilience in that person. Group will discuss ways to foster resiliency--making connections, avoid the tendency to view crises as insurmountable challenges, accept that change is an unavoidable part of life, move toward realistic goals, take decisive actions, look for opportunities for self-discovery, nurture a positive view of yourself, keep things in perspective, maintain a hopeful outlook on life, and take care of yourself.        Patient Session Goals / Objectives:       Patients will identify what it means to them to be \"resilient.\"     Patients will explore ways to develop, foster, and maintain resiliency.      Patients will engage in conversational prompts (taken from above-mentioned ways to develop resilience) in the form of a game to personally consider ways to increase resiliency.         Patient Participation / Response:  Fully participated with the group by sharing personal reflections / insights and openly received / provided feedback with other participants.    Verbalized understanding of mental health maintenance topic    Treatment Plan:  Patient has a current master individualized treatment plan.  See Epic treatment plan for more information.    Denisha Daniels RN  "

## 2024-07-09 NOTE — GROUP NOTE
"OT Clinic Group Note    PATIENT'S NAME: Basil Andrews  MRN:   [id number]  :   1990  [id number]  DATE OF SERVICE: 24  START TIME:  2:00 PM  END TIME:  2:50 PM  FACILITATOR: America Perrin OTR  TOPIC: [facility] OT Group: Occupational Therapy Clinic  [facility] Partial Hospitalization Program  TRACK: PHP 2    NUMBER OF PARTICIPANTS: 4    Summary of Group / Topics Discussed:  Occupational Therapy Clinic: Provided opportunity for patients to independently choose and engage in a therapeutic activity that supports progress towards their goals and psychiatric recovery. The Occupational Therapy Clinic provides a context for patients to monitor their symptoms, gain self-awareness, practice skills (self-regulation, mindfulness, self-talk, focus/concentration, social, productivity), build a sense of self efficacy and mastery, as well as receive validation, support, and resources. OT checks in, observes, assesses, and monitors performance skills and patterns in context with each group member. Patient was provided orientation to the OT clinic and overview of purpose of the OT clinic in support of meeting their goals.     Patient Session Goals / Objectives:  Independently identify a purposeful and meaningful therapeutic activity.  Identified which goal(s) they are intentionally working on during session.   Reflected on their performance and share insight about their progress.  Practiced and identified a way to generalize a skill to their everyday life      Patient Participation / Response:  Fully participated with the group by sharing personal reflections / insights and openly received / provided feedback with other participants.  Demonstrated understanding of content through engaging in a task of choice (coloring). Patient shared it felt \"good\".  Patient's affect was appropriate to situation and mood congruent.     Treatment Plan:  Patient has a current master individualized treatment plan " and today was our weekly review of the patient's progress.  See Epic treatment plan for progress / updates on goals and plan.    America Perrin, OTR

## 2024-07-09 NOTE — GROUP NOTE
Psychotherapy Group Note    PATIENT'S NAME: Basil Andrews  MRN:   [id number]  :   1990  [id number]  DATE OF SERVICE: 24  START TIME: 10:00 AM  END TIME: 10:50 AM  FACILITATOR: Denisha Daniels RN; Basil Tesfaye MD  TOPIC: MH EBP Group: Specialty Awareness  [facility] Adult Partial Hospitalization Program  TRACK: PHP 1&2 initial treatment planning group    NUMBER OF PARTICIPANTS: 5    Summary of Group / Topics Discussed:  Specialty Topics: Goal-setting and treatment planning: Assisted patients in identifying and developing treatment plan goals for the partial hospitalization program. Education was provided surrounding the purpose of setting goals, the potential benefits of tracking personal progress for mental health, and how to set SMART goals. Patients were then provided with the opportunity to engage in self-reflection and identify individualized goals for their treatment plan in the areas of personal safety, symptom management, daily living skills, wellness, aftercare, and an elective goal of their choosing. Validation, support, and feedback were provided from staff throughout group.     Patient Session Goals and Objectives:      Identify and understand the value of setting SMART goals and tracking personal progress.      Self-reflect and share individual purposes for seeking partial hospitalization programming.       Develop SMART goals to guide treatment plan related to personal safety, symptom management, daily living skills, wellness and aftercare.       Patient Participation / Response:  Fully participated with the group by sharing personal reflections / insights and openly received / provided feedback with other participants.    Verbalized understanding of ways to proactively manage illness and Identified plan to address barriers to practicing skills discussed in topic    Treatment Plan:  Patient has a current master individualized treatment plan and today was  our weekly review of the patient's progress.  See Epic treatment plan for progress / updates on goals and plan.    Denisha Daniels RN

## 2024-07-09 NOTE — GROUP NOTE
"Process Group Note    PATIENT'S NAME: Basil Andrews  MRN:   [id number]  :   1990  ACCT. NUMBER: [id number]  DATE OF SERVICE: 24  START TIME:  9:00 AM  END TIME:  9:50 AM  FACILITATOR: Faiza Howell LPCC  TOPIC:  Process Group    Diagnoses:  ***    [facility] Adult Partial Hospitalization Program  TRACK: PHP1    NUMBER OF PARTICIPANTS: 6          Data:    Session content: At the start of this group, patients were invited to check in by identifying themselves, describing their current emotional status, and identifying issues to address in this group.   Area(s) of treatment focus addressed in this session included {OP BEH ADULT  AREA OF TREATMENT FOCUS:707718}.  ***    Therapeutic Interventions/Treatment Strategies:  {OP  THERAPEUTIC INTERVENTIONS/TREATMENT:803462}    Assessment:    Patient response:   Patient responded to session by {PATIENT RESPONSE:344650}    Possible barriers to participation / learning include: {POSSIBLE BARRIERS:456048}    Health Issues:   {YES / NO:897091}       Substance Use Review:   Substance Use: {YES / NO:492126}    Mental Status/Behavioral Observations  Appearance:   {Appearance:445335::\"Appropriate \"}  Eye Contact:   {Eye Contact:225962::\"Good \"}  Psychomotor Behavior: {Psychomotor Behavior:699962::\"Normal \"}  Attitude:   {Attitude:224505::\"Cooperative \"}  Orientation:   {Orientation:742909::\"All\"}  Speech   Rate / Production: {Speech Rate/Production:047615::\"Normal \"}   Volume:  {Speech Volume:679160::\"Normal \"}  Mood:    {Mood:306833::\"Normal\"}  Affect:    {Affect:854617::\"Appropriate \"}  Thought Content:   {Thought Content with Safety:287506}  Thought Form:  {Thought Form:497855::\"Coherent \",\"Logical \"}    Insight:    {Insight:420335::\"Good \"}    Plan:     Safety Plan: {SAFETY PLAN:620529}     Barriers to treatment: {Barriers to Treatment:465120}    Patient Contracts (see media tab):  {Patient Contracts:005462}    Substance Use: {SUBSTANCE USE " ASSESSMENT/INTERVENTION:259258}     Continue or Discharge: {Continue or Discharge:456470}      Faiza Howell, [facility]  July 9, 2024

## 2024-07-09 NOTE — GROUP NOTE
Process Group Note    PATIENT'S NAME: Basil Andrews  MRN:   [id number]  :   1990  [id number]  DATE OF SERVICE: 24  START TIME:  9:00 AM  END TIME:  9:50 AM  FACILITATOR: Eron Gant LMFT  TOPIC:  Process Group    Diagnoses:  1.  Bipolar disorder, current episode manic severe with psychotic features (H)   F31.2       M [facility] Partial Hospitalization Program  TRACK: [facility]    NUMBER OF PARTICIPANTS: 6        Data:    Session content: At the start of this group, patients were invited to check in by identifying themselves, describing their current emotional status, and identifying issues to address in this group.   Area(s) of treatment focus addressed in this session included Symptom Management, Personal Safety, and Community Resources/Discharge Planning.  Patient reported feeling optimistic  trying to build on yesterday.    Patient discussed working toward staying away from talking about mental illness outside of group.  His family says things that aren't helpful.  He is also working to limit time on Oriental orthodox stuff.   For skills they will use to address their goal(s), patient identified focus on everyday things to have more things to talk about besides mental illness.  He is working to keep himself interested in a variety of things to keep balanced.   A barrier to working toward their goal(s) and/or addressing mental health symptoms the patient identified was following through on goal activities, being at home makes it harder to focus because of household responsibilities.  Patient reported no safety concerns and/or self-injurious behaviors. Patient reported no substance use. Patient reported they are taking their medications as prescribed.   Patient reported feeling proud/grateful for learning a lot from PHP group.  Patient discussed with the treatment group that they he would like to talk to Dr. Tesfaye about getting some medication for anxiety.    Therapeutic  Interventions/Treatment Strategies:  Psychotherapist offered support, feedback and validation. Treatment modalities used include Cognitive Behavioral Therapy. Interventions include Coping Skills: Facilitated understanding of  what factors may contribute to symptom relapse and skills plan to manage symptom relapse  and Symptoms Management: Promoted understanding of their diagnoses and how it impacts their functioning.    Assessment:    Patient response:   Patient responded to session by accepting feedback, giving feedback, listening, focusing on goals, being attentive and accepting support    Possible barriers to participation / learning include: Anxiety    Health Issues:   None reported       Substance Use Review:   Substance Use: No active concerns identified.    Mental Status/Behavioral Observations  Appearance:   Appropriate   Eye Contact:   Good   Psychomotor Behavior: Normal   Attitude:   Cooperative   Orientation:   All  Speech   Rate / Production: Normal    Volume:  Normal   Mood:    Anxious   Affect:    Blunted  Thought Content:   Clear and Safety denies any current safety concerns including suicidal ideation, self-harm, and homicidal ideation  Thought Form:  Coherent  Logical     Insight:    Good     Plan:   Safety Plan: No current safety concerns identified.    Barriers to treatment: None identified  Patient Contracts (see media tab):  None  Substance Use: Provided encouragement towards sobriety   Continue or Discharge: Patient will continue in Adult Partial Hospitalization Program (PHP)  as planned. Patient is likely to benefit from learning and using skills as they work toward the goals identified in their treatment plan.      SARAH Liu  July 9, 2024

## 2024-07-10 ENCOUNTER — HOSPITAL ENCOUNTER (OUTPATIENT)
Dept: BEHAVIORAL HEALTH | Facility: CLINIC | Age: 34
Discharge: HOME | End: 2024-07-10
Attending: PSYCHIATRY & NEUROLOGY
Payer: COMMERCIAL

## 2024-07-10 PROCEDURE — H0035 MH PARTIAL HOSP TX UNDER 24H: HCPCS

## 2024-07-10 PROCEDURE — H0035 MH PARTIAL HOSP TX UNDER 24H: HCPCS | Performed by: COUNSELOR

## 2024-07-10 PROCEDURE — 90832 PSYTX W PT 30 MINUTES: CPT

## 2024-07-10 NOTE — GROUP NOTE
"Psychoeducation Group Note    PATIENT'S NAME: Basil Andrews  MRN:   [id number]  :   1990  ACCT. NUMBER: [id number]  DATE OF SERVICE: 7/10/24  START TIME:  2:00 PM  END TIME:  2:50 PM  FACILITATOR: Denisha Daniels RN  TOPIC:  Wellness Group: [facility] Adult Partial Hospitalization Program  TRACK: PHP 2    NUMBER OF PARTICIPANTS: 5    Summary of Group / Topics Discussed:  Foundations of Health: Daily Life Skills. Pts watched a video about adaptive ways to approach home care/self care/hygiene. Patients were encouraged to explore their self talk/internal dialog around activities of daily living (ADLs) and consider ways their experience in doing these tasks may improve with changes in the language they use with themselves. Recommendations were given for tips on how to make ADLs feel more approachable and more enjoyable to complete--apps, websites, lists, ways to tracking, and breaking things down.      Patient Session Goals/Objectives:    -Explore self-judgement around completion (or lack of) of ADLs  -Challenge \"all-or-nothing\" thinking in relation to ADLs and how to combat this way of thinking   -Identify specific tools to use for improving productivity with ADLs       Patient Participation / Response:  Fully participated with the group by sharing personal reflections / insights and openly received / provided feedback with other participants.    Verbalized understanding of foundations of health topic    Treatment Plan:  Patient has a current master individualized treatment plan.  See Epic treatment plan for more information.    Denisha Daniels RN  "

## 2024-07-10 NOTE — GROUP NOTE
Process Group Note    PATIENT'S NAME: Basil Andrews  MRN:   [id number]  :   1990  [id number]  DATE OF SERVICE: 7/10/24  START TIME:  9:00 AM  END TIME:  9:50 AM  FACILITATOR: Faiza Howell LPCC  TOPIC:  Process Group    Diagnoses:  1.  Bipolar disorder, current episode manic severe with psychotic features (H)   F31.2       [facility] Partial Hospitalization Program  TRACK: [facility]    NUMBER OF PARTICIPANTS: 6        Data:    Session content: At the start of this group, patients were invited to check in by identifying themselves, describing their current emotional status, and identifying issues to address in this group.   Area(s) of treatment focus addressed in this session included Symptom Management and Personal Safety.    Patient reported feeling 'down in the dumps... more depressed.'   Patient discussed working toward feeling improving routine and engaging appropriately in social interactions.   Skills they plan to practice to address the goal include patience.   Patient reported passive SI, denied plan or intent. Committed to safety.   Patient reported no substance use.  Patient reported taking medications as prescribed.    Pt took process time to discuss feeling uncomfortable after watching a standup comedy special last night that was 'raunchy' and requested a check in with a therapist today to discuss in more detail. Pt also discussed having difficulty engaging socially and physical sensations he is experiencing, specifically 'vibrations' in his head that he attributed to possibly medication, wanting to cry, or other mental health symptoms. Pt was receptive to feedback and support from the group.     Therapeutic Interventions/Treatment Strategies:  Psychotherapist offered support, feedback and validation, set limits, provided redirection, and reinforced use of skills. Treatment modalities used include Dialectical Behavioral Therapy. Interventions include Symptoms  Management: Promoted understanding of their diagnoses and how it impacts their functioning and Relationship Skills: Assisted patients in implementing more effective communication skills in their relationships and Discussed strategies to promote healthier understanding of interpersonal relationships.    Assessment:    Patient response:   Patient responded to session by accepting feedback, giving feedback, listening, focusing on goals, being attentive, accepting support, and verbalizing understanding    Possible barriers to participation / learning include: severity of symptoms    Health Issues:   None reported       Substance Use Review:   Substance Use: No active concerns identified.    Mental Status/Behavioral Observations  Appearance:   Appropriate   Eye Contact:   Good   Psychomotor Behavior: Restless   Attitude:   Cooperative   Orientation:   All  Speech   Rate / Production: Hyperverbal    Volume:  Normal   Mood:    Anxious  Depressed   Affect:    Worrisome   Thought Content:   Rumination  Thought Form:  Tangential  Psychosis    Insight:    Good     Plan:   Safety Plan: Patient consented to co-developed safety plan.  Safety and risk management plan was completed.  Patient agreed to use safety plan should any safety concerns arise.  A copy was given to the patient.   Barriers to treatment: None identified  Patient Contracts (see media tab):  None  Substance Use: Not addressed in session   Continue or Discharge: Patient will continue in Adult Partial Hospitalization Program (PHP)  as planned. Patient is likely to benefit from learning and using skills as they work toward the goals identified in their treatment plan.      JOSUÉ Barillas  July 10, 2024

## 2024-07-10 NOTE — GROUP NOTE
Psychotherapy Group Note    PATIENT'S NAME: Basil Andrews  MRN:   [id number]  :   1990  [id number]  DATE OF SERVICE: 24  START TIME:  1:00 PM  END TIME:  1:50 PM  FACILITATOR: Eron Gant LMFT  TOPIC: MH EBP Group: [facility] Adult Partial Hospitalization Program  TRACK: PHP2    NUMBER OF PARTICIPANTS: 5    Summary of Group / Topics Discussed: (Part 2 of 2)  Education Support Network:  Patients and caregivers received an overview of diagnoses including physical, intellectual, and behavioral indicators of illness. Patients presented information created in the support network development group to engage caregivers in planning for help and support to manage mental health symptoms.  The goal is to help patients and caregivers create a plan for support and assistance after discharge.      Patient Session Goals / Objectives:  Understanding diagnoses and accompanying physical reactions, thoughts, and behaviors.    Explored options to support patients in their recovery   Formulated a plan with caregivers for assistance and support to aid in recovery   Problem solved barriers to follow through on plan      Patient Participation / Response:  Fully participated with the group by sharing personal reflections / insights and openly received / provided feedback with other participants.    Demonstrated understanding of topics discussed through group discussion and participation, Identified / Expressed readiness to act on skill suggestions discussed in topic, Verbalized understanding of ways to proactively manage illness, and Practiced skills in session  Wife joined over the computer  Treatment Plan:  Patient has a current master individualized treatment plan.  See Epic treatment plan for more information.    SARAH Liu

## 2024-07-10 NOTE — GROUP NOTE
Psychotherapy Group Note    PATIENT'S NAME: Basil Andrews  MRN:   [id number]  :   1990  [id number]  DATE OF SERVICE: 7/10/24  START TIME:  1:00 PM  END TIME:  1:50 PM  FACILITATOR: Alva Faye LICSW  TOPIC: MH EBP Group: Coping Skills  [facility] Adult Partial Hospitalization Program  TRACK: 2    NUMBER OF PARTICIPANTS: 5    Summary of Group / Topics Discussed:  Coping Skills: Radical Acceptance: Patients learned radical acceptance as a way to tolerate heightened stress in the moment.  Patients identified situations that necessitate radical acceptance.  They focused on applying acceptance of the moment to tolerate difficult emotions and events. Patients discussed how to distinguish when this can be useful in their lives and when other skills are more relevant or helpful.    Patient Session Goals / Objectives:  Understand that some amount of pain exists for each of us in our lives  Process the difficulty of acceptance in our lives and benefits of radical acceptance to mood and functioning.  Demonstrate understanding of when to use the radical acceptance to tolerate distress by providing examples of situations where this could be applied.  Identify barriers to applying radical acceptance to difficult situations and explore strategies to overcome them      Patient Participation / Response:  Fully participated with the group by sharing personal reflections / insights and openly received / provided feedback with other participants.    Demonstrated understanding of topics discussed through group discussion and participation, Expressed understanding of the relevance / importance of coping skills at distressing times in life, Demonstrated knowledge of when to consider using a variety of coping skills in daily life, Identified barriers to applying coping skills, and Identified / Expressed personal readiness to practice new coping skills    Treatment Plan:  Patient has a current master  individualized treatment plan.  See Epic treatment plan for more information.    Alva Mcclain, LICSW

## 2024-07-10 NOTE — GROUP NOTE
"Psychoeducation Group Note    PATIENT'S NAME: Basil Andrews  MRN:   [id number]  :   1990  ACCT. NUMBER: [id number]  DATE OF SERVICE: 7/10/24  START TIME: 11:00 AM  END TIME: 11:50 AM  FACILITATOR: America Perrin OTR  TOPIC: MH PHP OT Group: Self- Regulation Skills  [facility] Adult Partial Hospitalization Program  TRACK: PHP 2    NUMBER OF PARTICIPANTS: 6    Summary of Group / Topics Discussed:  Self-Regulation Skills: Coping Cards: Provided education on the benefits of developing a robust coping plan to help manage distress and regulate emotions.  Discussed the importance of having easy access to this plan in times of increased symptoms.  Brainstormed with group members to identify coping skills in the categories of sensory strategies, distraction techniques, cognitive strategies, and physical activity.  Instructed patients to select 1-2 skills from each category to include in their deck of coping cards, then provided time and materials for patients to create this deck.  Prompted patients to consider where they might keep this tool and when they might find it most helpful.      Patient Session Goals / Objectives:  Review the benefits of having a robust coping plan to help manage distress and regulate emotions  Develop a deck of personal coping cards to promote self-regulation and independent skill use  Established a plan for practice of these skills in their own environments       Patient Participation / Response:  Fully participated with the group by sharing personal reflections / insights and openly received / provided feedback with other participants.  Demonstrated understanding of content through creating coping cards and self reflecting where he can keep them for easy access (\"backpack\").  Patient's affect was appropriate to situation and mood congruent.     Treatment Plan:  Patient has a current master individualized treatment plan.  See Epic treatment plan for more information.    America MERCER " Aydin, OTR

## 2024-07-10 NOTE — GROUP NOTE
Psychotherapy Group Note    PATIENT'S NAME: Basil Andrews  MRN:   [id number]  :   1990  [id number]  DATE OF SERVICE: 7/10/24  START TIME: 10:00 AM  END TIME: 10:50 AM  FACILITATOR: Melissa Torres LICSW  TOPIC: MH EBP Group: Emotions Management  [facility] Mental [facility] Outpatient Programs  TRACK: [facility] 2     NUMBER OF PARTICIPANTS: 6    Summary of Group / Topics Discussed:  Emotions Management: Model of Emotions: Patients were introduced to the cyclical model of emotions.  Explored emotions are shaped by different events and one s interpretation of events.  Group discussed how emotions begin with an event, followed by one s interpretation, followed by associated feelings.  Discussion included a review of personal urges and actions that can/do follow an emotional experience in the patient s life, and the end results.    Patient Session Goals / Objectives:  Demonstrate understanding of types various emotions.  Identify and discuss specific emotions and when they occur; understand triggers.  Identify individual emotions and physical sensations that accompany them.  Discuss urges and actions, and how to influence the intensity of emotional reactions and disrupt the cycle.    Discuss barriers to emotional regulation.  Choose 1-2 strategies to assist with emotional response to potentially distressing situations.      Patient Participation / Response:  Fully participated with the group by sharing personal reflections / insights and openly received / provided feedback with other participants.    Demonstrated understanding of topics discussed through group discussion and participation    Treatment Plan:  Patient has a current master individualized treatment plan.  See Epic treatment plan for more information.    INDIGO Harrell

## 2024-07-10 NOTE — PROGRESS NOTES
"[facility]   Partial Hospitalization Program  Interval/Follow-up Psychiatric Evaluation    Patient: Basil Andrews  Preferred Name: Basil  MRN: [id number]  : 1990  Acct. No.: [id number]  Date of Service: 24  PHP Track:  2            Interval History:  \"I'm feeling a lot of anxiety.\" Basil presents today for follow-up and ongoing program supervision. Discussed today:  Restlessness, more notable in his feet than his legs but also difficulty staying in one position for too long  Some tightness in chest in response to worry  Still noting some coincidences and intrusive thoughts that he knows are not indicative of larger patterns but are nonetheless concerning to him  Has been taking benztropine twice daily with scheduled haloperidol 10 mg doses  Has been avoiding taking extra as needed doses of 5 mg haloperidol  \"I feel better every time I sleep,\" meaning not only is sleep restorative but that his symptoms are less after every night and every nap    Review of Symptoms  Sleep: see above  Appetite: stable  Suicidal ideation: denies current or recent suicidal ideation or behavior  Thoughts of non-suicidal self-injury: denied  Recent self-injurious behavior: denied  Homicidal ideation: denied  Other safety concerns: denied    Activities of Daily Living and Related Systems Impacted by Illness:  Hygiene: improving  Socialization: limited by recent events  Activities of Daily Living: (cleaning, shopping, bills, etc.): improving  Concerns related to work: not working due to illness     Substance use:  denies    Response to Program Interventions:  Finds group supportive, is working to incorporate skills    Current Medications:  Current Outpatient Medications   Medication Sig Dispense Refill    benztropine (COGENTIN) 1 MG tablet Take 1 tablet (1 mg) by mouth 2 times daily 60 tablet 1    haloperidol (HALDOL) 10 MG tablet Take 1 tablet (10 mg) by mouth 2 times daily 60 tablet 1    divalproex sodium " extended-release (DEPAKOTE ER) 500 MG 24 hr tablet Take 3 tablets (1,500 mg) by mouth at bedtime 90 tablet 1    divalproex sodium extended-release (DEPAKOTE ER) 500 MG 24 hr tablet Take 1 tablet (500 mg) by mouth daily 30 tablet 1    fluticasone (FLONASE) 50 MCG/ACT nasal spray Spray 2 sprays into both nostrils daily      gabapentin (NEURONTIN) 300 MG capsule Take 300 mg by mouth in the morning and 600 mg by mouth at bedtime      haloperidol (HALDOL) 5 MG tablet Take 1 tablet (5 mg) by mouth every 8 hours as needed for agitation 60 tablet 1    ipratropium (ATROVENT) 0.06 % nasal spray Spray 2 sprays into both nostrils 4 times daily      multivitamin, therapeutic with minerals (MULTI-VITAMIN) TABS Take 1 tablet by mouth daily      propranolol (INDERAL) 10 MG tablet Take 1 tablet (10 mg) by mouth 3 times daily as needed (restlessness) (Patient not taking: Reported on 7/5/2024) 60 tablet 1    risperiDONE (RISPERDAL) 1 MG tablet Take 1 tablet (1 mg) by mouth every morning 30 tablet 1    risperiDONE (RISPERDAL) 3 MG tablet Take 1 tablet (3 mg) by mouth at bedtime 30 tablet 1       The above list was reviewed with patient today.     Patient is taking medications as prescribed and reports adverse effects as discussed above    Laboratory Results:  Most recent labs reviewed. Pertinent updates/findings: None.       Mental Status Examination:  Vital Signs: There were no vitals taken for this visit.   Appearance: appropriately groomed, appears stated age, and in no apparent distress.  Attitude: cooperative   Eye Contact: good   Muscle Strength and Tone: normal  Psychomotor Behavior: restless   Gait and Station: deferred  Speech: clear, coherent, decreased prosody, regular rate, regular rhythm, and fluent  Associations: No loosening of associations  Thought Process: coherent and perseverating  Thought Content: no evidence of suicidal ideation or homicidal ideation, no evidence of psychotic thought, no auditory hallucinations  "present, no visual hallucinations present, and reports intrusive but challengeable ideas of reference  Mood: \"anxious\"  Affect: mood congruent, intensity is blunted, constricted mobility, and reactive  Insight: good  Judgment: intact, adequate for safety  Impulse Control: intact  Oriented to: time, place, person, and situation  Attention Span and Concentration: Normal  Language: Intact  Recent and Remote Memory: Delayed & immediate recall intact  Fund of Knowledge/Assessment of Intelligence: Average  Capacity of Activities of Daily Living: Independent, able to participate in programmatic care services.    Diagnosis/es:    ICD-10-CM    1. Bipolar disorder, current episode manic severe with psychotic features (H)  F31.2           Assessment/Plan:  Basil presents today for follow-up psychiatric evaluation and assessment of progress. Endorses continued improvement with each sleep and that he is sleeping well. Residual psychotic symptoms. Dicussed that he can use additional haloperidol as needed with some guidance on what situations might merit the extra dose. Also encouraged him to plan a dose of propranolol 10 mg tonight when he gets home to see if that makes sitting still easier or more comfortable. Discussed signs of orthostatic hypotension.    Not sufficiently improved that lower level of care is worth considering at this time. Continue PHP.    Safety Assessment:  Basil reports suicidal ideation and/or non-suicidal self-injury or thoughts thereof as noted above  Basil is future-oriented and is engaged in treatment planning   I do not feel that Basil meets criteria for a 72-hour involuntary hold and remains appropriate for an outpatient level of care    Visit Details:  Type of service: In-person  Location (patient and provider): [facility] Adult Mental Health Outpatient Programmatic Care Offices    Level of Medical Decision Making:   - At least 1 chronic problem that is not stable  - Engaged in " prescription drug management during visit (discussed any medication benefits, side effects, alternatives, etc.)  Discussion of management or test interpretation with external physician/other qualified healthcare professional/appropriate source - programmatic care multidisciplinary treatment team

## 2024-07-11 ENCOUNTER — HOSPITAL ENCOUNTER (OUTPATIENT)
Dept: BEHAVIORAL HEALTH | Facility: CLINIC | Age: 34
Discharge: HOME | End: 2024-07-11
Attending: PSYCHIATRY & NEUROLOGY
Payer: COMMERCIAL

## 2024-07-11 DIAGNOSIS — F31.2 BIPOLAR DISORDER, CURRENT EPISODE MANIC SEVERE WITH PSYCHOTIC FEATURES (H): Primary | ICD-10-CM

## 2024-07-11 PROCEDURE — H0035 MH PARTIAL HOSP TX UNDER 24H: HCPCS | Performed by: COUNSELOR

## 2024-07-11 PROCEDURE — 99215 OFFICE O/P EST HI 40 MIN: CPT | Performed by: PSYCHIATRY & NEUROLOGY

## 2024-07-11 PROCEDURE — H0035 MH PARTIAL HOSP TX UNDER 24H: HCPCS

## 2024-07-11 NOTE — GROUP NOTE
OT Clinic Group Note    PATIENT'S NAME: Basil Andrews  MRN:   [id number]  :   1990  [id number]  DATE OF SERVICE: 24  START TIME:  1:00 PM  END TIME:  1:50 PM  FACILITATOR: America Perrin OTR  TOPIC: [facility] OT Group: Occupational Therapy Clinic  [facility] Partial Hospitalization Program  TRACK: PHP 2    NUMBER OF PARTICIPANTS: 4    Summary of Group / Topics Discussed:  Occupational Therapy Clinic: Provided opportunity for patients to independently choose and engage in a therapeutic activity that supports progress towards their goals and psychiatric recovery. The Occupational Therapy Clinic provides a context for patients to monitor their symptoms, gain self-awareness, practice skills (self-regulation, mindfulness, self-talk, focus/concentration, social, productivity), build a sense of self efficacy and mastery, as well as receive validation, support, and resources. OT checks in, observes, assesses, and monitors performance skills and patterns in context with each group member.  Patient was provided orientation to the OT clinic and overview of purpose of the OT clinic in support of meeting their goals.     Patient Session Goals / Objectives:  Independently identify a purposeful and meaningful therapeutic activity.  Identified which goal(s) they are intentionally working on during session.   Reflected on their performance and share insight about their progress.  Practiced and identified a way to generalize a skill to their everyday life      Patient Participation / Response:  Fully participated with the group by sharing personal reflections / insights and openly received / provided feedback with other participants.  Demonstrated understanding of content through engaging in a task of choice (coloring and ceramics). Patient shared he is excited to make something for his wife.  Patient's affect was appropriate to situation and mood congruent.     Treatment Plan:  Patient has a  current master individualized treatment plan and today was our weekly review of the patient's progress.  See Epic treatment plan for progress / updates on goals and plan.    America Perrin, OTR

## 2024-07-11 NOTE — PROGRESS NOTES
"[facility]   Partial Hospitalization Program  Interval/Follow-up Psychiatric Evaluation    Patient: Basil Andrews  Preferred Name: Basil  MRN: [id number]  : 1990  Acct. No.: [id number]  Date of Service: 24  PHP Track:  2            Interval History:  \"I'm trying to prove that I'm doing everything I can.\" Basil presents today for follow-up and ongoing program supervision. Discussed today:  Ongoing discomfort with sexual content in media (e.g. recent comedy special or on television programming generally)  Does not find it triggering of his own behavior  Has generally preferred to avoid it in his media consumption historically  Indicated he was worried that any exposure to it might suggest to family that he was not serious in his reformation following the manic episode  Has discussed going to support groups for men with history of problematic sexual behavior  Has a couple of groups identified, plans to attend meetings  Discussed that his overall goal is repair of relationships with his in-laws and re-establishing trust  Discussed that he can offer apology  Discussed that they may want to pursue support through [facility] or [employer]  As might his wife  Discussed that it is important that everyone understand that the troublesome actions themselves can be attributed almost entirely to his misha  Trial of propranolol helped with restlessness  Noting some lightheadedness so will limit use e.g. before his shower so he does not get lightheaded after washing his feet  Still intrusive ideas of reference at times  Would like a small increase in haloperidol too    Review of Symptoms  Sleep: improving and continues to result in decrease symptoms   Appetite: no change  Suicidal ideation: passive thoughts (e.g., \"if I got hit by a bus I would be okay with that\") yesterday. Denies today   Thoughts of non-suicidal self-injury: denied  Recent self-injurious behavior: denied  Homicidal ideation: denied  Other safety " concerns: denied    Activities of Daily Living and Related Systems Impacted by Illness:  Hygiene: no concerns  Socialization: see above  Activities of Daily Living: (cleaning, shopping, bills, etc.): no change  Concerns related to work: on leave    Substance use:  denies    Response to Program Interventions:  Finds groups supportive, helpful  Is identifying and starting to practice skills    Current Medications:  Current Outpatient Medications   Medication Sig Dispense Refill    benztropine (COGENTIN) 1 MG tablet Take 1 tablet (1 mg) by mouth 2 times daily 60 tablet 1    divalproex sodium extended-release (DEPAKOTE ER) 500 MG 24 hr tablet Take 3 tablets (1,500 mg) by mouth at bedtime 90 tablet 1    divalproex sodium extended-release (DEPAKOTE ER) 500 MG 24 hr tablet Take 1 tablet (500 mg) by mouth daily 30 tablet 1    fluticasone (FLONASE) 50 MCG/ACT nasal spray Spray 2 sprays into both nostrils daily      gabapentin (NEURONTIN) 300 MG capsule Take 300 mg by mouth in the morning and 600 mg by mouth at bedtime      haloperidol (HALDOL) 10 MG tablet Take 1 tablet (10 mg) by mouth 2 times daily 60 tablet 1    haloperidol (HALDOL) 5 MG tablet Take 1 tablet (5 mg) by mouth every 8 hours as needed for agitation 60 tablet 1    ipratropium (ATROVENT) 0.06 % nasal spray Spray 2 sprays into both nostrils 4 times daily      multivitamin, therapeutic with minerals (MULTI-VITAMIN) TABS Take 1 tablet by mouth daily      propranolol (INDERAL) 10 MG tablet Take 1 tablet (10 mg) by mouth 3 times daily as needed (restlessness) (Patient not taking: Reported on 7/5/2024) 60 tablet 1    risperiDONE (RISPERDAL) 1 MG tablet Take 1 tablet (1 mg) by mouth every morning 30 tablet 1    risperiDONE (RISPERDAL) 3 MG tablet Take 1 tablet (3 mg) by mouth at bedtime 30 tablet 1       The above list was reviewed and updated in [facility] with patient today.     Patient is taking medications as prescribed and reports adverse effects as previously  "discussed (chiefly akathisia)    Laboratory Results:  Most recent labs reviewed. Pertinent updates/findings: None.       Mental Status Examination:  Vital Signs: There were no vitals taken for this visit.   Appearance: appropriately groomed, appears stated age, and in no apparent distress.  Attitude: cooperative   Eye Contact: good   Muscle Strength and Tone: normal  Psychomotor Behavior: fidgety   Gait and Station: deferred  Speech: clear, coherent, decreased prosody, regular rate, regular rhythm, and fluent  Associations: Perseverative  Thought Process: coherent and perseverating  Thought Content: no evidence of suicidal ideation or homicidal ideation, no auditory hallucinations present, no visual hallucinations present, and noting intrusive ideas of reference still  Mood: \" okay \"  Affect: intensity is normal, constricted mobility, restricted range, and reactive  Insight: good  Judgment: intact, adequate for safety  Impulse Control: intact  Oriented to: time, place, person, and situation  Attention Span and Concentration: Normal  Language: Intact  Recent and Remote Memory: Delayed & immediate recall intact  Fund of Knowledge/Assessment of Intelligence: Average  Capacity of Activities of Daily Living: Independent, able to participate in programmatic care services.    Diagnosis/es:    ICD-10-CM    1. Bipolar disorder, current episode manic severe with psychotic features (H)  F31.2           Assessment/Plan:  Basil presents today for follow-up psychiatric evaluation and assessment of progress. Endorses ongoing intrusive ideas of reference. Will increase haloperidol to 10 mg in the morning and 12.5 (10 mg tab plus half of a 5 mg tab) in the evening. Can continue to use propranolol as needed for akathisia and additional haloperidol as needed for psychotic symptoms. No other medication changes today.    Discussed that he may want to focus his attention and interventions first and foremost on management of his " illness. Support groups for sex addiction or similar may not be the most appropriate for him since the thoughts and actions he took were a direct manifestation of his misha. Encouraged him to attend the groups to see in any case but if it seems like the wrong fit then his energy may better be spent in other areas.    Symptom burden remains high and recent manic episode resulted in damage to family relationships and a prolonged inpatient hospitalization. Continue at this level of care for close observation and treatment.      Safety Assessment:  Basil reports suicidal ideation and/or non-suicidal self-injury or thoughts thereof as noted above  Basil is future-oriented and is engaged in treatment planning   I do not feel that Basil meets criteria for a 72-hour involuntary hold and remains appropriate for an outpatient level of care    Visit Details:  Type of service: In-person  Location (patient and provider): [facility] Programmatic Care Offices    Level of Medical Decision Making:   - At least 1 chronic problem that is not stable  - Engaged in prescription drug management during visit (discussed any medication benefits, side effects, alternatives, etc.)  Discussion of management or test interpretation with external physician/other qualified healthcare professional/appropriate source - programmatic care multidisciplinary treatment team    45 min spent on the date of the encounter in chart review, patient visit, review of tests, documentation, care coordination, and/or discussion with other providers about the issues documented above.
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
## 2024-07-11 NOTE — GROUP NOTE
Process Group Note    PATIENT'S NAME: Basil Andrews  MRN:   [id number]  :   1990  [facility]. NUMBER: [id number]  DATE OF SERVICE: 24  START TIME:  9:00 AM  END TIME:  9:50 AM  FACILITATOR: Eron Gant LMFT  TOPIC:  Process Group    Diagnoses:  1.  Bipolar disorder, current episode manic severe with psychotic features (H)   F31.2    M [facility] Partial Hospitalization Program  TRACK: [facility]    NUMBER OF PARTICIPANTS: 4        Data:    Session content: At the start of this group, patients were invited to check in by identifying themselves, describing their current emotional status, and identifying issues to address in this group.   Area(s) of treatment focus addressed in this session included Symptom Management, Personal Safety, and Community Resources/Discharge Planning.  Patient reported feeling interested and optimistic.   Patient discussed working toward calling support people.  He is continuing his goal to talk about more surface level things rather than to talk about his mental illness.   For skills they will use to address their goal(s), patient identified using the [employer] and follow podcasts.  He listens to a podcast and reads the transcript at the whole time to make sure he stays focused.   A barrier to working toward their goal(s) and/or addressing mental health symptoms the patient identified was stress about finances.  Patient reported no safety concerns and/or self-injurious behaviors. Patient reported no substance use. Patient reported they are taking their medications as prescribed.   Patient reported feeling proud/grateful for doing the lawn and having a good conversation with his wife.    Therapeutic Interventions/Treatment Strategies:  Psychotherapist offered support, feedback and validation. Treatment modalities used include Cognitive Behavioral Therapy. Interventions include Coping Skills: Facilitated understanding of  what factors may contribute to symptom relapse and  skills plan to manage symptom relapse  and Symptoms Management: Promoted understanding of their diagnoses and how it impacts their functioning.    Assessment:    Patient response:   Patient responded to session by accepting feedback, giving feedback, listening, focusing on goals, being attentive and accepting support    Possible barriers to participation / learning include: Anxiety    Health Issues:   None reported       Substance Use Review:   Substance Use: No active concerns identified.    Mental Status/Behavioral Observations  Appearance:   Appropriate   Eye Contact:   Good   Psychomotor Behavior: Normal   Attitude:   Cooperative   Orientation:   All  Speech   Rate / Production: Normal    Volume:  Normal   Mood:    Anxious   Affect:    Worrisome  Thought Content:   Clear and Safety denies any current safety concerns including suicidal ideation, self-harm, and homicidal ideation  Thought Form:  Coherent  Logical     Insight:    Fair    Plan:   Safety Plan: No current safety concerns identified.  Recommended that patient call 911 or go to the local ED should there be a change in any of these risk factors.   Barriers to treatment: None identified  Patient Contracts (see media tab):  None  Substance Use: Provided encouragement towards sobriety   Continue or Discharge: Patient will continue in Adult Partial Hospitalization Program (PHP)  as planned. Patient is likely to benefit from learning and using skills as they work toward the goals identified in their treatment plan.      Eron Gant, SARAH  July 11, 2024

## 2024-07-11 NOTE — GROUP NOTE
"Process Group Note    PATIENT'S NAME: Basil Andrews  MRN:   [id number]  :   1990  ACCT. NUMBER: [id number]  DATE OF SERVICE: 24  START TIME:  9:00 AM  END TIME:  9:50 AM  FACILITATOR: Faiza Howell LPCC  TOPIC:  Process Group    Diagnoses:  ***    [facility] Adult Partial Hospitalization Program  TRACK: PHP2    NUMBER OF PARTICIPANTS: 6          Data:    Session content: At the start of this group, patients were invited to check in by identifying themselves, describing their current emotional status, and identifying issues to address in this group.   Area(s) of treatment focus addressed in this session included {OP BEH ADULT  AREA OF TREATMENT FOCUS:095982}.  ***    Therapeutic Interventions/Treatment Strategies:  {OP  THERAPEUTIC INTERVENTIONS/TREATMENT:510714}    Assessment:    Patient response:   Patient responded to session by {PATIENT RESPONSE:964082}    Possible barriers to participation / learning include: {POSSIBLE BARRIERS:633509}    Health Issues:   {YES / NO:046051}       Substance Use Review:   Substance Use: {YES / NO:358468}    Mental Status/Behavioral Observations  Appearance:   {Appearance:241745::\"Appropriate \"}  Eye Contact:   {Eye Contact:838480::\"Good \"}  Psychomotor Behavior: {Psychomotor Behavior:968582::\"Normal \"}  Attitude:   {Attitude:727347::\"Cooperative \"}  Orientation:   {Orientation:739191::\"All\"}  Speech   Rate / Production: {Speech Rate/Production:235961::\"Normal \"}   Volume:  {Speech Volume:037334::\"Normal \"}  Mood:    {Mood:665628::\"Normal\"}  Affect:    {Affect:901017::\"Appropriate \"}  Thought Content:   {Thought Content with Safety:347234}  Thought Form:  {Thought Form:373661::\"Coherent \",\"Logical \"}    Insight:    {Insight:425851::\"Good \"}    Plan:     Safety Plan: {SAFETY PLAN:962536}     Barriers to treatment: {Barriers to Treatment:830787}    Patient Contracts (see media tab):  {Patient Contracts:165847}    Substance Use: {SUBSTANCE USE " ASSESSMENT/INTERVENTION:448288}     Continue or Discharge: {Continue or Discharge:078827}      Faiza Howell, [facility]  July 11, 2024

## 2024-07-12 ENCOUNTER — HOSPITAL ENCOUNTER (OUTPATIENT)
Dept: BEHAVIORAL HEALTH | Facility: CLINIC | Age: 34
Discharge: HOME | End: 2024-07-12
Attending: PSYCHIATRY & NEUROLOGY
Payer: COMMERCIAL

## 2024-07-12 PROCEDURE — H0035 MH PARTIAL HOSP TX UNDER 24H: HCPCS | Performed by: COUNSELOR

## 2024-07-12 PROCEDURE — H0035 MH PARTIAL HOSP TX UNDER 24H: HCPCS

## 2024-07-12 PROCEDURE — H0035 MH PARTIAL HOSP TX UNDER 24H: HCPCS | Performed by: SOCIAL WORKER

## 2024-07-12 NOTE — GROUP NOTE
Psychotherapy Group Note    PATIENT'S NAME: Basil Andrews  MRN:   [id number]  :   1990  [id number]  DATE OF SERVICE: 24  START TIME: 10:00 AM  END TIME: 10:50 AM  FACILITATOR: Eron Gant LMFT; Liz Simmons RN  TOPIC: MH EBP Group: Specialty Awareness  [facility] Adult Partial Hospitalization Program  TRACK: PHP2    NUMBER OF PARTICIPANTS: 4    Summary of Group / Topics Discussed:  Specialty Topics: Goal Setting: Provided education and assessment on patient's group programming goals. Evaluated patient's assessment of their ability to participate in groups, share emotions with group members, and openness to the group format. Provided education regarding appropriate individual-based group therapy goals.     Patient Session Goals and Objectives:  -Participate in reflective assessment of group readiness.  -Understand appropriate topics and methods to discuss those topics in group programming.  -Identify and problem solve barriers to group participation.  -Identify strategies to actively cope while engaging in group programming.   -Reflected up individualized treatment plans  -Meet with members of the treatment team to assess goal progress       Patient Participation / Response:  Fully participated with the group by sharing personal reflections / insights and openly received / provided feedback with other participants.    Demonstrated understanding of topics discussed through group discussion and participation, Identified / Expressed readiness to act on skill suggestions discussed in topic, Verbalized understanding of ways to proactively manage illness, and Practiced skills in session    Treatment Plan:  Patient has a current master individualized treatment plan and today was our weekly review of the patient's progress.  See Epic treatment plan for progress / updates on goals and plan.    SARAH Liu

## 2024-07-12 NOTE — GROUP NOTE
Process Group Note    PATIENT'S NAME: Basil Andrews  MRN:   [id number]  [facility]   1990  [id number]  DATE OF SERVICE: 24  START TIME:  9:00 AM  END TIME:  9:50 AM  FACILITATOR: Faiza Howell LPCC  TOPIC:  Process Group    Diagnoses:  1.  Bipolar disorder, current episode manic severe with psychotic features (H)   F31.2       M [facility] Partial Hospitalization Program  TRACK: [facility]    NUMBER OF PARTICIPANTS: 5        Data:    Session content: At the start of this group, patients were invited to check in by identifying themselves, describing their current emotional status, and identifying issues to address in this group.   Area(s) of treatment focus addressed in this session included Symptom Management and Personal Safety.    Patient reported feeling 'good.'   Patient discussed working toward slowing down, listening.   Skills they plan to practice to address the goal include patience.   Patient reported no safety concerns or SIB.  Patient reported no substance use.  Patient reported taking medications as prescribed.  Patient reported feeling proud/grateful for having a peaceful night with his son.       Therapeutic Interventions/Treatment Strategies:  Psychotherapist offered support, feedback and validation, set limits, provided redirection, and reinforced use of skills. Treatment modalities used include Dialectical Behavioral Therapy. Interventions include Relationship Skills: Assisted patients in implementing more effective communication skills in their relationships and Discussed relationships and ways to reduce conflict .    Assessment:    Patient response:   Patient responded to session by accepting feedback, giving feedback, listening, focusing on goals, being attentive, accepting support, demonstrating behavior change, and verbalizing understanding    Possible barriers to participation / learning include: and no barriers identified    Health Issues:   None reported        Substance Use Review:   Substance Use: No active concerns identified.    Mental Status/Behavioral Observations  Appearance:   Appropriate   Eye Contact:   Good   Psychomotor Behavior: Restless   Attitude:   Cooperative   Orientation:   All  Speech   Rate / Production: Normal/ Responsive Talkative   Volume:  Normal   Mood:    Anxious   Affect:    Constricted   Thought Content:   Rumination  Thought Form:  Coherent  Tangential     Insight:    Good     Plan:   Safety Plan: No current safety concerns identified.  Recommended that patient call 911 or go to the local ED should there be a change in any of these risk factors.   Barriers to treatment: None identified  Patient Contracts (see media tab):  None  Substance Use: Not addressed in session   Continue or Discharge: Patient will continue in Adult Partial Hospitalization Program (PHP)  as planned. Patient is likely to benefit from learning and using skills as they work toward the goals identified in their treatment plan.      JOSUÉ Barillas  July 12, 2024

## 2024-07-12 NOTE — GROUP NOTE
Psychoeducation Group Note    PATIENT'S NAME: Basil Andrews  MRN:   [id number]  :   1990  ACCT. NUMBER: [id number]  DATE OF SERVICE: 24  START TIME: 11:00 AM  END TIME: 11:50 AM  FACILITATOR: America Perrin OTR  TOPIC: [facility] OT Group: Lifestyle Balance and Structure  [facility] Adult Partial Hospitalization Program  TRACK: PHP 2    NUMBER OF PARTICIPANTS: 4    Summary of Group / Topics Discussed:  Lifestyle Balance and Structure:  Weekend Planning: To support structure and routine during the weekend, the group members were led through a facilitated discussion on the importance of structure and routine for overall mental wellness and to promote leisure, productivity, and self care during unstructured time. Patients discussed the importance of life balance and identified at least 1 self care activity, leisure activity, and productivity activity that they want to do this weekend to improve structure/routine. Patients independently created a weekend plan and shared with the group their personal plans. Patients identified potential barriers to their plan and skills that will help overcome the barriers. Validation, support, and feedback was provided throughout group process.        Patient Session Goals / Objectives:      Reflected on and created a weekend plan      Discuss the importance of life balance, structure, and routine      Identified and planned 3 activities in the areas of leisure, productivity, and self care for the weekend.      Identified and problem solved barriers to achieving identified plans        Patient Participation / Response:  Fully participated with the group by sharing personal reflections / insights and openly received / provided feedback with other participants.  Patient independently identified a self care activity (shave/laundry), productive activity (yardwork/[employer]), and leisure activity ([employer] with wife) that they want to complete this weekend. Patient self  reflected on potential barriers to their weekend and problem solved skills they could use.  Patient's affect was appropriate to situation and mood congruent.     Treatment Plan:  Patient has a current master individualized treatment plan.  See Epic treatment plan for more information.    America Perrin, OTR

## 2024-07-12 NOTE — GROUP NOTE
Psychotherapy Group Note    PATIENT'S NAME: Basil Andrews  MRN:   [id number]  :   1990  [id number]  DATE OF SERVICE: 24  START TIME:  2:00 PM  END TIME:  2:50 PM  FACILITATOR: Patricia Frey LICSW  TOPIC: MH EBP Group: Enhanced Mindfulness  [facility] Adult Partial Hospitalization Program  TRACK: 2    NUMBER OF PARTICIPANTS: 5    Summary of Group / Topics Discussed:  Enhanced Mindfulness: Body and Mind Integration: Patients received an overview and education regarding the importance of including the body in the management of emotional health and self-care and as a direct route to mindfulness practice.  Patients discussed various ways in which the body can serve as an informant to their physical and emotional experiences/need. Patients discussed the body as a direct link to the present moment and to mindfulness practice.  Patients discussed current relationship with body, self-awareness, mindfulness practice and barriers to connection with body.  Patients were guided through breath work and movement to facilitate greater self-awareness, grounding, self-expression, and connection to other.  Patients discussed how the experiential could be applied to better manage mental health and develop phillips connection to self.    Patient Session Goals / Objectives:  Identify how movement awareness could be used for grounding, stress management, self-expression, connection to other and self-regulation  Improved awareness of breath and movement preferences  Identify how movement and the body is used in mindfulness practice  Reflect on use of these practices in everyday life.  Identify barriers to attending to body      Patient Participation / Response:  Fully participated with the group by sharing personal reflections / insights and openly received / provided feedback with other participants.    Demonstrated understanding of topics discussed through group discussion and participation and  Practiced skills in session    Treatment Plan:  Patient has a current master individualized treatment plan.  See Epic treatment plan for more information.    INDIGO Bruce

## 2024-07-12 NOTE — GROUP NOTE
Psychotherapy Group Note    PATIENT'S NAME: Basil Andrews  MRN:   [id number]  :   1990  [id number]  DATE OF SERVICE: 24  START TIME:  2:00 PM  END TIME:  2:50 PM  FACILITATOR: Eron Gant LMFT  TOPIC: MH EBP Group: Specialty [facility] Adult Partial Hospitalization Program  TRACK: PHP2    NUMBER OF PARTICIPANTS: 4    Summary of Group / Topics Discussed:  Specialty Topics: Hope: The topic of hope was presented in order to help patients better understand the symptoms of hopelessness and how to become more hopeful. Patients discussed their current awareness of the topic and relevance to their functioning. Individual experiences with symptoms and treatment options were also discussed. Patients explored options for ongoing/future treatment and symptom management.  Patients watched a video about the science of hope.    Patient Session Goals / Objectives:  Discussed definition of hopelessness  Discussed how hopelessness impacts functioning  Set a plan to utilize skills to reduce hopelessness      Patient Participation / Response:  Fully participated with the group by sharing personal reflections / insights and openly received / provided feedback with other participants.    Demonstrated understanding of topics discussed through group discussion and participation, Identified / Expressed readiness to act on skill suggestions discussed in topic, Verbalized understanding of ways to proactively manage illness, and Practiced skills in session    Treatment Plan:  Patient has a current master individualized treatment plan and today was our weekly review of the patient's progress.  See Epic treatment plan for progress / updates on goals and plan.    SARAH Liu

## 2024-07-12 NOTE — GROUP NOTE
Psychoeducation Group Note    PATIENT'S NAME: Basil Andrews  MRN:   [id number]  :   1990  [facility]. NUMBER: [id number]  DATE OF SERVICE: 24  START TIME:  1:00 PM  END TIME:  1:50 PM  FACILITATOR: Denisha Daniels RN  TOPIC:  Wellness Group: [facility] Adult Partial Hospitalization Program  TRACK: PHP2    NUMBER OF PARTICIPANTS: 5    Summary of Group / Topics Discussed:  [facility]: Relationship with diagnosis and Stigma: In this group, patients were given the space to explore the trajectory of their relationship with their diagnosis/es over time. The group discussed the way stigma impacts their own life, their access to treatment, and the relationship they have with their support network. The relationship between physical and mental health was also explored in the context of healthcare access, treatment, and support. Patients explored ways to combat stigma surrounding mental illness.      Patient Session Goals / Objectives:    Patients identified the importance healthy, safe expression of emotion    Patients discussed how receiving education on mental health diagnoses, symptoms, and treatments can help to keep personal stigma at bay and advocate for themselves            Patient Participation / Response:  Fully participated with the group by sharing personal reflections / insights and openly received / provided feedback with other participants.    Identified / Expressed personal readiness to practice skills    Treatment Plan:  Patient has a current master individualized treatment plan.  See Epic treatment plan for more information.    Denisha Daniels RN

## 2024-07-12 NOTE — GROUP NOTE
Psychotherapy Group Note    PATIENT'S NAME: Basil Andrews  MRN:   [id number]  :   1990  [id number]  DATE OF SERVICE: 24  START TIME: 10:00 AM  END TIME: 10:50 AM  FACILITATOR: Faiza Howell LPCC  TOPIC: MH EBP Group: Coping Skills  [facility] Adult Partial Hospitalization Program  TRACK: PHP2    NUMBER OF PARTICIPANTS: 5    Summary of Group / Topics Discussed:  Coping Skills: Grounding: Patients discussed and practiced strategies to increase attachment / presence to the current moment.  Patients identified situations in which using these strategies will help improve emotion regulation sense of calm in the body.  Reviewed the benefits of applying grounding strategies, as well as past / current practices of each member.  Patients identified situations in which using these strategies would reduce stress. They developed the ability to distinguish when these strategies can be useful in their lives for management and stress and psychological well-being.    Patient Session Goals / Objectives:  Understand the purpose of using grounding strategies to reduce stress.  Verbalize understanding of how and when to apply grounding strategies to reduce distress and increase presence in the moment.  Review patients current grounding practices and discuss a more formal way of practicing and accessing skills.  Practice using various calming strategies (e.g. 5-4-3-2-1; mental and body awareness).  Choose 1-2 grounding strategies to apply during times of distress.      Patient Participation / Response:  Fully participated with the group by sharing personal reflections / insights and openly received / provided feedback with other participants.    Demonstrated understanding of topics discussed through group discussion and participation, Expressed understanding of the relevance / importance of coping skills at distressing times in life, Demonstrated knowledge of when to consider using a variety of  coping skills in daily life, Identified 2-3 positive coping strategies that have helped maintain / improve symptoms in the past, and Identified / Expressed personal readiness to practice new coping skills    Treatment Plan:  Patient has a current master individualized treatment plan.  See Epic treatment plan for more information.    Faiza Howell, [facility]

## 2024-07-15 ENCOUNTER — HOSPITAL ENCOUNTER (OUTPATIENT)
Dept: BEHAVIORAL HEALTH | Facility: CLINIC | Age: 34
Discharge: HOME | End: 2024-07-15
Attending: PSYCHIATRY & NEUROLOGY
Payer: COMMERCIAL

## 2024-07-15 PROCEDURE — H0035 MH PARTIAL HOSP TX UNDER 24H: HCPCS

## 2024-07-15 PROCEDURE — 90853 GROUP PSYCHOTHERAPY: CPT

## 2024-07-15 PROCEDURE — H0035 MH PARTIAL HOSP TX UNDER 24H: HCPCS | Performed by: COUNSELOR

## 2024-07-15 NOTE — GROUP NOTE
Psychoeducation Group Note    PATIENT'S NAME: Basil Andrews  MRN:   [id number]  :   1990  [facility]. NUMBER: [id number]  DATE OF SERVICE: 7/15/24  START TIME: 11:00 AM  END TIME: 11:50 AM  FACILITATOR: America Perrin OTR  TOPIC: MH PHP OT Group: Self- Regulation Skills  [facility] Adult Partial Hospitalization Program  TRACK: PHP 1 & 2 (merged)    NUMBER OF PARTICIPANTS: 6    Summary of Group / Topics Discussed:  Self-Regulation Skills: Relaxation: Patients were provided with verbal and experiential education to identify physical and sensorimotor based activities that can be utilized for relaxation, stress management, self-care, health maintenance, and self-regulation.  Patients went through an experiential practice of different relaxation skills and identified skills they can use in their daily life to decrease anxiety and promote relaxation. Patients increased knowledge and awareness of activities that support relaxation, build resiliency, and prevent relapse through healthy engagement in mindful focused activities.     Patient Session Goals / Objectives:   Identified specific physical and sensorimotor based activities for relaxation.   Improved awareness of activities that are meaningful focused activities that support relaxation.   Established a plan for practice of these skills in their own environments.   Practiced and reflected on how to generalize taught skills to their everyday life.        Patient Participation / Response:  Fully participated with the group by sharing personal reflections / insights and openly received / provided feedback with other participants.  Demonstrated understanding of content through engaging in all relaxation strategies and self reflecting on a strategy he can use in his everyday life for relaxation (belly breathing).  Patient's affect was appropriate to situation and mood congruent.     Treatment Plan:  Patient has a current master individualized treatment plan.  See  Epic treatment plan for more information.    America Perrin, OTR

## 2024-07-15 NOTE — GROUP NOTE
Psychotherapy Group Note    PATIENT'S NAME: Basil Andrews  MRN:   [id number]  :   1990  [id number]  DATE OF SERVICE: 7/15/24  START TIME:  2:00 PM  END TIME:  2:50 PM  FACILITATOR: Haylee Mosqueda LICSW  TOPIC: MH EBP Group: Self-Awareness  [facility] Mental [facility] Outpatient Programs  TRACK: PHP 1 and PHP 2 merge     NUMBER OF PARTICIPANTS: 8    Summary of Group / Topics Discussed:  Self-Awareness: Gratitude: Topic focused on assisting patients in identifying key concepts in gratitude. Patients discussed the benefits of practicing gratitude and its impact on mood improvement, mindfulness, and perspective. Patients worked to increase time spent on recognition and appreciation of what is positive and working in their lives. Patients discussed the concepts and benefits of feeling grateful. The goal is to reduce rumination and negative thinking resulting in increased mindfulness and resilience. Patients specifically discussed how they can practice and problem solve barriers to daily gratitude practice.     Patient Session Goals / Objectives:  [place] the concept and benefits of gratitude  Identified ways to practice gratitude in daily life  Problem solved barriers to practicing gratitude      Patient Participation / Response:  Fully participated with the group by sharing personal reflections / insights and openly received / provided feedback with other participants.    Demonstrated understanding of topics discussed through group discussion and participation, Demonstrated understanding of values, strengths, and challenges to learn about themselves, and Practiced skills in session    Treatment Plan:  Patient has a current master individualized treatment plan.  See Epic treatment plan for more information.    INDIGO Alfredo

## 2024-07-15 NOTE — GROUP NOTE
Psychotherapy Group Note    PATIENT'S NAME: Basil Andrews  MRN:   [id number]  :   1990  [id number]  DATE OF SERVICE: 7/15/24  START TIME: 10:00 AM  END TIME: 10:50 AM  FACILITATOR: Eron Gant LMFT  TOPIC: MH EBP Group: Cognitive Restructuring  [facility] Adult Partial Hospitalization Program  TRACK: PHP1 and PHP2 combined    NUMBER OF PARTICIPANTS: 8    Summary of Group / Topics Discussed:  Cognitive Restructuring: Core Beliefs: Patients received an overview of what a core belief is, and how they develop. Patients then began to identify their negative core beliefs. Patients worked to modify core beliefs with the goal of improved self-image and functioning.     Patient Session Goals / Objectives:  Familiarize self with the concept of core beliefs and how they develop.    Explore personal core beliefs (positive and negative)  Develop / advance recognition of the connection between negative thoughts and negative core beliefs.  Formulate new neutral/positive core beliefs             Patient Participation / Response:  Fully participated with the group by sharing personal reflections / insights and openly received / provided feedback with other participants.    Demonstrated understanding of topics discussed through group discussion and participation, Expressed understanding of the relationship between behaviors, thoughts, and feelings, Demonstrated knowledge of personal thought patterns and how they impact their mood and behavior., Identified / Expressed personal readiness to practice CBT, and Practiced skills in session    Treatment Plan:  Patient has a current master individualized treatment plan.  See Epic treatment plan for more information.    SARAH Liu

## 2024-07-15 NOTE — GROUP NOTE
Psychotherapy Group Note    PATIENT'S NAME: Basil Andrews  MRN:   [id number]  :   1990  [id number]  DATE OF SERVICE: 7/15/24  START TIME:  1:00 PM  END TIME:  1:50 PM  FACILITATOR: Eron Gant LMFT  TOPIC:  EBP Group: Specialty [facility] Adult Partial Hospitalization Program  TRACK: PHP1 and PHP2 combined    NUMBER OF PARTICIPANTS: 7    Summary of Group / Topics Discussed:  Specialty Topics: Education Support Network: Patients reviewed handouts on identifying the mild, moderate, and severe symptoms of their disorder.  Patients identified helpful supportive statements and actions they would appreciate from caregivers.  The goal is to gather information in preparation for the education support network for problem solving and planning for support with caregivers.     Patient Session Goals / Objectives:  Understanding diagnoses and accompanying physical reactions, thoughts, and behaviors.    Explored options to support patients in their recovery   Formulated a plan with patients to communicate with caregivers for assistance and support to aid in recovery   Problem solved barriers to follow through on plan      Patient Participation / Response:  Fully participated with the group by sharing personal reflections / insights and openly received / provided feedback with other participants.    Demonstrated understanding of topics discussed through group discussion and participation, Verbalized understanding of ways to proactively manage illness, and Practiced skills in session    Treatment Plan:  Patient has a current master individualized treatment plan.  See Epic treatment plan for more information.    SARAH Liu

## 2024-07-15 NOTE — GROUP NOTE
Process Group Note    PATIENT'S NAME: Basil Andrews  MRN:   [id number]  :   1990  [id number]  DATE OF SERVICE: 7/15/24  START TIME:  9:00 AM  END TIME:  9:50 AM  FACILITATOR: Haylee Mosqueda LICSW  TOPIC:  Process Group    Diagnoses:    Bipolar disorder, current episode manic severe with psychotic features (H)   F31.2    [facility] Partial Hospitalization Program  TRACK: [facility] 2     NUMBER OF PARTICIPANTS: 3        Data:    Session content: At the start of this group, patients were invited to check in by identifying themselves, describing their current emotional status, and identifying issues to address in this group.   Area(s) of treatment focus addressed in this session included Symptom Management and Personal Safety.    Patient reported feeling stressed. Patient discussed working toward routine. Patient identified stages of change as skills they will use to address their goal(s). Patient reported motivation may be a barrier to working toward their goal(s) and/or addressing mental health symptoms. Patient reported no safety concerns and/or self-injurious behaviors. Patient reported no substance use. Patient reported they are taking their medications as prescribed. Patient reported feeling proud that they part of group. Patient discussed small changes made with the treatment group.              Therapeutic Interventions/Treatment Strategies:  Treatment modalities used include Cognitive Behavioral Therapy and Dialectical Behavioral Therapy.    Assessment:    Patient response:   Patient responded to session by accepting feedback, giving feedback, listening, and being attentive    Possible barriers to participation / learning include: and no barriers identified    Health Issues:   None reported       Substance Use Review:   Substance Use: No active concerns identified.    Mental Status/Behavioral Observations  Appearance:   Appropriate   Eye Contact:   Good   Psychomotor  Behavior: Normal   Attitude:   Cooperative   Orientation:   All  Speech   Rate / Production: Normal    Volume:  Normal   Mood:    Normal  Affect:    Appropriate   Thought Content:   Clear  Thought Form:  Coherent  Logical     Insight:    Good     Plan:   Safety Plan: No current safety concerns identified.  Recommended that patient call 911 or go to the local ED should there be a change in any of these risk factors.   Barriers to treatment: None identified  Patient Contracts (see media tab):  None  Substance Use: Not addressed in session   Continue or Discharge: Patient will continue in Adult Partial Hospitalization Program (PHP)  as planned. Patient is likely to benefit from learning and using skills as they work toward the goals identified in their treatment plan.      Haylee Mosqueda, [facility]  July 15, 2024

## 2024-07-16 ENCOUNTER — HOSPITAL ENCOUNTER (OUTPATIENT)
Dept: BEHAVIORAL HEALTH | Facility: CLINIC | Age: 34
Discharge: HOME | End: 2024-07-16
Attending: PSYCHIATRY & NEUROLOGY
Payer: COMMERCIAL

## 2024-07-16 DIAGNOSIS — F31.2 BIPOLAR DISORDER, CURRENT EPISODE MANIC SEVERE WITH PSYCHOTIC FEATURES (H): Primary | ICD-10-CM

## 2024-07-16 PROCEDURE — H0035 MH PARTIAL HOSP TX UNDER 24H: HCPCS | Performed by: COUNSELOR

## 2024-07-16 PROCEDURE — H0035 MH PARTIAL HOSP TX UNDER 24H: HCPCS

## 2024-07-16 PROCEDURE — 99214 OFFICE O/P EST MOD 30 MIN: CPT | Performed by: PSYCHIATRY & NEUROLOGY

## 2024-07-16 NOTE — GROUP NOTE
Psychotherapy Group Note    PATIENT'S NAME: Basil Andrews  MRN:   [id number]  :   1990  [id number]  DATE OF SERVICE: 24  START TIME: 10:00 AM  END TIME: 10:50 AM  FACILITATOR: Eron Gant LMFT  TOPIC: MH EBP Group: Specialty [facility] Adult Partial Hospitalization Program  TRACK: PHP2    NUMBER OF PARTICIPANTS: 4    Summary of Group / Topics Discussed:  Specialty Topics: Goal Setting: Provided education and assessment on patient's group programming goals. Evaluated patient's assessment of their ability to participate in groups, share emotions with group members, and openness to the group format. Provided education regarding appropriate individual-based group therapy goals.     Patient Session Goals and Objectives:  -Participate in reflective assessment of group readiness.  -Understand appropriate topics and methods to discuss those topics in group programming.  -Identify and problem solve barriers to group participation.  -Identify strategies to actively cope while engaging in group programming.   -Reflected up individualized treatment plans  -Meet with members of the treatment team to assess goal progress       Patient Participation / Response:  Fully participated with the group by sharing personal reflections / insights and openly received / provided feedback with other participants.    Demonstrated understanding of topics discussed through group discussion and participation, Identified / Expressed readiness to act on skill suggestions discussed in topic, Verbalized understanding of ways to proactively manage illness, and Practiced skills in session    Treatment Plan:  Patient has a current master individualized treatment plan and today was our weekly review of the patient's progress.  See Epic treatment plan for progress / updates on goals and plan.    SARAH Liu

## 2024-07-16 NOTE — GROUP NOTE
Psychoeducation Group Note    PATIENT'S NAME: Basil Andrews  MRN:   [id number]  :   1990  [facility]. NUMBER: [id number]  DATE OF SERVICE: 24  START TIME: 11:00 AM  END TIME: 11:50 AM  FACILITATOR: Denisha Daniels RN  TOPIC:  Wellness Group: [facility] Adult Partial Hospitalization Program  TRACK: PHP 1&2 (merged)    NUMBER OF PARTICIPANTS: 9    Summary of Group / Topics Discussed:  [facility]:Relationship with diagnosis and Stigma: In this group, patients were given the space to explore the trajectory of their relationship with their diagnosis/es over time. The group discussed the way stigma impacts their own life, their access to treatment, and the relationship they have with their support network. The relationship between physical and mental health was also explored in the context of healthcare access, treatment, and support. Patients explored ways to combat stigma surrounding mental illness.      Patient Session Goals / Objectives:    Patients identified the importance healthy, safe expression of emotion    Patients discussed how receiving education on mental health diagnoses, symptoms, and treatments can help to keep personal stigma at bay and advocate for themselves           Patient Participation / Response:  Fully participated with the group by sharing personal reflections / insights and openly received / provided feedback with other participants.    Identified / Expressed personal readiness to practice skills    Treatment Plan:  Patient has a current master individualized treatment plan.  See Epic treatment plan for more information.    Denisha Daniels RN

## 2024-07-16 NOTE — GROUP NOTE
"OT Clinic Group Note    PATIENT'S NAME: Basil Andrews  MRN:   [id number]  :   1990  [facility]. NUMBER: [id number]  DATE OF SERVICE: 24  START TIME:  1:00 PM  END TIME:  1:50 PM  FACILITATOR: America Perrin OTR  TOPIC: [facility] OT Group: Occupational Therapy Clinic  [facility] Partial Hospitalization Program  TRACK: PHP 1 & 2 (merged)    NUMBER OF PARTICIPANTS: 8    Summary of Group / Topics Discussed:  Occupational Therapy Clinic: Provided opportunity for patients to independently choose and engage in a therapeutic activity that supports progress towards their goals and psychiatric recovery. The Occupational Therapy Clinic provides a context for patients to monitor their symptoms, gain self-awareness, practice skills (self-regulation, mindfulness, self-talk, focus/concentration, social, productivity), build a sense of self efficacy and mastery, as well as receive validation, support, and resources. OT checks in, observes, assesses, and monitors performance skills and patterns in context with each group member.  Patient was provided orientation to the OT clinic and overview of purpose of the OT clinic in support of meeting their goals.     Patient Session Goals / Objectives:  Independently identify a purposeful and meaningful therapeutic activity.  Identified which goal(s) they are intentionally working on during session.   Reflected on their performance and share insight about their progress.  Practiced and identified a way to generalize a skill to their everyday life      Patient Participation / Response:  Fully participated with the group by sharing personal reflections / insights and openly received / provided feedback with other participants.  Demonstrated understanding of content through engaging in a task of choice (art). Patient shared a self reflection that he feels like \"things are more clear\".   Patient's affect was appropriate to situation and mood congruent.     Treatment " Plan:  Patient has a current master individualized treatment plan and today was our weekly review of the patient's progress.  See Epic treatment plan for progress / updates on goals and plan.    America Perrin, OTR

## 2024-07-16 NOTE — PROGRESS NOTES
"[facility]   Partial Hospitalization Program  Interval/Follow-up Psychiatric Evaluation    Patient: Basil Andrews  Preferred Name: Basil  MRN: [id number]  : 1990  Acct. No.: [id number]  Date of Service: 24  PHP Track:  2            Interval History:  \"I need some other drug to help me with restlessness.\" Basil presents today for follow-up and ongoing program supervision. Discussed today:  Still difficulty sitting still  Additional dose (20 mg total) of propranolol was not tolerable  \"I felt like I couldn't breathe\"  Has gone back to 10 mg nightly and feels that is more tolerable  Reviewed medication again  Has been taking 10 mg of haloperidol in the morning, 12.5 in the evening  Continues to feel improvement in symptoms every time he wakes up (from overnight sleep or a nap)  Plan as of discharge from inpatient had been to continue taper of risperidone   Patient still taking 3 mg at bedtime  \"I think I need something for ADHD too\"  Does not want to take a stimulant  Struggling to help out with tasks around the home, which is both frustrating for him and causing marital discord  \"I feel like I want to cry all the time, like even when we're just watching a Alireza movie\"    Review of Symptoms  Sleep: see above  Appetite: stable  Suicidal ideation: denies current or recent suicidal ideation or behavior  Thoughts of non-suicidal self-injury: denied  Recent self-injurious behavior: denied  Homicidal ideation: denied  Other safety concerns: denied    Activities of Daily Living and Related Systems Impacted by Illness:  Hygiene: no concerns raised today  Socialization: still feeling alienated, embarrassed about behavior when he was manic  Activities of Daily Living: (cleaning, shopping, bills, etc.): struggling; see above  Concerns related to work: not currently working    Substance use:  denies    Response to Program Interventions:  Feels supported, learning skills    Current Medications:  Current " Outpatient Medications   Medication Sig Dispense Refill    benztropine (COGENTIN) 1 MG tablet Take 1 tablet (1 mg) by mouth 2 times daily 60 tablet 1    divalproex sodium extended-release (DEPAKOTE ER) 500 MG 24 hr tablet Take 3 tablets (1,500 mg) by mouth at bedtime 90 tablet 1    divalproex sodium extended-release (DEPAKOTE ER) 500 MG 24 hr tablet Take 1 tablet (500 mg) by mouth daily 30 tablet 1    haloperidol (HALDOL) 10 MG tablet Take 1 tablet (10 mg) by mouth 2 times daily 60 tablet 1    haloperidol (HALDOL) 5 MG tablet Take 1 tablet (5 mg) by mouth every 8 hours as needed for agitation 60 tablet 1    propranolol (INDERAL) 10 MG tablet Take 1 tablet (10 mg) by mouth 3 times daily as needed (restlessness) 60 tablet 1    fluticasone (FLONASE) 50 MCG/ACT nasal spray Spray 2 sprays into both nostrils daily      ipratropium (ATROVENT) 0.06 % nasal spray Spray 2 sprays into both nostrils 4 times daily      multivitamin, therapeutic with minerals (MULTI-VITAMIN) TABS Take 1 tablet by mouth daily      risperiDONE (RISPERDAL) 3 MG tablet Take 1 tablet (3 mg) by mouth at bedtime 30 tablet 1       The above list was reviewed and updated in EPIC with patient today.     Patient is taking medications as prescribed and reports adverse effects as discussed above and in previous notes    Laboratory Results:  Most recent labs reviewed. Pertinent updates/findings: None.       Mental Status Examination:  Vital Signs: There were no vitals taken for this visit.   Appearance: adequately groomed, appears stated age, and in no apparent distress.  Attitude: cooperative   Eye Contact: good   Muscle Strength and Tone: normal  Psychomotor Behavior: restless, fidgety, and tremor   Gait and Station: deferred  Speech: clear, coherent, decreased prosody, regular rate, regular rhythm, and fluent  Associations: No loosening of associations and Perseverative  Thought Process: coherent and perseverating  Thought Content: no evidence of suicidal  "ideation or homicidal ideation, no evidence of psychotic thought, no auditory hallucinations present, and no visual hallucinations present  Mood: \"sad \"  Affect: mood congruent, intensity is normal, constricted mobility, restricted range, and reactive. Less blunted today.  Insight: good  Judgment: intact, adequate for safety  Impulse Control: intact  Oriented to: time, place, person, and situation  Attention Span and Concentration: Normal  Language: Intact  Recent and Remote Memory: Delayed & immediate recall intact  Fund of Knowledge/Assessment of Intelligence: Average  Capacity of Activities of Daily Living: Independent, able to participate in programmatic care services.    Diagnosis/es:    ICD-10-CM    1. Bipolar disorder, current episode manic severe with psychotic features (H)  F31.2           Assessment/Plan:  Basil presents today for follow-up psychiatric evaluation and assessment of progress. Endorses continued improvement in symptoms with no mention today of intrusive thoughts or ideas of reference. Greater range of affect and he is starting to be able to contend with grief for his recent actions and the damage they caused to his relationships. Discussed that misha was not his fault and that he still will bear responsibility for the necessary repair. Also discussed that grief is inevitable, and that tears are not inappropriate at this time and might be healing.    We will continue taper of risperidone and can titrate haloperidol if needed. No change to the latter at this time. For the former we will decrease to 2 mg PO at bedtime starting tonight and continuing for 3 nights, then to 1 mg at bedtime for 3 nights, then stop. I will see him early next week to reassess progress as we make this change.    While there is scant, if any, evidence to show increased risk of misha when using stimulants as prescribed for ADHD. I will still defer starting it pending further improvement in misha and while we focus on " eliminating the therapeutic duplication of his antipsychotics. I will explore starting atomoxetine at our next session.    Greatly improved and function remains limited. Again, severity of recent presentation and length of inpatient treatment underscore the need for vigilant and conservative treatment. Continue PHP for close observation and management.      Safety Assessment:  Basil reports suicidal ideation and/or non-suicidal self-injury or thoughts thereof as noted above  Basil is future-oriented and is engaged in treatment planning   I do not feel that Basil meets criteria for a 72-hour involuntary hold and remains appropriate for an outpatient level of care    Visit Details:  Type of service: In-person  Location (patient and provider): [facility] Adult Mental Health Outpatient Programmatic Care Offices    Level of Medical Decision Making:   - At least 1 chronic problem that is not stable  - Engaged in prescription drug management during visit (discussed any medication benefits, side effects, alternatives, etc.)  Discussion of management or test interpretation with external physician/other qualified healthcare professional/appropriate source - programmatic care multidisciplinary treatment team

## 2024-07-16 NOTE — GROUP NOTE
Process Group Note    PATIENT'S NAME: Basil Andrews  MRN:   [id number]  :   1990  [id number]  DATE OF SERVICE: 24  START TIME:  9:00 AM  END TIME:  9:50 AM  FACILITATOR: Eron Gant LMFT  TOPIC:  Process Group    Diagnoses:  1.  Bipolar disorder, current episode manic severe with psychotic features (H)   F31.2          [facility] Partial Hospitalization Program  TRACK: PHP2    This group was observed by a nursing student named  Cy     NUMBER OF PARTICIPANTS: 5        Data:    Session content: At the start of this group, patients were invited to check in by identifying themselves, describing their current emotional status, and identifying issues to address in this group.   Area(s) of treatment focus addressed in this session included Symptom Management, Personal Safety, and Community Resources/Discharge Planning.  Patient reported feeling frustrated in having a mental illness and his wife is getting stressed taking care of their 3 year old son and Basil isn't able to do everything he wants to do right away.   Patient discussed working toward working on his daily routine and start doing care activities with his son again, such as bath time.   For skills they will use to address their goal(s), patient identified reminding himself of the impact on the relationship.  Stay open on communication with his wife.   A barrier to working toward their goal(s) and/or addressing mental health symptoms the patient identified was low motivation.  Patient reported no safety concerns and/or self-injurious behaviors. Patient reported no substance use. Patient reported they are taking their medications as prescribed.   Patient reported feeling proud/grateful for getting better.  Patient discussed with the treatment group that he wants to talk to the doctor about medication because he gets restless.    Therapeutic Interventions/Treatment Strategies:  Psychotherapist offered  support, feedback and validation. Treatment modalities used include Cognitive Behavioral Therapy. Interventions include Coping Skills: Facilitated understanding of  what factors may contribute to symptom relapse and skills plan to manage symptom relapse  and Symptoms Management: Promoted understanding of their diagnoses and how it impacts their functioning.    Assessment:    Patient response:   Patient responded to session by accepting feedback, giving feedback, listening, focusing on goals, being attentive and accepting support    Possible barriers to participation / learning include: Severity of symptoms    Health Issues:   None reported       Substance Use Review:   Substance Use: No active concerns identified.    Mental Status/Behavioral Observations  Appearance:   Appropriate   Eye Contact:   Good   Psychomotor Behavior: Normal   Attitude:   Cooperative   Orientation:   All  Speech   Rate / Production: Normal    Volume:  Normal   Mood:    Anxious Depressed  Affect:    Blunted Worrisome  Thought Content:   Clear and Safety denies any current safety concerns including suicidal ideation, self-harm, and homicidal ideation  Thought Form:  Coherent  Logical     Insight:    Fair    Plan:   Safety Plan: No current safety concerns identified.    Barriers to treatment: None identified  Patient Contracts (see media tab):  None  Substance Use: Provided encouragement towards sobriety   Continue or Discharge: Patient will continue in Adult Partial Hospitalization Program (PHP)  as planned. Patient is likely to benefit from learning and using skills as they work toward the goals identified in their treatment plan.      Eron Gant, SARAH  July 16, 2024

## 2024-07-16 NOTE — GROUP NOTE
Psychotherapy Group Note    PATIENT'S NAME: Basil Andrews  MRN:   [id number]  :   1990  [id number]  DATE OF SERVICE: 24  START TIME:  2:00 PM  END TIME:  2:50 PM  FACILITATOR: Haylee Mosqueda LICSW; Eron Gant LMFT  TOPIC: MH EBP Group: Specialty Awareness  [facility] Adult Partial Hospitalization Program  TRACK: PHP 2 and PHP 1     NUMBER OF PARTICIPANTS: 8    Summary of Group / Topics Discussed:  Specialty Topics: Education Support Network: Patients worked on identifying the mild, moderate, and severe symptoms of their disorder.  Patients identified helpful supportive statements and actions they would appreciate from caregivers.  The goal is to gather information in preparation for the education support network for problem solving and planning for support with caregivers.    Education Support Network:  Patients and caregivers received an overview of diagnoses including physical, intellectual, and behavioral indicators of illness. Patients presented information created in the support network development group to engage caregivers in planning for help and support to manage mental health symptoms.  The goal is to help patients and caregivers create a plan for support and assistance after discharge.      Patient Session Goals / Objectives:  Understanding diagnoses and accompanying physical reactions, thoughts, and behaviors.    Explored options to support patients in their recovery   Formulated a plan with caregivers for assistance and support to aid in recovery   Problem solved barriers to follow through on plan      Patient Participation / Response:  Fully participated with the group by sharing personal reflections / insights and openly received / provided feedback with other participants.    Demonstrated understanding of topics discussed through group discussion and participation and Identified / Expressed readiness to act on skill suggestions discussed in  topic    Treatment Plan:  Patient has a current master individualized treatment plan.  See Epic treatment plan for more information.    ANSELMO AlfredoSW

## 2024-07-17 ENCOUNTER — HOSPITAL ENCOUNTER (OUTPATIENT)
Dept: BEHAVIORAL HEALTH | Facility: CLINIC | Age: 34
Discharge: HOME | End: 2024-07-17
Attending: PSYCHIATRY & NEUROLOGY
Payer: COMMERCIAL

## 2024-07-17 PROCEDURE — H0035 MH PARTIAL HOSP TX UNDER 24H: HCPCS

## 2024-07-17 NOTE — PROGRESS NOTES
Writer met with Basil to review current financial stressors while on leave from work. Reviewed current financial standing and community options. Basil reports the following as current concerns: debt management, mortgage payments, and affording childcare. Basil reported at this point financial and debt management resources would be most beneficial. Romer provided [facility] [employer] counseling and debt management as a starting point. Writer reviewed process for setting up a virtual or in-person meeting to get started.     Abbey Diaz on 7/17/2024 at 12:15 PM

## 2024-07-17 NOTE — GROUP NOTE
Psychoeducation Group Note    PATIENT'S NAME: Basil Andrews  MRN:   [id number]  :   1990  ACCT. NUMBER: [id number]  DATE OF SERVICE: 24  START TIME:  2:00 PM  END TIME:  2:50 PM  FACILITATOR: Denisha Daniels RN  TOPIC:  Wellness Group: [facility] Adult Partial Hospitalization Program  TRACK: PHP 2    NUMBER OF PARTICIPANTS: 5    Summary of Group / Topics Discussed:  Foundations of Health: Sleep Hygiene. The importance of sleep hygiene--bedtime routine, morning routine--was reinforced through an interactive, multiple-choice game. Patients were educated on ways to improve falling asleep, staying asleep, and their quality of sleep. Patients were provided with an in-depth look at what to avoid around bedtime--stimulant and depressive chemicals, blue light, sensory stimulation, etc. Patients discussed various sleep disorders and the impact they may have on mental health. Patients were encouraged to analyze their current sleep practices and ways they could improve their sleep. Various forms of treatment for sleep disorders were reviewed.      Patient Session Goals / Objectives:    Described the effect and purpose of sleep on the brain and identify the amount of sleep needed    Described the connection between sleep disturbances and mental illness    Reviewed sleep hygiene recommendations and ways each patient can improve their sleep hygiene       Patient Participation / Response:  Fully participated with the group by sharing personal reflections / insights and openly received / provided feedback with other participants.    Identified / Expressed personal readiness to practice skills    Treatment Plan:  Patient has a current master individualized treatment plan.  See Epic treatment plan for more information.    Denisha Daniels RN

## 2024-07-17 NOTE — GROUP NOTE
Psychoeducation Group Note    PATIENT'S NAME: Basil Andrews  MRN:   [id number]  :   1990  ACCT. NUMBER: [id number]  DATE OF SERVICE: 24  START TIME: 11:00 AM  END TIME: 11:50 AM  FACILITATOR: America Perrin OTR; Deven Barnes MD  TOPIC: [facility] OT Group: Lifestyle Balance and Structure  [facility] Adult Partial Hospitalization Program  TRACK: PHP 2    NUMBER OF PARTICIPANTS: 6    Summary of Group / Topics Discussed:  Lifestyle Balance and Structure:  Leisure Experiential: Provided skilled facilitation and clinical observation of patients in context during a leisure experiential designed to provide patients the opportunity to have a hands on social experience as an opportunity to gain self-awareness, build milieu cohesion and social skills, self-monitor personal performance skills, and identify the benefits of activity on their nervous system. Facilitated reflection and group discussion to deepen the meaning of the experience for participants.      Patient Session Goals / Objectives:  Learn through an experiential intervention activity the benefits of leisure  Identify and reflect on the process and share insight around their engagement in the group process.       Patient Participation / Response:  Fully participated with the group by sharing personal reflections / insights and openly received / provided feedback with other participants.  Demonstrated understanding of content through engaging fully in leisure activity and engaging appropriately with peers.  Patient's affect was appropriate to situation and mood congruent.     Treatment Plan:  Patient has a current master individualized treatment plan.  See Epic treatment plan for more information.    EMA Agustin

## 2024-07-17 NOTE — GROUP NOTE
Psychotherapy Group Note    PATIENT'S NAME: Basil Andrews  MRN:   [id number]  :   1990  [id number]  DATE OF SERVICE: 24  START TIME: 10:00 AM  END TIME: 10:50 AM  FACILITATOR: Haylee Mosqueda LICSW  TOPIC: MH EBP Group: Cognitive Restructuring  [facility] Adult Partial Hospitalization Program  TRACK: PHP 2     NUMBER OF PARTICIPANTS: 6    Summary of Group / Topics Discussed:  Cognitive Restructuring: Distortions: Patients received an overview of how to identify common cognitive distortions. Patients will explore alternatives to cognitive distortions and practice challenging their negative thought patterns. The goal is to help patients target modify ineffective thought patterns.     Patient Session Goals / Objectives:  Familiarized self with ineffective / unhealthy thoughts and how they develop.    Explored impact of ineffective thoughts / distortions on mood and activity  Formulated new neutral/positive alternatives to challenge less helpful / ineffective thoughts.  Practiced and plan to apply in daily life             Patient Participation / Response:  Fully participated with the group by sharing personal reflections / insights and openly received / provided feedback with other participants.    Demonstrated understanding of topics discussed through group discussion and participation, Expressed understanding of the relationship between behaviors, thoughts, and feelings, Demonstrated knowledge of personal thought patterns and how they impact their mood and behavior., and Identified barriers to changing thought patterns    Treatment Plan:  Patient has a current master individualized treatment plan.  See Epic treatment plan for more information.    INDIGO Alfredo

## 2024-07-18 ENCOUNTER — HOSPITAL ENCOUNTER (OUTPATIENT)
Dept: BEHAVIORAL HEALTH | Facility: CLINIC | Age: 34
Discharge: HOME | End: 2024-07-18
Attending: PSYCHIATRY & NEUROLOGY
Payer: COMMERCIAL

## 2024-07-18 PROCEDURE — H0035 MH PARTIAL HOSP TX UNDER 24H: HCPCS | Performed by: COUNSELOR

## 2024-07-18 PROCEDURE — H0035 MH PARTIAL HOSP TX UNDER 24H: HCPCS

## 2024-07-18 NOTE — GROUP NOTE
OT Clinic Group Note    PATIENT'S NAME: Basil Andrews  MRN:   [id number]  :   1990  ACCT. NUMBER: [id number]  DATE OF SERVICE: 24  START TIME:  1:00 PM  END TIME:  1:50 PM  FACILITATOR: America Perrin OTR  TOPIC: [facility] OT Group: Occupational Therapy Clinic  [facility] Adult Partial Hospitalization Program  TRACK: PHP 1 & 2 (merged)    NUMBER OF PARTICIPANTS: 9    Summary of Group / Topics Discussed:  Occupational Therapy Clinic: Provided opportunity for patients to independently choose and engage in a therapeutic activity that supports progress towards their goals and psychiatric recovery. The Occupational Therapy Clinic provides a context for patients to monitor their symptoms, gain self-awareness, practice skills (self-regulation, mindfulness, self-talk, focus/concentration, social, productivity), build a sense of self efficacy and mastery, as well as receive validation, support, and resources. OT checks in, observes, assesses, and monitors performance skills and patterns in context with each group member.  Patient was provided orientation to the OT clinic and overview of purpose of the OT clinic in support of meeting their goals.     Patient Session Goals / Objectives:  Independently identify a purposeful and meaningful therapeutic activity.  Identified which goal(s) they are intentionally working on during session.   Reflected on their performance and share insight about their progress.  Practiced and identified a way to generalize a skill to their everyday life      Patient Participation / Response:  Fully participated with the group by sharing personal reflections / insights and openly received / provided feedback with other participants.  Demonstrated understanding of content through engaging in a task of choice (scratch art). Patient shared he was thinking about some stressors in his life and used skills to stay grounded in group.     Treatment Plan:  Patient has a current master  individualized treatment plan and today was our weekly review of the patient's progress.  See Epic treatment plan for progress / updates on goals and plan.    America Perrin, OTR

## 2024-07-18 NOTE — GROUP NOTE
"Psychoeducation Group Note    PATIENT'S NAME: Basil Andrews  MRN:   [id number]  :   1990  ACCT. NUMBER: [id number]  DATE OF SERVICE: 24  START TIME: 11:00 AM  END TIME: 11:50 AM  FACILITATOR: Denisha Daniels RN  TOPIC:  Wellness Group: Mind/Body Practice & Complementary  [facility] Adult Partial Hospitalization Program  TRACK: PHP 1&2 (merged)    NUMBER OF PARTICIPANTS: 8    Summary of Group / Topics Discussed:  Mind/Body Practice & Complementary Therapies:   TIPP skills. Patients were educated on the importance of practicing therapeutic skills in a safe space during periods of non-escalation as to ease efforts of utilizing these skills during times of distress.       T: Temperature Change         Patients experimented with using an ice pack on various parts of the body and assessed the effects it had on their thought patterns and level of distress. Group discussed how this physical sensation can be distracting and disrupt distressing thought patterns.     I: Intense Exercise          It was explained to patients how, in a time when the body's \"fight or flight\" response is activated, utilizing intense exercise is giving the body what it wants. This skill can be useful for expelling energy in a safe manner.      P: Paced Breathing         Belly breathing, square breathing, rectangle breathing, and paring various breathing techniques together was discussed and practiced.      P: Progressive Muscle Relaxation         The purpose, benefits, and techniques of progressive muscle relaxation were discussed. Patients participated in a guided PMR practice and discussed the sensations noticed in the body.      Patient Session Goals / Objectives:     Identified various tools to use during times of distress to engage the thinking brain     Experimented using ice packs for temperature change     Practiced progressive muscle relaxation       Patient Participation / Response:  Fully participated with the " group by sharing personal reflections / insights and openly received / provided feedback with other participants.    Identified / Expressed personal readiness to practice skills    Treatment Plan:  Patient has a current master individualized treatment plan.  See Epic treatment plan for more information.    Denisha Daniels RN

## 2024-07-18 NOTE — GROUP NOTE
Psychotherapy Group Note    PATIENT'S NAME: Basil Andrews  MRN:   [id number]  :   1990  [id number]  DATE OF SERVICE: 24  START TIME:  2:00 PM  END TIME:  2:50 PM  FACILITATOR: Faiza Howell LPCC  TOPIC: MH EBP Group: Behavioral Activation  [facility] Adult Partial Hospitalization Program  TRACK: PHP1 and 2 merged    NUMBER OF PARTICIPANTS: 9    Summary of Group / Topics Discussed:  Behavioral Activation: The Change Process - Behavior Change: Patients explored the process and types of change, including but not limited to, theories of change, steps to making change, methods of changing behavior, and potential barriers.  Patients worked to identify what changes may benefit their daily lives, and work towards a plan to implement change.      Patient Session Goals / Objectives:  Demonstrate understanding of the change process.    Identify positive and negative behavioral patterns.  Make plans to track and implement changes and share experiences in group.  Identify personal barriers to change      Patient Participation / Response:  Fully participated with the group by sharing personal reflections / insights and openly received / provided feedback with other participants.    Demonstrated understanding of topics discussed through group discussion and participation, Expressed understanding of the relationship between behaviors, thoughts, and feelings, Shared experiences and challenges with making behavioral changes, Identified barriers to change, Identified / Expressed personal readiness to make behavioral change, Shared their progress tracking mood and activity, and Practiced skills in session    Treatment Plan:  Patient has a current master individualized treatment plan and today was our weekly review of the patient's progress.  See Epic treatment plan for progress / updates on goals and plan.    JOSUÉ Barillas

## 2024-07-18 NOTE — GROUP NOTE
Psychotherapy Group Note    PATIENT'S NAME: Basil Andresw  MRN:   [id number]  [facility]   1990  [id number]  DATE OF SERVICE: 24  START TIME: 10:00 AM  END TIME: 10:50 AM  FACILITATOR: Eron Gant LMFT; Denisha Daniels RN  TOPIC: MH EBP Group: Specialty [facility] Adult Partial Hospitalization Program  TRACK: PHP2    NUMBER OF PARTICIPANTS: 4    Summary of Group / Topics Discussed:  Specialty Topics: Goal Setting: Provided education and assessment on patient's group programming goals. Evaluated patient's assessment of their ability to participate in groups, share emotions with group members, and openness to the group format. Provided education regarding appropriate individual-based group therapy goals.     Patient Session Goals and Objectives:  -Participate in reflective assessment of group readiness.  -Understand appropriate topics and methods to discuss those topics in group programming.  -Identify and problem solve barriers to group participation.  -Identify strategies to actively cope while engaging in group programming.   -Reflected up individualized treatment plans  -Meet with members of the treatment team to assess goal progress       Patient Participation / Response:  Fully participated with the group by sharing personal reflections / insights and openly received / provided feedback with other participants.    Demonstrated understanding of topics discussed through group discussion and participation, Identified / Expressed readiness to act on skill suggestions discussed in topic, Verbalized understanding of ways to proactively manage illness, and Practiced skills in session    Treatment Plan:  Patient has a current master individualized treatment plan and today was our weekly review of the patient's progress.  See Epic treatment plan for progress / updates on goals and plan.    SARAH Liu

## 2024-07-18 NOTE — GROUP NOTE
Process Group Note    PATIENT'S NAME: Basil Andrews  MRN:   [id number]  :   1990  [id number]  DATE OF SERVICE: 24  START TIME:  9:00 AM  END TIME:  9:50 AM  FACILITATOR: Eron Gant LMFT  TOPIC: MH Process Group    Diagnoses:  1.  Bipolar disorder, current episode manic severe with psychotic features (H)   F31.2          [facility] Adult Partial Hospitalization Program  TRACK: PHP2    This group was observed by nursing student - Deven    NUMBER OF PARTICIPANTS: 7        Data:    Session content: At the start of this group, patients were invited to check in by identifying themselves, describing their current emotional status, and identifying issues to address in this group.   Area(s) of treatment focus addressed in this session included Symptom Management, Personal Safety, and Community Resources/Discharge Planning.  Patient reported feeling frustrated.  He felt like sleeping in and not come to group but he convinced himself to come to group.   Patient discussed working toward call for [place] stuff.   For skills they will use to address their goal(s), patient identified opposite action to emotion.   A barrier to working toward their goal(s) and/or addressing mental health symptoms the patient identified was procrastinating.  Patient reported no safety concerns and/or self-injurious behaviors. Patient reported no substance use. Patient reported they are taking their medications as prescribed.   Patient reported feeling proud/grateful for coming to group.  Basil said he has been waiting on a request for 2 years for a work transfer to [place] and it just came through 2 days ago and he has 10 days to make a decision.  He requested for an extension on the decision timeline.    Therapeutic Interventions/Treatment Strategies:  Psychotherapist offered support, feedback and validation. Treatment modalities used include Cognitive Behavioral Therapy. Interventions include Coping Skills:  Facilitated understanding of  what factors may contribute to symptom relapse and skills plan to manage symptom relapse  and Symptoms Management: Promoted understanding of their diagnoses and how it impacts their functioning.    Assessment:    Patient response:   Patient responded to session by accepting feedback, giving feedback, listening, focusing on goals, being attentive and accepting support    Possible barriers to participation / learning include: Severity of symptoms    Health Issues:   None reported       Substance Use Review:   Substance Use: No active concerns identified.    Mental Status/Behavioral Observations  Appearance:   Appropriate   Eye Contact:   Good   Psychomotor Behavior: Normal   Attitude:   Cooperative   Orientation:   All  Speech   Rate / Production: Normal    Volume:  Normal   Mood:    Anxious Depressed  Affect:    Blunted  Thought Content:   Clear and Safety denies any current safety concerns including suicidal ideation, self-harm, and homicidal ideation  Thought Form:  Coherent  Logical     Insight:    Fair    Plan:   Safety Plan: No current safety concerns identified.  Recommended that patient call 911 or go to the local ED should there be a change in any of these risk factors.   Barriers to treatment: None identified  Patient Contracts (see media tab):  None  Substance Use: Provided encouragement towards sobriety   Continue or Discharge: Patient will continue in Adult Partial Hospitalization Program (PHP)  as planned. Patient is likely to benefit from learning and using skills as they work toward the goals identified in their treatment plan.      Eron Gant, SARAH  July 18, 2024

## 2024-07-19 ENCOUNTER — HOSPITAL ENCOUNTER (OUTPATIENT)
Dept: BEHAVIORAL HEALTH | Facility: CLINIC | Age: 34
Discharge: HOME | End: 2024-07-19
Attending: PSYCHIATRY & NEUROLOGY
Payer: COMMERCIAL

## 2024-07-19 PROCEDURE — H0035 MH PARTIAL HOSP TX UNDER 24H: HCPCS

## 2024-07-19 PROCEDURE — H0035 MH PARTIAL HOSP TX UNDER 24H: HCPCS | Performed by: COUNSELOR

## 2024-07-19 NOTE — GROUP NOTE
Psychotherapy Group Note    PATIENT'S NAME: Basil Andrews  MRN:   [id number]  [facility]   1990  [id number]  DATE OF SERVICE: 24  START TIME:  2:00 PM  END TIME:  2:50 PM  FACILITATOR: Eron Gant LMFT  TOPIC: MH EBP Group: [facility] Adult Partial Hospitalization Program  TRACK: PHP1 and PHP2 combined    NUMBER OF PARTICIPANTS: 6    Summary of Group / Topics Discussed:  Mindfulness: Nature: Patients received an overview on how spending time in nature can benefit general health, mental health symptoms, and stressors.  Patients discussed current awareness, knowledge, and practice of spending time in nature.  Patients also discussed barriers to spending time in nature.    Patient Session Goals / Objectives:  Demonstrated and verbalized understanding how spending time in nature can support mental health  Patients also discussed barriers to spending time in nature.      Patient Participation / Response:  Fully participated with the group by sharing personal reflections / insights and openly received / provided feedback with other participants.    Demonstrated understanding of topics discussed through group discussion and participation and Practiced skills in session    Treatment Plan:  Patient has a current master individualized treatment plan.  See Epic treatment plan for more information.    SARAH Liu

## 2024-07-19 NOTE — GROUP NOTE
Process Group Note    PATIENT'S NAME: Basil Andrews  MRN:   [id number]  [facility]   1990  [id number]  DATE OF SERVICE: 24  START TIME:  9:00 AM  END TIME:  9:50 AM  FACILITATOR: Haylee Mosqueda LICSW  TOPIC:  Process Group    Diagnoses:    1.  Bipolar disorder, current episode manic severe with psychotic features (H)   F31.2    M [facility] Partial Hospitalization Program  TRACK: [facility] 2       NUMBER OF PARTICIPANTS: 4         Data:    Session content: At the start of this group, patients were invited to check in by identifying themselves, describing their current emotional status, and identifying issues to address in this group.   Area(s) of treatment focus addressed in this session included Symptom Management and Personal Safety.      Patient reported feeling optimistic. Patient discussed working toward communication. Patient identified communication as skills they will use to address their goal(s). Patient reported general mood may be a barrier to working toward their goal(s) and/or addressing mental health symptoms. Patient reported no safety concerns and/or self-injurious behaviors. Patient reported no substance use. Patient reported they are taking their medications as prescribed. Patient reported feeling proud that they part of group. Patient discussed general mood with the treatment group.              Therapeutic Interventions/Treatment Strategies:  Psychotherapist offered support, feedback and validation.    Assessment:    Patient response:   Patient responded to session by accepting feedback, giving feedback, and listening    Possible barriers to participation / learning include: and no barriers identified    Health Issues:   None reported       Substance Use Review:   Substance Use: No active concerns identified.    Mental Status/Behavioral Observations  Appearance:   Appropriate   Eye Contact:   Good   Psychomotor Behavior: Normal   Attitude:   Cooperative    Orientation:   All  Speech   Rate / Production: Normal    Volume:  Normal   Mood:    Normal  Affect:    Appropriate   Thought Content:   Clear  Thought Form:  Coherent  Logical     Insight:    Good     Plan:   Safety Plan: No current safety concerns identified.  Recommended that patient call 911 or go to the local ED should there be a change in any of these risk factors.   Barriers to treatment: None identified  Patient Contracts (see media tab):  None  Substance Use: Not addressed in session   Continue or Discharge: Patient will continue in Adult Partial Hospitalization Program (PHP)  as planned. Patient is likely to benefit from learning and using skills as they work toward the goals identified in their treatment plan.      Haylee Mosqueda, [facility]  July 19, 2024

## 2024-07-19 NOTE — GROUP NOTE
Psychoeducation Group Note    PATIENT'S NAME: Basil Andrews  MRN:   [id number]  :   1990  [facility] NUMBER: [id number]  DATE OF SERVICE: 24  START TIME:  1:00 PM  END TIME:  1:50 PM  FACILITATOR: Denisha Daniels RN  TOPIC:  Wellness Group: Medication Education and Management  [facility] Partial Hospitalization Program  TRACK: PHP 1&2 (merged)    NUMBER OF PARTICIPANTS: 7    Summary of Group / Topics Discussed:  Medication Educations and Management:  Medication Assessment/Review: Patients were given a medication quiz to assess their current knowledge about the medications that are prescribed and why they are taking them. Medication lists were reviewed with each patient individually to provide education and answer questions. Safe medication storage, handling, management, and disposal were discussed within the group. Patients completed medication wallet cards    Patient Session Goals / Objectives:  Assessed patient understanding of their current medications and indication  Identify what to do if a dose is missed  Assessed medication adherence  Assessed knowledge of medication side effects and how to treat them      Patient Participation / Response:  Fully participated with the group by sharing personal reflections / insights and openly received / provided feedback with other participants.     Identified / Expressed personal readiness to practice skills    Treatment Plan:  Patient has a current master individualized treatment plan.  See Epic treatment plan for more information.    Denisha Daniels RN

## 2024-07-19 NOTE — GROUP NOTE
Psychotherapy Group Note    PATIENT'S NAME: Basil Andrews  MRN:   [id number]  :   1990  [id number]  DATE OF SERVICE: 24  START TIME: 10:00 AM  END TIME: 10:50 AM  FACILITATOR: Eron Gant LMFT; Jose Blackwood LPC  TOPIC:  EBP Group: Relationship Skills  [facility] Adult Partial Hospitalization Program  TRACK: PHP1 and PHP2 combined    NUMBER OF PARTICIPANTS: 8    Summary of Group / Topics Discussed:  Relationship Skills: Boundaries: Patients were provided with a general overview of interpersonal boundaries and how lack of boundaries relates to symptoms and functioning. The purpose is to help patients identify boundary issues and gain awareness and skills to work towards healthier interpersonal boundaries. Current awareness of healthy boundary characteristics and barriers to establishing healthy boundaries were discussed.    Patient Session Goals / Objectives:  Familiarized patients with the concept of interpersonal boundaries and their characteristics  Discussed and practiced strategies to promote healthier interpersonal boundaries  Identified boundary issues and identified plan to improve boundaries      Patient Participation / Response:  Fully participated with the group by sharing personal reflections / insights and openly received / provided feedback with other participants.    Demonstrated understanding of topics discussed through group discussion and participation, Demonstrated understanding of relationship skills and communication skills, Identified / Expressed personal readiness to incorporate effective communication skills, Verbalized understanding of communication skills, communication challenges, and communication strengths, and Practiced skills in session    Treatment Plan:  Patient has a current master individualized treatment plan.  See Epic treatment plan for more information.    SARAH Liu

## 2024-07-19 NOTE — GROUP NOTE
Psychoeducation Group Note    PATIENT'S NAME: Basil Andrews  MRN:   [id number]  :   1990  [facility] NUMBER: [id number]  DATE OF SERVICE: 24  START TIME: 11:00 AM  END TIME: 11:50 AM  FACILITATOR: America Perrin OTR  TOPIC: [facility] OT Group: Lifestyle Balance and Structure  [facility] Adult Partial Hospitalization Program  TRACK: PHP 1 & 2 (merged)    NUMBER OF PARTICIPANTS: 7    Summary of Group / Topics Discussed:  Lifestyle Balance and Structure:  Weekend Planning: To support structure and routine during the weekend, the group members were led through a facilitated discussion on the importance of structure and routine for overall mental wellness and to promote leisure, productivity, and self care during unstructured time. Patients discussed the importance of life balance and identified at least 1 self care activity, leisure activity, and productivity activity that they want to do this weekend to improve structure/routine. Patients independently created a weekend plan and shared with the group their personal plans. Patients identified potential barriers to their plan and skills that will help overcome the barriers. Validation, support, and feedback was provided throughout group process.           Patient Session Goals / Objectives:      Reflected on and created a weekend plan      Discuss the importance of life balance, structure, and routine      Identified and planned 3 activities in the areas of leisure, productivity, and self care for the weekend.      Identified and problem solved barriers to achieving identified plans        Patient Participation / Response:  Fully participated with the group by sharing personal reflections / insights and openly received / provided feedback with other participants.  Patient independently identified a self care activity (walk), productive activity (mow), and leisure activity (call brother) that they want to complete this weekend. Patient self reflected on  potential barriers to their weekend and problem solved skills they could use.  Patient's affect was appropriate to situation and mood congruent.     Treatment Plan:  Patient has a current master individualized treatment plan.  See Epic treatment plan for more information.    America Perrin, OTR

## 2024-07-22 ENCOUNTER — HOSPITAL ENCOUNTER (OUTPATIENT)
Dept: BEHAVIORAL HEALTH | Facility: CLINIC | Age: 34
Discharge: HOME | End: 2024-07-22
Attending: PSYCHIATRY & NEUROLOGY
Payer: COMMERCIAL

## 2024-07-22 DIAGNOSIS — F31.2 BIPOLAR DISORDER, CURRENT EPISODE MANIC SEVERE WITH PSYCHOTIC FEATURES (H): ICD-10-CM

## 2024-07-22 PROCEDURE — H0035 MH PARTIAL HOSP TX UNDER 24H: HCPCS | Performed by: COUNSELOR

## 2024-07-22 PROCEDURE — 99214 OFFICE O/P EST MOD 30 MIN: CPT | Performed by: PSYCHIATRY & NEUROLOGY

## 2024-07-22 PROCEDURE — H0035 MH PARTIAL HOSP TX UNDER 24H: HCPCS

## 2024-07-22 RX ORDER — HALOPERIDOL 5 MG/1
TABLET ORAL
Qty: 60 TABLET | Refills: 1 | Status: SHIPPED | OUTPATIENT
Start: 2024-07-22 | End: 2024-07-26

## 2024-07-22 NOTE — GROUP NOTE
"OT Clinic Group Note    PATIENT'S NAME: Basil Andrews  MRN:   [id number]  :   1990  [facility] NUMBER: [id number]  DATE OF SERVICE: 24  START TIME:  1:00 PM  END TIME:  1:50 PM  FACILITATOR: America Perrin OTR; Deven Barnes MD  TOPIC: MH PHP OT Group: Occupational Therapy Clinic  [facility] Partial Hospitalization Program  TRACK: PHP 1 & 2 (merged)    NUMBER OF PARTICIPANTS: 6    Summary of Group / Topics Discussed:  Occupational Therapy Clinic: Provided opportunity for patients to independently choose and engage in a therapeutic activity that supports progress towards their goals and psychiatric recovery. The Occupational Therapy Clinic provides a context for patients to monitor their symptoms, gain self-awareness, practice skills (self-regulation, mindfulness, self-talk, focus/concentration, social, productivity), build a sense of self efficacy and mastery, as well as receive validation, support, and resources. OT checks in, observes, assesses, and monitors performance skills and patterns in context with each group member. Patient was provided orientation to the OT clinic and overview of purpose of the OT clinic in support of meeting their goals.     Patient Session Goals / Objectives:  Independently identify a purposeful and meaningful therapeutic activity.  Identified which goal(s) they are intentionally working on during session.   Reflected on their performance and share insight about their progress.  Practiced and identified a way to generalize a skill to their everyday life      Patient Participation / Response:  Fully participated with the group by sharing personal reflections / insights and openly received / provided feedback with other participants.  Demonstrated understanding of content through engaging in a task of choice (coloring). Patient shared he felt \"good\" after group.  Patient's affect was appropriate to situation and mood congruent.     Treatment Plan:  Patient has a " current master individualized treatment plan.  See Epic treatment plan for more information.    America Perrin, OTR

## 2024-07-22 NOTE — PROGRESS NOTES
"[facility]   Partial Hospitalization Program  Interval/Follow-up Psychiatric Evaluation    Patient: Basil Andrews  Preferred Name: Basil  MRN: [id number]  : 1990  Acct. No.: [id number]  Date of Service: 24  PHP Track:  2            Interval History:  \"I'm still getting paranoid thoughts.\" Basil presents today for follow-up and ongoing program supervision. Discussed today:  Including \"numerology,\" and magical thinking:  Needing to wipe a certain number of times after defecating or washing his hands a certain number of times  Also feeling, \"if I shave and I don't cut myself that means I'm okay\"  Still intrusive thoughts reminiscent of his time on inpatient  E.g. feeling cleaning staff are \"in on it,\" and knowing that's not the the case  Overall able to challenge the thoughts but having to devote significant time and energy to it  Has now tapered off risperidone completely  Better able to sit still  Has been avoiding propranolol altogether  \"I think I need to go up to 15 mg [of haloperidol] at night\"  Discussed also bringing 5 mg tabs with him during the day    Review of Symptoms  Sleep: improved  Appetite: increased  Has gained 35 lbs since admission to inpatient   Suicidal ideation: denies current or recent suicidal ideation or behavior  Thoughts of non-suicidal self-injury: denied  Recent self-injurious behavior: denied  Homicidal ideation: denied  Other safety concerns: denied    Activities of Daily Living and Related Systems Impacted by Illness:  Hygiene: no concerns  Socialization: limited overall by symptoms   Activities of Daily Living: (cleaning, shopping, bills, etc.): has been difficult  Concerns related to work: on leave    Substance use:  denies    Response to Program Interventions:  Able to make use of skills and supports    Current Medications:  Current Outpatient Medications   Medication Sig Dispense Refill    benztropine (COGENTIN) 1 MG tablet Take 1 tablet (1 mg) by mouth 2 " times daily 60 tablet 1    divalproex sodium extended-release (DEPAKOTE ER) 500 MG 24 hr tablet Take 3 tablets (1,500 mg) by mouth at bedtime 90 tablet 1    divalproex sodium extended-release (DEPAKOTE ER) 500 MG 24 hr tablet Take 1 tablet (500 mg) by mouth daily 30 tablet 1    fluticasone (FLONASE) 50 MCG/ACT nasal spray Spray 2 sprays into both nostrils daily      haloperidol (HALDOL) 10 MG tablet Take 1 tablet (10 mg) by mouth 2 times daily 60 tablet 1    haloperidol (HALDOL) 5 MG tablet Take 1 tablet (5 mg) by mouth at bedtime. May also take 1 tablet (5 mg) 2 times daily as needed for agitation or other (psychotic symptoms). 60 tablet 1    ipratropium (ATROVENT) 0.06 % nasal spray Spray 2 sprays into both nostrils 4 times daily      multivitamin, therapeutic with minerals (MULTI-VITAMIN) TABS Take 1 tablet by mouth daily      propranolol (INDERAL) 10 MG tablet Take 1 tablet (10 mg) by mouth 3 times daily as needed (restlessness) 60 tablet 1       The above list was reviewed and updated in [facility] with patient today.     Patient is taking medications as prescribed and reports adverse effects as above    Laboratory Results:  Most recent labs reviewed. Pertinent updates/findings: None.       Mental Status Examination:  Vital Signs: There were no vitals taken for this visit.   Appearance: appropriately groomed, appears stated age, and in no apparent distress.  Attitude: cooperative   Eye Contact: good   Muscle Strength and Tone: normal  Psychomotor Behavior: fidgety but not grossly restless. No tongue fasciculations or pill-rolling on exam. [place]; cannot rule out cogwheeling  Gait and Station: normal width, turn, arm swing  Speech: clear, coherent, decreased prosody, regular rate, regular rhythm, and fluent  Associations: No loosening of associations  Thought Process: coherent and goal directed  Thought Content: no evidence of suicidal ideation or homicidal ideation, no auditory hallucinations present, no visual  "hallucinations present, and magical thinking; easier to challenge  Mood: \"better\"  Affect: mood congruent, intensity is normal, restricted range, slight lability, and reactive  Insight: good  Judgment: intact, adequate for safety  Impulse Control: intact  Oriented to: time, place, person, and situation  Attention Span and Concentration: Normal  Language: Intact  Recent and Remote Memory: Delayed & immediate recall intact  Fund of Knowledge/Assessment of Intelligence: Average  Capacity of Activities of Daily Living: Independent, able to participate in programmatic care services.    Diagnosis/es:    ICD-10-CM    1. Bipolar disorder, current episode manic severe with psychotic features (H)  F31.2 haloperidol (HALDOL) 5 MG tablet          Assessment/Plan:  Basil presents today for follow-up psychiatric evaluation and assessment of progress. Endorses ongoing ideas of reference and magical thinking, easier to challenge overall but still distracting and concerning to patient. Will increase evening dose of haloperidol to 15 mg, continue 10 mg in the morning for total daily scheduled dose of 25 mg. Patient encouraged to bring a small supply of 5 mg tabs with him during the day and take as needed for increased symptoms of anxiety, suspiciousness, etc. Patient wary of using propranolol at all given dyspnea at 20 mg dose but was reminded today that he took 10 mg dose without difficulty.    No gross evidence of extrapyramidal symptoms on exam and we will be vigilant. Ongoing, albeit mild, psychotic symptoms. Continue PHP. I will follow up later this week.    Safety Assessment:  Basil reports suicidal ideation and/or non-suicidal self-injury or thoughts thereof as noted above  Basil is future-oriented and is engaged in treatment planning   I do not feel that Basil meets criteria for a 72-hour involuntary hold and remains appropriate for an outpatient level of care    Basil Tesfaye MD on 7/22/2024 at 11:49 AM "     Visit Details:  Type of service: In-person  Location (patient and provider): [facility] Offices    Level of Medical Decision Making:   - At least 1 chronic problem that is not stable  - Engaged in prescription drug management during visit (discussed any medication benefits, side effects, alternatives, etc.)  Discussion of management or test interpretation with external physician/other qualified healthcare professional/appropriate source - programmatic care multidisciplinary treatment team

## 2024-07-22 NOTE — GROUP NOTE
Process Group Note    PATIENT'S NAME: Basil Andrews  MRN:   [id number]  :   1990  [id number]  DATE OF SERVICE: 24  START TIME:  9:00 AM  END TIME: 10:50 AM  FACILITATOR: Eron Gant LMFT  TOPIC:  Process Group    Diagnoses:  1.  Bipolar disorder, current episode manic severe with psychotic features (H)   F31.2       [facility] Adult Partial Hospitalization Program  TRACK: PHP1 and PHP2 combined    The group also reviewed assertivess skills and the use of the DEAR MAN assertiveness skill.    NUMBER OF PARTICIPANTS: 7        Data:    Session content: At the start of this group, patients were invited to check in by identifying themselves, describing their current emotional status, and identifying issues to address in this group.   Area(s) of treatment focus addressed in this session included Symptom Management, Personal Safety, and Community Resources/Discharge Planning.  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   He also talked to a work person about transferring to [place].   His wife will be off for a trip over the weekend.   Patient discussed working toward be less stressed about transferring to [place] and pressure on himself about parenting role involvement.   For skills they will use to address their goal(s), patient identified relaxed breathing.   A barrier to working toward their goal(s) and/or addressing mental health symptoms the patient identified was over thinking and worrying about his job transfer and finances.  Patient reported no safety concerns and/or self-injurious behaviors. Patient reported no substance use. Patient reported they are taking their medications as prescribed.   Patient reported feeling proud/grateful for his son sleeping all the way in his bed and not getting out.  Basil took some process time and talked with the group about stressors related his wife possibly changing jobs and  his family considering moving to [place].    Therapeutic Interventions/Treatment Strategies:  Psychotherapist offered support, feedback and validation. Treatment modalities used include Cognitive Behavioral Therapy. Interventions include Coping Skills: Facilitated understanding of  what factors may contribute to symptom relapse and skills plan to manage symptom relapse  and Symptoms Management: Promoted understanding of their diagnoses and how it impacts their functioning.    Assessment:    Patient response:   Patient responded to session by accepting feedback, giving feedback, listening, focusing on goals, being attentive and accepting support    Possible barriers to participation / learning include: Worrying about employment and financial concerns    Health Issues:   None reported       Substance Use Review:   Substance Use: No active concerns identified.    Mental Status/Behavioral Observations  Appearance:   Appropriate   Eye Contact:   Good   Psychomotor Behavior: Normal   Attitude:   Cooperative   Orientation:   All  Speech   Rate / Production: Normal    Volume:  Normal   Mood:    Anxious Depressed  Affect:    Blunted Worrisome  Thought Content:   Clear and Safety denies any current safety concerns including suicidal ideation, self-harm, and homicidal ideation  Thought Form:  Coherent  Logical     Insight:    Good     Plan:   Safety Plan: No current safety concerns identified.    Barriers to treatment: None identified  Patient Contracts (see media tab):  None  Substance Use: Provided encouragement towards sobriety   Continue or Discharge: Patient will continue in Adult Partial Hospitalization Program (PHP)  as planned. Patient is likely to benefit from learning and using skills as they work toward the goals identified in their treatment plan.        Eron Gant, SARAH  July 22, 2024

## 2024-07-23 ENCOUNTER — HOSPITAL ENCOUNTER (OUTPATIENT)
Dept: BEHAVIORAL HEALTH | Facility: CLINIC | Age: 34
Discharge: HOME | End: 2024-07-23
Attending: PSYCHIATRY & NEUROLOGY
Payer: COMMERCIAL

## 2024-07-23 PROCEDURE — H0035 MH PARTIAL HOSP TX UNDER 24H: HCPCS

## 2024-07-23 PROCEDURE — H0035 MH PARTIAL HOSP TX UNDER 24H: HCPCS | Performed by: COUNSELOR

## 2024-07-23 NOTE — GROUP NOTE
Psychoeducation Group Note    PATIENT'S NAME: Basil Andrews  MRN:   [id number]  :   1990  ACCT. NUMBER: [id number]  DATE OF SERVICE: 24  START TIME:  1:00 PM  END TIME:  1:50 PM  FACILITATOR: Denisha Daniels RN  TOPIC:  Wellness Group: Mind/Body Practice & Complementary  [facility] Adult Partial Hospitalization Program  TRACK: PHP1&2 (merge)    NUMBER OF PARTICIPANTS: 5    Summary of Group / Topics Discussed:  Mind/Body Practice & Complementary Therapies:  Relaxation Techniques: In this group, patients were educated on a variety of relaxation and mindfulness skills to reduce distress and improve coping skills. The skills were taught to the group and then practiced through an organized exercise.     Skills taught & practiced included:  Meditation/mindfulness practice on change.      Patient Session Goals / Objectives:  Identified relaxation skills  Explained how the various relaxation skills are practiced  Practiced relaxation experiential       Patient Participation / Response:  Fully participated with the group by sharing personal reflections / insights and openly received / provided feedback with other participants.    Identified / Expressed personal readiness to practice skills    Treatment Plan:  Patient has a current master individualized treatment plan.  See Epic treatment plan for more information.    Denisha Daniels RN

## 2024-07-23 NOTE — GROUP NOTE
Process Group Note    PATIENT'S NAME: Basil Andrews  MRN:   [id number]  :   1990  [id number]  DATE OF SERVICE: 24  START TIME:  9:00 AM  END TIME:  9:50 AM  FACILITATOR: Eron Gant LMFT  TOPIC:  Process Group    Diagnoses:  1.  Bipolar disorder, current episode manic severe with psychotic features (H)   F31.2          [facility] Partial Hospitalization Program  TRACK: PHP1 and PHP2 combined    NUMBER OF PARTICIPANTS: 5        Data:    Session content: At the start of this group, patients were invited to check in by identifying themselves, describing their current emotional status, and identifying issues to address in this group.   Area(s) of treatment focus addressed in this session included Symptom Management, Personal Safety, and Community Resources/Discharge Planning.  Patient reported feeling frustrated about his whole situation.  His wife is having a hard time deciding about moving and he needs to make a decision by Friday.  His son slept normally last night, so that helped.   Patient discussed working toward be gentle and patient.   For skills they will use to address their goal(s), patient identified patience and gentleness toward his wife.   A barrier to working toward their goal(s) and/or addressing mental health symptoms the patient identified was getting caught up in the moment when talking with his wife and getting frustrated.  Patient reported no safety concerns and/or self-injurious behaviors. Patient reported no substance use. Patient reported they are taking their medications as prescribed.   Patient reported feeling proud/grateful for being in PHP.    Therapeutic Interventions/Treatment Strategies:  Psychotherapist offered support, feedback and validation. Treatment modalities used include Cognitive Behavioral Therapy. Interventions include Coping Skills: Facilitated understanding of  what factors may contribute to symptom relapse and skills plan to  manage symptom relapse  and Symptoms Management: Promoted understanding of their diagnoses and how it impacts their functioning.    Assessment:    Patient response:   Patient responded to session by accepting feedback, giving feedback, listening, focusing on goals, being attentive and accepting support    Possible barriers to participation / learning include: Worry and stress related to not being able to come to a decision with his wife about whether or not they should move to [place].    Health Issues:   None reported       Substance Use Review:   Substance Use: No active concerns identified.    Mental Status/Behavioral Observations  Appearance:   Appropriate   Eye Contact:   Good   Psychomotor Behavior: Normal   Attitude:   Cooperative   Orientation:   All  Speech   Rate / Production: Normal    Volume:  Normal   Mood:    Anxious Depressed  Affect:    Blunted  Thought Content:   Clear and Safety denies any current safety concerns including suicidal ideation, self-harm, and homicidal ideation  Thought Form:  Coherent  Logical     Insight:    Fair    Plan:   Safety Plan: No current safety concerns identified.    Barriers to treatment: None identified  Patient Contracts (see media tab):  None  Substance Use: Provided encouragement towards sobriety   Continue or Discharge: Patient will continue in Adult Partial Hospitalization Program (PHP)  as planned. Patient is likely to benefit from learning and using skills as they work toward the goals identified in their treatment plan.      Eron Gant, SARAH  July 23, 2024

## 2024-07-23 NOTE — GROUP NOTE
"OT Clinic Group Note    PATIENT'S NAME: Basil Andrews  MRN:   [id number]  :   1990  ACCT. NUMBER: [id number]  DATE OF SERVICE: 24  START TIME: 11:00 AM  END TIME: 11:50 AM  FACILITATOR: America Perrin OTR  TOPIC: [facility] OT Group: Occupational Therapy Clinic  [facility] Partial Hospitalization Program  TRACK: PHP 1 & 2 (merged)    NUMBER OF PARTICIPANTS: 4    Summary of Group / Topics Discussed:  Occupational Therapy Clinic: Provided opportunity for patients to independently choose and engage in a therapeutic activity that supports progress towards their goals and psychiatric recovery. The Occupational Therapy Clinic provides a context for patients to monitor their symptoms, gain self-awareness, practice skills (self-regulation, mindfulness, self-talk, focus/concentration, social, productivity), build a sense of self efficacy and mastery, as well as receive validation, support, and resources. OT checks in, observes, assesses, and monitors performance skills and patterns in context with each group member.  Patient was provided orientation to the OT clinic and overview of purpose of the OT clinic in support of meeting their goals.     Patient Session Goals / Objectives:  Independently identify a purposeful and meaningful therapeutic activity.  Identified which goal(s) they are intentionally working on during session.   Reflected on their performance and share insight about their progress.  Practiced and identified a way to generalize a skill to their everyday life      Patient Participation / Response:  Fully participated with the group by sharing personal reflections / insights and openly received / provided feedback with other participants.  Demonstrated understanding of content through engaging in a task of choice (coloring). Patient shared he felt \"good\" after group.  Patient's affect was appropriate to situation and mood congruent.     Treatment Plan:  Patient has a current master " individualized treatment plan and today was our weekly review of the patient's progress.  See Epic treatment plan for progress / updates on goals and plan.    America Perrin, OTR

## 2024-07-23 NOTE — GROUP NOTE
Psychoeducation Group Note    PATIENT'S NAME: Basil Andrews  MRN:   [id number]  :   1990  ACCT. NUMBER: [id number]  DATE OF SERVICE: 24  START TIME:  2:00 PM  END TIME:  2:50 PM  FACILITATOR: Eron Gant LMFT  TOPIC: MH Life Skills Group: Communication and Social Skills Development  [facility] Adult Partial Hospitalization Program  TRACK: PHP1 and PHP2 combined    NUMBER OF PARTICIPANTS: 6    Summary of Group / Topics Discussed:  Communication and Social Skills Development: Communication Styles: Anatomy of Trust: BRAVING Skill: Patients were taught and gained awareness of interpersonal relationships, specifically trust.  Patients watched a video by Jean Paul Alcaraz and were introduced to the acronym BRAVING as a tool to reflect on issues in important relationships in a more manageable way and use skills to express the true issue of the conflict.  Patients also reflected on self trust and self compassion.      Patient Session Goals / Objectives:  Identified skills that help improve interpersonal relationships and express conflict      Improved awareness of important aspects of trust in interpersonal relationships and how this relates to mental health recovery      Established a plan for practice of these skills in their own environments  Practiced and reflected on how to generalize taught skills to their everyday life      Patient Participation / Response:  Fully participated with the group by sharing personal reflections / insights and openly received / provided feedback with other participants.    Demonstrated understanding of content through group participation     Treatment Plan:  Patient has a current master individualized treatment plan.  See Epic treatment plan for more information.    SARAH Liu

## 2024-07-24 ENCOUNTER — HOSPITAL ENCOUNTER (OUTPATIENT)
Dept: BEHAVIORAL HEALTH | Facility: CLINIC | Age: 34
Discharge: HOME | End: 2024-07-24
Attending: PSYCHIATRY & NEUROLOGY
Payer: COMMERCIAL

## 2024-07-24 PROCEDURE — H0035 MH PARTIAL HOSP TX UNDER 24H: HCPCS | Performed by: COUNSELOR

## 2024-07-24 PROCEDURE — H0035 MH PARTIAL HOSP TX UNDER 24H: HCPCS

## 2024-07-24 NOTE — GROUP NOTE
"Psychoeducation Group Note    PATIENT'S NAME: Basil Andrews  MRN:   [id number]  :   1990  ACCT. NUMBER: [id number]  DATE OF SERVICE: 24  START TIME:  1:00 PM  END TIME:  1:50 PM  FACILITATOR: Denisha Daniels RN  TOPIC:  Wellness Group: [facility] Adult Partial Hospitalization Program  TRACK: PHP 1&2 (merged)    NUMBER OF PARTICIPANTS: 6    Summary of Group / Topics Discussed:  [facility]: Summary of Group / Topics Discussed: distress tolerance scattergories. Patients used category word game as a tool for grounding. Patients were educated that, in times of distress, this skill can be used to engage the \"thinking mind\" rather than the \"emotion mind.\"     Patient Session Goals / Objectives:     Practice skill of purposeful thought as a way to interrupt distressing thought patterns.     Engage in social interaction in form of a game and discussed co-regulation        Patient Participation / Response:  Fully participated with the group by sharing personal reflections / insights and openly received / provided feedback with other participants.    Identified / Expressed personal readiness to practice skills    Treatment Plan:  Patient has a current master individualized treatment plan.  See Epic treatment plan for more information.    Denisha Daniels RN  "

## 2024-07-24 NOTE — GROUP NOTE
Psychotherapy Group Note    PATIENT'S NAME: Basil Andrews  MRN:   [id number]  [facility]   1990  [id number]  DATE OF SERVICE: 24  START TIME: 10:00 AM  END TIME: 10:50 AM  FACILITATOR: Eron Gant LMFT; Denisha Daniels RN  TOPIC: MH EBP Group: [facility] Adult Partial Hospitalization Program  TRACK: PHP1 and PHP2 combined    A student staff was also present to observe this group    NUMBER OF PARTICIPANTS: 4    Summary of Group / Topics Discussed:  Specialty Topics: Goal Setting: Provided education and assessment on patient's group programming goals. Evaluated patient's assessment of their ability to participate in groups, share emotions with group members, and openness to the group format. Provided education regarding appropriate individual-based group therapy goals.     Patient Session Goals and Objectives:  -Participate in reflective assessment of group readiness.  -Understand appropriate topics and methods to discuss those topics in group programming.  -Identify and problem solve barriers to group participation.  -Identify strategies to actively cope while engaging in group programming.   -Reflected up individualized treatment plans  -Meet with members of the treatment team to assess goal progress       Patient Participation / Response:  Fully participated with the group by sharing personal reflections / insights and openly received / provided feedback with other participants.    Demonstrated understanding of topics discussed through group discussion and participation, Identified / Expressed readiness to act on skill suggestions discussed in topic, Verbalized understanding of ways to proactively manage illness, and Practiced skills in session    Treatment Plan:  Patient has a current master individualized treatment plan and today was our weekly review of the patient's progress.  See Epic treatment plan for progress / updates on goals and plan.    Eron MENA  SARAH Gant

## 2024-07-24 NOTE — GROUP NOTE
Psychoeducation Group Note    PATIENT'S NAME: Basil Andrews  MRN:   [id number]  :   1990  ACCT. NUMBER: [id number]  DATE OF SERVICE: 24  START TIME: 11:00 AM  END TIME: 11:50 AM  FACILITATOR: America Perrin OTR; Jose Blackwood LPC  TOPIC: [facility] OT Group: Lifestyle Balance and Structure  [facility] Adult Partial Hospitalization Program  TRACK: PHP 1 & 2 (merged)    NUMBER OF PARTICIPANTS: 8    Summary of Group / Topics Discussed:  Lifestyle Balance and Structure: Self-care: Reviewed education on the framework of life balance that includes three intersecting circles (productivity, self-care, and leisure.)  Facilitated collaborative discussion about self-care including why it is important, barriers to engaging in it, and examples of current self-care practice. Brainstormed with the group to develop a list of self-care activities that meet the needs of one's mind, body, or spirit.  Educated on the importance of approaching self-care with self-compassion and linnea.  Discussed possible benefits of engaging in self-care including improved confidence, increased sense of structure and routine, and feeling rested.  Instructed group members to select one or two self-care activities they would like to explore in the coming weeks and provided an opportunity to brainstorm strategies for success.       Patient Session Goals / Objectives:   Reflected on self-care benefits and barriers through a group brainstorming process.    Identified areas of self-care that are a current priority and set realistic goals for these areas.   Identify strategies and skills to meet specific needs and address barriers.   Reflected on their current self-care engagement and shared insight about their progress.        Patient Participation / Response:  Fully participated with the group by sharing personal reflections / insights and openly received / provided feedback with other participants.  Demonstrated understanding of  content through brainstorming different forms of self care and making a goal to take a nap today as self care.   Patient's affect was appropriate to situation and mood congruent.     Treatment Plan:  Patient has a current master individualized treatment plan.  See Epic treatment plan for more information.    America Perrin, OTR

## 2024-07-24 NOTE — GROUP NOTE
Process Group Note    PATIENT'S NAME: Basil Andrews  MRN:   [id number]  :   1990  [id number]  DATE OF SERVICE: 24  START TIME:  9:00 AM  END TIME: 10:50 AM  FACILITATOR: Eron Gant LMFT  TOPIC:  Process Group    Diagnoses:  1.  Bipolar disorder, current episode manic severe with psychotic features (H)   F31.2          [facility] Partial Hospitalization Program  TRACK: PHP1 and PHP2 combined    NUMBER OF PARTICIPANTS: 9        Data:    Session content: At the start of this group, patients were invited to check in by identifying themselves, describing their current emotional status, and identifying issues to address in this group.   Area(s) of treatment focus addressed in this session included Symptom Management, Personal Safety, and Community Resources/Discharge Planning.  Patient reported feeling a little frustrated with everything he is dealing with.  He has been having some odd body sensations, possibly related to medication changes.   Patient discussed working toward working with his wife on the moving decision.   For skills they will use to address their goal(s), patient identified patience.   A barrier to working toward their goal(s) and/or addressing mental health symptoms the patient identified was worrying about moving decisions and finances.  Patient reported no safety concerns and/or self-injurious behaviors. Patient reported no substance use. Patient reported they are taking their medications as prescribed.   Patient reported feeling proud/grateful for he and his wife not arguing as much.    Therapeutic Interventions/Treatment Strategies:  Psychotherapist offered support, feedback and validation. Treatment modalities used include Cognitive Behavioral Therapy. Interventions include Coping Skills: Facilitated understanding of  what factors may contribute to symptom relapse and skills plan to manage symptom relapse  and Symptoms Management: Promoted  understanding of their diagnoses and how it impacts their functioning.    Assessment:    Patient response:   Patient responded to session by accepting feedback, giving feedback, listening, focusing on goals, being attentive and accepting support    Possible barriers to participation / learning include: Worrying about moving decisions and finances.    Health Issues:   None reported       Substance Use Review:   Substance Use: No active concerns identified.    Mental Status/Behavioral Observations  Appearance:   Appropriate   Eye Contact:   Good   Psychomotor Behavior: Normal   Attitude:   Cooperative   Orientation:   All  Speech   Rate / Production: Normal    Volume:  Normal   Mood:    Anxious Depressed  Affect:    Blunted Worrisome  Thought Content:   Clear and Safety denies any current safety concerns including suicidal ideation, self-harm, and homicidal ideation  Thought Form:  Coherent  Logical     Insight:    Fair    Plan:   Safety Plan: No current safety concerns identified.  Recommended that patient call 911 or go to the local ED should there be a change in any of these risk factors.   Barriers to treatment: None identified  Patient Contracts (see media tab):  None  Substance Use: Provided encouragement towards sobriety   Continue or Discharge: Patient will continue in Adult Partial Hospitalization Program (PHP)  as planned. Patient is likely to benefit from learning and using skills as they work toward the goals identified in their treatment plan.      Eron Gant, SARAH  July 24, 2024

## 2024-07-24 NOTE — GROUP NOTE
Psychoeducation Group Note    PATIENT'S NAME: Basil Andrews  MRN:   [id number]  :   1990  [id number]  DATE OF SERVICE: 24  START TIME:  2:00 PM  END TIME:  2:50 PM  FACILITATOR: Eron Gant LMFT  TOPIC:  Wellness Group: Mental Health Maintenance  [facility] Adult Partial Hospitalization Program  TRACK: PHP1 and PHP2    NUMBER OF PARTICIPANTS: 5    Summary of Group / Topics Discussed:  Mental Health Maintenance:  Stigma: In this group patients explored stigma surrounding a mental health diagnosis.  The group discussed the way stigma impacts their own life, and discussed strategies to reduce. The relationship between physical and mental health were also explored in the context of healthcare access, treatment, and support.    Patient Session Goals / Objectives:  Patients identified the importance of practicing emotional hygiene  Patients identified ways to decrease the  impact of stigma in their own life      Patient Participation / Response:  Fully participated with the group by sharing personal reflections / insights and openly received / provided feedback with other participants.    Demonstrated understanding of topics discussed through group discussion and participation, Identified / Expressed personal readiness to practice skills, and Verbalized understanding of mental health maintenance topic    Treatment Plan:  Patient has a current master individualized treatment plan and today was our weekly review of the patient's progress.  See Epic treatment plan for progress / updates on goals and plan.    SARAH Liu

## 2024-07-25 ENCOUNTER — HOSPITAL ENCOUNTER (OUTPATIENT)
Dept: BEHAVIORAL HEALTH | Facility: CLINIC | Age: 34
Discharge: HOME | End: 2024-07-25
Attending: PSYCHIATRY & NEUROLOGY
Payer: COMMERCIAL

## 2024-07-25 PROCEDURE — H0035 MH PARTIAL HOSP TX UNDER 24H: HCPCS | Performed by: COUNSELOR

## 2024-07-25 PROCEDURE — H0035 MH PARTIAL HOSP TX UNDER 24H: HCPCS

## 2024-07-25 NOTE — GROUP NOTE
Psychotherapy Group Note    PATIENT'S NAME: Basil Andrews  MRN:   [id number]  :   1990  [id number]  DATE OF SERVICE: 24  START TIME: 10:00 AM  END TIME: 10:50 AM  FACILITATOR: Eron Gnat LMFT  TOPIC:  EBP Group: [facility] Hospitalization Program  TRACK: PHP2    This group was observed by student Deven Barnes    NUMBER OF PARTICIPANTS: 3    Summary of Group / Topics Discussed:  Specialty Topics: Goal Setting: Provided education and assessment on patient's group programming goals. Evaluated patient's assessment of their ability to participate in groups, share emotions with group members, and openness to the group format. Provided education regarding appropriate individual-based group therapy goals.     Patient Session Goals and Objectives:  -Participate in reflective assessment of group readiness.  -Understand appropriate topics and methods to discuss those topics in group programming.  -Identify and problem solve barriers to group participation.  -Identify strategies to actively cope while engaging in group programming.   -Reflected up individualized treatment plans  -Meet with members of the treatment team to assess goal progress       Patient Participation / Response:  Fully participated with the group by sharing personal reflections / insights and openly received / provided feedback with other participants.  Basil shared he worried about finances and after PHP is over, he plans to meet with a financial counselor for debt help.  Demonstrated understanding of topics discussed through group discussion and participation, Identified / Expressed readiness to act on skill suggestions discussed in topic, Verbalized understanding of ways to proactively manage illness, and Practiced skills in session    Treatment Plan:  Patient has a current master individualized treatment plan and today was our weekly review of the patient's progress.  See  Epic treatment plan for progress / updates on goals and plan.    Eron Gant LMFT

## 2024-07-25 NOTE — GROUP NOTE
"OT Clinic Group Note    PATIENT'S NAME: Basil Andrews  MRN:   [id number]  :   1990  ACCT. NUMBER: [id number]  DATE OF SERVICE: 24  START TIME: 11:00 AM  END TIME: 11:50 AM  FACILITATOR: America Perrin OTR  TOPIC: [facility] OT Group: Occupational Therapy Clinic  [facility] Partial Hospitalization Program  TRACK: PHP 1 & 2 (merged)    NUMBER OF PARTICIPANTS: 7    Summary of Group / Topics Discussed:  Occupational Therapy Clinic: Provided opportunity for patients to independently choose and engage in a therapeutic activity that supports progress towards their goals and psychiatric recovery. The Occupational Therapy Clinic provides a context for patients to monitor their symptoms, gain self-awareness, practice skills (self-regulation, mindfulness, self-talk, focus/concentration, social, productivity), build a sense of self efficacy and mastery, as well as receive validation, support, and resources. OT checks in, observes, assesses, and monitors performance skills and patterns in context with each group member. Patient was provided orientation to the OT clinic and overview of purpose of the OT clinic in support of meeting their goals.     Patient Session Goals / Objectives:  Independently identify a purposeful and meaningful therapeutic activity.  Identified which goal(s) they are intentionally working on during session.   Reflected on their performance and share insight about their progress.  Practiced and identified a way to generalize a skill to their everyday life      Patient Participation / Response:  Fully participated with the group by sharing personal reflections / insights and openly received / provided feedback with other participants.  Demonstrated understanding of content through engaging in a task of choice (coloring). Patient shared that the group helped him \"take [his] mind off\" stressors.  Patient's affect was appropriate to situation and mood congruent.     Treatment " Plan:  Patient has a current master individualized treatment plan and today was our weekly review of the patient's progress.  See Epic treatment plan for progress / updates on goals and plan.    America Perrin, OTR

## 2024-07-25 NOTE — GROUP NOTE
Psychotherapy Group Note    PATIENT'S NAME: Basil Andrews  MRN:   [id number]  :   1990  [id number]  DATE OF SERVICE: 24  START TIME:  2:00 PM  END TIME:  2:50 PM  FACILITATOR: Eron aGnt LMFT  TOPIC:  EBP Group: Self-Awareness  [facility] Adult Partial Hospitalization Program  TRACK: PHP1 and PHP2 combined    NUMBER OF PARTICIPANTS: 7    Summary of Group / Topics Discussed:  Self-Awareness: Personal Strengths: Topic focused on assisting patients in identifying personal strengths and how they relate to the management of mental health symptoms. Patients discussed the benefits of acknowledging their personal strengths and their impact on mood improvement, mindfulness, and perspective. Patients worked to increase time spent on recognition and appreciation of what is positive and working in their lives. The goal is to reduce rumination and negative thinking resulting in increased mindfulness and resilience. Patients will work to put skills into practice and problem-solve barriers.     Patient Session Goals / Objectives:  Identified personal strengths  Identified barriers to recognition of personal strengths  Verbalized understanding of strategies to increase use of their strengths in management of daily symptoms      Patient Participation / Response:  Fully participated with the group by sharing personal reflections / insights and openly received / provided feedback with other participants.    Demonstrated understanding of topics discussed through group discussion and participation, Demonstrated understanding of values, strengths, and challenges to learn about themselves, Verbalized understanding of ways to proactively manage illness, and Practiced skills in session    Treatment Plan:  Patient has a current master individualized treatment plan and today was our weekly review of the patient's progress.  See Epic treatment plan for progress / updates on goals and plan.    Eron  SARAH Fontana

## 2024-07-25 NOTE — GROUP NOTE
Process Group Note    PATIENT'S NAME: Basil Andrews  MRN:   [id number]  :   1990  [id number]  DATE OF SERVICE: 24  START TIME:  9:00 AM  END TIME:  9:50 AM  FACILITATOR: Eron Gant LMFT  TOPIC:  Process Group    Diagnoses:  1.  Bipolar disorder, current episode manic severe with psychotic features (H)   F31.2          [facility] Adult Partial Hospitalization Program  TRACK: PHP2    The group was observed by student Deven Barnes    NUMBER OF PARTICIPANTS: 4        Data:    Session content: At the start of this group, patients were invited to check in by identifying themselves, describing their current emotional status, and identifying issues to address in this group.   Area(s) of treatment focus addressed in this session included Symptom Management, Personal Safety, and Community Resources/Discharge Planning.  Patient reported feeling powerless with my situation.  He is feeling guilt about financial problems.  He said he is concerned his family might have to file bankruptcy and he is having more stress about it.  He has guilt about being a gambling addict in the past and how it is impacting his family now.   Patient discussed working toward reduce worry about finances.   For skills they will use to address their goal(s), patient identified box breathing.   A barrier to working toward their goal(s) and/or addressing mental health symptoms the patient identified was worrying about finances.  Patient reported feeling better off not alive but he has no thoughts for suicide. He is committed to safety.  Patient reported no substance use. Patient reported they are taking their medications as prescribed.   Patient reported feeling proud/grateful for [place] his [place].  Patient discussed with the treatment group that he is concerned about medication side effects and wondering if he is having some moments of feeling panic attacks related to his financial  stress.    Therapeutic Interventions/Treatment Strategies:  Psychotherapist offered support, feedback and validation. Treatment modalities used include Cognitive Behavioral Therapy. Interventions include Coping Skills: Facilitated understanding of  what factors may contribute to symptom relapse and skills plan to manage symptom relapse  and Symptoms Management: Promoted understanding of their diagnoses and how it impacts their functioning.    Assessment:    Patient response:   Patient responded to session by accepting feedback, giving feedback, listening, focusing on goals, being attentive and accepting support    Possible barriers to participation / learning include: Severity of symptoms    Health Issues:   None reported       Substance Use Review:   Substance Use: No active concerns identified.    Mental Status/Behavioral Observations  Appearance:   Appropriate   Eye Contact:   Good   Psychomotor Behavior: Normal   Attitude:   Cooperative   Orientation:   All  Speech   Rate / Production: Normal    Volume:  Normal   Mood:    Anxious Depressed  Affect:    Worrisome  Thought Content:   Clear and Safety Patient reported feeling better off not alive but he has no thoughts for suicide. He is committed to safety.   Thought Form:  Coherent  Logical     Insight:    Fair    Plan:   Safety Plan: Patient committed to safety.  Recommended that patient call 911 or go to the local ED should there be a change in any of these risk factors.   Barriers to treatment: None identified  Patient Contracts (see media tab):  None  Substance Use: Provided encouragement towards sobriety   Continue or Discharge: Patient will continue in Adult Partial Hospitalization Program (PHP)  as planned. Patient is likely to benefit from learning and using skills as they work toward the goals identified in their treatment plan.      SARAH Liu  July 25, 2024

## 2024-07-25 NOTE — GROUP NOTE
Psychoeducation Group Note    PATIENT'S NAME: Basil Andrews  MRN:   [id number]  :   1990  [id number]  DATE OF SERVICE: 24  START TIME:  1:00 PM  END TIME:  1:50 PM  FACILITATOR: Denisha Daniels RN  TOPIC:  Wellness Group: Brain [facility] Adult Partial Hospitalization Program  TRACK: PHP 1&2 (merged)    NUMBER OF PARTICIPANTS: 7    Summary of Group / Topics Discussed:  Brain Health:  Pathophysiology of Mood Disorders: Patients were educated on mood disorder etiology and neuroscience, risk factors, symptoms, and pharmacologic, psychotherapeutic, and complementary treatment options. Patients were guided on a discussion of mental, behavioral, and physical symptoms and shared their symptoms with the group.     Patient Session Goals / Objectives:  Described what mood disorders are and identified risk factors   Explained how chemical imbalances in the brain can cause symptoms and how medications work to reverse this imbalance   Identified and described pharmacologic, psychotherapeutic, and complementary treatment options      Patient Participation / Response:  Fully participated with the group by sharing personal reflections / insights and openly received / provided feedback with other participants.    Identified / Expressed personal readiness to practice skills    Treatment Plan:  Patient has a current master individualized treatment plan.  See Epic treatment plan for more information.    Denisha Daniels RN

## 2024-07-25 NOTE — GROUP NOTE
Psychotherapy Group Note    PATIENT'S NAME: Basil Andrews  MRN:   [id number]  :   1990  [id number]  DATE OF SERVICE: 24  START TIME:  2:00 PM  END TIME:  2:50 PM  FACILITATOR: Eron Gant LMFT  TOPIC:  EBP Group: Self-Awareness  [facility] Adult Partial Hospitalization Program  TRACK: PHP1 and PHP2 combined    This group was observed by student Deven Barnes    NUMBER OF PARTICIPANTS: 8    Summary of Group / Topics Discussed:  Self-Awareness: Values: Patients identified personal values by examining development of their current values and how their values influence their daily functioning and life choices. Patients explored the impact of their values on their thoughts, feelings, and actions. Patients discussed definition of personal values and how they develop and change over time. The goal is to help patients reconcile value conflicts and achieve balance and flexibility to improve mood and daily functioning.     Patient Session Goals / Objectives:  Examined development of values and impact of values on functioning  Identified and prioritized important values related to current well-being   Identified strategies to change or enhance values to positively impact symptoms  Assisted patients to find ways to adapt functioning to better fit their values      Patient Participation / Response:  Fully participated with the group by sharing personal reflections / insights and openly received / provided feedback with other participants.    Demonstrated understanding of topics discussed through group discussion and participation, Verbalized understanding of ways to proactively manage illness, and Practiced skills in session    Treatment Plan:  Patient has a current master individualized treatment plan.  See Epic treatment plan for more information.    SARAH Liu

## 2024-07-26 ENCOUNTER — HOSPITAL ENCOUNTER (OUTPATIENT)
Dept: BEHAVIORAL HEALTH | Facility: CLINIC | Age: 34
Discharge: HOME | End: 2024-07-26
Attending: PSYCHIATRY & NEUROLOGY
Payer: COMMERCIAL

## 2024-07-26 ENCOUNTER — TELEPHONE (OUTPATIENT)
Dept: BEHAVIORAL HEALTH | Facility: CLINIC | Age: 34
End: 2024-07-26
Payer: COMMERCIAL

## 2024-07-26 DIAGNOSIS — F31.2 BIPOLAR DISORDER, CURRENT EPISODE MANIC SEVERE WITH PSYCHOTIC FEATURES (H): ICD-10-CM

## 2024-07-26 PROCEDURE — H0035 MH PARTIAL HOSP TX UNDER 24H: HCPCS | Performed by: SOCIAL WORKER

## 2024-07-26 PROCEDURE — 99214 OFFICE O/P EST MOD 30 MIN: CPT | Performed by: PSYCHIATRY & NEUROLOGY

## 2024-07-26 PROCEDURE — H0035 MH PARTIAL HOSP TX UNDER 24H: HCPCS

## 2024-07-26 RX ORDER — HALOPERIDOL 5 MG/1
TABLET ORAL
Qty: 60 TABLET | Refills: 1 | Status: SHIPPED | OUTPATIENT
Start: 2024-07-26 | End: 2024-07-29

## 2024-07-26 ASSESSMENT — COLUMBIA-SUICIDE SEVERITY RATING SCALE - C-SSRS
1. SINCE LAST CONTACT, HAVE YOU WISHED YOU WERE DEAD OR WISHED YOU COULD GO TO SLEEP AND NOT WAKE UP?: NO
2. HAVE YOU ACTUALLY HAD ANY THOUGHTS OF KILLING YOURSELF?: NO

## 2024-07-26 NOTE — TELEPHONE ENCOUNTER
----- Message from Haylee Mosqueda sent at 7/26/2024  1:03 PM CDT -----  Regarding: discharge  Discharge from [facility] at the end of day, please remove from the [facility]!    Patient will attend IOP/DT 2 (Psychosis) starting on 7/29/24  M, W, TH- 12:00pm-3:00PM    Thank You!

## 2024-07-26 NOTE — GROUP NOTE
Psychotherapy Group Note    PATIENT'S NAME: Basil Andrews  MRN:   [id number]  :   1990  [id number]  DATE OF SERVICE: 24  START TIME:  2:00 PM  END TIME:  2:50 PM  FACILITATOR: Patricia Frey LICSW  TOPIC: MH EBP Group: Enhanced Mindfulness  [facility] Adult Partial Hospitalization Program  TRACK: 1 and 2 merged    NUMBER OF PARTICIPANTS: 6    Summary of Group / Topics Discussed:  Enhanced Mindfulness: Body and Mind Integration: Patients received an overview and education regarding the importance of including the body in the management of emotional health and self-care and as a direct route to mindfulness practice.  Patients discussed various ways in which the body can serve as an informant to their physical and emotional experiences/need. Patients discussed the body as a direct link to the present moment and to mindfulness practice.  Patients discussed current relationship with body, self-awareness, mindfulness practice and barriers to connection with body.  Patients were guided through breath work and movement to facilitate greater self-awareness, grounding, self-expression, and connection to other.  Patients discussed how the experiential could be applied to better manage mental health and develop phillips connection to self.    Patient Session Goals / Objectives:  Identify how movement awareness could be used for grounding, stress management, self-expression, connection to other and self-regulation  Improved awareness of breath and movement preferences  Identify how movement and the body is used in mindfulness practice  Reflect on use of these practices in everyday life.  Identify barriers to attending to body      Patient Participation / Response:  Fully participated with the group by sharing personal reflections / insights and openly received / provided feedback with other participants.    Demonstrated understanding of topics discussed through group discussion and  participation, Identified / Expressed readiness to act on skill suggestions discussed in topic, and Practiced skills in session    Treatment Plan:  Patient has See Epic Treatment Plan - Patient is discharging.    Patricia Frey, ANSELMOSW

## 2024-07-26 NOTE — GROUP NOTE
Psychotherapy Group Note    PATIENT'S NAME: Basil Andrews  MRN:   [id number]  :   1990  [id number]  DATE OF SERVICE: 24  START TIME:  1:00 PM  END TIME:  1:50 PM  FACILITATOR: Jose Blackwood LPC  TOPIC:  EBP Group: [facility] Adult Partial Hospitalization Program    TRACK: PHP-2    NUMBER OF PARTICIPANTS: 3    Summary of Group / Topics Discussed:  Specialty Topics: Grief/Transitions: Patients received an overview of the grief process.  Patients explored their relationship to loss and how that has affected their mental health symptoms.  Strategies for recognizing loss and ideas for engaging in the grief process was presented and discussed.  Patients identified needs for support and coping skills to manage loss.  The purpose of this specialty topic is to help patients identify the aspects of change due to loss that individuals experience in addition to mental health symptoms to better cope with the grief, loss, and life transitions.      Patient Session Goals / Objectives:  Identified grief, loss, and life transitions   Discussed how grief impacts mental health symptoms and disrupts usual functioning  Identified needs for support and coping with grief and planned further action for coping    Patient Participation / Response:  Fully participated with the group by sharing personal reflections / insights and openly received / provided feedback with other participants.    Demonstrated understanding of topics discussed through group discussion and participation, Identified / Expressed readiness to act on skill suggestions discussed in topic, Verbalized understanding of ways to proactively manage illness, Identified plan to address barriers to practicing skills discussed in topic, and Practiced skills in session    Treatment Plan:  Patient has a current master individualized treatment plan.  See Epic treatment plan for more information.    Jose Blackwood,  LPC

## 2024-07-26 NOTE — GROUP NOTE
Psychotherapy Group Note    PATIENT'S NAME: Basil Andrews  MRN:   [id number]  :   1990  [facility] NUM[facility]: [id number]  DATE OF SERVICE: 24  START TIME: 10:00 AM  END TIME: 10:50 AM  FACILITATOR: Miguelina Colón LICSW  TOPIC: MH EBP Group: Coping Skills  [facility] Adult Mental Health Outpatient Programs  TRACK: [facility] Merge    NUMBER OF PARTICIPANTS: 7    Summary of Group / Topics Discussed:  Coping Skills: Additional Coping Skills:  Patients discussed and practiced COPE AHEAD.  Reviewed the benefits of applying the aforementioned coping strategies.  Patients explored how these strategies might be applied to daily stressors or distressing situations.    Patient Session Goals / Objectives:  Understand the purpose and benefits of applying cope ahead coping strategies    Address barriers to utilizing coping skills when in distress.      Patient Participation / Response:  Fully participated with the group by sharing personal reflections / insights and openly received / provided feedback with other participants.    Demonstrated understanding of topics discussed through group discussion and participation    Treatment Plan:  Patient has a current master individualized treatment plan.  See Epic treatment plan for more information.    INDIGO Baker

## 2024-07-26 NOTE — PROGRESS NOTES
"       Adult Mental Health Intensive Outpatient Discharge Summary/Instructions      Patient: Baisl Andrews MRN: [id number]   : 1990 Age: 33 year old Sex: male     Admission Date: 2024  Discharge Date: 2024  Diagnosis: Bipolar I     Focus of Treatment / Progress    Personal Safety:     Step 1: Warning signs:     Warning Signs     sexual thoughts, always hungry, zero sleep, spending money \" alot of craziness\"      Step 2: Internal coping strategies - Things I can do to take my mind off my problems without contacting another person:     Strategies     letting self know when I am overwhelmed or anxious     play basketball, football, bowling     boating      Step 3: People and social settings that provide distraction:     Name Contact Information     family, friends and relatives          Places     therapist     [facility]      Step 4: People whom I can ask for help during a crisis:     Name Contact Information     Mom- Madyson Valderrama [phone]     James Gatica [phone]      Step 5: Professionals or agencies I can contact during a crisis:     Clinician/Agency Name Phone Emergency Contact     Therapist- Nabil Nguyen         Psychiatrist-           * Follow your safety plan     * Call crisis lines as needed:    [facility] [phone] [facility] [phone]  [facility] [phone] Crisis Connection [phone]  [facility] [phone] [facility] [phone]  [facility] [phone] National Suicide Prevention [phone]  [facility] [phone] Suicide Prevention [phone]  [facility] [phone]    Managing symptoms of:  Impulsivity, misha, anxiety     Community support/health:  [place]ness   Https://namimn.org/    [place] Warm line Peer support  https://mentalhealthmn.org/support/[place]-warmline/   121.266.5893  Text \"Support\" to 24519    [place] Mental Health Help line  [phone]     Managing Symptoms and Preventing " Relapse    * Go to all of your appointments    * Take all medications as directed.      * Carry a current list if medication with you    * Do not use illicit (street) drugs.  Avoid alcohol    * Report these symptoms to your care team. These are early signs of relapse:   Thoughts of suicide   Losing more sleep   Increased confusion   Mood getting worse   Feeling more aggressive   Other:      *Use these skills daily:  grounding, emotional regulation     Copy of summary sent to: patient via [employer]     Follow up with psychiatrist / main caregiver:   Pt will continue with [facility] psychiatry and set up outside provider  Next visit: 7/29/24    Follow up with your therapist: Nabil Nguyen  Next visit: will make apt.    Go to group therapy and / or support groups at: Will attend Psychosis Track starting on 7/29- 12-3PM T, W, TH    See your medical doctor about:  any symptoms that arise    Other:      Thank You!  Thank you for choosing [facility] for your care - it was an honor to serve you. One way we continue to improve is by listening to patients and family members. If you receive a survey, please take the time to complete it and share your experience with us. Our goal is to always provide great care and an excellent experience. We hope you feel comfortable recommending our services to your family and friends.       Client Signature:_______________________  Date / Time:___________  Staff Signature:________________________   Date / Time:___________

## 2024-07-26 NOTE — PROGRESS NOTES
"[facility]   Partial Hospitalization Program  Interval/Follow-up Psychiatric Evaluation    Patient: Basil Andrews  Preferred Name: Basil  MRN: [id number]  : 1990  Acct. No.: [id number]  Date of Service: 24  PHP Track:  2            Interval History:  \"I'm starting to feel a lot better.\" Basil presents today for follow-up and ongoing program supervision. 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 He would like to try 15 mg twice daily as his standing dose  Reviewed risks and benefits  Feels ready to discharge from [facility] today as planned, start IOP next week    Review of Symptoms  Sleep: improving  Appetite: improved  Suicidal ideation: has passive suicidal thoughts described as \"if a plane fell on me I wouldn't care, but I would never act on anything,\" endorses Anglican beliefs as one protective factor  Thoughts of non-suicidal self-injury: denied  Recent self-injurious behavior: denied  Homicidal ideation: denied  Other safety concerns: denied    Activities of Daily Living and Related Systems Impacted by Illness:  Hygiene: improved  Socialization: no change  Activities of Daily Living: (cleaning, shopping, bills, etc.): improving  Concerns related to work: see above    Substance use:  denies      Current Medications:  Current Outpatient Medications   Medication Sig Dispense Refill    haloperidol (HALDOL) 5 MG " tablet Take 1 tablet (5 mg) by mouth 2 times daily. May also take 1 tablet (5 mg) 2 times daily as needed for agitation or other (psychotic symptoms). 60 tablet 1    benztropine (COGENTIN) 1 MG tablet Take 1 tablet (1 mg) by mouth 2 times daily 60 tablet 1    divalproex sodium extended-release (DEPAKOTE ER) 500 MG 24 hr tablet Take 3 tablets (1,500 mg) by mouth at bedtime 90 tablet 1    divalproex sodium extended-release (DEPAKOTE ER) 500 MG 24 hr tablet Take 1 tablet (500 mg) by mouth daily 30 tablet 1    fluticasone (FLONASE) 50 MCG/ACT nasal spray Spray 2 sprays into both nostrils daily      haloperidol (HALDOL) 10 MG tablet Take 1 tablet (10 mg) by mouth 2 times daily 60 tablet 1    ipratropium (ATROVENT) 0.06 % nasal spray Spray 2 sprays into both nostrils 4 times daily      multivitamin, therapeutic with minerals (MULTI-VITAMIN) TABS Take 1 tablet by mouth daily      propranolol (INDERAL) 10 MG tablet Take 1 tablet (10 mg) by mouth 3 times daily as needed (restlessness) 60 tablet 1       The above list was reviewed and updated in EPIC with patient today.     Patient is taking medications as prescribed and denies adverse effects    Laboratory Results:  Most recent labs reviewed. Pertinent updates/findings: None.       Mental Status Examination:  Vital Signs: There were no vitals taken for this visit.   Appearance: appropriately groomed, appears stated age, and in no apparent distress.  Attitude: cooperative   Eye Contact: good   Muscle Strength and Tone: normal  Psychomotor Behavior: normal or unremarkable, no visible tremor (pill-rolling or otherwise), no fasciculations noted  Gait and Station: normal width, turn, arm swing  Speech: clear, coherent, decreased prosody, regular rate, regular rhythm, and fluent  Associations: No loosening of associations  Thought Process: coherent and goal directed  Thought Content: no evidence of psychotic thought, passive suicidal ideation present, no auditory hallucinations  "present, and no visual hallucinations present  Mood: \"better\"  Affect: mood congruent, intensity is normal, and reactive  Insight: good  Judgment: intact, adequate for safety  Impulse Control: intact  Oriented to: time, place, person, and situation  Attention Span and Concentration: Normal  Language: Intact  Recent and Remote Memory: Delayed & immediate recall intact  Fund of Knowledge/Assessment of Intelligence: Average  Capacity of Activities of Daily Living: Independent, able to participate in programmatic care services.    Diagnosis/es:    ICD-10-CM    1. Bipolar disorder, current episode manic severe with psychotic features (H)  F31.2 haloperidol (HALDOL) 5 MG tablet        Cannot rule out 295.70  (F25) Schizoaffective Disorder Bipolar Type    Assessment/Plan:  Basil presents today for follow-up psychiatric evaluation and assessment of progress. Endorses continued improvement. Thought disorder symptoms continue to improve and neologisms and intrusive ideas of reference persist. Agree with a trial of increased haloperidol. Patient now taking one 10 mg tab and one 5 mg tab twice daily, can take 2.5 to 5 mg twice daily as needed. Discussed that if the increased dose leads to EPS he can take additional benztropine or diphenhydramine.    Has reached maximum benefit at this level of care and will discharge from [facility] today. High residual symptoms and will benefit from additional therapy and monitoring. Enroll in IOP next week.    Bipolar disorder   Overall markedly improved   Still residual thought disorder symptoms  Discharge from [facility]  Admit to IOP  Increase haloperidol to 15 mg twice daily   Can use additional benztropine as needed, or diphenhydramine   No other medication changes      Safety Assessment:  Basil reports suicidal ideation and/or non-suicidal self-injury or thoughts thereof as noted above  Basil is future-oriented and is engaged in treatment planning   I do not feel that Basil meets criteria for " a 72-hour involuntary hold and remains appropriate for an outpatient level of care    Basil Tesfaye MD on 7/26/2024 at 12:10 PM    Visit Details:  Type of service: In-person  Location (patient and provider): [facility] Adult Mental Health Outpatient Programmatic Care Offices    Level of Medical Decision Making:   - At least 1 chronic problem that is not stable  - Engaged in prescription drug management during visit (discussed any medication benefits, side effects, alternatives, etc.)  Discussion of management or test interpretation with external physician/other qualified healthcare professional/appropriate source - programmatic care multidisciplinary treatment team

## 2024-07-26 NOTE — PROGRESS NOTES
Intensive Outpatient Program   Physician Certification of Medical Necessity    Patient Name: Basil Andrews  Patient Preferred Name: Basil  Patient : 1990  Patient MRN: [id number]    Attending physician: Basil Tesfaye MD      Admission Certification:    I certify the above-named patient would require partial hospitalization care if intensive outpatient services were not provided and that the patient requires such IOP services for a minimum of 9 hours per week. These services are provided under the care and supervision of a physician and under an individualized plan of treatment that is established and periodically reviewed by a physician in consultation with appropriate staff participating in the program.    From admission date: 2024 to 60 day: 2024    Basil Tesfaye MD on 2024 at 5:08 PM

## 2024-07-26 NOTE — GROUP NOTE
Process Group Note    PATIENT'S NAME: Basil Andrews  MRN:   [id number]  :   1990  [id number]  DATE OF SERVICE: 24  START TIME:  9:00 AM  END TIME:  9:50 AM  FACILITATOR: Haylee Mosqueda LICSW  TOPIC:  Process Group    Diagnoses:    1.  Bipolar disorder, current episode manic severe with psychotic features (H)   F31.2          [facility] Partial Hospitalization Program  TRACK: PHP Merge    NUMBER OF PARTICIPANTS: 7        Data:    Session content: At the start of this group, patients were invited to check in by identifying themselves, describing their current emotional status, and identifying issues to address in this group.   Area(s) of treatment focus addressed in this session included Symptom Management and Personal Safety.    Patient reported feeling okay. Patient discussed working toward logistical barriers. Patient identified routine as skills they will use to address their goal(s). Patient reported logistics may be a barrier to working toward their goal(s) and/or addressing mental health symptoms. Patient reported no safety concerns and/or self-injurious behaviors. Patient reported no substance use. Patient reported they are taking their medications as prescribed. Patient reported feeling proud that they part of group. Patient discussed discharge with the treatment group.              Therapeutic Interventions/Treatment Strategies:  Treatment modalities used include Cognitive Behavioral Therapy and Dialectical Behavioral Therapy.    Assessment:    Patient response:   Patient responded to session by accepting feedback, giving feedback, and listening    Possible barriers to participation / learning include: and no barriers identified    Health Issues:   None reported       Substance Use Review:   Substance Use: No active concerns identified.    Mental Status/Behavioral Observations  Appearance:   Appropriate   Eye Contact:   Good   Psychomotor Behavior: Normal    Attitude:   Cooperative   Orientation:   All  Speech   Rate / Production: Normal/ Responsive Normal    Volume:  Normal   Mood:    Normal  Affect:    Appropriate   Thought Content:   Clear  Thought Form:  Coherent  Logical     Insight:    Good     Plan:   Safety Plan: No current safety concerns identified.  Recommended that patient call 911 or go to the local ED should there be a change in any of these risk factors.   Barriers to treatment: None identified  Patient Contracts (see media tab):  None  Substance Use: Not addressed in session   Continue or Discharge: Patient will continue in Adult Partial Hospitalization Program (PHP)  as planned. Patient is likely to benefit from learning and using skills as they work toward the goals identified in their treatment plan.      Haylee Mosqueda, [facility] HospitalJAYME  July 26, 2024

## 2024-07-26 NOTE — GROUP NOTE
Psychoeducation Group Note    PATIENT'S NAME: Basil Andrews  MRN:   [id number]  :   1990  ACCT. NUMBER: [id number]  DATE OF SERVICE: 24  START TIME: 11:00 AM  END TIME: 11:50 AM  FACILITATOR: Denisha Daniels RN  TOPIC:  Wellness Group: Medication Education and Management  [facility] Partial Hospitalization Program  TRACK: PHP1&2 (merged)    NUMBER OF PARTICIPANTS: 7    Summary of Group / Topics Discussed:  Medication Educations and Management:  Medication Jeopardy: Patients provided education regarding medication safety, antidepressants, side effects, neuroleptics, expected medication outcomes, knowledge of diagnosis, symptoms, and symptom management through an engaging jeopardy-style format.     Patient Session Goals / Objectives:    Participated in team-based Jeopardy game  Identified strategies for safe use, handling, and disposal of medications  Discussed basic aspects of medication safety, side effects, adverse outcomes and contraindications      Patient Participation / Response:  Fully participated with the group by sharing personal reflections / insights and openly received / provided feedback with other participants.     Identified / Expressed personal readiness to practice skills and Verbalized understanding of medication education and management topic    Treatment Plan:  Patient has a current master individualized treatment plan.  See Epic treatment plan for more information.    Denisha Daniels RN

## 2024-07-29 ENCOUNTER — HOSPITAL ENCOUNTER (OUTPATIENT)
Dept: BEHAVIORAL HEALTH | Facility: CLINIC | Age: 34
Discharge: HOME | End: 2024-07-29
Attending: PSYCHIATRY & NEUROLOGY
Payer: COMMERCIAL

## 2024-07-29 DIAGNOSIS — G25.9 EXTRAPYRAMIDAL AND MOVEMENT DISORDER: ICD-10-CM

## 2024-07-29 DIAGNOSIS — F31.2 BIPOLAR DISORDER, CURRENT EPISODE MANIC SEVERE WITH PSYCHOTIC FEATURES (H): ICD-10-CM

## 2024-07-29 PROCEDURE — 99214 OFFICE O/P EST MOD 30 MIN: CPT | Performed by: PSYCHIATRY & NEUROLOGY

## 2024-07-29 PROCEDURE — 90853 GROUP PSYCHOTHERAPY: CPT | Performed by: PSYCHOLOGIST

## 2024-07-29 RX ORDER — HALOPERIDOL 5 MG/1
TABLET ORAL
Qty: 60 TABLET | Refills: 1 | Status: SHIPPED | OUTPATIENT
Start: 2024-07-29 | End: 2024-09-13

## 2024-07-29 RX ORDER — BENZTROPINE MESYLATE 1 MG/1
TABLET ORAL
Qty: 60 TABLET | Refills: 1 | Status: SHIPPED | OUTPATIENT
Start: 2024-07-29 | End: 2024-08-05

## 2024-07-29 NOTE — GROUP NOTE
Psychotherapy Group Note    PATIENT'S NAME: Basil Andrews  MRN:   [id number]  :   1990  [id number]  DATE OF SERVICE: 24  START TIME: 12:00 PM  END TIME: 12:50 PM  FACILITATOR: Lily Espino Psy.D, LP  TOPIC: MH EBP Group: Coping Skills  [facility] Mental Health Outpatient Programs  TRACK: iop/dt2p  6B      NUMBER OF PARTICIPANTS: 6    Summary of Group / Topics Discussed:  Coping Skills: Grounding: Patients discussed and practiced strategies to increase attachment / presence to the current moment.  Patients identified situations in which using these strategies will help improve emotion regulation sense of calm in the body.  Reviewed the benefits of applying grounding strategies, as well as past / current practices of each member.  Patients identified situations in which using these strategies would reduce stress. They developed the ability to distinguish when these strategies can be useful in their lives for management and stress and psychological well-being.    Patient Session Goals / Objectives:  Understand the purpose of using grounding strategies to reduce stress.  Verbalize understanding of how and when to apply grounding strategies to reduce distress and increase presence in the moment.  Review patients current grounding practices and discuss a more formal way of practicing and accessing skills.  Practice using various calming strategies (e.g. 5-4-3-2-1; mental and body awareness).  Choose 1-2 grounding strategies to apply during times of distress.      Patient Participation / Response:  Fully participated with the group by sharing personal reflections / insights and openly received / provided feedback with other participants.    Demonstrated knowledge of when to consider using a variety of coping skills in daily life    Treatment Plan:  Patient has a current master individualized treatment plan and today was our weekly review of the patient's progress.  See Epic  treatment plan for progress / updates on goals and plan.    Lily Espino Psy.D, LP

## 2024-07-29 NOTE — GROUP NOTE
"Process Group Note    PATIENT'S NAME: Basil Andrews  MRN:   [id number]  :   1990  [id number]  DATE OF SERVICE: 24  START TIME:  1:00 PM  END TIME:  1:50 PM  FACILITATOR: Lily Espino Psy.D, LP  TOPIC:  Process Group    Diagnoses:  295.70  (F25) Schizoaffective Disorder Bipolar Type.  296.44 Bipolar I Disorder Current or Most Recent Episode Manic, with psychotic features.  Provisional Diagnosis: 296.21 (F32.0) Major Depressive Disorder, Single Episode, Mild _ and With mixed features   Psychiatry   Date/time: 2024 @ 8:00 AM Telephone  Provider:  Donta Bob MD  Address: [facility] [address], [facility] [address] [address]  Phone: [phone]  Fax: [phone]  Individual Therapy:  Nabil Craig   Address: [place] [address]  Phone: [phone]  Fax: [phone]   Note:  Provider is out of office -  please call or e-mail [email or web address] to schedule a follow up once he is back in office.  PCP: Dmitry Tee PA-C  Location: [facility] [place] [facility], [address], [place] [address]  Phone: [phone]  Fax: [phone]       [facility] Mental [facility] Outpatient Programs  TRACK: iop/dt2p  6B      NUMBER OF PARTICIPANTS: 6        Data:    Session content: At the start of this group, patients were invited to check in by identifying themselves, describing their current emotional status, and identifying issues to address in this group.   Area(s) of treatment focus addressed in this session included Symptom Management, Personal Safety, and Community Resources/Discharge Planning.  Client reported being safe today.  Reported mood is \"anxious, frustrated and some paranoia.\"     Goal for today is to attend group therapy. The client talked to the group about how he is trying to not get overwhelmed by life changes. He talked to others about how " "he will be transferred to [place] in September. He stated that he has to sell his house and that his wife will need to find a different job as well. He stated that he is practicing breathing techniques and grounding skills. He stated that he hears voices at times and has problems \"mishearing people.\" He noticed that it can trigger his paranoia. He reported being sober and having more problems with concentration.      Therapeutic Interventions/Treatment Strategies:  Psychotherapist reinforced use of skills. Treatment modalities used include Cognitive Behavioral Therapy and Dialectical Behavioral Therapy. Interventions include Relapse Prevention: Facilitated understanding of effective coping skills in response to triggers for substance use, Mindfulness: Facilitated discussion of when/how to use mindfulness skills to benefit general health, mental health symptoms, and stressors, Symptoms Management: Promoted understanding of their diagnoses and how it impacts their functioning, and Emotions Management:  Reviewed opposite action skill.    Assessment:    Patient response:   Patient responded to session by listening, focusing on goals, and being attentive    Possible barriers to participation / learning include: severity of symptoms    Health Issues:   None reported       Substance Use Review:   Substance Use: No active concerns identified.    Mental Status/Behavioral Observations  Appearance:   Appropriate   Eye Contact:   Good   Psychomotor Behavior: Normal   Attitude:   Cooperative   Orientation:   All  Speech   Rate / Production: Normal    Volume:  Normal   Mood:    Anxious   Affect:    Constricted   Thought Content:   Rumination and Psychosis reports hallucinations auditory and paranoia  Thought Form:  Coherent  Logical  Psychosis    Insight:    Good     Plan:   Safety Plan: Recommended that patient call 911 or go to the local ED should there be a change in any of these risk factors.   Barriers to treatment: None " identified  Patient Contracts (see media tab):  None  Substance Use: Provided encouragement towards sobriety   Continue or Discharge: Patient will continue in Adult Day Treatment (ADT)  as planned. Patient is likely to benefit from learning and using skills as they work toward the goals identified in their treatment plan.      Camden Reed.LUTHER, LP  July 29, 2024

## 2024-07-29 NOTE — PROGRESS NOTES
"Individualized Treatment Plan         Patient's Name:         Basil Andrews            Preferred Name:        Basil  Pronouns:                  He/Him              :                           1990  MRN:                           [id number]     Program Admission Date: 2024      Estimated Program Discharge Date: 2024 (Please note, this date is subject to change based on needs and progress toward identified goals.)       Date of Initial Treatment Plan: 2024     Chief Concern: I am in this program because: \"Sexual issues, awkward issues sexuality, and how to act in front of everyone\"        Long Term Goal: \"How to stay surface-level, get myself into the cheese topic conversations of the world not Denominational or my mental health.\"     Goal Areas:  GOAL AREA 1: Personal Safety       Goal Status:  stopped 24  Description:   Suicidal ideation: On 24:  Basil reported \"Sometimes I have those passive thoughts I'm better dead, but no thoughts of actually doing anything.\"   Self-injury urges: n/a  Last acted on self-injury: na  Skills that help me cope with self-injury: n/a  SMART goals/tasks:  Basil will listen to light, uplifting music when he is feeling down  Report to the treatment team about any suicidal thoughts.    Progress updates:  24:  Basil reported \"Sometimes I have those passive thoughts I'm better dead, but no thoughts of actually doing anything.\"  Basil is committed to safety if any safety concerns come up.  : I have passive thoughts more saying \"what if I got hit by a bus\" would it be easier? Every few days, fleeting thoughts. I like listening to classical music. Used to like to listen to Yazidism talk show. When I was manic it was triggering, but I've been able to listen to it normally.\" Caring and Sharing Hands\"  2024    He reported no suicidal thoughts or intents or plans.  2024 Basil denies S/I or safety issues. Luz Pa, NICOLE, LICSW " "(SP)  9/5/2024 He reported being safe     GOAL AREA 2: Symptom Management      Goal Status:  stopped 9/12/24  Description:   impulsivity, unhelpful or intrusive thoughts, and \"trouble letting things go or getting fixated\" , lack of interest or pleasure in activities   Feelings of regret for past behaviors, thought distortions  While in program I would like to accomplish: \"be able to stop fixating on things\"  I will know I am making progress when: \"I feel at ease, be able to get into the rhythm of things in social situations\"  I think I will be ready to discharge from the program when: \"I feel at ease and find peace\"  SMART goals/tasks:  Continue to experiment with taking breaks when needed to help center myself and continue checking in with group on process  Attend group therapy regularly.    Progress updates:  7/16/24 progress:  Basil reported \"I feel like my main issues are to work on active listening.\"  \"When I'm at home, one thing I do, when I get agitated, I go lay down with a blanket on the pullout couch.\"  If he is with his son he will have his son watch a little TV while Basil lays down, or has his son lay down while his son plays around him.  7/23: intrusive thoughts have gone away, there is still a lot of numerology and paranoia about certain things. I've been taking breaks still but more about being restless rather than the intrusive thoughts.   7/29/2024  He reported feeling anxious and frustrated about his transfer to another state. He noted paranoid thoughts related to the anxiety.  7/30/2024 He reported past regrets about past experiences about paranoia feelings and wants to explore his current thoughts and views. He talked to the group about learning, growing, and accepting his changes that he has made.   8/13/2024 Basil is aware of fatigue and sleepiness. He continues with provider for medication management. He will reduce Haldol 5 mg. At times it feels as if he is in hibernation.  " "SP  9/5/2024 He reported a decrease in symptoms and recognizes the negative thoughts and the paranoia thoughts     GOAL AREA 3: Daily Life Skills         Goal Status:  stopped 9/12/24  Description:   Communication with family, structure and routine, home management, leisure participation (activities for enjoyment, hobbies), self-care activities, life roles (parenting, working, going to school, care giving, volunteering, etc.), social participation, and community engagement  While in program I would like to accomplish: \"find my rhythm, peace\"  I will know I am making progress when: \"I don't feel awkward.\"  I think I will be ready to discharge from the program when: \"I can rebuild my concentration to be able to return to work.\"  SMART goals/tasks:  Allow myself to feel the heat of embarrassment.   Added 7/16/24:  At night Basil will \"shave, shower and brush my teeth\" after his son falls asleep.  He will also take his medication at that time.  Communicate openly with family.  Progress updates:  Progress 7/16/24:  Currently Basil showers and brushes his teeth daily but he only shaves every other day.  7/23: Picking up son from  now by myself! I brush my teeth morning and night I have been shaving and showering in the morning after he goes to day care. In the AM from 7:30-8:10.  I was using this time to nap, but found it made me more sleepy, so I have been using that time for self care instead.   7/25/24:  Basil reported the communication with his wife is \"better than it was.\" He has been working to \"stay calm when we are going through arguments or disagreements.\"  He thinks more about his wife's perspective.  I will know I am making progress when: \"For my wife and I, I'll know I'm making progress when we stop arguing so much. For group, I just need to make sure that I'm being patient.\"  I think I will be ready to discharge from the program when: \"When those skills are easier to practice.\"  SMART " "goals/tasks:  \"I would like to move from just hearing to listening.  I'd like to provide validation to my wife when communicating with her.\"  \"I'd like to let at least one group participant speak before I do when the facilitator poses a question to the group.\"     7/29/2024  He reported difficulties in communicating with his wife and sometimes \"mishearing\" what others said.Progress   8/13/2024 Basil continues to communicate about realistic expectation for task management at home. He reports it taking longer to complete a domestic task. He can engage in mowing the lawn, dishes and the babies laundry. He would like to increase his task responsibilities at home. SP  9/5/2024 He reported fatigue from medications and talked to psychiatry about this. He stated that it is difficult to get chores done at home and care for his 3 year old son with the fatigue.     GOAL AREA 4: Wellness  Goal Status:  stopped 9/12/24  Description:   Hydration     While in program I would like to accomplish:  Progress updates:  7/18: I want to drink a glass of water tomorrow.--Pt completed this goal by 7/19 and reported he felt good, after drinking about 40oz water! Said he has been waking up with cramps in his legs and is hopeful hydration may help address that.  8/13/2024 Basil continues maintaining his hydration by drinking water through out the day. SP  9/5/2024 He reported healthy habits.     GOAL AREA 5: Aftercare Plan  Goal Status:  stopped 9/12/24  Description:     After program is completed I will: \"continue with psychiatry and therapy.\"  I will know I am making progress when: \"My extended family and I experience healing\"  I think I will be ready to discharge from the program when: \"I'm consistently able to attend the support group.\"  SMART goals/tasks:  \"Go to [facility] and other groups that Eron suggested I attend.  I'd like to attend every week until I feel like I don't need to.\"     Progress updates:    7/29.  Basil shared he " "worried about finances and after PHP is over, he plans to meet with a financial counselor for debt help.  8/13/2024 Basil would like to explore and engage in [facility] groups. There is a group that meets on Thursdays close to home. He will consult with [facility]  at 11:00 am on Thursday. SP  9/5/2024 He stated that he is moving his care to [place], and is moving there in September.     GOAL AREA 6: Other Goal(s); Substance Use, Cultural Needs, and/or Something Else I Need  Goal Status:  stopped 9/12/24  Description:   Remain sober from all substances    While in program I would like to accomplish: \"continue to remain sober\"  I will know I am making progress when: \"\"    SMART goals/tasks:  Try to stay at 1 drink per social event.  Limit drinking alcohol at social events and try to have non-alcoholic drinks as an alternative.    Progress updates:  7/29/2024  He reported talking to his wife about this and has been sober from marijuana.  8/13/2024 Basil is maintaining sobriety. He has not smoked marijuana in five years. SP  9/5/2024 He reported ongoing sobriety.     My Strengths:   caring, empathetic, goal-focused, and good listener       My Limitations or Vulnerabilities:  Manic symptoms      My Other Health Issues:  None reported     Supports:    I want to include the following support people in my treatment: \"wife\"  Please note we cannot share information with anyone unless you sign a release of information. You can specify what information can and cannot be shared and you can retract that written permission at any time.     Staff and support people can help me by: \"offering helpful suggestions\"     Cultural Values and Needs: none      Assessments: The following assessments were completed by patient for this visit:  PHQ9:        6/18/2013     3:00 PM 7/1/2015    12:00 PM 8/26/2015    11:00 AM 7/2/2024     8:00 AM 7/5/2024    11:18 AM 7/8/2024     9:44 AM   PHQ-9 SCORE   PHQ-9 Total Score MyChart         15 " (Moderately severe depression)     PHQ-9 Total Score 5             PHQ-9 Total Score     4 8 15 21   PHQ-9 Total Score   3              GAD7:        6/18/2013     3:00 PM 7/1/2015    12:00 PM 8/26/2015    11:00 AM 7/2/2024     8:00 AM 7/5/2024    11:47 AM 7/8/2024     9:44 AM   HUGH-7 SCORE   Total Score         17 (severe anxiety)     Total Score     0 5 17    17    17    17    17    17 16   Total Score BEH Adult 3 1              PROMIS 10-Global Health (all questions and answers displayed):        7/2/2024     8:00 AM 7/5/2024    11:50 AM 7/8/2024     9:44 AM   PROMIS 10   In general, would you say your health is:   Good     In general, would you say your quality of life is:   Fair     In general, how would you rate your physical health?   Good     In general, how would you rate your mental health, including your mood and your ability to think?   Fair     In general, how would you rate your satisfaction with your social activities and relationships?   Fair     In general, please rate how well you carry out your usual social activities and roles   Good     To what extent are you able to carry out your everyday physical activities such as walking, climbing stairs, carrying groceries, or moving a chair?   Completely     In the past 7 days, how often have you been bothered by emotional problems such as feeling anxious, depressed, or irritable?   Often     In the past 7 days, how would you rate your fatigue on average?   Moderate     In the past 7 days, how would you rate your pain on average, where 0 means no pain, and 10 means worst imaginable pain?   0     In general, would you say your health is: 4 3 2   In general, would you say your quality of life is: 3 2 2   In general, how would you rate your physical health? 3 3 1   In general, how would you rate your mental health, including your mood and your ability to think? 3 2 1   In general, how would you rate your satisfaction with your social activities and  relationships? 2 2 1   In general, please rate how well you carry out your usual social activities and roles. (This includes activities at home, at work and in your community, and responsibilities as a parent, child, spouse, employee, friend, etc.) 4 3 1   To what extent are you able to carry out your everyday physical activities such as walking, climbing stairs, carrying groceries, or moving a chair? 1 5 4   In the past 7 days, how often have you been bothered by emotional problems such as feeling anxious, depressed, or irritable? 3 4 5   In the past 7 days, how would you rate your fatigue on average? 3 3 4   In the past 7 days, how would you rate your pain on average, where 0 means no pain, and 10 means worst imaginable pain? 0 0 0   Global Mental Health Score 11 8    8    8    8 5   Global Physical Health Score 12 16    16    16    16 12   PROMIS TOTAL - SUBSCORES 23 24    24    24    24 17                    Diagnosis/es:       295.70  (F25) Schizoaffective Disorder Bipolar Type.  296.44 Bipolar I Disorder Current or Most Recent Episode Manic, with psychotic features.  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  PCP: Dmitry Tee PA-C  [facility] [address] [place] [address]  Phone: [phone]  Fax: [phone]        Level of Care: Adult Day Treatment  Program  Track: iop/dt2p  6B     Multidisciplinary Team Members: Basil Tesfaye MD; Lily Espino PsyD, YOLETTE; Luz Dietz [facility]AYME; America Perrin, OTR/L; Liz Simmons RN       Program services: Group and Individual Psychotherapy (at least 1 hour per day), Patient Education and Training Related to Treatment (at least one hour per day), Occupational Therapy and/or Nurse Liaison Education and Support (at least one hour per day), Psychiatric Evaluation and Management (at intake and least once per month), Patient-Centered Support Network Counseling (at least every other week)     Staff Interventions:  Assist to identify treatment goals, including identifying and problem-solving barriers. Assist in identifying strengths and how to mobilize them in meeting treatment goals. Assist in identifying limitations and other health concerns, and ways to manage those in the recovery process and beyond. Assist in identifying and helping to establish, maintain, and strengthen support network. Assist with and facilitate discharge planning, including connection with available and appropriate community services.       Ongoing psychotherapeutic and multidisciplinary assessment. Regular meetings with psychiatrist or other independent psychiatric provider. Assessment by registered nurse liaisons at admission and ongoing/as needed. Complete OT assessment where applicable/as needed. Complete functional assessment(s) where applicable/as needed.     Plan for and help with maintaining safety, including revising and updating written safety plan where applicable. Assist in identifying and applying coping skills. Assist in identifying and problem solving barriers to treatment and clinical progress. Utilize motivational interviewing techniques to promote change. Provide education to promote informed decisions and decision-making. Utilize motivational interviewing techniques to promote change.      Provide education regarding how to effectively use  group process. Teach skills to help identify and manage thoughts, behaviors, and emotions, with specific skills/topics including: emotional regulation, identification of values, understanding and modifying distorted thinking, understanding and coping with grief and loss, shame, self-compassion, self-awareness, mindfulness, radical acceptance, distress tolerance skills, hope, problem-solving, communication, interpersonal effectiveness, distress tolerance. Facilitate increased self-awareness.        Provide education regarding: life balance/structure/routine, goal setting and integration, prioritizing and planning, leisure values, behavior activation, motivation, energy management, stress management, neuroscience of change, sensory modulation, window of tolerance, ANS and vagus nerve activation, sensory enhanced mindfulness, sensory/body based grounding skills.     Provide education regarding: eight dimensions of wellness, sleep hygiene, medication education and management, stigma, nutrition, signs and symptoms, community resources, support network education.         Abuse Prevention Plan:   Treatment team will provide education regarding skill development to address symptom management, life skills, wellness, discharge planning, and personal safety.  Collaborate with patients internal and external providers to coordinate care.  Treatment will be provided in a safe, therapeutic environment.  Program provider will offer medication adjustment/management as needed/indicated.      Patient Participation in Plan:  This plan was developed by the patient named at the top of the document with assistance from the multidisciplinary care team. The patient agrees with this care plan, developed goals, and has received a copy. Supports and/or family have been invited to participate in the creation of this plan at the discretion of the patient.      Discharge Criteria:   Patient will discharge from the program when the goals above  "have been met, the patient is otherwise deemed to no longer need and/or benefit from the program/this level of care, another treatment modality is identified that would better meet treatment needs and goals, and/or the patient opts to discharge from the program for any reason.     Acknowledgement of Current Treatment Plan         I have reviewed my treatment plan with my treatment team members.  I agree with the plan as it is written in the electronic health record.      Name:                                                     Signature:                                                          Date:  Basil Tesfaye MD  Psychiatrist/Medical Director             NOTE: Patient signatures are completed manually and scanned into the electronic medical record. See \"Media\" tab in epic  "

## 2024-07-29 NOTE — GROUP NOTE
Psychoeducation Group Note    PATIENT'S NAME: Basil Andrews  MRN:   [id number]  :   1990  ACCT. NUMBER: [id number]  DATE OF SERVICE: 24  START TIME:  2:00 PM  END TIME:  2:50 PM  FACILITATOR: Lily Espino Psy.D, LP; Liz Simmons RN  TOPIC:  Wellness Group: Mind/Body Practice & Complementary  [facility] Adult Mental Health Outpatient Programs  TRACK: iop/dt2p  6B    NUMBER OF PARTICIPANTS: 6    Summary of Group / Topics Discussed:  Mind/Body Practice & Complementary Therapies:  Relaxation Techniques: In this group, patients were educated on a variety of relaxation and mindfulness skills to reduce distress and improve coping skills. The skills were taught to the group and then practiced through an organized exercise.     Skills taught & practiced included:  Personal Relaxation Scene, Deep Breathing Exercise, Sensory Exercise, Mindful Eating,  Mindful Walking Exercise, Guided Imagery, vagal nerve theory  Education about vagus nerve and discussion about fight, flight, freeze.     Patient Session Goals / Objectives:  Identified relaxation skills  Explained how the various relaxation skills are practiced  Practiced relaxation experiential       Patient Participation / Response:  Fully participated with the group by sharing personal reflections / insights and openly received / provided feedback with other participants.    Identified / Expressed personal readiness to practice skills    Treatment Plan:  Patient has a current master individualized treatment plan.  See Epic treatment plan for more information.    Lily Espino Psy.D, YOLETTE

## 2024-07-29 NOTE — PROGRESS NOTES
"[facility]   Adult Mental Health Outpatient Programs  Psychiatric Progress Record    Program Track: IOP/DT 2 P    PATIENT'S NAME: Basil Andrews  MRN:   [id number]  :   1990  ACCT. NUMBER: [id number]  DATE OF SERVICE: 24    Interval History:  \"I'm still feeling restless.\" Basil presents today for follow-up and ongoing program supervision.   Endorses:  Seeing continued improvement  Spent some time with in-laws over the weekend and it went well  \"I was talking to my  and he said he didn't think I was as messed up as I think I am\"  Not noting more restlessness since increasing morning dose of haloperidol  And not noting more clinical improvement either; maintaining the same steady course  Difficult to read, sit still through a movie or sporting events  He and his wife will be moving to [place] in September  Patient feeling associated stressors, of course  Still wary of trying propranolol given dyspnea at 20 mg dose    Review of Symptoms  Sleep: improved, restorative  Suicidal ideation: none endorsed  Thoughts of non-suicidal self-injury: none endorsed  Recent self-injurious behavior: none endorsed  Homicidal ideation: none endorsed  Other safety concerns: none endorsed    Activities of Daily Living and Related Systems Impacted by Illness:  Hygiene: no concerns  Socialization: improving  Activities of Daily Living: (cleaning, shopping, bills, etc.): impaired by symptoms and/or adverse effects   Concerns related to work: plans to restart work at new position (in [place]) in September     Substance use:  Drinking large quantities of caffeine daily, several Monster Energy Drinks, coffee, Mtn Dew, etc.  No others endorsed    Medications:  Current Outpatient Medications   Medication Sig Dispense Refill    benztropine (COGENTIN) 1 MG tablet Take 2 tablets (2 mg) by mouth every morning AND 1 tablet (1 mg) at bedtime. 60 tablet 1    haloperidol (HALDOL) 5 MG tablet Take 1 tablet (5 mg) by mouth at " "bedtime. May also take 1 tablet (5 mg) 2 times daily as needed for agitation or other (psychotic symptoms). 60 tablet 1    divalproex sodium extended-release (DEPAKOTE ER) 500 MG 24 hr tablet Take 3 tablets (1,500 mg) by mouth at bedtime 90 tablet 1    divalproex sodium extended-release (DEPAKOTE ER) 500 MG 24 hr tablet Take 1 tablet (500 mg) by mouth daily 30 tablet 1    fluticasone (FLONASE) 50 MCG/ACT nasal spray Spray 2 sprays into both nostrils daily      haloperidol (HALDOL) 10 MG tablet Take 1 tablet (10 mg) by mouth 2 times daily 60 tablet 1    ipratropium (ATROVENT) 0.06 % nasal spray Spray 2 sprays into both nostrils 4 times daily      multivitamin, therapeutic with minerals (MULTI-VITAMIN) TABS Take 1 tablet by mouth daily      propranolol (INDERAL) 10 MG tablet Take 1 tablet (10 mg) by mouth 3 times daily as needed (restlessness) 60 tablet 1       The above list was reviewed and updated in [facility] with patient today.     Patient is taking medications as prescribed and reports adverse effects per above    Laboratory Results:  Most recent labs reviewed. Pertinent updates/findings: None.     Mental Status Examination:  Vital Signs: There were no vitals taken for this visit.   Appearance: appropriately groomed, appears stated age, and in no apparent distress.  Attitude: cooperative   Eye Contact: good   Muscle Strength and Tone: normal  Psychomotor Behavior: normal or unremarkable; no gross tremor or involuntary movements  Gait and Station: deferred  Speech: clear, coherent, decreased prosody, regular rate, regular rhythm, and fluent  Associations: No loosening of associations  Thought Process: coherent and goal directed, less perseverative today  Thought Content: no evidence of suicidal ideation or homicidal ideation, no evidence of psychotic thought, no auditory hallucinations present, and no visual hallucinations present  Mood: \"better\"  Affect: mood congruent, constricted mobility, restricted range, and " reactive  Insight: good  Judgment: intact, adequate for safety  Impulse Control: intact  Oriented to: time, place, person, and situation  Attention Span and Concentration: Normal  Language: Intact  Recent and Remote Memory: Delayed & immediate recall intact  Fund of Knowledge/Assessment of Intelligence: Average  Capacity of Activities of Daily Living: Independent, able to participate in programmatic care services.    Diagnosis/es:    ICD-10-CM    1. Bipolar disorder, current episode manic severe with psychotic features (H)  F31.2 haloperidol (HALDOL) 5 MG tablet      2. Extrapyramidal and movement disorder  G25.9 benztropine (COGENTIN) 1 MG tablet              Cannot rule out 295.70  (F25) Schizoaffective Disorder Bipolar Type in place of bipolar disorder above      Assessment/Plan:  Basil presents today for follow-up psychiatric evaluation and assessment of progress. Endorses ongoing akathisia. Patient not describing it being worse than when he was taking 25 mg of haloperidol daily, but also reported he was able to sit through a movie last week and that is no longer the case. Patient is wary of trying propranolol again. Will increase scheduled morning dose of benztropine and patient advised he can also take as needed up to 6 mg total daily. We'll decrease haloperidol to 10 mg in the morning and 15 mg at bedtime as well.    Continued improvement. Started IOP today and continues to benefit from programming. I will hand off to my counterpart for medication management while in program. Continue IOP. Likely will discharge in early September since he will be relocating.    Bipolar disorder vs schizoaffective disorder  Overall improved   Decrease haloperidol to 10 mg daily in the morning and 15 mg at bedtime   Increase benztropine to 2 mg in the morning and 1 mg at bedtime  No other medication changes  Continue IOP    Safety Assessment:  Basil reports suicidal ideation and/or non-suicidal self-injury or thoughts  thereof as noted above  Basil is future-oriented and is engaged in treatment planning   I do not feel that Basil meets criteria for a 72-hour involuntary hold and remains appropriate for an outpatient level of care    aBsil Tesfaye MD on 7/29/2024 at 3:06 PM    Visit Details:  Type of service: In-person  Location (patient and provider): [facility] Adult Mental Health Outpatient Programmatic Care Offices    Level of Medical Decision Making:   - At least 1 chronic problem that is not stable  - Engaged in prescription drug management during visit (discussed any medication benefits, side effects, alternatives, etc.)  Discussion of management or test interpretation with external physician/other qualified healthcare professional/appropriate source - programmatic care multidisciplinary treatment team

## 2024-07-29 NOTE — PROGRESS NOTES
Outpatient Mental Health Program Discharge Summary / Instructions - Adult       PATIENT'S NAME: Basil Andrews   MRN:   [id number]  :   1990    PROGRAM PARTICIPATION: Adult Day Treatment (ADT)  Track: iop/dt2-  6B    ADMISSION DATE: 2024    DISCHARGE DATE: 9/10/2024,  appointment with Dr. Ara Camargo to review medications    Reason for Discharge:   Completed treatment: factors leading to admission have been addressed to the extent that the patient can be safely transitioned to a less intensive level of care.     Diagnosis:   295.70  (F25) Schizoaffective Disorder Bipolar Type.  296.44 Bipolar I Disorder Current or Most Recent Episode Manic, with psychotic features.  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  PCP: Dmitry Tee PA-C  Location: [facility] [place] [facility], [address] [place] [address]  Phone: [phone]  Fax: [phone]       Medications: (include name, dose, and frequency)  Current Outpatient Medications:     benztropine (COGENTIN) 1 MG tablet, TAKE ONE TABLET BY MOUTH TWICE DAILY, Disp: 60 tablet, Rfl: 0    divalproex sodium extended-release (DEPAKOTE ER) 500 MG 24 hr tablet, Take 3 tablets (1,500 mg) by mouth at bedtime, Disp: 90 tablet, Rfl: 1      fluticasone (FLONASE) 50 MCG/ACT nasal spray, Spray 2 sprays into both nostrils daily, Disp: , Rfl:  "    haloperidol (HALDOL) 10 MG tablet, Take 1 tablet (10 mg) by mouth 2 times daily (Patient taking differently: Take 10 mg by mouth at bedtime. Take 10 mg at be time), Disp: 60 tablet, Rfl: 1    haloperidol (HALDOL) 5 MG tablet, Take 1 tablet (5 mg) by mouth at bedtime. May also take 1 tablet (5 mg) 2 times daily as needed for agitation or other (psychotic symptoms). (Patient not taking: Reported on 9/3/2024), Disp: 60 tablet, Rfl: 1    ipratropium (ATROVENT) 0.06 % nasal spray, Spray 2 sprays into both nostrils 4 times daily, Disp: , Rfl:     multivitamin, therapeutic with minerals (MULTI-VITAMIN) TABS, Take 1 tablet by mouth daily, Disp: , Rfl:     naltrexone (DEPADE/REVIA) 50 MG tablet, Take 1 tablet (50 mg) by mouth daily., Disp: 90 tablet, Rfl: 0    propranolol (INDERAL) 10 MG tablet, Take 1 tablet (10 mg) by mouth 3 times daily as needed (restlessness) (Patient not taking: Reported on 8/13/2024), Disp: 60 tablet, Rfl: 1      DISCHARGE PLAN / RECOMMENDATIONS TO MANAGE SYMPTOMS AND PREVENT RELAPSE:  Take medications as prescribed.   Follow up with your providers and appointments.    Follow up with the following resources and/or community supports: KRISH: NAMImn.org   Report symptoms and significant changes to your care team: including any safety concerns, thoughts of suicide or homicide, changes in sleep, increased confusion, mood getting worse, feeling more aggressive or agitation  Use coping skills:  Distress tolerance strategies:  relaxation activities: , paced breathing/progressive muscle relaxation, and   Physical activities: go for a walk, meditation, deep breathing, and stretching   Focus on helpful thoughts: \"This is temporary\", \"I will get through this\", \"It always passes\", \"Ride the wave\", think about happy memories: , remind myself of what is important to me: , and self-compassion statements:   Do not use illicit drugs.  Avoid alcohol.    He will transfer care to [place], [place], in September.    Follow up " with psychiatrist / main caregiver: Donta Bob MD    Next visit: will schedule    Follow up with your therapist: Nabil Craig   Next visit:  will schedule    PERSONAL SAFETY / CRISIS MANAGEMENT:  Follow your individualized Safety Coping Plan.  Report increased symptoms and safety concerns to your care team.  Call 911 or go to your nearest emergency department.  Use the crisis resources listed below:    Crisis Resources:  Suicide Prevention Lifeline: 2-068-666-TALK (0488)  Crisis Text Line Service (available 24 hours a day, 7 days a week): Text [place] to 758394  Call  **CRISIS (835502) from a cell phone to talk to a team of professionals who can help you.    Crisis Services By [facility]: Phone Number:   [place]     [phone]   [place]    [phone]   [place] (COPE)    [phone]   [place]    [phone]   Jim                                                    [phone] (after hours)   [place]   [phone]   [place]     [phone]       The above discharge plan and recommendations were created to help you manage your mental health and to improve your overall functioning and well-being.     Not following the above recommendations may result in an increase of symptoms, potential safety risks and a need for increased services.    We appreciate the opportunity to work with you and sincerely thank you for choosing [facility].        Your treatment team:    Basil Tesfaye MD; Lily Espino PsyD, LP; Luz Dietz, [facility] HospitalSW; America Perrin, OTR/L; Liz Simmons RN        Patient Signature: ______________________________________________________ Date:___________                        Community Support and Resources:      Free Support groups near [place], [place] through KRISH.org  https://www.krish.org/affiliate/[place]/  National [place] on Mental Illness (KRISH)     NamiMN.org free support groups for numerous group therapy topics & issues including: individual, family, couples, LGBTQ, Young  "Adults, parenting, anxiety, grief and loss, depression, and others    407.405.9883 or 1888.KRISH.HELPS  Open Door: Panic & Anxiety: through KRISH.org    FACE IT.org (men's support group in [place])  Hard 8 Games   Hobbies and activities and groups    Fast Tracker  Linking people to mental health and substance use disorder resources  Jogli   Virtual Peer Support Network (VPSN) -- Wellness in [place]  Mental Health Advocacy  https://mnwitw.org/vpsn  [place] Mental Health Warm Line  Peer to peer support  Mo thru Sat, 12 pm to 10 pm  [phone] or 3.536.894.7448  Text \"Support\" to 95594  DBT: Dialectical Behavior Therapy..https://[place].gov/.../dialectical-behavior-therapy/dbt-certified-providers  [facility].  [phone]  [facility] Community Support Program: Printio.ru.Clickpass  free activities and support,  [facility] Community Support Program  Mental Health Apps  My3  https://Vimessa.org/  VirtualHopeBox  https://Modulus/apps/virtual-hope-box/    "

## 2024-07-30 ENCOUNTER — HOSPITAL ENCOUNTER (OUTPATIENT)
Dept: BEHAVIORAL HEALTH | Facility: CLINIC | Age: 34
Discharge: HOME | End: 2024-07-30
Attending: PSYCHIATRY & NEUROLOGY
Payer: COMMERCIAL

## 2024-07-30 PROCEDURE — 90853 GROUP PSYCHOTHERAPY: CPT | Performed by: PSYCHOLOGIST

## 2024-07-30 NOTE — GROUP NOTE
Psychotherapy Group Note    PATIENT'S NAME: Basil Andrews  MRN:   [id number]  :   1990  [id number]  DATE OF SERVICE: 24  START TIME: 12:00 PM  END TIME: 12:50 PM  FACILITATOR: Lily Espino Psy.D, LP  TOPIC: MH EBP Group: Relationship Skills  [facility] Mental Health Outpatient Programs  TRACK: iop/dt2p    NUMBER OF PARTICIPANTS: 6    Summary of Group / Topics Discussed:  Relationship Skills: Conflict Resolution: Topic of conflict resolution in interpersonal communication was discussed. Patients received an overview of how communication skills during times of conflict impact symptoms and functioning. Patients discussed their current communication challenges and how this impacts their functioning. Patients also verbalized understanding of skills learned and practiced in session.     Patient Session Goals / Objectives:  Identified and discussed patient individual challenges with communication  Presented and practiced effective communication skills in session  Assisted patients in implementing more effective communication skills in their relationships      Patient Participation / Response:  Fully participated with the group by sharing personal reflections / insights and openly received / provided feedback with other participants.    Demonstrated understanding of relationship skills and communication skills    Treatment Plan:  Patient has a current master individualized treatment plan.  See Epic treatment plan for more information.    Lily Espino Psy.D, LP

## 2024-07-30 NOTE — GROUP NOTE
Psychoeducation Group Note    PATIENT'S NAME: Basil Andrews  MRN:   [id number]  :   1990  ACCT. NUMBER: [id number]  DATE OF SERVICE: 24  START TIME:  2:00 PM  END TIME:  2:50 PM  FACILITATOR: Lily Espino Psy.D, LP; Basil Tesfaye MD  TOPIC: MH Life Skills Group: Lifestyle Balance and Structure  [facility] Adult Mental Health Outpatient Programs  TRACK: iop/dt2p    NUMBER OF PARTICIPANTS: 7    Summary of Group / Topics Discussed:  Lifestyle Balance and Strucure:  Goal-setting & integration: Patients were introduced to the process and benefits of setting realistic, timely, and achievable goals to help support their ability to follow through with meaningful personal, health, recovery and treatment goals that they would like to achieve to improve overall functioning.  Patients were also taught and then practiced techniques to manage a variety of obstacles to achieve their goals and how to break goals into manageable pieces.  Patients also reported on follow through of goals.     Patient Session Goals / Objectives:  Facilitated the creation of a vision for recovery and wellbeing  Identified and wrote meaningful SMART goals to engage in meaningful activities of daily living   Identified and problem solved barriers to achieving goals   Identified plan to support follow through on goals and reflection on progress made      Patient Participation / Response:  Fully participated with the group by sharing personal reflections / insights and openly received / provided feedback with other participants.    Verbalized understanding of content    Treatment Plan:  Patient has a current master individualized treatment plan.  See Epic treatment plan for more information.    Lily Espino Psy.D, YOLETTE

## 2024-07-30 NOTE — GROUP NOTE
"Process Group Note    PATIENT'S NAME: Basil Andrews  MRN:   [id number]  :   1990  [id number]  DATE OF SERVICE: 24  START TIME:  1:00 PM  END TIME:  1:50 PM  FACILITATOR: Lily Espino Psy.D, LP  TOPIC:  Process Group    Diagnoses:  295.70  (F25) Schizoaffective Disorder Bipolar Type.  296.44 Bipolar I Disorder Current or Most Recent Episode Manic, with psychotic features.  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  PCP: Dmitry Tee PA-C  Location: [facility] [place] [facility], [address], [place] [address]  Phone: [phone]  Fax: [phone]       [facility] Mental Health Outpatient Programs  TRACK: iop/dt2p  6B    NUMBER OF PARTICIPANTS: 7        Data:    Session content: At the start of this group, patients were invited to check in by identifying themselves, describing their current emotional status, and identifying issues to address in this group.   Area(s) of treatment focus addressed in this session included Symptom Management, Personal Safety, and Community Resources/Discharge Planning.  Client reported being safe today.  Reported mood is \"a little discouraged and frustrated about moving.\"   Goal for today is to attend group therapy. The client talked to the group about how he will talk with his wife about selling the house and not renting it, and " moving to [place] by 9/21/24. He stated that he is using his listening skills to communicate with his wife. He stated that he remains sober, but notices that he can't focus well. He reported that he cut down on caffeine and is drinking more water. He stated that he doesn't miss drinking alcohol. He stated that he is not drinking the monster energy drinks any more and feels better.       Therapeutic Interventions/Treatment Strategies:  Psychotherapist reinforced use of skills. Treatment modalities used include Cognitive Behavioral Therapy and Dialectical Behavioral Therapy. Interventions include Relapse Prevention: Facilitated understanding of effective coping skills in response to triggers for substance use, Mindfulness: Facilitated discussion of when/how to use mindfulness skills to benefit general health, mental health symptoms, and stressors, Symptoms Management: Promoted understanding of their diagnoses and how it impacts their functioning, and Emotions Management:  Reviewed opposite action skill and Increased awareness of daily mood patterns/changes.    Assessment:    Patient response:   Patient responded to session by giving feedback, listening, and being attentive    Possible barriers to participation / learning include: severity of symptoms    Health Issues:   None reported       Substance Use Review:   Substance Use: No active concerns identified.    Mental Status/Behavioral Observations  Appearance:   Appropriate   Eye Contact:   Good   Psychomotor Behavior: Normal   Attitude:   Cooperative   Orientation:   All  Speech   Rate / Production: Normal    Volume:  Normal   Mood:    Anxious  Sad   Affect:    Appropriate   Thought Content:   Rumination and Psychosis denies any symptoms of psychosis  Thought Form:  Coherent  Logical     Insight:    Good     Plan:   Safety Plan: Recommended that patient call 911 or go to the local ED should there be a change in any of these risk factors.   Barriers to treatment: None  identified  Patient Contracts (see media tab):  None  Substance Use: Provided encouragement towards sobriety   Continue or Discharge: Patient will continue in Adult Day Treatment (ADT)  as planned. Patient is likely to benefit from learning and using skills as they work toward the goals identified in their treatment plan.      Camden Reed.LUTHER, LP  July 30, 2024

## 2024-08-04 DIAGNOSIS — G25.9 EXTRAPYRAMIDAL AND MOVEMENT DISORDER: ICD-10-CM

## 2024-08-05 ENCOUNTER — HOSPITAL ENCOUNTER (OUTPATIENT)
Dept: BEHAVIORAL HEALTH | Facility: CLINIC | Age: 34
Discharge: HOME | End: 2024-08-05
Attending: PSYCHIATRY & NEUROLOGY
Payer: COMMERCIAL

## 2024-08-05 DIAGNOSIS — Z72.6 GAMBLING: ICD-10-CM

## 2024-08-05 DIAGNOSIS — Z72.6 GAMBLING: Primary | ICD-10-CM

## 2024-08-05 DIAGNOSIS — F31.2 BIPOLAR DISORDER, CURRENT EPISODE MANIC SEVERE WITH PSYCHOTIC FEATURES (H): Primary | ICD-10-CM

## 2024-08-05 PROCEDURE — 99214 OFFICE O/P EST MOD 30 MIN: CPT

## 2024-08-05 PROCEDURE — 90853 GROUP PSYCHOTHERAPY: CPT | Performed by: PSYCHOLOGIST

## 2024-08-05 RX ORDER — NALTREXONE HYDROCHLORIDE 50 MG/1
50 TABLET, FILM COATED ORAL DAILY
Qty: 30 TABLET | Refills: 0 | Status: SHIPPED | OUTPATIENT
Start: 2024-08-05 | End: 2024-09-03

## 2024-08-05 RX ORDER — BENZTROPINE MESYLATE 1 MG/1
1 TABLET ORAL 2 TIMES DAILY
Qty: 60 TABLET | Refills: 0 | Status: SHIPPED | OUTPATIENT
Start: 2024-08-05 | End: 2024-09-13

## 2024-08-05 NOTE — GROUP NOTE
Psychotherapy Group Note    PATIENT'S NAME: Basil Andrews  MRN:   [id number]  :   1990  [facility] [id number]  DATE OF SERVICE: 24  START TIME: 12:00 PM  END TIME: 12:50 PM  FACILITATOR: Lily Espino Psy.D, LP  TOPIC:  EBP Group: Emotions Management  [facility] Mental Health Outpatient Programs  TRACK: iop/dt2p  6B    NUMBER OF PARTICIPANTS: 5    Summary of Group / Topics Discussed:  Emotions Management: Check Facts: Patients participated in an interactive approach to identifying and challenging cognitive distortions that arise following an event that triggers intense emotion.  Patients choose an emotional reaction/event to work on.  The group shared their experiences and thought processes for feedback.      Patient Session Goals / Objectives:  Learn the process of identifying thoughts associated with the situation and reaction  Learn to challenge cognitive distortions and reframe the situation/event/reaction   Distinguish between facts, feelings, thoughts  Gain understanding of how to interpret an emotional reaction  Practice identification of cognitive distortions and evaluating an emotionally charged situation more rationally/objectively/mindfully      Patient Participation / Response:  Fully participated with the group by sharing personal reflections / insights and openly received / provided feedback with other participants.    Expressed understanding of the relevance / importance of emotions management skills at distressing times in life    Treatment Plan:  Patient has a current master individualized treatment plan.  See Epic treatment plan for more information.    Lily Espino Psy.D, LP

## 2024-08-05 NOTE — GROUP NOTE
"Process Group Note    PATIENT'S NAME: Basil Andrews  MRN:   [id number]  :   1990  [facility] [id number]  DATE OF SERVICE: 24  START TIME:  1:00 PM  END TIME:  1:50 PM  FACILITATOR: Lily Espino Psy.D, LP  TOPIC:  Process Group    Diagnoses:  295.70  (F25) Schizoaffective Disorder Bipolar Type.  296.44 Bipolar I Disorder Current or Most Recent Episode Manic, with psychotic features.  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  PCP: Dmitry Tee PA-C  Location: [facility] [place] [facility], [address], [place] [address]  Phone: [phone]  Fax: [phone]       [facility] Mental Health Outpatient Programs  TRACK: iop/dt2p    NUMBER OF PARTICIPANTS: 5        Data:    Session content: At the start of this group, patients were invited to check in by identifying themselves, describing their current emotional status, and identifying issues to address in this group.   Area(s) of treatment focus addressed in this session included Symptom Management, Personal Safety, and Community Resources/Discharge Planning.  Client reported being safe today.  Reported mood is \"optimistic.\"    Goal for today is to attend group therapy.  The client talked to the group about how he is getting ready to move, talking to a contact for the [employer] in [place], and organizing things. He stated that he had " Statement Selected some misinterpretations of what people said and related it to his paranoia. He stated that he has been restless , and is trying to be patient and apply skills learned from the group. He stated that he stopped drinking and is only taking his medications. He stated that he had some gambling issues and talked to the group about taking naltrexone to help.       Therapeutic Interventions/Treatment Strategies:  Psychotherapist reinforced use of skills. Treatment modalities used include Cognitive Behavioral Therapy and Dialectical Behavioral Therapy. Interventions include Relapse Prevention: Facilitated understanding of effective coping skills in response to triggers for substance use, Mindfulness: Encouraged a plan to use mindfulness skills in daily life, Symptoms Management: Promoted understanding of their diagnoses and how it impacts their functioning, and Emotions Management:  Reviewed opposite action skill.    Assessment:    Patient response:   Patient responded to session by giving feedback, listening, and focusing on goals    Possible barriers to participation / learning include: severity of symptoms    Health Issues:   None reported       Substance Use Review:   Substance Use: No active concerns identified.    Mental Status/Behavioral Observations  Appearance:   Appropriate   Eye Contact:   Good   Psychomotor Behavior: Normal   Attitude:   Cooperative   Orientation:   All  Speech   Rate / Production: Normal    Volume:  Normal   Mood:    Anxious   Affect:    Constricted   Thought Content:   Rumination and Psychosis reports paranoia  Thought Form:  Coherent  Logical  Psychosis    Insight:    Good     Plan:   Safety Plan: Recommended that patient call 911 or go to the local ED should there be a change in any of these risk factors.   Barriers to treatment: None identified  Patient Contracts (see media tab):  None  Substance Use: Provided encouragement towards sobriety   Continue or Discharge: Patient will continue in  Adult Day Treatment (ADT)  as planned. Patient is likely to benefit from learning and using skills as they work toward the goals identified in their treatment plan.      Lily Espino Psy.D, LP  August 5, 2024   For information on Fall & Injury Prevention, visit: [email or web address] OR  [email or web address] OR  https://www.cdc.gov/steadi/patient.html

## 2024-08-05 NOTE — PROGRESS NOTES
[facility]   Adult Mental Health Outpatient Programs  Psychiatric Progress Record    Program Track: IOP/DT 2 P    PATIENT'S NAME: Basil Andrews  MRN:   [id number]  :   1990  [facility]. NUMBER: [id number]  DATE OF SERVICE: 24    Interval History:  Basil presents today for follow-up and ongoing program supervision.   Endorses:  I been thinking, I am in the good place with the sexual staff. I am doing well in term of not looking on women, but its was hard when I think about things I did in the psych words.   I have memory of what I was doing in the hospital. I think Naltrexone [prescribed for Gambling] stopped sexual thoughts but interfered with depression medications  I would be rather be depressed than having the thoughts.   I am still having urges to gambling. Not engaged in a few months  Denies suicide ideations  No signs of msiha, delusion and hallucination  Medication compliant. There is still some restlessness but not so bad. It is getting better      Review of Symptoms  Sleep: it is great  Appetite: Fine, lack of motivation to make some thing to eat  Suicidal ideation: denies current or recent suicidal ideation or behavior  Thoughts of non-suicidal self-injury: denied  Recent self-injurious behavior: denied  Homicidal ideation: denied  Other safety concerns: denied    Activities of Daily Living and Related Systems Impacted by Illness:  Hygiene: no problem  Socialization: Same   Activities of Daily Living: (cleaning, shopping, bills, etc.): No chnages  Concerns related to work: None    Substance use:  Denies any substance use. NO opioids use    Response to Program Interventions:  I think it is good.     Medications:  Current Outpatient Medications   Medication Sig Dispense Refill    benztropine (COGENTIN) 1 MG tablet Take 2 tablets (2 mg) by mouth every morning AND 1 tablet (1 mg) at bedtime. 60 tablet 1    divalproex sodium extended-release (DEPAKOTE ER) 500 MG 24 hr tablet Take 3 tablets  "(1,500 mg) by mouth at bedtime 90 tablet 1    divalproex sodium extended-release (DEPAKOTE ER) 500 MG 24 hr tablet Take 1 tablet (500 mg) by mouth daily 30 tablet 1    fluticasone (FLONASE) 50 MCG/ACT nasal spray Spray 2 sprays into both nostrils daily      haloperidol (HALDOL) 10 MG tablet Take 1 tablet (10 mg) by mouth 2 times daily 60 tablet 1    haloperidol (HALDOL) 5 MG tablet Take 1 tablet (5 mg) by mouth at bedtime. May also take 1 tablet (5 mg) 2 times daily as needed for agitation or other (psychotic symptoms). 60 tablet 1    ipratropium (ATROVENT) 0.06 % nasal spray Spray 2 sprays into both nostrils 4 times daily      multivitamin, therapeutic with minerals (MULTI-VITAMIN) TABS Take 1 tablet by mouth daily      naltrexone (DEPADE/REVIA) 50 MG tablet Take 1 tablet (50 mg) by mouth daily 30 tablet 0    propranolol (INDERAL) 10 MG tablet Take 1 tablet (10 mg) by mouth 3 times daily as needed (restlessness) 60 tablet 1       The above list was reviewed and updated in [facility] with patient today.     Patient is taking medications as prescribed and denies adverse effects    Laboratory Results:  Most recent labs reviewed. Pertinent updates/findings: None.     Mental Status Examination:  Vital Signs: There were no vitals taken for this visit.   Appearance: adequately groomed, appears stated age, and in no apparent distress.  Attitude: cooperative   Eye Contact: good   Muscle Strength and Tone: normal  Psychomotor Behavior: normal or unremarkable   Gait and Station: normal width, turn, arm swing  Speech: clear, coherent, decreased prosody, regular rate, regular rhythm, and fluent  Associations: No loosening of associations  Thought Process: coherent and goal directed  Thought Content: no evidence of psychotic thought, passive suicidal ideation present, no auditory hallucinations present, and no visual hallucinations present  Mood: \"anxious and depressed\"  Affect: mood congruent  Insight: fair  Judgment: intact, " adequate for safety  Impulse Control: intact  Oriented to: time, place, person, and situation  Attention Span and Concentration: Normal  Language: Intact  Recent and Remote Memory: Delayed & immediate recall intact  Fund of Knowledge/Assessment of Intelligence: Average  Capacity of Activities of Daily Living: Independent, able to participate in programmatic care services.    Diagnosis/es:    ICD-10-CM    1. Bipolar disorder, current episode manic severe with psychotic features (H)  F31.2       2. Gambling  Z72.6 naltrexone (DEPADE/REVIA) 50 MG tablet          Cannot rule out 295.70  (F25) Schizoaffective Disorder Bipolar Type in place of bipolar disorder above     Assessment/Plan:  Basil presents today for follow-up psychiatric evaluation and assessment of progress. He reported some noticeable improvement in his sexual behavior, successfully avoiding looking at women, although he is still struggling with memories and intrusive sexual thoughts. Naltrexone, prescribed for gambling, did reduce the sexual thoughts. He still has the urge to menendez but has not engaged in it for a few months. He agreed to restart Nalextrone. He denies suicidal ideations, misha, delusions, or hallucinations and remains compliant with his medications.   Restlessness related to Haldol is improving with recent medication changes  He is compliant with medication and reported no side effects. He will continue to attend psychotherapy and medication management in [facility] for stabilization. Follow up in 2 weeks or sooner as needed    Safety Assessment:  Basil reports suicidal ideation and/or non-suicidal self-injury or thoughts thereof as noted above  Basil is future-oriented and is engaged in treatment planning   I do not feel that Basil meets criteria for a 72-hour involuntary hold and remains appropriate for an outpatient level of care    MAXINE OKEEFE DNP on 8/5/2024 at 12:38 PM    Visit Details:  Type of service: In-person  Location  (patient and provider): [facility] Adult Mental Health Outpatient Programmatic Care Offices    Level of Medical Decision Making:   - At least 1 chronic problem that is not stable  - Engaged in prescription drug management during visit (discussed any medication benefits, side effects, alternatives, etc.)  Discussion of management or test interpretation with external physician/other qualified healthcare professional/appropriate source - programmatic care multidisciplinary treatment team    30 min spent on the date of the encounter in chart review, patient visit, review of tests, documentation, care coordination, and/or discussion with other providers about the issues documented above.      This document completed in part using [employer] dictation software and therefore may contain inadvertent word or phrase substitutions.

## 2024-08-06 ENCOUNTER — HOSPITAL ENCOUNTER (OUTPATIENT)
Dept: BEHAVIORAL HEALTH | Facility: CLINIC | Age: 34
Discharge: HOME | End: 2024-08-06
Attending: PSYCHIATRY & NEUROLOGY
Payer: COMMERCIAL

## 2024-08-06 DIAGNOSIS — Z72.6 GAMBLING: Primary | ICD-10-CM

## 2024-08-06 PROCEDURE — 90853 GROUP PSYCHOTHERAPY: CPT

## 2024-08-06 PROCEDURE — 90853 GROUP PSYCHOTHERAPY: CPT | Performed by: SOCIAL WORKER

## 2024-08-06 NOTE — GROUP NOTE
Process Group Note    PATIENT'S NAME: Basil Andrews  MRN:   [id number]  :   1990  [id number]  DATE OF SERVICE: 24  START TIME: 12:00 PM  END TIME: 12:50 PM  FACILITATOR: oJse Blackwood LPC  TOPIC:  Process Group    Diagnoses:  295.70  (F25) Schizoaffective Disorder Bipolar Type.  296.44 Bipolar I Disorder Current or Most Recent Episode Manic, with psychotic features.  Provisional Diagnosis: 296.21 (F32.0) Major Depressive Disorder, Single Episode, Mild and With mixed features     [facility] Adult Mental Health Outpatient Programs    TRACK: IOP/DT Psychosis    NUMBER OF PARTICIPANTS: 6      Data:    Session content: At the start of this group, patients were invited to check in by identifying themselves, describing their current emotional status, and identifying issues to address in this group. Area(s) of treatment focus addressed in this session included Symptom Management, Personal Safety, and Community Resources/Discharge Planning.    Patient engaged in processing group and checked in with the group's discussion questions and reported the following, I am going to say I am feeling anxious and overwhelmed today. 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 So, I would say the goals I am working towards is like realizing that I have already done the work of calling everyone and so now I just have to focus on not spending money, eat what I got, and take some time to make food if I actually get hungry. I have a whole bunch of things already that I could make. The skills I will use towards my day is possibly box breathe if I start getting way too overwhelmed. If I feel like I want to sleep, I should probably just do opposite emotion, so I don't just stay in a cycle of sleeping. And psychosis symptoms I feel like are very faint, like the paranoia and  stuff. I can tell my body almost wants to try and become psychotic a little bit, but I do think the medication and sleeping so well has helped me fighting off those urges. No safety concerns. No chemical use. I am taking my meds as prescribed. I am proud that I made mac and cheese today for my lunch right before I came here. Just being here and talking is supportive for me.     Therapeutic Interventions/Treatment Strategies:  Psychotherapist offered support, feedback and validation, set limits, provided redirection, and reinforced use of skills. Treatment modalities used include Dialectical Behavioral Therapy. Interventions include Emotions Management:  Reinforced the purpose and biological basis of emotions, Discussed barriers to emotional regulation, Reviewed opposite action skill, and Increased awareness of daily mood patterns/changes.    Assessment:    Patient response:   Patient responded to session by accepting feedback, giving feedback, listening, focusing on goals, being attentive, accepting support, appearing alert, demonstrating behavior change, and verbalizing understanding    Possible barriers to participation / learning include: and no barriers identified    Health Issues:   None reported       Substance Use Review:   Substance Use: No active concerns identified.    Mental Status/Behavioral Observations  Appearance:   Appropriate   Eye Contact:   Good   Psychomotor Behavior: Normal   Attitude:   Cooperative   Orientation:   All  Speech   Rate / Production: Normal    Volume:  Normal   Mood:    Normal  Affect:    Appropriate   Thought Content:   Clear  Thought Form:  Coherent  Logical     Insight:    Good     Plan:   Safety Plan: No current safety concerns identified.  Recommended that patient call 911 or go to the local ED should there be a change in any of these risk factors.   Barriers to treatment: None identified  Patient Contracts (see media tab):  None  Substance Use: Not addressed in session    Continue or Discharge: Patient will continue in Adult Day Treatment (ADT)  as planned. Patient is likely to benefit from learning and using skills as they work toward the goals identified in their treatment plan.    Jose Blackwood, TEDDY    August 6, 2024

## 2024-08-06 NOTE — GROUP NOTE
Psychotherapy Group Note    PATIENT'S NAME: Basil Andrews  MRN:   [id number]  :   1990  [id number]  DATE OF SERVICE: 24  START TIME:  1:00 PM  END TIME:  1:50 PM  FACILITATOR: Jose Blackwood LPC  TOPIC:  EBP Group: Emotions Management  [facility] Mental Health Outpatient Programs    TRACK: IOP/DT- Psychosis    NUMBER OF PARTICIPANTS: 7    Summary of Group / Topics Discussed:  Emotions Management: Opposite to Emotion: Patients discussed past and present struggles with knowing how to make changes in their lives due to difficult emotional experiences.  Explored desires to experience and feel less anger, sadness, guilt, and fear.  Reviewed the therapeutic skill of opposite action and patients explored opportunities to use their behaviors as a tool to reduce an emotion that they want to change.     Patient Session Goals / Objectives:  Review DBT concepts and focus on patient s experiences of distress and difficult emotional experiences.  Learn how to do the opposite of what an emotion makes us want to do in an effort to decrease an unwanted emotional experience.  Demonstrate understanding of the skill of opposite action by sharing experiences where the technique could be useful in past / present situations.      Patient Participation / Response:  Fully participated with the group by sharing personal reflections / insights and openly received / provided feedback with other participants.    Demonstrated understanding of topics discussed through group discussion and participation, Expressed understanding of the relevance / importance of emotions management skills at distressing times in life, Self-aware of experiences with difficult emotions, and strategies to employ to manage them, Demonstrated knowledge of when to consider applying a variety of emotions management skills in daily life, Demonstrated understanding and practice strategies to manage difficult emotions and  move towards healing, Identified barriers to applying emotions management strategies, Identified strategies to overcome barriers to use of emotions management skills, Identified emotions management strategies that have helped maintain / improve symptoms in the past, Practiced 2-3 new skills in session, and Identified / Expressed personal readiness to practice new emotions management skills    Treatment Plan:  Patient has a current master individualized treatment plan.  See Epic treatment plan for more information.    Jose Blackwood, LPC

## 2024-08-06 NOTE — GROUP NOTE
Psychotherapy Group Note    PATIENT'S NAME: Basil Andrews  MRN:   [id number]  :   1990  [id number]  DATE OF SERVICE: 24  START TIME:  2:00 PM  END TIME:  2:50 PM  FACILITATOR: Luz Pa LICSW  TOPIC: MH EBP Group: Emotions Management  [facility] Mental Health Outpatient Programs  TRACK: IOP/DT 2    NUMBER OF PARTICIPANTS: 7    Summary of Group / Topics Discussed:  Emotions Management: Anger: Patients explored and shared personal experiences associated with feelings of anger.  Group explored how these feelings develop, what they mean to each individual, and how to increase acceptance and usefulness of these feelings.  Discussed anger as a  secondary  emotion and reviewed ways to manage anger and challenge associated cognitive distortions. Group members worked to contextualize these concepts and promote healing.     Patient Session Goals / Objectives:  Discuss and review definitions and personal views/experiences with anger  Explore how feelings of anger impact functioning  Understand and practice strategies to manage difficult emotions and move towards healing  Demonstrate understanding of the feelings of anger  Verbalize how these emotions have impacted their lives/functioning  Verbalize of knowledge gained and possible interventions to manage feelings      Patient Participation / Response:  Fully participated with the group by sharing personal reflections / insights and openly received / provided feedback with other participants.    Demonstrated understanding of topics discussed through group discussion and participation and Expressed understanding of the relevance / importance of emotions management skills at distressing times in life    Treatment Plan:  Patient has a current master individualized treatment plan.  See Epic treatment plan for more information.    INDIGO Verde

## 2024-08-08 ENCOUNTER — HOSPITAL ENCOUNTER (OUTPATIENT)
Dept: BEHAVIORAL HEALTH | Facility: CLINIC | Age: 34
Discharge: HOME | End: 2024-08-08
Attending: PSYCHIATRY & NEUROLOGY
Payer: COMMERCIAL

## 2024-08-08 PROCEDURE — 90853 GROUP PSYCHOTHERAPY: CPT | Performed by: SOCIAL WORKER

## 2024-08-08 PROCEDURE — 90853 GROUP PSYCHOTHERAPY: CPT

## 2024-08-08 NOTE — GROUP NOTE
Psychoeducation Group Note    PATIENT'S NAME: Basil Andrews  MRN:   [id number]  :   1990  [facility] NUMBER: [id number]  DATE OF SERVICE: 24  START TIME:  1:00 PM  END TIME:  1:50 PM  FACILITATOR: Jose Blackwood LPC  TOPIC: MH Life Skills Group: Communication and Social Skills Development  [facility] Mental Health Outpatient Programs    TRACK: IOP/DT-2/6B Psychosis    NUMBER OF PARTICIPANTS: 6    Summary of Group / Topics Discussed:  Communication and Social Skills Development: Communication Styles: Am I Assertive?: Patients completed a self assessment of assertiveness skills and how this impacts their communication with other people.  Patients were given an opportunity to identify strengths as well as areas for improvement in assertive communication.    Patient Session Goals / Objectives:  Identified strengths and areas for improvement in assertive communication and how that impacts their ability to communicate clearly with other people     Improved awareness of important aspects of assertive communication and how this relates to mental health recovery      Established a plan for practice of these skills in their own environments  Practiced and reflected on how to generalize taught skills to their everyday life    Patient Participation / Response:  Fully participated with the group by sharing personal reflections / insights and openly received / provided feedback with other participants.    Verbalized understanding of content, Patient would benefit from additional opportunities to practice the content to be able to generalize it to their everyday life with increased intentionality, consistency, and efficacy in support of their psychiatric recovery, and Patient worked towards initial treatment plan goals     Treatment Plan:  Patient has a current master individualized treatment plan.  See Epic treatment plan for more information.    Jose Blackwood LPC

## 2024-08-08 NOTE — GROUP NOTE
Psychoeducation Group Note    PATIENT'S NAME: Basil Andrews  MRN:   [id number]  :   1990  ACCT. NUMBER: [id number]  DATE OF SERVICE: 24  START TIME:  2:00 PM  END TIME:  2:50 PM  FACILITATOR: Luz Pa LICSW; America Perrin OTR  TOPIC: MH Life Skills Group: Resiliency Development  [facility] Outpatient Programs  TRACK: IOP/DT 2    NUMBER OF PARTICIPANTS: 5    Summary of Group / Topics Discussed:  Resiliency Development:  Resiliency Development: Grounding: Staff introduced the topic of grounding techniques as distress tolerance skills.  Provided education on three categories of grounding (mental, physical, and soothing). Facilitated collaborative discussion and identified personal examples of grounding skills.  Instructed group members in utilizing cognitive strategies such as naming items in a given category, imagery with emphasis on sensory experiences, and solving a puzzle for distraction.  Instructed in physical strategies of stretching, stomping, and breathing.       Patient Session Goals / Objectives:   Identify effective mental and physical grounding strategies through exploration and experiential learning   Independently identify a purposeful and meaningful therapeutic activity.   Identified which goal(s) they are intentionally working on during session.    Reflected on their performance and shared insight about their progress.   Practiced and identified a way to generalize a skill to their everyday life.      Patient Participation / Response:  Fully participated with the group by sharing personal reflections / insights and openly received / provided feedback with other participants.    Verbalized understanding of content    Treatment Plan:  Patient has a current master individualized treatment plan.  See Epic treatment plan for more information.    INDIGO Verde

## 2024-08-08 NOTE — GROUP NOTE
Process Group Note    PATIENT'S NAME: Basil Andrews  MRN:   [id number]  :   1990  [id number]  DATE OF SERVICE: 24  START TIME: 12:00 PM  END TIME: 12:50 PM  FACILITATOR: Jose Blackwood LPC  TOPIC:  Process Group    Diagnoses:  295.70  (F25) Schizoaffective Disorder Bipolar Type.  296.44 Bipolar I Disorder Current or Most Recent Episode Manic, with psychotic features.  Provisional Diagnosis: 296.21 (F32.0) Major Depressive Disorder, Single Episode, Mild and With mixed features     [facility] Adult Mental [facility] Outpatient Programs    TRACK: IOP/DT Psychosis    NUMBER OF PARTICIPANTS: 7      Data:    Session content: At the start of this group, patients were invited to check in by identifying themselves, describing their current emotional status, and identifying issues to address in this group. Area(s) of treatment focus addressed in this session included Symptom Management, Personal Safety, and Community Resources/Discharge Planning.    Patient arrived on time, attended group, and engaged in appropriate check-in questions and stated the following,  I feel still overwhelmed most of the time, throughout the day, but we are widdling down the little things, like figuring out the moving company stuff and talking to my realtor stuff. The skills I have been using is like just making calls and taking care of that. Psychosis symptoms, I feel like the paranoia and like the auditory hallucinations are really, really, low. I still do feel like everyone is connected in some way but I have to shy away from that I am in control of this, because everyone is connected to each other and gets to make their own choices. I know I have to realize everyone gets to make their own choices. I have no safety concerns of suicidal thoughts or self-injurious behaviors. Chemical use I have not had any of. I am taking my medications as prescribed, yes. Something that I am proud of today is that I  "texted my realtor to get another number for the movers. So, that was something. And just having the group to support me helps me.     Therapeutic Interventions/Treatment Strategies:  Psychotherapist offered support, feedback and validation, provided redirection, and reinforced use of skills. Treatment modalities used include Motivational Interviewing and Cognitive Behavioral Therapy. Interventions include Coping Skills: Facilitated discussion on learning and applying radical acceptance skill, Discussed use of self-soothe skills to decrease distress in the body, Assisted patient in identifying 1-2 healthy distraction skills to reduce overall distress, Discussed how the use of intentional \"in the moment\" actions can help reduce distress, Reviewed patients current calming practices and discussed a more formal way of practicing and accessing skills, Promoted understanding of how and when to apply grounding strategies to reduce distress and increase presence in the moment, Discussed meditation skills and addressed ways to implement meditation skills , Assisted patient in understanding the purpose of planning / creating / participating / sharing in positive experiences, and Facilitated understanding of  what factors may contribute to symptom relapse and skills plan to manage symptom relapse .    Assessment:    Patient response:   Patient responded to session by accepting feedback, giving feedback, listening, focusing on goals, being attentive, accepting support, appearing alert, demonstrating behavior change, and verbalizing understanding    Possible barriers to participation / learning include: and no barriers identified    Health Issues:   None reported       Substance Use Review:   Substance Use: No active concerns identified.    Mental Status/Behavioral Observations  Appearance:   Appropriate   Eye Contact:   Good   Psychomotor Behavior: Normal   Attitude:   Cooperative   Orientation:   All  Speech   Rate / " Production: Normal    Volume:  Normal   Mood:    Normal  Affect:    Appropriate   Thought Content:   Clear  Thought Form:  Coherent  Logical     Insight:    Good     Plan:   Safety Plan: No current safety concerns identified.  Recommended that patient call 911 or go to the local ED should there be a change in any of these risk factors.   Barriers to treatment: None identified  Patient Contracts (see media tab):  None  Substance Use: Not addressed in session   Continue or Discharge: Patient will continue in Adult Day Treatment (ADT)  as planned. Patient is likely to benefit from learning and using skills as they work toward the goals identified in their treatment plan.    Jose Blackwood, LPC    August 8, 2024

## 2024-08-12 ENCOUNTER — HOSPITAL ENCOUNTER (OUTPATIENT)
Dept: BEHAVIORAL HEALTH | Facility: CLINIC | Age: 34
Discharge: HOME | End: 2024-08-12
Attending: PSYCHIATRY & NEUROLOGY
Payer: COMMERCIAL

## 2024-08-12 PROCEDURE — 90853 GROUP PSYCHOTHERAPY: CPT

## 2024-08-12 PROCEDURE — 90853 GROUP PSYCHOTHERAPY: CPT | Performed by: SOCIAL WORKER

## 2024-08-12 NOTE — GROUP NOTE
Process Group Note    PATIENT'S NAME: Basil Andrews  MRN:   [id number]  :   1990  [id number]  DATE OF SERVICE: 24  START TIME: 12:00 PM  END TIME: 12:50 PM  FACILITATOR: Jose Blackwood LPC  TOPIC:  Process Group    Diagnoses:  295.70  (F25) Schizoaffective Disorder Bipolar Type.  296.44 Bipolar I Disorder Current or Most Recent Episode Manic, with psychotic features.  Provisional Diagnosis: 296.21 (F32.0) Major Depressive Disorder, Single Episode, Mild and With mixed features     [facility] Adult Mental Health Outpatient Programs    TRACK: IOP/DT (Psychosis)    NUMBER OF PARTICIPANTS: 7      Data:    Session content: At the start of this group, patients were invited to check in by identifying themselves, describing their current emotional status, and identifying issues to address in this group. Area(s) of treatment focus addressed in this session included Symptom Management, Personal Safety, and Community Resources/Discharge Planning.    Patient arrived on time for group, and engaged with the check-in and processing questions, and identified the following,  I am feeling tired today. I need to meet with my psychiatrist tomorrow. What goal and I working towards? Like just getting stuff done like mowing my lawn. I also looked into [employer]  for debt help and I need to keep going on with that stuff. I need to keep using the opposite action skill we talked about just try to keep going forward. I still sometimes am also have some psychosis symptoms, like my brain is telling me to go into the voices and paranoia. I know I am like right there and not participating in it, so that is where I am at. No safety concerns. No chemical use. I am taking my meds as prescribed, and then another thing is like when I am speaking, I feel like I am tongue-tied and am speaking slower. My wife says she doesn't notice, but it feels like I am speaking to slow. I am not sure if it  is tiredness and stuff that is making me want to stop talking, or what. Something I am proud of today is that I mowed my lawn, and the group can support me by just being here and offering whatever you want to talk about. I don't need time to talk.     Therapeutic Interventions/Treatment Strategies:  Psychotherapist offered support, feedback and validation and reinforced use of skills. Treatment modalities used include Dialectical Behavioral Therapy. Interventions include Mindfulness: Facilitated discussion of when/how to use mindfulness skills to benefit general health, mental health symptoms, and stressors and Encouraged a plan to use mindfulness skills in daily life and Symptoms Management: Promoted understanding of their diagnoses and how it impacts their functioning.    Assessment:    Patient response:   Patient responded to session by accepting feedback, giving feedback, listening, focusing on goals, being attentive, accepting support, appearing alert, demonstrating behavior change, and verbalizing understanding    Possible barriers to participation / learning include: and no barriers identified    Health Issues:   None reported       Substance Use Review:   Substance Use: No active concerns identified.    Mental Status/Behavioral Observations  Appearance:   Appropriate   Eye Contact:   Good   Psychomotor Behavior: Normal   Attitude:   Cooperative   Orientation:   All  Speech   Rate / Production: Normal    Volume:  Normal   Mood:    Normal  Affect:    Appropriate   Thought Content:   Clear  Thought Form:  Coherent  Logical     Insight:    Good     Plan:   Safety Plan: No current safety concerns identified.  Recommended that patient call 911 or go to the local ED should there be a change in any of these risk factors.   Barriers to treatment: None identified  Patient Contracts (see media tab):  None  Substance Use: Not addressed in session   Continue or Discharge: Patient will continue in Adult Day Treatment  (ADT)  as planned. Patient is likely to benefit from learning and using skills as they work toward the goals identified in their treatment plan.    Jose Blackwood, LPC    August 12, 2024

## 2024-08-12 NOTE — GROUP NOTE
Psychotherapy Group Note    PATIENT'S NAME: Basil Andrews  MRN:   [id number]  :   1990  [id number]  DATE OF SERVICE: 24  START TIME:  1:00 PM  END TIME:  1:50 PM  FACILITATOR: Jose Blackwood LPC  TOPIC:  EBP Group: [facility] Adult Mental Health Outpatient Programs    TRACK: IOP/DT- (Psychosis)    NUMBER OF PARTICIPANTS: 7    Summary of Group / Topics Discussed:  Mindfulness: Mindfulness Skills: Patients received information on the main components of mindfulness. Patients participated in discussion on how to practice observing, describing, and participating in internal and external environments. Relevance of mindfulness skills to overall mental and physical health was explored.  Patients explored and discussed in group their current awareness and knowledge of mindfulness skills as well as barriers to applying skills.    Patient Session Goals / Objectives:  Demonstrated and verbalized understanding of key mindfulness concepts  Identified when/how to use mindfulness skills  Resolved barriers to practicing mindfulness skills  Identified plan to use mindfulness skills in daily life       Patient Participation / Response:  Fully participated with the group by sharing personal reflections / insights and openly received / provided feedback with other participants.    Demonstrated understanding of topics discussed through group discussion and participation, Demonstrated understanding of mindfulness skills and benefits of practice, Identified / Expressed personal readiness to practice mindfulness skills, Verbalized understanding of how mindfulness can benefit mental health symptoms, Identified plan to address barriers to practicing mindfulness skills , and Practiced skills in session    Treatment Plan:  Patient has a current master individualized treatment plan.  See Epic treatment plan for more information.    Jose Blackwood LPC

## 2024-08-13 ENCOUNTER — HOSPITAL ENCOUNTER (OUTPATIENT)
Dept: BEHAVIORAL HEALTH | Facility: CLINIC | Age: 34
Discharge: HOME | End: 2024-08-13
Attending: PSYCHIATRY & NEUROLOGY
Payer: COMMERCIAL

## 2024-08-13 DIAGNOSIS — F31.2 BIPOLAR DISORDER, CURRENT EPISODE MANIC SEVERE WITH PSYCHOTIC FEATURES (H): Primary | ICD-10-CM

## 2024-08-13 PROCEDURE — 90853 GROUP PSYCHOTHERAPY: CPT

## 2024-08-13 PROCEDURE — 99214 OFFICE O/P EST MOD 30 MIN: CPT

## 2024-08-13 PROCEDURE — 90853 GROUP PSYCHOTHERAPY: CPT | Performed by: SOCIAL WORKER

## 2024-08-13 NOTE — GROUP NOTE
Psychotherapy Group Note    PATIENT'S NAME: Basil Andrews  MRN:   [id number]  :   1990  [id number]  DATE OF SERVICE: 24  START TIME:  1:00 PM  END TIME:  1:50 PM  FACILITATOR: Jose Blackwood LPC  TOPIC:  EBP Group: Specialty Awareness  [facility] Mental [facility] Outpatient Programs    TRACK: IOP/DT- (Psychosis)    NUMBER OF PARTICIPANTS: 7    Summary of Group / Topics Discussed:  Specialty Topics: Hope: The topic of hope was presented in order to help patients better understand the symptoms of hopelessness and how to become more hopeful. Patients discussed their current awareness of the topic and relevance to their functioning. Individual experiences with symptoms and treatment options were also discussed. Patients explored options for ongoing/future treatment and symptom management.      Patient Session Goals / Objectives:  Discussed definition of hopelessness  Discussed how hopelessness impacts functioning  Set a plan to utilize skills to reduce hopelessness      Patient Participation / Response:  Fully participated with the group by sharing personal reflections / insights and openly received / provided feedback with other participants.    Demonstrated understanding of topics discussed through group discussion and participation, Identified / Expressed readiness to act on skill suggestions discussed in topic, Verbalized understanding of ways to proactively manage illness, Identified plan to address barriers to practicing skills discussed in topic, and Practiced skills in session    Treatment Plan:  Patient has a current master individualized treatment plan.  See Epic treatment plan for more information.    Jose Blackwood LPC

## 2024-08-13 NOTE — PROGRESS NOTES
[facility]   Adult Mental Health Outpatient Programs  Psychiatric Progress Record    Program Track: IOP/DT 2 P    PATIENT'S NAME: Basil Andrews  MRN:   [id number]  :   1990  ACCT. NUMBER: [id number]  DATE OF SERVICE: 24    Interval History:  Basil presents today for follow-up and ongoing program supervision.   Endorses:  I am oversleeping and groggy throughout the day. It has been happening for awhile but I did not mention it  I am talking too long, I am slow, I slur my words.   I think since I started Naltrexone, I have not had sexual thoughts.  Doing the dish takes me like 1 hours, not be able to help in the household  I had ADHD testing that was conclusive but I have been Adderall before    Review of Symptoms  Sleep: so so, may be improving  Appetite: better  Suicidal ideation: has passive suicidal thoughts described as passive  Thoughts of non-suicidal self-injury: denied  Recent self-injurious behavior: denied  Homicidal ideation: denied  Other safety concerns: denied    Activities of Daily Living and Related Systems Impacted by Illness:  Hygiene: Better  Socialization: I am not  Activities of Daily Living: (cleaning, shopping, bills, etc.): some being done  Concerns related to work: no    Substance use:  Denies      Medications:  Current Outpatient Medications   Medication Sig Dispense Refill    benztropine (COGENTIN) 1 MG tablet TAKE ONE TABLET BY MOUTH TWICE DAILY 60 tablet 0    divalproex sodium extended-release (DEPAKOTE ER) 500 MG 24 hr tablet Take 3 tablets (1,500 mg) by mouth at bedtime 90 tablet 1    divalproex sodium extended-release (DEPAKOTE ER) 500 MG 24 hr tablet Take 1 tablet (500 mg) by mouth daily 30 tablet 1    fluticasone (FLONASE) 50 MCG/ACT nasal spray Spray 2 sprays into both nostrils daily      haloperidol (HALDOL) 10 MG tablet Take 1 tablet (10 mg) by mouth 2 times daily 60 tablet 1    ipratropium (ATROVENT) 0.06 % nasal spray Spray 2 sprays into both nostrils 4  "times daily      multivitamin, therapeutic with minerals (MULTI-VITAMIN) TABS Take 1 tablet by mouth daily      naltrexone (DEPADE/REVIA) 50 MG tablet Take 1 tablet (50 mg) by mouth daily 30 tablet 0    haloperidol (HALDOL) 5 MG tablet Take 1 tablet (5 mg) by mouth at bedtime. May also take 1 tablet (5 mg) 2 times daily as needed for agitation or other (psychotic symptoms). (Patient not taking: Reported on 8/13/2024) 60 tablet 1    propranolol (INDERAL) 10 MG tablet Take 1 tablet (10 mg) by mouth 3 times daily as needed (restlessness) (Patient not taking: Reported on 8/13/2024) 60 tablet 1       The above list was reviewed and updated in EPIC with patient today.     Patient is taking medications as prescribed and denies adverse effects    Laboratory Results:  Most recent labs reviewed. Pertinent updates/findings: None.     Mental Status Examination:  Vital Signs: There were no vitals taken for this visit.   Appearance: adequately groomed, appears stated age, and in no apparent distress.  Attitude: cooperative   Eye Contact: good   Muscle Strength and Tone: normal  Psychomotor Behavior: normal or unremarkable   Gait and Station: normal width, turn, arm swing  Speech: clear, coherent, decreased prosody, regular rate, regular rhythm, and fluent  Associations: No loosening of associations  Thought Process: coherent and goal directed  Thought Content: no evidence of psychotic thought, passive suicidal ideation present, no auditory hallucinations present, and no visual hallucinations present  Mood: \"anxious and depressed\"  Affect: mood congruent  Insight: good  Judgment: intact, adequate for safety  Impulse Control: intact  Oriented to: time, place, person, and situation  Attention Span and Concentration: Normal  Language: Intact  Recent and Remote Memory: Delayed & immediate recall intact  Fund of Knowledge/Assessment of Intelligence: Average  Capacity of Activities of Daily Living: Independent, able to participate in " programmatic care services.    Diagnosis/es:    ICD-10-CM    1. Bipolar disorder, current episode manic severe with psychotic features (H)  F31.2         Cannot rule out 295.70  (F25) Schizoaffective Disorder Bipolar Type   Assessment/Plan:    Basil presents today for follow-up psychiatric evaluation and assessment of progress. He reports feeling sluggish and struggling to perform activities of daily living (ADLs) effectively. He plans to reduce his bedtime Haldol dosage to 10 mg, resulting in a regimen of 10 mg in the morning and 10 mg at bedtime. On a positive note, Naltrexone appears to have significantly reduced sexual ruminations. However, ongoing mental health challenges continue to impair his ability to self-care, keeping him at an increased safety risk. To address these risks, he will continue to participate in psychotherapy and medication management through his Intensive Outpatient Program  Follow up in 1 week or sooner as needed.    Safety Assessment:  Basil reports suicidal ideation and/or non-suicidal self-injury or thoughts thereof as noted above  Basil is future-oriented and is engaged in treatment planning   I do not feel that Basil meets criteria for a 72-hour involuntary hold and remains appropriate for an outpatient level of care    MAXINE OKEEFE DNP on 8/13/2024 at 1:30 PM    Visit Details:  Type of service: In-person  Location (patient and provider): [facility] Out[facility] Programmatic Care Offices    Level of Medical Decision Making:   - At least 1 chronic problem that is not stable  - Engaged in prescription drug management during visit (discussed any medication benefits, side effects, alternatives, etc.)  Discussion of management or test interpretation with external physician/other qualified healthcare professional/appropriate source - programmatic care multidisciplinary treatment team    30 min spent on the date of the encounter in chart review, patient  visit, review of tests, documentation, care coordination, and/or discussion with other providers about the issues documented above.      This document completed in part using [employer] dictation software and therefore may contain inadvertent word or phrase substitutions.

## 2024-08-13 NOTE — GROUP NOTE
Psychoeducation Group Note    PATIENT'S NAME: Basil Andrews  MRN:   [id number]  :   1990  ACCT. NUMBER: [id number]  DATE OF SERVICE: 24  START TIME:  2:00 PM  END TIME:  2:50 PM  FACILITATOR: Luz Pa LICSW  TOPIC: MH Life Skills Group: Lifestyle Balance and Structure  [facility] Outpatient Programs  TRACK: IOP/DT 2    NUMBER OF PARTICIPANTS: 7    Summary of Group / Topics Discussed:  Lifestyle Balance and Strucure:  Goal-setting & integration: Patients were introduced to the process and benefits of setting realistic, timely, and achievable goals to help support their ability to follow through with meaningful personal, health, recovery and treatment goals that they would like to achieve to improve overall functioning.  Patients were also taught and then practiced techniques to manage a variety of obstacles to achieve their goals and how to break goals into manageable pieces.  Patients also reported on follow through of goals.     Patient Session Goals / Objectives:  Facilitated the creation of a vision for recovery and wellbeing  Identified and wrote meaningful SMART goals to engage in meaningful activities of daily living   Identified and problem solved barriers to achieving goals   Identified plan to support follow through on goals and reflection on progress made      Patient Participation / Response:  Fully participated with the group by sharing personal reflections / insights and openly received / provided feedback with other participants.    Verbalized understanding of content    Treatment Plan:  Patient has a current master individualized treatment plan.  See Epic treatment plan for more information.    INDIGO Verde

## 2024-08-13 NOTE — ADDENDUM NOTE
Encounter addended by: Lily Espino Psy.D, YOLETTE on: 8/13/2024 5:13 PM   Actions taken: Flowsheet accepted

## 2024-08-13 NOTE — ADDENDUM NOTE
Encounter addended by: Lily Espino Psy.D, LP on: 8/13/2024 5:10 PM   Actions taken: Clinical Note Signed

## 2024-08-15 ENCOUNTER — HOSPITAL ENCOUNTER (OUTPATIENT)
Dept: BEHAVIORAL HEALTH | Facility: CLINIC | Age: 34
Discharge: HOME | End: 2024-08-15
Attending: PSYCHIATRY & NEUROLOGY
Payer: COMMERCIAL

## 2024-08-15 PROCEDURE — 90853 GROUP PSYCHOTHERAPY: CPT

## 2024-08-15 PROCEDURE — 90853 GROUP PSYCHOTHERAPY: CPT | Performed by: SOCIAL WORKER

## 2024-08-15 NOTE — GROUP NOTE
Psychoeducation Group Note    PATIENT'S NAME: Basil Andrews  MRN:   [id number]  :   1990  ACCT. NUMBER: [id number]  DATE OF SERVICE: 8/15/24  START TIME:  2:00 PM  END TIME:  2:50 PM  FACILITATOR: Luz Pa LICSW  TOPIC: MH Wellness Group: Health Maintenance  [facility] Outpatient Programs  TRACK: IOP/DT 2    NUMBER OF PARTICIPANTS: 5    Summary of Group / Topics Discussed:  Health Maintenance: Weekend planning: Patients were given time to complete a weekend plan of what they will do to promote wellness and sobriety over the weekend when they do not have the structure of group. Patients were encouraged to review progress on their treatment goals and were challenged to identify ways to work toward meeting them. Patients identified and discussed foreseeable barriers to success over the weekend and then developed a plan to overcome them. Patients reviewed their distress coping skills and social support network and discussed this with the group.       Patient Session Goals / Objectives:    ?    Identified activities to engage in that promote balance in wellness  ?    Distinguished possible barriers to success over the weekend and created a plan to overcome them  ?    Listed distress coping skills and identified social support network to utilize if in crisis during the weekend        Patient Participation / Response:  Fully participated with the group by sharing personal reflections / insights and openly received / provided feedback with other participants.    Demonstrated understanding of topics discussed through group discussion and participation and Verbalized understanding of health maintenance topic    Treatment Plan:  Patient has a current master individualized treatment plan.  See Epic treatment plan for more information.    INDIGO Verde

## 2024-08-15 NOTE — GROUP NOTE
Process Group Note    PATIENT'S NAME: Basil Andrews  MRN:   [id number]  :   1990  [id number]  DATE OF SERVICE: 8/15/24  START TIME:  1:00 PM  END TIME:  1:50 PM  FACILITATOR: Jose Blackwood LPC  TOPIC:  Process Group    Diagnoses:  295.70  (F25) Schizoaffective Disorder Bipolar Type.  296.44 Bipolar I Disorder Current or Most Recent Episode Manic, with psychotic features.  Provisional Diagnosis: 296.21 (F32.0) Major Depressive Disorder, Single Episode, Mild _ and With mixed features     [facility] Mental Health Outpatient Programs    TRACK: IOP/DT (Psychosis)    NUMBER OF PARTICIPANTS: 5      Data:    Session content: At the start of this group, patients were invited to check in by identifying themselves, describing their current emotional status, and identifying issues to address in this group. Area(s) of treatment focus addressed in this session included Symptom Management, Personal Safety, and Community Resources/Discharge Planning.    Patient arrived on time for group and engaged with the prompted check-in questions pertaining to emotions, goals, boundaries, barriers, safety concerns, chemical use, medication management, and group feedback, and stated the following:  I am feeling hopeful because I have been calling the [employer] and figuring the stuff out that I have been needing to do. And I am proud of myself for calling that  lady and figuring out what to do about my debt and I don't think I would have done that if it wasn't for my wife's support. What skills I will use today towards my goal is using mindfulness breathing. My psychosis symptoms are like I am at the front door of trying to not let them in and it is working and I think the medication has helped a lot with that too. Because maybe that has blocked out the paranoia a bit and the voices and stuff. I do hear things wrong a lot from people, but I don't try to like turn that into my  own little story. I just wait on it and see if it will pass or see what the truth is. And no safety concerns about any of that stuff. I know in the past I have had very passive suicide stuff, and so none of that. No chemical use either. I don't need any time to really process anything.     Therapeutic Interventions/Treatment Strategies:  Psychotherapist offered support, feedback and validation and reinforced use of skills. Treatment modalities used include Motivational Interviewing and Dialectical Behavioral Therapy. Interventions include Symptoms Management: Promoted understanding of their diagnoses and how it impacts their functioning.    Assessment:    Patient response:   Patient responded to session by accepting feedback, giving feedback, listening, focusing on goals, being attentive, accepting support, appearing alert, demonstrating behavior change, and verbalizing understanding    Possible barriers to participation / learning include: and no barriers identified    Health Issues:   None reported       Substance Use Review:   Substance Use: No active concerns identified.    Mental Status/Behavioral Observations  Appearance:   Appropriate   Eye Contact:   Good   Psychomotor Behavior: Normal   Attitude:   Cooperative   Orientation:   All  Speech   Rate / Production: Normal    Volume:  Normal   Mood:    Normal  Affect:    Appropriate   Thought Content:   Clear  Thought Form:  Coherent  Logical     Insight:    Good     Plan:   Safety Plan: Patient consented to co-developed safety plan.  Safety and risk management plan was completed.  Patient agreed to use safety plan should any safety concerns arise.  A copy was given to the patient.  Committed to safety and agreed to follow previously developed safety coping plan.    Barriers to treatment: None identified  Patient Contracts (see media tab):  None  Substance Use: Not addressed in session   Continue or Discharge: Patient will continue in Adult Day Treatment (ADT)  as  planned. Patient is likely to benefit from learning and using skills as they work toward the goals identified in their treatment plan.    Jose Blackwood, TEDDY    August 15, 2024

## 2024-08-15 NOTE — GROUP NOTE
Psychoeducation Group Note    PATIENT'S NAME: Basil Andrews  MRN:   [id number]  :   1990  ACCT. NUMBER: [id number]  DATE OF SERVICE: 8/15/24  START TIME: 12:00 PM  END TIME: 12:50 PM  FACILITATOR: Jose Blackwood LPC; America Perrin OTR  TOPIC: MH Life Skills Group: Sensory Approaches in Mental Health  [facility] Adult Mental Health Outpatient Programs    TRACK: IOP/DT (Psychosis)    NUMBER OF PARTICIPANTS: 5    Summary of Group / Topics Discussed:  Sensory Approaches in Mental Health:  Self Regulation Through Sensory Modulation:  Patients were provided with education on Autonomic Nervous System activation and taught skills that can be used immediately to help them calm down and regain self control.  Patients were taught how to recognize specific signs and symptoms of their individualized state of arousal and how to make changes when needed.  Patients explored body based skills (bottom up processing skills) and techniques to help manage symptoms and change level of arousal when needed to be in control and comfortable so they are able to function in different environments.       Patient Session Goals / Objectives:  Identified specific and individualized neurosensory skills to help when distressed and for crisis management  Identified signs and symptoms of current level of arousal and ways to make changes in level of arousal when needed  Established a plan for practice of these skills in their own environments    Patient Participation / Response:  Fully participated with the group by sharing personal reflections / insights and openly received / provided feedback with other participants.    Verbalized understanding of content and Patient worked towards initial treatment plan goals     Treatment Plan:  Patient has a current master individualized treatment plan.  See Epic treatment plan for more information.    Jose Blackwood LPC

## 2024-08-19 ENCOUNTER — HOSPITAL ENCOUNTER (OUTPATIENT)
Dept: BEHAVIORAL HEALTH | Facility: CLINIC | Age: 34
Discharge: HOME | End: 2024-08-19
Attending: PSYCHIATRY & NEUROLOGY
Payer: COMMERCIAL

## 2024-08-19 DIAGNOSIS — Z72.6 GAMBLING: Primary | ICD-10-CM

## 2024-08-19 DIAGNOSIS — F31.2 BIPOLAR DISORDER, CURRENT EPISODE MANIC SEVERE WITH PSYCHOTIC FEATURES (H): Primary | ICD-10-CM

## 2024-08-19 PROCEDURE — 90853 GROUP PSYCHOTHERAPY: CPT | Performed by: PSYCHOLOGIST

## 2024-08-19 PROCEDURE — 99214 OFFICE O/P EST MOD 30 MIN: CPT

## 2024-08-19 NOTE — PROGRESS NOTES
"[facility]   Adult Mental Health Outpatient Programs  Psychiatric Progress Record    Program Track: IOP/DT 2 P    PATIENT'S NAME: Basil Andrews  MRN:   [id number]  :   1990  ACCT. NUMBER: [id number]  DATE OF SERVICE: 24    Interval History:  \"Lethargic.\" Basil presents today for follow-up and ongoing program supervision.   Endorses:  I am still lethargic. My wife want the process to go a little faster. I am not helping much around the house  Driving is now dangerous, I am almost sleeping on the wheels  I have noticed some improvement with reduced haldol but I am not anywhere near I wan to be  The sexual thoughts are fine. I do not access my mind to trick me anymore. I do not have those thoughts to check out women anymore  No suicide thoughts. No self harm thoughts. I am challenging the passive thoughts such as \"if the bus hit me there is not problem.    Review of Symptoms  Sleep: I can sleep whenever. I have to force myself out the bed.   Appetite: is fine  Suicidal ideation: denies current or recent suicidal ideation or behavior  Thoughts of non-suicidal self-injury: denied  Recent self-injurious behavior: denied  Homicidal ideation: denied  Other safety concerns: denied    Substance use:  Same      Medications:  Current Outpatient Medications   Medication Sig Dispense Refill    benztropine (COGENTIN) 1 MG tablet TAKE ONE TABLET BY MOUTH TWICE DAILY 60 tablet 0    divalproex sodium extended-release (DEPAKOTE ER) 500 MG 24 hr tablet Take 3 tablets (1,500 mg) by mouth at bedtime 90 tablet 1    divalproex sodium extended-release (DEPAKOTE ER) 500 MG 24 hr tablet Take 1 tablet (500 mg) by mouth daily 30 tablet 1    fluticasone (FLONASE) 50 MCG/ACT nasal spray Spray 2 sprays into both nostrils daily      haloperidol (HALDOL) 10 MG tablet Take 1 tablet (10 mg) by mouth 2 times daily (Patient taking differently: Take 10 mg by mouth 2 times daily Take 5 mg in AM and 10 mg at be time) 60 tablet 1    " "haloperidol (HALDOL) 5 MG tablet Take 1 tablet (5 mg) by mouth at bedtime. May also take 1 tablet (5 mg) 2 times daily as needed for agitation or other (psychotic symptoms). (Patient not taking: Reported on 8/13/2024) 60 tablet 1    ipratropium (ATROVENT) 0.06 % nasal spray Spray 2 sprays into both nostrils 4 times daily      multivitamin, therapeutic with minerals (MULTI-VITAMIN) TABS Take 1 tablet by mouth daily      naltrexone (DEPADE/REVIA) 50 MG tablet Take 1 tablet (50 mg) by mouth daily 30 tablet 0    propranolol (INDERAL) 10 MG tablet Take 1 tablet (10 mg) by mouth 3 times daily as needed (restlessness) (Patient not taking: Reported on 8/13/2024) 60 tablet 1       The above list was reviewed and updated in EPIC with patient today.     Patient is taking medications as prescribed and denies adverse effects    Laboratory Results:  Most recent labs reviewed. Pertinent updates/findings: None.     Mental Status Examination:  Vital Signs: There were no vitals taken for this visit.   Appearance: adequately groomed, appears stated age, and in no apparent distress.  Attitude: cooperative   Eye Contact: good   Muscle Strength and Tone: normal  Psychomotor Behavior: normal or unremarkable   Gait and Station: normal width, turn, arm swing  Speech: clear, coherent, decreased prosody, regular rate, regular rhythm, and fluent  Associations: No loosening of associations  Thought Process: coherent and goal directed  Thought Content: no evidence of psychotic thought, no auditory hallucinations present, and no visual hallucinations present  Mood: \"anxious and depressed\"  Affect: mood congruent  Insight: good  Judgment: intact, adequate for safety  Impulse Control: intact  Oriented to: time, place, person, and situation  Attention Span and Concentration: Normal  Language: Intact  Recent and Remote Memory: Delayed & immediate recall intact  Fund of Knowledge/Assessment of Intelligence: Average  Capacity of Activities of Daily " Living: Independent, able to participate in programmatic care services.    Diagnosis/es:    ICD-10-CM    1. Bipolar disorder, current episode manic severe with psychotic features (H)  F31.2         Cannot rule out 295.70 (F25) Schizoaffective Disorder Bipolar Type     Assessment/Plan:  Basil presents today for follow-up psychiatric evaluation and assessment of progress.  Reduce Haldol to 5 mg in Am and 10 mg at bed time    Safety Assessment:  Basil reports suicidal ideation and/or non-suicidal self-injury or thoughts thereof as noted above  Basil is future-oriented and is engaged in treatment planning   I do not feel that Basil meets criteria for a 72-hour involuntary hold and remains appropriate for an outpatient level of care    MAXINE OKEEFE DNP on 8/19/2024 at 2:35 PM    Visit Details:  Type of service: In-person  Location (patient and provider): [facility] Adult Mental Health Outpatient Programmatic Care Offices    Level of Medical Decision Making:   - At least 1 chronic problem that is not stable  - Engaged in prescription drug management during visit (discussed any medication benefits, side effects, alternatives, etc.)  Discussion of management or test interpretation with external physician/other qualified healthcare professional/appropriate source - programmatic care multidisciplinary treatment team    30 min spent on the date of the encounter in chart review, patient visit, review of tests, documentation, care coordination, and/or discussion with other providers about the issues documented above.      This document completed in part using [employer] dictation software and therefore may contain inadvertent word or phrase substitutions.

## 2024-08-19 NOTE — GROUP NOTE
Psychotherapy Group Note    PATIENT'S NAME: Basil Andrews  MRN:   [id number]  :   1990  [id number]  DATE OF SERVICE: 24  START TIME: 12:00 PM  END TIME: 12:50 PM  FACILITATOR: Lily Espino Psy.D, LP  TOPIC: MH EBP Group: Relationship Skills  [facility] Mental Health Outpatient Programs  TRACK: iop/dt2p    NUMBER OF PARTICIPANTS: 6    Summary of Group / Topics Discussed:  Relationship Skills: Boundaries: Patients were provided with a general overview of interpersonal boundaries and how lack of boundaries relates to symptoms and functioning. The purpose is to help patients identify boundary issues and gain awareness and skills to work towards healthier interpersonal boundaries. Current awareness of healthy boundary characteristics and barriers to establishing healthy boundaries were discussed.    Patient Session Goals / Objectives:  Familiarized patients with the concept of interpersonal boundaries and their characteristics  Discussed and practiced strategies to promote healthier interpersonal boundaries  Identified boundary issues and identified plan to improve boundaries      Patient Participation / Response:  Fully participated with the group by sharing personal reflections / insights and openly received / provided feedback with other participants.    Demonstrated understanding of relationship skills and communication skills    Treatment Plan:  Patient has a current master individualized treatment plan.  See Epic treatment plan for more information.    Lily Espino Psy.D, YOLETTE

## 2024-08-19 NOTE — GROUP NOTE
"Process Group Note    PATIENT'S NAME: Basil Andrews  MRN:   [id number]  :   1990  [id number]  DATE OF SERVICE: 24  START TIME:  1:00 PM  END TIME:  1:50 PM  FACILITATOR: Lily Espino Psy.D, LP  TOPIC:  Process Group    Diagnoses:  295.70  (F25) Schizoaffective Disorder Bipolar Type.  296.44 Bipolar I Disorder Current or Most Recent Episode Manic, with psychotic features.  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  PCP: Dmitry Tee PA-C  Location: [facility] [place] [facility], [address], [place] [address]  Phone: [phone]  Fax: [phone]       [facility] Mental Health Outpatient Programs  TRACK: iop/dt2p    NUMBER OF PARTICIPANTS: 7        Data:    Session content: At the start of this group, patients were invited to check in by identifying themselves, describing their current emotional status, and identifying issues to address in this group.   Area(s) of treatment focus addressed in this session included Symptom Management, Personal Safety, and Community Resources/Discharge Planning.  Client reported being safe today.  Reported mood is \"depressed.\"   Goal for today is to attend group therapy. The client talked to the group about how his wife said that he doesn't express any of the 5 love languages, and the group discussed them and what he may do. He " "stated that he isn't an expressive person as she is and talked about their differences. He stated that he continues to organize to get ready for his move to [place]. He stated that he has some voices and paranoia, but \"it's at the cusp\" so it doesn't affect him as it did in the past. He talked with another member about poor sleep and paranoia.       Therapeutic Interventions/Treatment Strategies:  Psychotherapist reinforced use of skills. Treatment modalities used include Cognitive Behavioral Therapy and Dialectical Behavioral Therapy. Interventions include Relapse Prevention: Facilitated understanding of effective coping skills in response to triggers for substance use, Mindfulness: Encouraged a plan to use mindfulness skills in daily life, Symptoms Management: Promoted understanding of their diagnoses and how it impacts their functioning, and Emotions Management:  Discussed barriers to emotional regulation.    Assessment:    Patient response:   Patient responded to session by giving feedback, listening, and accepting support    Possible barriers to participation / learning include: severity of symptoms    Health Issues:   None reported       Substance Use Review:   Substance Use: No active concerns identified.    Mental Status/Behavioral Observations  Appearance:   Appropriate   Eye Contact:   Good   Psychomotor Behavior: Normal   Attitude:   Cooperative   Orientation:   All  Speech   Rate / Production: Normal    Volume:  Normal   Mood:    Anxious  Depressed   Affect:    Constricted   Thought Content:   Rumination and Psychosis reports hallucinations auditory and paranoia  Thought Form:  Coherent  Logical  Psychosis    Insight:    Good     Plan:   Safety Plan: Recommended that patient call 911 or go to the local ED should there be a change in any of these risk factors.   Barriers to treatment: None identified  Patient Contracts (see media tab):  None  Substance Use: Provided encouragement towards sobriety   Continue or " Discharge: Patient will continue in Adult Day Treatment (ADT)  as planned. Patient is likely to benefit from learning and using skills as they work toward the goals identified in their treatment plan.      Lily Espino Psy.D, LP  August 19, 2024

## 2024-08-19 NOTE — ADDENDUM NOTE
Encounter addended by: Laure Lee [facility] on: 8/19/2024 10:36 AM   Actions taken: Flowsheet accepted

## 2024-08-20 ENCOUNTER — HOSPITAL ENCOUNTER (OUTPATIENT)
Dept: BEHAVIORAL HEALTH | Facility: CLINIC | Age: 34
Discharge: HOME | End: 2024-08-20
Attending: PSYCHIATRY & NEUROLOGY
Payer: COMMERCIAL

## 2024-08-20 DIAGNOSIS — Z72.6 GAMBLING: Primary | ICD-10-CM

## 2024-08-20 PROCEDURE — 90853 GROUP PSYCHOTHERAPY: CPT | Performed by: PSYCHOLOGIST

## 2024-08-20 PROCEDURE — 90853 GROUP PSYCHOTHERAPY: CPT | Performed by: SOCIAL WORKER

## 2024-08-20 NOTE — GROUP NOTE
"Process Group Note    PATIENT'S NAME: Basil Andrews  MRN:   [id number]  :   1990  [id number]  DATE OF SERVICE: 24  START TIME:  1:00 PM  END TIME:  1:50 PM  FACILITATOR: Lily Espino Psy.D, LP  TOPIC:  Process Group    Diagnoses:  295.70  (F25) Schizoaffective Disorder Bipolar Type.  296.44 Bipolar I Disorder Current or Most Recent Episode Manic, with psychotic features.  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  PCP: Dmitry Tee PA-C  Location: [facility] [place] [facility], [address], [place] [address]  Phone: [phone]  Fax: [phone]       [facility] Mental Health Outpatient Programs  TRACK: iop/dt2p   6B    NUMBER OF PARTICIPANTS: 7        Data:    Session content: At the start of this group, patients were invited to check in by identifying themselves, describing their current emotional status, and identifying issues to address in this group.   Area(s) of treatment focus addressed in this session included Symptom Management, Personal Safety, and Community Resources/Discharge Planning.  Client reported being safe today.  Reported mood is \"cautious, depressed, and tired.\"    Goal for today is to attend group therapy. The client talked to the group about how he is making calls to set up providers in [place]. He stated that he will watch his 3 year " old son tonight, as his wife will go to a movie. He stated that he talked with the  in [place] about moving and getting arranged at his new job.       Therapeutic Interventions/Treatment Strategies:  Psychotherapist reinforced use of skills. Treatment modalities used include Cognitive Behavioral Therapy and Dialectical Behavioral Therapy. Interventions include Relapse Prevention: Coached on skills to manage factors that contribute to relapse, Mindfulness: Encouraged a plan to use mindfulness skills in daily life, Symptoms Management: Promoted understanding of their diagnoses and how it impacts their functioning, and Emotions Management:  Discussed barriers to emotional regulation.    Assessment:    Patient response:   Patient responded to session by giving feedback, listening, and focusing on goals    Possible barriers to participation / learning include: severity of symptoms    Health Issues:   None reported       Substance Use Review:   Substance Use: No active concerns identified.    Mental Status/Behavioral Observations  Appearance:   Appropriate   Eye Contact:   Good   Psychomotor Behavior: Normal   Attitude:   Cooperative   Orientation:   All  Speech   Rate / Production: Normal    Volume:  Normal   Mood:    Anxious  Sad   Affect:    Constricted   Thought Content:   Rumination and Psychosis reports paranoia  Thought Form:  Coherent  Logical     Insight:    Good     Plan:   Safety Plan: Recommended that patient call 911 or go to the local ED should there be a change in any of these risk factors.   Barriers to treatment: None identified  Patient Contracts (see media tab):  None  Substance Use: Provided encouragement towards sobriety   Continue or Discharge: Patient will continue in Adult Day Treatment (ADT)  as planned. Patient is likely to benefit from learning and using skills as they work toward the goals identified in their treatment plan.      Camden Reed.LUTHER, LP  August 20, 2024

## 2024-08-20 NOTE — GROUP NOTE
Psychotherapy Group Note    PATIENT'S NAME: Basil Andrews  MRN:   [id number]  :   1990  [id number]  DATE OF SERVICE: 24  START TIME: 12:00 PM  END TIME: 12:50 PM  FACILITATOR: Lily Espino Psy.D, LP  TOPIC: MH EBP Group: Relationship Skills  [facility] Mental Health Outpatient Programs  TRACK: iop/dt2p  6B    NUMBER OF PARTICIPANTS: 7    Summary of Group / Topics Discussed:  Relationship Skills: Relationship Mapping: Patients identified different types of relationships they have in their life and examined if there is conflict or closeness, the degree of conflict or closeness, and the reason for conflict. The goal of this topic is to help patients gain awareness of the relationships they have with others, identify types of conflict in patients  lives and how they impact symptoms/functioning, and identify how they can improve relationships with relationship and interpersonal skills they have learned.     Patient Session Goals / Objectives:  Familiarized patients with awareness to the different types of relationships they may have with different people and substances in their lives  Discussed and practiced strategies to promote healthier understanding of interpersonal relationships with a focus on awareness of conflict, the causes of conflict, and the ways to transform conflict  Discussed the use of substances and its impact on their relationships      Patient Participation / Response:  Fully participated with the group by sharing personal reflections / insights and openly received / provided feedback with other participants.    Demonstrated understanding of relationship skills and communication skills    Treatment Plan:  Patient has a current master individualized treatment plan.  See Epic treatment plan for more information.    Lily Espino Psy.D, LP

## 2024-08-20 NOTE — GROUP NOTE
Psychotherapy Group Note    PATIENT'S NAME: Basil Andrews  MRN:   [id number]  :   1990  [id number]  DATE OF SERVICE: 24  START TIME:  2:00 PM  END TIME:  2:50 PM  FACILITATOR: Luz Pa LICSW  TOPIC: MH EBP Group: Coping Skills  [facility] Mental Health Outpatient Programs  TRACK: IOP/DT 2    NUMBER OF PARTICIPANTS: 7    Summary of Group / Topics Discussed:  Coping Skills: Self-Soothe with a personal relaxation scene: Patients learned to apply self-soothe as a way to decrease heightened stress in the moment.  Patients identified situations that necessitate self-soothe strategies.  They focused on ways to manage physical symptoms of distress using the senses. They discussed how to distinguish when this can be useful in their lives when other strategies are more relevant or helpful.    Patient Session Goals / Objectives:  Understand the purpose of using the senses to decrease distress  Process what happens in the body when using self-soothe strategies  Demonstrate understanding of when to use self-soothe strategies  Identify and problem solve barriers to applying self-soothe strategies.  Choose 1-2 self-soothe strategies to apply during times of distress.  Create a personal relaxation scene to use as a self soothing tool.      Patient Participation / Response:  Fully participated with the group by sharing personal reflections / insights and openly received / provided feedback with other participants.    Demonstrated understanding of topics discussed through group discussion and participation and Expressed understanding of the relevance / importance of coping skills at distressing times in life    Treatment Plan:  Patient has a current master individualized treatment plan.  See Epic treatment plan for more information.    INDIGO Verde

## 2024-08-22 ENCOUNTER — HOSPITAL ENCOUNTER (OUTPATIENT)
Dept: BEHAVIORAL HEALTH | Facility: CLINIC | Age: 34
Discharge: HOME | End: 2024-08-22
Attending: PSYCHIATRY & NEUROLOGY
Payer: COMMERCIAL

## 2024-08-22 DIAGNOSIS — Z72.6 GAMBLING: Primary | ICD-10-CM

## 2024-08-22 PROCEDURE — 90853 GROUP PSYCHOTHERAPY: CPT | Performed by: SOCIAL WORKER

## 2024-08-22 PROCEDURE — H2012 BEHAV HLTH DAY TREAT, PER HR: HCPCS | Performed by: PSYCHOLOGIST

## 2024-08-22 PROCEDURE — 90853 GROUP PSYCHOTHERAPY: CPT | Performed by: PSYCHOLOGIST

## 2024-08-22 NOTE — GROUP NOTE
"Process Group Note    PATIENT'S NAME: Basil Andrews  MRN:   [id number]  :   1990  [id number]  DATE OF SERVICE: 24  START TIME:  1:00 PM  END TIME:  1:50 PM  FACILITATOR: Lily Espino Psy.D, LP  TOPIC:  Process Group    Diagnoses:  295.70  (F25) Schizoaffective Disorder Bipolar Type.  296.44 Bipolar I Disorder Current or Most Recent Episode Manic, with psychotic features.  Provisional Diagnosis: 296.21 (F32.0) Major Depressive Disorder, Single Episode, Mild _ and With mixed features   Psychiatry   Date/time: 2024 @ 8:00 AM Telephone  Provider:  Donta Bob MD  Address: [facility] [address], [facility] [address] [address]  Phone: [phone]  Fax: [phone]  Individual Therapy:  Nabil Craig   Address: [place] [address]  Phone: [phone]  Fax: [phone]   Note:  Provider is out of office -  please call or e-mail [email or web address] to schedule a follow up once he is back in office.  PCP: Dmitry Tee PA-C  Location: [facility] [place] [facility], [address] [place] [address]  Phone: [phone]  Fax: [phone]       [facility] Mental Health Outpatient Programs  TRACK: iop/dt2p  6B    NUMBER OF PARTICIPANTS: 6        Data:    Session content: At the start of this group, patients were invited to check in by identifying themselves, describing their current emotional status, and identifying issues to address in this group.   Area(s) of treatment focus addressed in this session included Symptom Management, Personal Safety, and Community Resources/Discharge Planning.  Client reported being safe today.  Reported mood is \"tired.\"    Goal for today is to attend group therapy. The client talked to the group about how he almost fell asleep while driving and that he is decreasing some medications that cause drowsiness. He stated that " he is working on forms for the new psychiatry office in [place] and completing them.       Therapeutic Interventions/Treatment Strategies:  Psychotherapist reinforced use of skills. Treatment modalities used include Cognitive Behavioral Therapy and Dialectical Behavioral Therapy. Interventions include Relapse Prevention: Assisted patient in identifying the challenges and barriers to participation and attendance to support groups/community resources, Mindfulness: Facilitated discussion of when/how to use mindfulness skills to benefit general health, mental health symptoms, and stressors, Symptoms Management: Promoted understanding of their diagnoses and how it impacts their functioning, and Emotions Management:  Reviewed opposite action skill.    Assessment:    Patient response:   Patient responded to session by accepting feedback, listening, and accepting support    Possible barriers to participation / learning include: severity of symptoms    Health Issues:   None reported       Substance Use Review:   Substance Use: No active concerns identified.    Mental Status/Behavioral Observations  Appearance:   Appropriate   Eye Contact:   Good   Psychomotor Behavior: Normal   Attitude:   Cooperative   Orientation:   All  Speech   Rate / Production: Normal    Volume:  Normal   Mood:    Anxious  Depressed   Affect:    Constricted   Thought Content:   Rumination and Psychosis denies any symptoms of psychosis  Thought Form:  Coherent  Logical     Insight:    Good     Plan:   Safety Plan: Recommended that patient call 911 or go to the local ED should there be a change in any of these risk factors.   Barriers to treatment: None identified  Patient Contracts (see media tab):  None  Substance Use: Provided encouragement towards sobriety   Continue or Discharge: Patient will continue in Adult Day Treatment (ADT)  as planned. Patient is likely to benefit from learning and using skills as they work toward the goals identified in their  treatment plan.      Lily Espino Psy.D, LP  August 22, 2024

## 2024-08-22 NOTE — GROUP NOTE
Psychotherapy Group Note    PATIENT'S NAME: Basil Andrews  MRN:   [id number]  :   1990  [id number]  DATE OF SERVICE: 24  START TIME: 12:00 PM  END TIME: 12:50 PM  FACILITATOR: Lily Espino Psy.D, LP  TOPIC: MH EBP Group: Relationship Skills  [facility] Mental Health Outpatient Programs  TRACK: iop/dt2p  6B    NUMBER OF PARTICIPANTS: 7    Summary of Group / Topics Discussed:  Relationship Skills: Assertive Communication: Patients were provided with a general overview of assertive communication skills and how practicing assertive communication skills will assist patients in developing healthier and more effective relationships. Patients reviewed their current awareness on ability to practice assertive communication, ways to increase assertive communication, and identified/problem solved barriers to assertive communication.     Patient Session Goals / Objectives:  Identified and discussed patient individual challenges with communication  Presented and practiced effective communication skills in session  Assisted patients in implementing more effective communication skills in their relationships      Patient Participation / Response:  Fully participated with the group by sharing personal reflections / insights and openly received / provided feedback with other participants.    Demonstrated understanding of relationship skills and communication skills    Treatment Plan:  Patient has a current master individualized treatment plan.  See Epic treatment plan for more information.    Lily Espino Psy.D, LP

## 2024-08-22 NOTE — GROUP NOTE
Psychotherapy Group Note    PATIENT'S NAME: Basil Andrews  MRN:   [id number]  [facility]   1990  [id number]  DATE OF SERVICE: 24  START TIME:  2:00 PM  END TIME:  2:50 PM  FACILITATOR: Luz Pa LICSW; Liz Simmons RN  TOPIC: MH EBP Group: Coping Skills  [facility] Mental Health Outpatient Programs  TRACK: IOP/DT 2    NUMBER OF PARTICIPANTS: 7    Summary of Group / Topics Discussed:  Coping Skills: Meditation: Patients learned about meditation and explored how and when to utilize it to increase focus, reduce mental health symptoms, decrease physical tension, and improve mental well-being.  Approaches to meditation were presented as a means of increasing self-awareness. The benefits of various meditation practices were discussed, as well as barriers to utilization of this coping strategy.     Patient Session Goals / Objectives:  Understand the purpose and efficacy of using meditation modalities to reduce stress / symptoms.  Review / discuss situations in daily life that cause distress, where establishing a meditation routine or meditating as needed may improve functioning.    Verbalize understanding of how and when to apply grounding strategies to reduce distress and increase presence in the moment.  Practice loving kindness meditation and address barriers to use in daily life.      Patient Participation / Response:  Fully participated with the group by sharing personal reflections / insights and openly received / provided feedback with other participants.    Demonstrated understanding of topics discussed through group discussion and participation    Treatment Plan:  Patient has a current master individualized treatment plan.  See Epic treatment plan for more information.    INDIGO Verde

## 2024-08-26 ENCOUNTER — HOSPITAL ENCOUNTER (OUTPATIENT)
Dept: BEHAVIORAL HEALTH | Facility: CLINIC | Age: 34
Discharge: HOME | End: 2024-08-26
Attending: PSYCHIATRY & NEUROLOGY
Payer: COMMERCIAL

## 2024-08-26 DIAGNOSIS — F31.2 BIPOLAR DISORDER, CURRENT EPISODE MANIC SEVERE WITH PSYCHOTIC FEATURES (H): Primary | ICD-10-CM

## 2024-08-26 PROCEDURE — 99214 OFFICE O/P EST MOD 30 MIN: CPT | Mod: 95

## 2024-08-26 PROCEDURE — 90853 GROUP PSYCHOTHERAPY: CPT | Performed by: PSYCHOLOGIST

## 2024-08-26 ASSESSMENT — ANXIETY QUESTIONNAIRES
GAD7 TOTAL SCORE: 16
IF YOU CHECKED OFF ANY PROBLEMS ON THIS QUESTIONNAIRE, HOW DIFFICULT HAVE THESE PROBLEMS MADE IT FOR YOU TO DO YOUR WORK, TAKE CARE OF THINGS AT HOME, OR GET ALONG WITH OTHER PEOPLE: VERY DIFFICULT
5. BEING SO RESTLESS THAT IT IS HARD TO SIT STILL: MORE THAN HALF THE DAYS
GAD7 TOTAL SCORE: 16
GAD7 TOTAL SCORE: 16
7. FEELING AFRAID AS IF SOMETHING AWFUL MIGHT HAPPEN: SEVERAL DAYS
7. FEELING AFRAID AS IF SOMETHING AWFUL MIGHT HAPPEN: SEVERAL DAYS
6. BECOMING EASILY ANNOYED OR IRRITABLE: NEARLY EVERY DAY
6. BECOMING EASILY ANNOYED OR IRRITABLE: NEARLY EVERY DAY
8. IF YOU CHECKED OFF ANY PROBLEMS, HOW DIFFICULT HAVE THESE MADE IT FOR YOU TO DO YOUR WORK, TAKE CARE OF THINGS AT HOME, OR GET ALONG WITH OTHER PEOPLE?: VERY DIFFICULT
4. TROUBLE RELAXING: NEARLY EVERY DAY
8. IF YOU CHECKED OFF ANY PROBLEMS, HOW DIFFICULT HAVE THESE MADE IT FOR YOU TO DO YOUR WORK, TAKE CARE OF THINGS AT HOME, OR GET ALONG WITH OTHER PEOPLE?: VERY DIFFICULT
4. TROUBLE RELAXING: NEARLY EVERY DAY
GAD7 TOTAL SCORE: 16
2. NOT BEING ABLE TO STOP OR CONTROL WORRYING: MORE THAN HALF THE DAYS
GAD7 TOTAL SCORE: 16
6. BECOMING EASILY ANNOYED OR IRRITABLE: NEARLY EVERY DAY
7. FEELING AFRAID AS IF SOMETHING AWFUL MIGHT HAPPEN: SEVERAL DAYS
4. TROUBLE RELAXING: NEARLY EVERY DAY
1. FEELING NERVOUS, ANXIOUS, OR ON EDGE: MORE THAN HALF THE DAYS
IF YOU CHECKED OFF ANY PROBLEMS ON THIS QUESTIONNAIRE, HOW DIFFICULT HAVE THESE PROBLEMS MADE IT FOR YOU TO DO YOUR WORK, TAKE CARE OF THINGS AT HOME, OR GET ALONG WITH OTHER PEOPLE: VERY DIFFICULT
GAD7 TOTAL SCORE: 16
GAD7 TOTAL SCORE: 16
3. WORRYING TOO MUCH ABOUT DIFFERENT THINGS: NEARLY EVERY DAY
1. FEELING NERVOUS, ANXIOUS, OR ON EDGE: MORE THAN HALF THE DAYS
GAD7 TOTAL SCORE: 16
3. WORRYING TOO MUCH ABOUT DIFFERENT THINGS: NEARLY EVERY DAY
7. FEELING AFRAID AS IF SOMETHING AWFUL MIGHT HAPPEN: SEVERAL DAYS
3. WORRYING TOO MUCH ABOUT DIFFERENT THINGS: NEARLY EVERY DAY
GAD7 TOTAL SCORE: 16
4. TROUBLE RELAXING: NEARLY EVERY DAY
6. BECOMING EASILY ANNOYED OR IRRITABLE: NEARLY EVERY DAY
8. IF YOU CHECKED OFF ANY PROBLEMS, HOW DIFFICULT HAVE THESE MADE IT FOR YOU TO DO YOUR WORK, TAKE CARE OF THINGS AT HOME, OR GET ALONG WITH OTHER PEOPLE?: VERY DIFFICULT
1. FEELING NERVOUS, ANXIOUS, OR ON EDGE: MORE THAN HALF THE DAYS
GAD7 TOTAL SCORE: 16
5. BEING SO RESTLESS THAT IT IS HARD TO SIT STILL: MORE THAN HALF THE DAYS
2. NOT BEING ABLE TO STOP OR CONTROL WORRYING: MORE THAN HALF THE DAYS
8. IF YOU CHECKED OFF ANY PROBLEMS, HOW DIFFICULT HAVE THESE MADE IT FOR YOU TO DO YOUR WORK, TAKE CARE OF THINGS AT HOME, OR GET ALONG WITH OTHER PEOPLE?: VERY DIFFICULT
7. FEELING AFRAID AS IF SOMETHING AWFUL MIGHT HAPPEN: SEVERAL DAYS
7. FEELING AFRAID AS IF SOMETHING AWFUL MIGHT HAPPEN: SEVERAL DAYS
2. NOT BEING ABLE TO STOP OR CONTROL WORRYING: MORE THAN HALF THE DAYS
5. BEING SO RESTLESS THAT IT IS HARD TO SIT STILL: MORE THAN HALF THE DAYS
GAD7 TOTAL SCORE: 16
7. FEELING AFRAID AS IF SOMETHING AWFUL MIGHT HAPPEN: SEVERAL DAYS
IF YOU CHECKED OFF ANY PROBLEMS ON THIS QUESTIONNAIRE, HOW DIFFICULT HAVE THESE PROBLEMS MADE IT FOR YOU TO DO YOUR WORK, TAKE CARE OF THINGS AT HOME, OR GET ALONG WITH OTHER PEOPLE: VERY DIFFICULT
1. FEELING NERVOUS, ANXIOUS, OR ON EDGE: MORE THAN HALF THE DAYS
IF YOU CHECKED OFF ANY PROBLEMS ON THIS QUESTIONNAIRE, HOW DIFFICULT HAVE THESE PROBLEMS MADE IT FOR YOU TO DO YOUR WORK, TAKE CARE OF THINGS AT HOME, OR GET ALONG WITH OTHER PEOPLE: VERY DIFFICULT
2. NOT BEING ABLE TO STOP OR CONTROL WORRYING: MORE THAN HALF THE DAYS
3. WORRYING TOO MUCH ABOUT DIFFERENT THINGS: NEARLY EVERY DAY
5. BEING SO RESTLESS THAT IT IS HARD TO SIT STILL: MORE THAN HALF THE DAYS

## 2024-08-26 ASSESSMENT — PATIENT HEALTH QUESTIONNAIRE - PHQ9
SUM OF ALL RESPONSES TO PHQ QUESTIONS 1-9: 20
SUM OF ALL RESPONSES TO PHQ QUESTIONS 1-9: 20
10. IF YOU CHECKED OFF ANY PROBLEMS, HOW DIFFICULT HAVE THESE PROBLEMS MADE IT FOR YOU TO DO YOUR WORK, TAKE CARE OF THINGS AT HOME, OR GET ALONG WITH OTHER PEOPLE: VERY DIFFICULT
SUM OF ALL RESPONSES TO PHQ QUESTIONS 1-9: 20
10. IF YOU CHECKED OFF ANY PROBLEMS, HOW DIFFICULT HAVE THESE PROBLEMS MADE IT FOR YOU TO DO YOUR WORK, TAKE CARE OF THINGS AT HOME, OR GET ALONG WITH OTHER PEOPLE: VERY DIFFICULT
SUM OF ALL RESPONSES TO PHQ QUESTIONS 1-9: 20
10. IF YOU CHECKED OFF ANY PROBLEMS, HOW DIFFICULT HAVE THESE PROBLEMS MADE IT FOR YOU TO DO YOUR WORK, TAKE CARE OF THINGS AT HOME, OR GET ALONG WITH OTHER PEOPLE: VERY DIFFICULT

## 2024-08-26 NOTE — GROUP NOTE
"Process Group Note    PATIENT'S NAME: Basil Andrews  MRN:   [id number]  :   1990  [id number]  DATE OF SERVICE: 24  START TIME:  1:00 PM  END TIME:  1:50 PM  FACILITATOR: Lily Espino Psy.D, LP  TOPIC:  Process Group    Diagnoses:  295.70  (F25) Schizoaffective Disorder Bipolar Type.  296.44 Bipolar I Disorder Current or Most Recent Episode Manic, with psychotic features.  Provisional Diagnosis: 296.21 (F32.0) Major Depressive Disorder, Single Episode, Mild _ and With mixed features   Psychiatry   Date/time: 2024 @ 8:00 AM Telephone  Provider:  Donta Bob MD  Address: [facility] [address], [facility] [address] [address]  Phone: [phone]  Fax: [phone]  Individual Therapy:  Nabil Craig   Address: [place] [address]  Phone: [phone]  Fax: [phone]   Note:  Provider is out of office -  please call or e-mail [email or web address] to schedule a follow up once he is back in office.  PCP: Dmitry Tee PA-C  Location: [facility] [place] [facility], [address], [place] [address]  Phone: [phone]  Fax: [phone]       [facility] Mental Health Outpatient Programs  TRACK: iop/dt2p  6B    NUMBER OF PARTICIPANTS: 5        Data:    Session content: At the start of this group, patients were invited to check in by identifying themselves, describing their current emotional status, and identifying issues to address in this group.   Area(s) of treatment focus addressed in this session included Symptom Management, Personal Safety, and Community Resources/Discharge Planning.  Client reported being safe today.  Reported mood is \"tired, and worried.\"   Goal for today is to attend group therapy and meet with the psychiatrist.  The client talked to the group about how he is arranging to move to a new job in [place] and has talked with the "  about the transition. He stated that he needed more accommodations for his job and made an appointment to talk with the psychiatrist about an accommodation letter. He stated that he will drive with his father-in-law to [place] in a rented truck and he talked with the group about staying awake most of the 18 hour drive.  He talked with others about paranoia and how his started. He stated that he apologized to family about his behaviors when he first had trouble with the symptoms and feels better about this now.   He reported being sober and taking medications as prescribed.      Therapeutic Interventions/Treatment Strategies:  Psychotherapist reinforced use of skills. Treatment modalities used include Cognitive Behavioral Therapy and Dialectical Behavioral Therapy. Interventions include Relapse Prevention: Facilitated understanding of effective coping skills in response to triggers for substance use, Mindfulness: Encouraged a plan to use mindfulness skills in daily life, Symptoms Management: Promoted understanding of their diagnoses and how it impacts their functioning, and Emotions Management:  Reviewed opposite action skill.    Assessment:    Patient response:   Patient responded to session by accepting feedback, giving feedback, and focusing on goals    Possible barriers to participation / learning include: severity of symptoms    Health Issues:   None reported       Substance Use Review:   Substance Use: No active concerns identified.    Mental Status/Behavioral Observations  Appearance:   Appropriate   Eye Contact:   Good   Psychomotor Behavior: Normal   Attitude:   Cooperative   Orientation:   All  Speech   Rate / Production: Normal    Volume:  Normal   Mood:    Depressed  Sad   Affect:    Constricted   Thought Content:   Rumination and Psychosis denies any symptoms of psychosis  Thought Form:  Coherent  Logical     Insight:    Good     Plan:   Safety Plan: Recommended that patient call 911 or go to the  local ED should there be a change in any of these risk factors.   Barriers to treatment: None identified  Patient Contracts (see media tab):  None  Substance Use: Provided encouragement towards sobriety   Continue or Discharge: Patient will continue in Adult Day Treatment (ADT)  as planned. Patient is likely to benefit from learning and using skills as they work toward the goals identified in their treatment plan.      Camden Reed.LUTHER, LP  August 26, 2024

## 2024-08-26 NOTE — PROGRESS NOTES
"[facility]   Adult Mental Health Outpatient Programs  Psychiatric Progress Record    Program Track: IOP/DT 2 P    PATIENT'S NAME: Basil Andrews  MRN:   [id number]  :   1990  [facility]. NUMBER: [id number]  DATE OF SERVICE: 24    Interval History:  \"Not fine.\" Basil presents today for follow-up and ongoing program supervision.   Endorses:  I sirena doing not fine. I am still have tendencies to fall sleep. I am sedated with all the drugs I am on  I am not able to perform my duties, at 75% work at my job. I need a letter of accomodation  No manic symptoms since the reduction of Haldol last   No worsening of depression of anxiety.   No suicide ideations.     Review of Symptoms  Sleep: Too sleep  Appetite: is always hungry  Suicidal ideation: denies current or recent suicidal ideation or behavior  Thoughts of non-suicidal self-injury: denied  Recent self-injurious behavior: denied  Homicidal ideation: denied  Other safety concerns: denied    Substance use:  Denies    Response to Program Interventions:  I think group is ok.     Medications:  Current Outpatient Medications   Medication Sig Dispense Refill    benztropine (COGENTIN) 1 MG tablet TAKE ONE TABLET BY MOUTH TWICE DAILY 60 tablet 0    divalproex sodium extended-release (DEPAKOTE ER) 500 MG 24 hr tablet Take 3 tablets (1,500 mg) by mouth at bedtime 90 tablet 1    divalproex sodium extended-release (DEPAKOTE ER) 500 MG 24 hr tablet Take 1 tablet (500 mg) by mouth daily 30 tablet 1    fluticasone (FLONASE) 50 MCG/ACT nasal spray Spray 2 sprays into both nostrils daily      haloperidol (HALDOL) 10 MG tablet Take 1 tablet (10 mg) by mouth 2 times daily (Patient taking differently: Take 10 mg by mouth 2 times daily. Take 10 mg at be time) 60 tablet 1    haloperidol (HALDOL) 5 MG tablet Take 1 tablet (5 mg) by mouth at bedtime. May also take 1 tablet (5 mg) 2 times daily as needed for agitation or other (psychotic symptoms). (Patient taking differently: " "Take 1 tablet (5 mg) by mouth in AM) 60 tablet 1    ipratropium (ATROVENT) 0.06 % nasal spray Spray 2 sprays into both nostrils 4 times daily      multivitamin, therapeutic with minerals (MULTI-VITAMIN) TABS Take 1 tablet by mouth daily      naltrexone (DEPADE/REVIA) 50 MG tablet Take 1 tablet (50 mg) by mouth daily 30 tablet 0    propranolol (INDERAL) 10 MG tablet Take 1 tablet (10 mg) by mouth 3 times daily as needed (restlessness) (Patient not taking: Reported on 8/13/2024) 60 tablet 1       The above list was reviewed and updated in [facility] with patient today.     Patient is taking medications as prescribed and denies adverse effects    Laboratory Results:  Most recent labs reviewed. Pertinent updates/findings: None.     Mental Status Examination:  Vital Signs: There were no vitals taken for this visit.   Appearance: adequately groomed, appears stated age, and in no apparent distress.  Attitude: cooperative   Eye Contact: good   Muscle Strength and Tone: unable to test  Psychomotor Behavior: unable to assess   Gait and Station:  unable to assess  Speech: clear, coherent, decreased prosody, regular rate, regular rhythm, and fluent  Associations: No loosening of associations  Thought Process: coherent and goal directed  Thought Content: no evidence of suicidal ideation or homicidal ideation, no evidence of psychotic thought, no auditory hallucinations present, and no visual hallucinations present  Mood: \"anxious, depressed, and better\"  Affect: mood congruent  Insight: good  Judgment: intact, adequate for safety  Impulse Control: intact  Oriented to: time, place, person, and situation  Attention Span and Concentration: Normal  Language: Intact  Recent and Remote Memory: Delayed & immediate recall intact  Fund of Knowledge/Assessment of Intelligence: Average  Capacity of Activities of Daily Living: Independent, able to participate in programmatic care services.    Diagnosis/es:    ICD-10-CM    1. Bipolar disorder, " current episode manic severe with psychotic features (H)  F31.2       Cannot rule out 295.70 (F25) Schizoaffective Disorder Bipolar Type     Assessment/Plan:  Basil presents today for follow-up psychiatric evaluation and assessment of progress. He continues to report excessive grogginess with current medication. He forgot to crease Haldol to 5 mg in AM as instructed last week. He will started that new order 5 mg in AM and 10 mg at be time, and reevaluate in 1 week. Still experiencing some shakiness, so no changes to Benztropine.  No safety concerns.     Bipolar disorder vs schizoaffective disorder  Overall improved   Decrease haloperidol to 10 mg daily at bed time  and 5 am in the morning  Benztropine kept at 1 mg in the morning and 1 mg at bedtime  No other medication changes  Continue IOP      Safety Assessment:  Basil reports suicidal ideation and/or non-suicidal self-injury or thoughts thereof as noted above  Basil is future-oriented and is engaged in treatment planning   I do not feel that Basil meets criteria for a 72-hour involuntary hold and remains appropriate for an outpatient level of care    MAXINE OKEEFE DNP on 8/26/2024 at 2:31 PM      Visit Details:  Type of service:  Video Visit    Start/End Time: see above    Originating Location (pt. Location): [facility] Outpatient Setting at [facility]: [facility]     Distant Location (provider location): Home Office    Platform used for Video Visit: [facility]    Physician has received verbal consent for a Video Visit from the patient? Yes    Level of Medical Decision Making:   - At least 1 chronic problem that is not stable  - Engaged in prescription drug management during visit (discussed any medication benefits, side effects, alternatives, etc.)  Discussion of management or test interpretation with external physician/other qualified healthcare professional/appropriate source - programmatic care multidisciplinary treatment team    30 min  spent on the date of the encounter in chart review, patient visit, review of tests, documentation, care coordination, and/or discussion with other providers about the issues documented above.      This document completed in part using [employer] dictation software and therefore may contain inadvertent word or phrase substitutions.

## 2024-08-26 NOTE — GROUP NOTE
Psychotherapy Group Note    PATIENT'S NAME: Basil Andrews  MRN:   [id number]  :   1990  [id number]  DATE OF SERVICE: 24  START TIME: 12:00 PM  END TIME: 12:50 PM  FACILITATOR: Lily Espino Psy.D, LP  TOPIC:  EBP Group: Self-Awareness  [facility] Mental Health Outpatient Programs  TRACK: iop/dt2p   6B    NUMBER OF PARTICIPANTS: 5    Summary of Group / Topics Discussed:  Self-Awareness: Values: Patients identified personal values by examining development of their current values and how their values influence their daily functioning and life choices. Patients explored the impact of their values on their thoughts, feelings, and actions. Patients discussed definition of personal values and how they develop and change over time. The goal is to help patients reconcile value conflicts and achieve balance and flexibility to improve mood and daily functioning.     Patient Session Goals / Objectives:  Examined development of values and impact of values on functioning  Identified and prioritized important values related to current well-being   Identified strategies to change or enhance values to positively impact symptoms  Assisted patients to find ways to adapt functioning to better fit their values      Patient Participation / Response:  Fully participated with the group by sharing personal reflections / insights and openly received / provided feedback with other participants.    Demonstrated understanding of values, strengths, and challenges to learn about themselves    Treatment Plan:  Patient has a current master individualized treatment plan.  See Epic treatment plan for more information.    Lily sEpino Psy.D, LP

## 2024-08-27 ENCOUNTER — HOSPITAL ENCOUNTER (OUTPATIENT)
Dept: BEHAVIORAL HEALTH | Facility: CLINIC | Age: 34
Discharge: HOME | End: 2024-08-27
Attending: PSYCHIATRY & NEUROLOGY
Payer: COMMERCIAL

## 2024-08-27 PROCEDURE — 90853 GROUP PSYCHOTHERAPY: CPT

## 2024-08-27 PROCEDURE — 90853 GROUP PSYCHOTHERAPY: CPT | Performed by: PSYCHOLOGIST

## 2024-08-27 NOTE — GROUP NOTE
"Process Group Note    PATIENT'S NAME: Basil Andrews  MRN:   [id number]  :   1990  [id number]  DATE OF SERVICE: 24  START TIME:  1:00 PM  END TIME:  1:50 PM  FACILITATOR: Lily Espino Psy.D, LP  TOPIC:  Process Group    Diagnoses:  295.70  (F25) Schizoaffective Disorder Bipolar Type.  296.44 Bipolar I Disorder Current or Most Recent Episode Manic, with psychotic features.  Provisional Diagnosis: 296.21 (F32.0) Major Depressive Disorder, Single Episode, Mild _ and With mixed features   Psychiatry   Date/time: 2024 @ 8:00 AM Telephone  Provider:  Donta Bob MD  Address: [facility] [address], [facility] [address] [address]  Phone: [phone]  Fax: [phone]  Individual Therapy:  Nabil Craig   Address: [place] [address]  Phone: [phone]  Fax: [phone]   Note:  Provider is out of office -  please call or e-mail [email or web address] to schedule a follow up once he is back in office.  PCP: Dmitry Tee PA-C  Location: [facility] [place] [facility], [address], [place] [address]  Phone: [phone]  Fax: [phone]       [facility] Outpatient Programs  TRACK: iop/dt2p  6B    NUMBER OF PARTICIPANTS: 5        Data:    Session content: At the start of this group, patients were invited to check in by identifying themselves, describing their current emotional status, and identifying issues to address in this group.   Area(s) of treatment focus addressed in this session included Symptom Management, Personal Safety, and Community Resources/Discharge Planning.  Client reported being safe today.  Reported mood is \"proud.\"  Goal for today is to attend group therapy and pack boxes to move.  The client talked to the group about how he donated many DVD's and the player to the [employer] and is packing boxes to move to [place] in " September. He stated that he talked with a provider about an accomodation letter and talked with his boss about finishing his work each day. He reported that he can arrange to do things differently and explained it to the group. He talked with others about their strengths and about his recent episode of psychosis. He reported that he talked with relatives and resolved difficult issues that occurred while he was manic, and he feels relieved.       Therapeutic Interventions/Treatment Strategies:  Psychotherapist offered support, feedback and validation. Treatment modalities used include Cognitive Behavioral Therapy and Dialectical Behavioral Therapy. Interventions include Relapse Prevention: Assisted patient in identifying the challenges and barriers to participation and attendance to support groups/community resources, Mindfulness: Facilitated discussion of when/how to use mindfulness skills to benefit general health, mental health symptoms, and stressors, Symptoms Management: Promoted understanding of their diagnoses and how it impacts their functioning, and Emotions Management:  Reviewed opposite action skill.    Assessment:    Patient response:   Patient responded to session by giving feedback, listening, and being attentive    Possible barriers to participation / learning include: severity of symptoms    Health Issues:   None reported       Substance Use Review:   Substance Use: No active concerns identified.    Mental Status/Behavioral Observations  Appearance:   Appropriate   Eye Contact:   Good   Psychomotor Behavior: Normal   Attitude:   Cooperative   Orientation:   All  Speech   Rate / Production: Normal    Volume:  Normal   Mood:    Anxious   Affect:    Constricted   Thought Content:   Rumination and Psychosis denies any symptoms of psychosis  Thought Form:  Coherent  Logical     Insight:    Good     Plan:   Safety Plan: Recommended that patient call 911 or go to the local ED should there be a change in any  of these risk factors.   Barriers to treatment: None identified  Patient Contracts (see media tab):  None  Substance Use: Provided encouragement towards sobriety   Continue or Discharge: Patient will continue in Adult Day Treatment (ADT)  as planned. Patient is likely to benefit from learning and using skills as they work toward the goals identified in their treatment plan.      Lily Espino Psy.D, LP  August 27, 2024

## 2024-08-27 NOTE — GROUP NOTE
Psychoeducation Group Note    PATIENT'S NAME: Basil Andrews  MRN:   [id number]  :   1990  [facility]. NUMBER: [id number]  DATE OF SERVICE: 24  START TIME:  2:00 PM  END TIME:  2:50 PM  FACILITATOR: Jose Blackwood LPC; Liz Simmons RN  TOPIC: MH Wellness Group: Mental Health Maintenance  [facility] Mental Health Outpatient Programs    TRACK: IOP/DT-2 Psychosis    NUMBER OF PARTICIPANTS: 5    Summary of Group / Topics Discussed:  Mental Health Maintenance:  Stigma: In this group patients explored stigma surrounding a mental health diagnosis.  The group discussed the way stigma impacts their own life, and discussed strategies to reduce. The relationship between physical and mental health were also explored in the context of healthcare access, treatment, and support.    Patient Session Goals / Objectives:  Patients identified the importance of practicing emotional hygiene  Patients identified ways to decrease the  impact of stigma in their own life    Patient Participation / Response:  Fully participated with the group by sharing personal reflections / insights and openly received / provided feedback with other participants.    Demonstrated understanding of topics discussed through group discussion and participation, Identified / Expressed personal readiness to practice skills, and Verbalized understanding of mental health maintenance topic    Treatment Plan:  Patient has a current master individualized treatment plan.  See Epic treatment plan for more information.    Jose Blackwood LPC

## 2024-08-27 NOTE — GROUP NOTE
Psychotherapy Group Note    PATIENT'S NAME: Basil Andrews  MRN:   [id number]  :   1990  [id number]  DATE OF SERVICE: 24  START TIME: 12:00 PM  END TIME: 12:50 PM  FACILITATOR: Lily Espino Psy.D, LP  TOPIC:  EBP Group: Self-Awareness  [facility] Mental Health Outpatient Programs  TRACK: iop/dt2p  6B    NUMBER OF PARTICIPANTS: 5    Summary of Group / Topics Discussed:  Self-Awareness: Personal Strengths: Topic focused on assisting patients in identifying personal strengths and how they relate to the management of mental health symptoms. Patients discussed the benefits of acknowledging their personal strengths and their impact on mood improvement, mindfulness, and perspective. Patients worked to increase time spent on recognition and appreciation of what is positive and working in their lives. The goal is to reduce rumination and negative thinking resulting in increased mindfulness and resilience. Patients will work to put skills into practice and problem-solve barriers.     Patient Session Goals / Objectives:  Identified personal strengths  Identified barriers to recognition of personal strengths  Verbalized understanding of strategies to increase use of their strengths in management of daily symptoms      Patient Participation / Response:  Fully participated with the group by sharing personal reflections / insights and openly received / provided feedback with other participants.    Demonstrated understanding of values, strengths, and challenges to learn about themselves    Treatment Plan:  Patient has a current master individualized treatment plan.  See Epic treatment plan for more information.    Lily Espino Psy.D, LP

## 2024-08-29 ENCOUNTER — HOSPITAL ENCOUNTER (OUTPATIENT)
Dept: BEHAVIORAL HEALTH | Facility: CLINIC | Age: 34
Discharge: HOME | End: 2024-08-29
Attending: PSYCHIATRY & NEUROLOGY
Payer: COMMERCIAL

## 2024-08-29 DIAGNOSIS — Z72.6 GAMBLING: Primary | ICD-10-CM

## 2024-08-29 PROCEDURE — 90853 GROUP PSYCHOTHERAPY: CPT | Performed by: PSYCHOLOGIST

## 2024-08-29 PROCEDURE — 90853 GROUP PSYCHOTHERAPY: CPT

## 2024-08-29 NOTE — GROUP NOTE
Psychoeducation Group Note    PATIENT'S NAME: Basil Andrews  MRN:   [id number]  :   1990  [facility]. NUMBER: [id number]  DATE OF SERVICE: 24  START TIME:  2:00 PM  END TIME:  2:50 PM  FACILITATOR: Jose Blackwood LPC; America Perrin OTR  TOPIC: MH Life Skills Group: Lifestyle Balance and Structure  [facility] Mental Health Outpatient Programs    TRACK: IOP/DT-2 Psychosis    NUMBER OF PARTICIPANTS: 6    Summary of Group / Topics Discussed:  Lifestyle Balance and Strucure: Patients were introduced to the importance of leisure, self-care, productivity, and social participation in support of mental health management by exploring a balanced lifestyle. Patients identified how they spend their time and skills to bring this into better balance.  Patients identified a self-care, productive, leisure, and social participation activity that they would like to complete this week. Patients practiced the skill of planning to identify when and where they could complete these activities. Patients shared with their peers their plans for the week. Validation, support, and guidance provided throughout group.     Patient Session Goals/Objectives:   (1) Discussed the importance of life balance, structure, and routine.   (2) Identified and planned 4 activities in the areas of leisure, productivity, social participation and self-care.   (3) Self-reflected on the benefits of structure and routine in their life.       Patient Participation / Response:  Fully participated with the group by sharing personal reflections / insights and openly received / provided feedback with other participants.    Verbalized understanding of content    Patient engaged throughout group session today and identified, discussed, and developed four main occupational categories related to self-care, productivity, leisure, and social participation.    Treatment Plan:  Patient has a current master individualized treatment plan.  See Epic  treatment plan for more information.    Jose Blackwood, LPC

## 2024-08-29 NOTE — GROUP NOTE
"Process Group Note    PATIENT'S NAME: Basil Andrews  MRN:   [id number]  :   1990  [id number]  DATE OF SERVICE: 24  START TIME:  1:00 PM  END TIME:  1:50 PM  FACILITATOR: Lily Espino Psy.D, LP  TOPIC:  Process Group    Diagnoses:  295.70  (F25) Schizoaffective Disorder Bipolar Type.  296.44 Bipolar I Disorder Current or Most Recent Episode Manic, with psychotic features.  Provisional Diagnosis: 296.21 (F32.0) Major Depressive Disorder, Single Episode, Mild _ and With mixed features   Psychiatry   Date/time: 2024 @ 8:00 AM Telephone  Provider:  Donta Bob MD  Address: [facility] [address], [facility] [address] [address]  Phone: [phone]  Fax: [phone]  Individual Therapy:  Nabil Craig   Address: [place] [address]  Phone: [phone]  Fax: [phone]   Note:  Provider is out of office -  please call or e-mail [email or web address] to schedule a follow up once he is back in office.  PCP: Dmitry Tee PA-C  Location: [facility] [place] [facility], [address] [place] [address]  Phone: [phone]  Fax: [phone]       [facility] Mental Health Outpatient Programs  TRACK: iop/dt2p 6B    NUMBER OF PARTICIPANTS: 6        Data:    Session content: At the start of this group, patients were invited to check in by identifying themselves, describing their current emotional status, and identifying issues to address in this group.   Area(s) of treatment focus addressed in this session included.Client reported being safe today.  Reported mood is \"really tired.\"    Goal for today is to attend group therapy. The client talked to the group about how he is caring for his son, doing chores around the house, but feeling too tired from medications. He stated that he nearly fell asleep driving and wants to reduce his medications. He talked with " others about energy drinks and how long they last, and about medications. He reported that he is packing and cleaning his house to move.     .      Therapeutic Interventions/Treatment Strategies:  Psychotherapist reinforced use of skills. Treatment modalities used include Cognitive Behavioral Therapy and Dialectical Behavioral Therapy. Interventions include Relapse Prevention: Facilitated understanding of effective coping skills in response to triggers for substance use, Mindfulness: Facilitated discussion of when/how to use mindfulness skills to benefit general health, mental health symptoms, and stressors, Symptoms Management: Promoted understanding of their diagnoses and how it impacts their functioning, and Emotions Management:  Reviewed opposite action skill.    Assessment:    Patient response:   Patient responded to session by giving feedback, listening, and focusing on goals    Possible barriers to participation / learning include: severity of symptoms    Health Issues:   None reported       Substance Use Review:   Substance Use: No active concerns identified.    Mental Status/Behavioral Observations  Appearance:   Appropriate   Eye Contact:   Good   Psychomotor Behavior: Normal   Attitude:   Cooperative   Orientation:   All  Speech   Rate / Production: Normal    Volume:  Normal   Mood:    Anxious  Depressed  Sad   Affect:    Constricted   Thought Content:   Rumination and Psychosis denies any symptoms of psychosis  Thought Form:  Coherent  Logical     Insight:    Good     Plan:   Safety Plan: Recommended that patient call 911 or go to the local ED should there be a change in any of these risk factors.   Barriers to treatment: None identified  Patient Contracts (see media tab):  None  Substance Use: Provided encouragement towards sobriety   Continue or Discharge: Patient will continue in Adult Day Treatment (ADT)  as planned. Patient is likely to benefit from learning and using skills as they work toward the  goals identified in their treatment plan.      Lily Espino Psy.D, LP  August 29, 2024

## 2024-08-29 NOTE — GROUP NOTE
Psychotherapy Group Note    PATIENT'S NAME: Basil Andrews  MRN:   [id number]  :   1990  [id number]  DATE OF SERVICE: 24  START TIME: 12:00 PM  END TIME: 12:50 PM  FACILITATOR: Lily Espino Psy.D, LP  TOPIC:  EBP Group: Self-Awareness  [facility] Mental Health Outpatient Programs  TRACK: iop/dt2p  6B    NUMBER OF PARTICIPANTS: 7    Summary of Group / Topics Discussed:  Self-Awareness: Self-Compassion: Patients received overview of key concepts in developing self-compassion. Patients discussed mindfulness, self-kindness, and finding common humanity. Patients identified their current approach to problems in their lives and learned skills for increasing self-compassion. Patients identified ways they can put self-compassion skills into practice and problem solve barriers to application of skills.     The group discussed areas of resilience. They discussed connections with others and with themselves using self-compassion. The discussed humor, empathy, work, purpose, perspectives, vision, pushback, or self-advocacy, and politics in small and larger groups.     Patient Session Goals / Objectives:  [place] components of self-compassion  Identify ways to practice self-compassion in daily life  Problem solve barriers to self-compassion practice      Patient Participation / Response:  Fully participated with the group by sharing personal reflections / insights and openly received / provided feedback with other participants.    Demonstrated understanding of values, strengths, and challenges to learn about themselves    Treatment Plan:  Patient has a current master individualized treatment plan.  See Epic treatment plan for more information.    Lily Espino Psy.D, YOLETTE

## 2024-09-03 ENCOUNTER — HOSPITAL ENCOUNTER (OUTPATIENT)
Dept: BEHAVIORAL HEALTH | Facility: CLINIC | Age: 34
Discharge: HOME | End: 2024-09-03
Attending: PSYCHIATRY & NEUROLOGY
Payer: COMMERCIAL

## 2024-09-03 DIAGNOSIS — F31.2 BIPOLAR DISORDER, CURRENT EPISODE MANIC SEVERE WITH PSYCHOTIC FEATURES (H): Primary | ICD-10-CM

## 2024-09-03 DIAGNOSIS — Z72.6 GAMBLING: ICD-10-CM

## 2024-09-03 PROCEDURE — 90853 GROUP PSYCHOTHERAPY: CPT | Performed by: PSYCHOLOGIST

## 2024-09-03 PROCEDURE — 99214 OFFICE O/P EST MOD 30 MIN: CPT

## 2024-09-03 RX ORDER — NALTREXONE HYDROCHLORIDE 50 MG/1
50 TABLET, FILM COATED ORAL DAILY
Qty: 90 TABLET | Refills: 0 | Status: SHIPPED | OUTPATIENT
Start: 2024-09-03

## 2024-09-03 NOTE — GROUP NOTE
Psychotherapy Group Note    PATIENT'S NAME: Basil Andrews  MRN:   [id number]  [facility]   1990  [id number]  DATE OF SERVICE: 24  START TIME:  2:00 PM  END TIME:  2:50 PM  FACILITATOR: Airam Ferrara LICSW  TOPIC: MH EBP Group: Self-Awareness  [facility] Adult Mental Health Outpatient Programs  TRACK: IOP/DT 6 P    NUMBER OF PARTICIPANTS: 3    Summary of Group / Topics Discussed:  Self-Awareness: Self-Compassion: Patients received overview of key concepts in developing self-compassion. Patients discussed mindfulness, self-kindness, and finding common humanity. Patients identified their current approach to problems in their lives and learned skills for increasing self-compassion. Patients identified ways they can put self-compassion skills into practice and problem solve barriers to application of skills.     Patient Session Goals / Objectives:  [place] components of self-compassion  Identify ways to practice self-compassion in daily life  Problem solve barriers to self-compassion practice      Patient Participation / Response:  Fully participated with the group by sharing personal reflections / insights and openly received / provided feedback with other participants.    Demonstrated understanding of topics discussed through group discussion and participation, Demonstrated understanding of values, strengths, and challenges to learn about themselves, Identified / Expressed readiness to act intentionally, increase self-compassion, promote personal growth, Verbalized understanding of ways to proactively manage illness, and Identified plan to address barriers to practicing skills that promote self-awareness     Treatment Plan:  Patient has a current master individualized treatment plan.  See Epic treatment plan for more information.    INDIGO Chand

## 2024-09-03 NOTE — GROUP NOTE
Psychotherapy Group Note    PATIENT'S NAME: Basil Andrews  MRN:   [id number]  :   1990  [id number]  DATE OF SERVICE: 24  START TIME: 12:00 PM  END TIME: 12:50 PM  FACILITATOR: Lily Espino Psy.D, LP  TOPIC:  EBP Group: Emotions Management  [facility] Mental Health Outpatient Programs  TRACK: iop/dt2p    NUMBER OF PARTICIPANTS: 3    Summary of Group / Topics Discussed:  Emotions Management: Anger: Patients explored and shared personal experiences associated with feelings of anger.  Group explored how these feelings develop, what they mean to each individual, and how to increase acceptance and usefulness of these feelings.  Discussed anger as a  secondary  emotion and reviewed ways to manage anger and challenge associated cognitive distortions. Group members worked to contextualize these concepts and promote healing.     Patient Session Goals / Objectives:  Discuss and review definitions and personal views/experiences with anger  Explore how feelings of anger impact functioning  Understand and practice strategies to manage difficult emotions and move towards healing  Demonstrate understanding of the feelings of anger  Verbalize how these emotions have impacted their lives/functioning  Verbalize of knowledge gained and possible interventions to manage feelings      Patient Participation / Response:  Fully participated with the group by sharing personal reflections / insights and openly received / provided feedback with other participants.    Expressed understanding of the relevance / importance of emotions management skills at distressing times in life    Treatment Plan:  Patient has a current master individualized treatment plan.  See Epic treatment plan for more information.    Lily Espino Psy.D, LP

## 2024-09-03 NOTE — PROGRESS NOTES
[facility]   Adult Mental Health Outpatient Programs  Psychiatric Progress Record    Program Track: IOP/DT 2 P    PATIENT'S NAME: Basil Andrews  MRN:   [id number]  :   1990  ACCT. NUMBER: [id number]  DATE OF SERVICE: 24    Interval History:  Basil presents today for follow-up and ongoing program supervision.   Endorses:  I found a plan to stay awake. I take Monsters, jose I have to tale 5-6 a day  I am not having sexual thought any more  I am sleeping too much. It is affecting my life and work, my wife does not like it at all  I think the dose of Depakot is really too high too.  No suicide thoughts  No manic manic symptoms. A little bit depressed. No energy and too much sleeping  Compliant with current doses.     Review of Symptoms  Sleep: way too much sleep  Appetite: I am always hungry  Tired and sleepy  Suicidal ideation: denies current or recent suicidal ideation or behavior  Thoughts of non-suicidal self-injury: denied  Recent self-injurious behavior: denied  Homicidal ideation: denied  Other safety concerns: denied    Activities of Daily Living and Related Systems Impacted by Illness:  Hygiene: sleepiness is affecting my ability to do hygiene  Socialization: no way. All I do is sleeping  Activities of Daily Living: (cleaning, shopping, bills, etc.): I am not a functioning adult. All I do is sleeping  Concerns related to work: No    Substance use:  Denies    Medications:  Current Outpatient Medications   Medication Sig Dispense Refill    divalproex sodium extended-release (DEPAKOTE ER) 500 MG 24 hr tablet Take 3 tablets (1,500 mg) by mouth at bedtime 90 tablet 1    fluticasone (FLONASE) 50 MCG/ACT nasal spray Spray 2 sprays into both nostrils daily      haloperidol (HALDOL) 10 MG tablet Take 1 tablet (10 mg) by mouth 2 times daily (Patient taking differently: Take 10 mg by mouth at bedtime. Take 10 mg at be time) 60 tablet 1    ipratropium (ATROVENT) 0.06 % nasal spray Spray 2 sprays into  "both nostrils 4 times daily      multivitamin, therapeutic with minerals (MULTI-VITAMIN) TABS Take 1 tablet by mouth daily      naltrexone (DEPADE/REVIA) 50 MG tablet Take 1 tablet (50 mg) by mouth daily. 90 tablet 0    benztropine (COGENTIN) 1 MG tablet TAKE ONE TABLET BY MOUTH TWICE DAILY 60 tablet 0    divalproex sodium extended-release (DEPAKOTE ER) 500 MG 24 hr tablet Take 1 tablet (500 mg) by mouth daily (Patient not taking: Reported on 9/3/2024) 30 tablet 1    haloperidol (HALDOL) 5 MG tablet Take 1 tablet (5 mg) by mouth at bedtime. May also take 1 tablet (5 mg) 2 times daily as needed for agitation or other (psychotic symptoms). (Patient not taking: Reported on 9/3/2024) 60 tablet 1    propranolol (INDERAL) 10 MG tablet Take 1 tablet (10 mg) by mouth 3 times daily as needed (restlessness) (Patient not taking: Reported on 8/13/2024) 60 tablet 1       The above list was reviewed and updated in [facility] with patient today.     Patient is taking medications as prescribed and report adverse effects: Excessive sleepiness    Laboratory Results:  Most recent labs reviewed. Pertinent updates/findings: None.     Mental Status Examination:  Vital Signs: There were no vitals taken for this visit.   Appearance: adequately groomed, appears stated age, and in no apparent distress.  Attitude: cooperative   Eye Contact: good   Muscle Strength and Tone: normal  Psychomotor Behavior: normal or unremarkable   Gait and Station: normal width, turn, arm swing  Speech: clear, coherent, decreased prosody, regular rate, regular rhythm, and fluent  Associations: No loosening of associations  Thought Process: coherent and goal directed  Thought Content: no evidence of suicidal ideation or homicidal ideation, no evidence of psychotic thought, no auditory hallucinations present, and no visual hallucinations present  Mood: \"depressed and sleepiness \"  Affect: mood congruent  Insight: good  Judgment: intact, adequate for safety  Impulse " Control: intact  Oriented to: time, place, person, and situation  Attention Span and Concentration: Normal  Language: Intact  Recent and Remote Memory: Delayed & immediate recall intact  Fund of Knowledge/Assessment of Intelligence: Average  Capacity of Activities of Daily Living: Independent, able to participate in programmatic care services.    Diagnosis/es:    ICD-10-CM    1. Bipolar disorder, current episode manic severe with psychotic features (H)  F31.2       2. Gambling  Z72.6 naltrexone (DEPADE/REVIA) 50 MG tablet        Cannot rule out 295.70 (F25) Schizoaffective Disorder Bipolar Type       Assessment/Plan:  Basil presents today for follow-up psychiatric evaluation and assessment of progress.     Bipolar disorder vs schizoaffective disorder  Overall improved   CHANGE haloperidol to 10 mg ONCE daily at bed time    CHANGE Depakot to 1500 mg ONCE daily  Benztropine kept at 1 mg in the morning and 1 mg at bedtime  No other medication changes  Continue IOP      Safety Assessment:  Basil reports suicidal ideation and/or non-suicidal self-injury or thoughts thereof as noted above  Basil is future-oriented and is engaged in treatment planning   I do not feel that Basil meets criteria for a 72-hour involuntary hold and remains appropriate for an outpatient level of care    MAXINE OKEEFE DNP on 9/3/2024 at 2:01 PM    Visit Details:  Type of service: In-person  Location (patient and provider): [facility] Adult Mental [facility] Outpatient Programmatic Care Offices    Level of Medical Decision Making:   - At least 1 chronic problem that is not stable  - Engaged in prescription drug management during visit (discussed any medication benefits, side effects, alternatives, etc.)  Discussion of management or test interpretation with external physician/other qualified healthcare professional/appropriate source - programmatic care multidisciplinary treatment team    30 min spent on the date of the encounter in  chart review, patient visit, review of tests, documentation, care coordination, and/or discussion with other providers about the issues documented above.      This document completed in part using [employer] dictation software and therefore may contain inadvertent word or phrase substitutions.    24

## 2024-09-03 NOTE — GROUP NOTE
"Process Group Note    PATIENT'S NAME: Basil Andrews  MRN:   [id number]  :   1990  [id number]  DATE OF SERVICE: 24  START TIME:  1:00 PM  END TIME:  1:50 PM  FACILITATOR: Lily Espino Psy.D, LP  TOPIC:  Process Group    Diagnoses:  295.70  (F25) Schizoaffective Disorder Bipolar Type.  296.44 Bipolar I Disorder Current or Most Recent Episode Manic, with psychotic features.  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  PCP: Dmitry Tee PA-C  Location: [facility] [place] [facility], [address], [place] [address]  Phone: [phone]  Fax: [phone]       [facility] Mental Health Outpatient Programs  TRACK: iop/dt2p    NUMBER OF PARTICIPANTS: 3        Data:    Session content: At the start of this group, patients were invited to check in by identifying themselves, describing their current emotional status, and identifying issues to address in this group.   Area(s) of treatment focus addressed in this session included Symptom Management, Personal Safety, and Community Resources/Discharge Planning.  Client reported being safe today.  Reported mood is \"tired.\"     Goal for today is to attend group therapy and talk with Dr. Bullock. The client talked to the group about how he continues to have fatigue and will talk to Dr. Bullock about strattera or other products. He " stated that he drinks monster drinks with caffeine and it keeps him alert for 2 hours. He talked with others about family and paranoia and about staying in a relationship for support. He shared ideas about emotional pain and recovery from paranoia, and about his marriage. He stated that the fatigue from medications is getting in the way of his marriage life.       Therapeutic Interventions/Treatment Strategies:  Psychotherapist reinforced use of skills. Treatment modalities used include Cognitive Behavioral Therapy and Dialectical Behavioral Therapy. Interventions include Relapse Prevention: Facilitated understanding of effective coping skills in response to triggers for substance use, Mindfulness: Encouraged a plan to use mindfulness skills in daily life, Symptoms Management: Promoted understanding of their diagnoses and how it impacts their functioning, and Emotions Management:  Reviewed opposite action skill.    Assessment:    Patient response:   Patient responded to session by accepting feedback, giving feedback, and listening    Possible barriers to participation / learning include: severity of symptoms    Health Issues:   None reported       Substance Use Review:   Substance Use: No active concerns identified.    Mental Status/Behavioral Observations  Appearance:   Appropriate   Eye Contact:   Good   Psychomotor Behavior: Normal   Attitude:   Cooperative   Orientation:   All  Speech   Rate / Production: Normal    Volume:  Normal   Mood:    Anxious  Sad   Affect:    Constricted   Thought Content:   Rumination and Psychosis denies any symptoms of psychosis  Thought Form:  Coherent  Logical     Insight:    Good     Plan:   Safety Plan: Recommended that patient call 911 or go to the local ED should there be a change in any of these risk factors.   Barriers to treatment: None identified  Patient Contracts (see media tab):  None  Substance Use: Not addressed in session   Continue or Discharge: Patient will continue  in Adult Day Treatment (ADT)  as planned. Patient is likely to benefit from learning and using skills as they work toward the goals identified in their treatment plan.      Camden Reed.LUTHER, LP  September 3, 2024

## 2024-09-04 ENCOUNTER — TELEPHONE (OUTPATIENT)
Dept: BEHAVIORAL HEALTH | Facility: CLINIC | Age: 34
End: 2024-09-04
Payer: COMMERCIAL

## 2024-09-04 NOTE — TELEPHONE ENCOUNTER
Phone call to Basil to coordinate care around discharge and a message was left on his phone.    Parveen Barros, D,  Licensed Clinical Psychologist

## 2024-09-05 ENCOUNTER — HOSPITAL ENCOUNTER (OUTPATIENT)
Dept: BEHAVIORAL HEALTH | Facility: CLINIC | Age: 34
Discharge: HOME | End: 2024-09-05
Attending: PSYCHIATRY & NEUROLOGY
Payer: COMMERCIAL

## 2024-09-05 PROCEDURE — 90853 GROUP PSYCHOTHERAPY: CPT

## 2024-09-05 PROCEDURE — 90853 GROUP PSYCHOTHERAPY: CPT | Performed by: PSYCHOLOGIST

## 2024-09-05 NOTE — GROUP NOTE
Psychoeducation Group Note    PATIENT'S NAME: Basil Andrews  MRN:   [id number]  :   1990  ACCT. NUMBER: [id number]  DATE OF SERVICE: 24  START TIME:  2:00 PM  END TIME:  2:50 PM  FACILITATOR: Jose Blackwood LPC; America Perrin OTR; Laura Navarrete OT  TOPIC: MH Life Skills Group: Resiliency Development  [facility] Adult Mental Health Outpatient Programs    TRACK: IOP/DT-2 Psychosis    NUMBER OF PARTICIPANTS: 6    Summary of Group / Topics Discussed:  Resiliency Development: Provided education and facilitated discussion on self-compassion to combat negative thoughts and improve overall mental wellness. Facilitated a self-reflection process on self-compassion strategies that they are using in their daily life. Explored and discussed 11 different self-compassion strategies with peers. Completed skill exploration of self-compassion drawing to gain self-awareness and identify benefits of skill usage in their daily life. Validation and support provided throughout group process.     Patient Session Goals / Objectives:   -Identify current self-compassion strategies in their daily life.   -Engage in experiential practice of self-compassion drawing to identify benefits and future use.   -Identify and reflect on self-compassion strategies they will use in their daily life to improve feelings of self-confidence and to use as a source of coping/resiliency.       Patient Participation / Response:  Fully participated with the group by sharing personal reflections / insights and openly received / provided feedback with other participants.    Verbalized understanding of content    Patient engaged in self-compassion activity and successfully identified numerous ways and strategies and interventions to cope with stress, and re-engage in healthy self-compassion and coping strategies.     Treatment Plan:  Patient has a current master individualized treatment plan.  See Epic treatment plan for more  information.    Jose Blackwood, LPC

## 2024-09-05 NOTE — GROUP NOTE
Psychotherapy Group Note    PATIENT'S NAME: Basil Andrews  MRN:   [id number]  :   1990  [id number]  DATE OF SERVICE: 24  START TIME: 12:00 PM  END TIME: 12:50 PM  FACILITATOR: Lily Espino Psy.D, LP  TOPIC: MH EBP Group: Behavioral Activation  [facility] Adult Mental Health Outpatient Programs  TRACK: iop/dt2p  6B    NUMBER OF PARTICIPANTS: 6    Summary of Group / Topics Discussed:  Behavioral Activation: [place] Ahead: {Patients identified situations that prompt unwanted and unhelpful emotions / thoughts / behaviors.   Patients discussed how to problem solve by proactively using coping skills in potentially difficult situations. Components included describing the situation, brainstorming coping skills, imagining how scenario can/will unfold, rehearsing the action plan, and practicing relaxation to follow.  Patients practiced using these skills to reduce symptom distress and increase effective coping  behaviors.      Patient Session Goals / Objectives:  Identify difficult situation(s), and gain proficiency with alternative behaviors / skills to problem solve.  Increase confidence using coping skills through group practice in session.  Receive and provide feedback regarding skill development.  Apply coping skills in daily life situations.      Patient Participation / Response:  Fully participated with the group by sharing personal reflections / insights and openly received / provided feedback with other participants.    Expressed understanding of the relationship between behaviors, thoughts, and feelings    Treatment Plan:  Patient has a current master individualized treatment plan.  See Epic treatment plan for more information.    Lily Espino Psy.D, YOLETTE

## 2024-09-05 NOTE — GROUP NOTE
"Process Group Note    PATIENT'S NAME: Basil Andrews  MRN:   [id number]  :   1990  [id number]  DATE OF SERVICE: 24  START TIME:  1:00 PM  END TIME:  1:50 PM  FACILITATOR: Lily Espino Psy.D, LP  TOPIC:  Process Group    Diagnoses:    295.70  (F25) Schizoaffective Disorder Bipolar Type.  296.44 Bipolar I Disorder Current or Most Recent Episode Manic, with psychotic features.  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 [address], [facility] [address] [address]  Phone: [phone]  Fax: [phone]  Individual Therapy:  Nabil Craig   Address: [place] [address]  Phone: [phone]  Fax: [phone]   Note:  Provider is out of office -  please call or e-mail [email or web address] to schedule a follow up once he is back in office.  PCP: Dmitry Tee PA-C  Location: [facility] [place] [facility], [address], [place] [address]  Phone: [phone]  Fax: [phone]     [facility] Mental Health Outpatient Programs  TRACK: ip/dt2p  6B    NUMBER OF PARTICIPANTS: 6        Data:    Session content: At the start of this group, patients were invited to check in by identifying themselves, describing their current emotional status, and identifying issues to address in this group.   Area(s) of treatment focus addressed in this session included Symptom Management, Personal Safety, and Community Resources/Discharge Planning.  Client reported being safe today.  Reported mood is \"more rested and more at ease.\"   Goal for today is to attend group therapy.  The client talked to the group about how he will continue to clean and pack boxes to move to [place] next week. He stated that his " father-in-law will help him drive a truck to [place]. He stated that he talked to a psychiatrist about his fatigue and decreased medications in the morning to be able to stay awake and focus. He stated that he has increased his sleep and naps and is worried about falling asleep when driving.       Therapeutic Interventions/Treatment Strategies:  Psychotherapist reinforced use of skills. Treatment modalities used include Cognitive Behavioral Therapy and Dialectical Behavioral Therapy. Interventions include Coping Skills: Discussed meditation skills and addressed ways to implement meditation skills , Mindfulness: Facilitated discussion of when/how to use mindfulness skills to benefit general health, mental health symptoms, and stressors, Symptoms Management: Promoted understanding of their diagnoses and how it impacts their functioning, and Emotions Management:  Discussed barriers to emotional regulation.    Assessment:    Patient response:   Patient responded to session by listening, focusing on goals, and being attentive    Possible barriers to participation / learning include: severity of symptoms    Health Issues:   None reported       Substance Use Review:   Substance Use: No active concerns identified.    Mental Status/Behavioral Observations  Appearance:   Appropriate   Eye Contact:   Good   Psychomotor Behavior: Normal   Attitude:   Cooperative   Orientation:   All  Speech   Rate / Production: Normal    Volume:  Normal   Mood:    Anxious   Affect:    Constricted   Thought Content:   Rumination and Psychosis denies any symptoms of psychosis  Thought Form:  Coherent  Logical     Insight:    Good     Plan:   Safety Plan: Recommended that patient call 911 or go to the local ED should there be a change in any of these risk factors.   Barriers to treatment: None identified  Patient Contracts (see media tab):  None  Substance Use: Provided encouragement towards sobriety   Continue or Discharge: Patient will continue in  Adult Day Treatment (ADT)  as planned. Patient is likely to benefit from learning and using skills as they work toward the goals identified in their treatment plan.      Lily Espino Psy.D, LP  September 5, 2024

## 2024-09-09 ENCOUNTER — HOSPITAL ENCOUNTER (OUTPATIENT)
Dept: BEHAVIORAL HEALTH | Facility: CLINIC | Age: 34
Discharge: HOME | End: 2024-09-09
Attending: PSYCHIATRY & NEUROLOGY
Payer: COMMERCIAL

## 2024-09-09 DIAGNOSIS — Z72.6 GAMBLING: Primary | ICD-10-CM

## 2024-09-09 PROCEDURE — 90853 GROUP PSYCHOTHERAPY: CPT | Performed by: PSYCHOLOGIST

## 2024-09-09 PROCEDURE — 90853 GROUP PSYCHOTHERAPY: CPT | Performed by: SOCIAL WORKER

## 2024-09-09 NOTE — GROUP NOTE
Psychotherapy Group Note    PATIENT'S NAME: Basil Andrews  MRN:   [id number]  :   1990  [id number]  DATE OF SERVICE: 24  START TIME: 12:00 PM  END TIME: 12:50 PM  FACILITATOR: iLly Espino Psy.D, LP  TOPIC:  EBP Group: Cognitive Restructuring  [facility] Mental Health Outpatient Programs  TRACK: iop/dt2p  6B    NUMBER OF PARTICIPANTS: 7    Summary of Group / Topics Discussed:  Cognitive Restructuring: Distortions: Patients received an overview of how to identify common cognitive distortions. Patients will explore alternatives to cognitive distortions and practice challenging their negative thought patterns. The goal is to help patients target modify ineffective thought patterns.     Patient Session Goals / Objectives:  Familiarized self with ineffective / unhealthy thoughts and how they develop.    Explored impact of ineffective thoughts / distortions on mood and activity  Formulated new neutral/positive alternatives to challenge less helpful / ineffective thoughts.  Practiced and plan to apply in daily life             Patient Participation / Response:  Fully participated with the group by sharing personal reflections / insights and openly received / provided feedback with other participants.    Expressed understanding of the relationship between behaviors, thoughts, and feelings    Treatment Plan:  Patient has a current master individualized treatment plan.  See Epic treatment plan for more information.    Lily Espino Psy.D, LP

## 2024-09-09 NOTE — GROUP NOTE
Psychoeducation Group Note    PATIENT'S NAME: Basil Andrews  MRN:   [id number]  :   1990  ACCT. NUMBER: [id number]  DATE OF SERVICE: 24  START TIME:  2:00 PM  END TIME:  2:50 PM  FACILITATOR: Luz Pa LICSW; Liz Simmons RN  TOPIC:  Wellness Group: [facility] Adult Mental Health Outpatient Programs  TRACK: IOP/DT 2    NUMBER OF PARTICIPANTS: 6    Summary of Group / Topics Discussed:  [facility]:   Green, Yellow, Red Crisis Warning Signs & Relapse Prevention: Symptom awareness and early intervention are the key to preventing mental health relapse. In this group patients were guided to identify/differentiate mild, moderate, and severe mental health symptoms. Corresponding actions for each symptom level were also identified. Patients completed a self-assessment of their mental health symptoms and their plans for managing them early and proactively.     Patient Session Goals / Objectives:  -Review each patient's baseline presentation and record on safety plan  -Describe/identify warning signs of  starting to struggle with one's mental health  -Describe/identify warning signs of crisis; each patient will record this          Patient Participation / Response:  Fully participated with the group by sharing personal reflections / insights and openly received / provided feedback with other participants.    Demonstrated understanding of topics discussed through group discussion and participation    Treatment Plan:  Patient has a current master individualized treatment plan.  See Epic treatment plan for more information.    INDIGO Verde

## 2024-09-09 NOTE — GROUP NOTE
"Process Group Note    PATIENT'S NAME: Basil Adnrews  MRN:   [id number]  :   1990  [id number]  DATE OF SERVICE: 24  START TIME:  1:00 PM  END TIME:  1:50 PM  FACILITATOR: Lily Espino Psy.D, LP  TOPIC:  Process Group    Diagnoses:  295.70  (F25) Schizoaffective Disorder Bipolar Type.  296.44 Bipolar I Disorder Current or Most Recent Episode Manic, with psychotic features.  Provisional Diagnosis: 296.21 (F32.0) Major Depressive Disorder, Single Episode, Mild _ and With mixed features   Psychiatry   Date/time: 2024 @ 8:00 AM Telephone  Provider:  Donta Bob MD  Address: [facility] [address], [facility] [address] [address]  Phone: [phone]  Fax: [phone]  Individual Therapy:  Nabil Craig   Address: [place] [address]  Phone: [phone]  Fax: [phone]   Note:  Provider is out of office -  please call or e-mail [email or web address] to schedule a follow up once he is back in office.  PCP: Dmitry Tee PA-C  Location: [facility] [place] [facility], [address] [place] [address]  Phone: [phone]  Fax: [phone]       [facility] Mental Health Outpatient Programs  TRACK: iop/dt2p    NUMBER OF PARTICIPANTS: 7        Data:    Session content: At the start of this group, patients were invited to check in by identifying themselves, describing their current emotional status, and identifying issues to address in this group.   Area(s) of treatment focus addressed in this session included Symptom Management, Personal Safety, and Community Resources/Discharge Planning.  Client reported being safe today.  Reported mood is \"accomplished.\"    Goal for today is to attend group therapy. The client talked to the group about how he packed many things over the weekend and is going to move this weekend. He talked to the group about driving with " his father-in-law to [place] in the moving truck and how to stay awake. He stated that he will talk with psychiatry about medications. He reported no psychosis and that he is feeling better about the move. He talked to others about music and about snacks that can be alerting and have strong tastes.   He reported being sober.     Therapeutic Interventions/Treatment Strategies:  Psychotherapist reinforced use of skills. Treatment modalities used include Cognitive Behavioral Therapy and Dialectical Behavioral Therapy. Interventions include Relapse Prevention: Discussed the use of substances and its impact on their relationships, Mindfulness: Encouraged a plan to use mindfulness skills in daily life, Symptoms Management: Promoted understanding of their diagnoses and how it impacts their functioning, and Emotions Management:  Discussed barriers to emotional regulation.    Assessment:    Patient response:   Patient responded to session by accepting feedback, giving feedback, and listening    Possible barriers to participation / learning include: severity of symptoms    Health Issues:   None reported       Substance Use Review:   Substance Use: No active concerns identified.    Mental Status/Behavioral Observations  Appearance:   Appropriate   Eye Contact:   Good   Psychomotor Behavior: Normal   Attitude:   Cooperative   Orientation:   All  Speech   Rate / Production: Normal    Volume:  Normal   Mood:    Anxious  Normal  Affect:    Appropriate   Thought Content:   Clear and Rumination  Thought Form:  Coherent  Logical     Insight:    Good     Plan:   Safety Plan: Recommended that patient call 911 or go to the local ED should there be a change in any of these risk factors.   Barriers to treatment: None identified  Patient Contracts (see media tab):  None  Substance Use: Provided encouragement towards sobriety   Continue or Discharge: Patient will continue in Adult Day Treatment (ADT)  as planned. Patient is likely to benefit  from learning and using skills as they work toward the goals identified in their treatment plan.      Lily Espino Psy.D, LP  September 9, 2024

## 2024-09-10 ENCOUNTER — HOSPITAL ENCOUNTER (OUTPATIENT)
Dept: BEHAVIORAL HEALTH | Facility: CLINIC | Age: 34
Discharge: HOME | End: 2024-09-10
Attending: PSYCHIATRY & NEUROLOGY
Payer: COMMERCIAL

## 2024-09-10 DIAGNOSIS — Z72.6 GAMBLING: Primary | ICD-10-CM

## 2024-09-10 PROCEDURE — 90853 GROUP PSYCHOTHERAPY: CPT | Performed by: PSYCHOLOGIST

## 2024-09-10 PROCEDURE — 90853 GROUP PSYCHOTHERAPY: CPT | Performed by: SOCIAL WORKER

## 2024-09-10 NOTE — GROUP NOTE
"Process Group Note    PATIENT'S NAME: Basil Andrews  MRN:   [id number]  :   1990  [id number]  DATE OF SERVICE: 9/10/24  START TIME:  1:00 PM  END TIME:  1:50 PM  FACILITATOR: Lily Espino Psy.D, LP  TOPIC:  Process Group    Diagnoses:  295.70  (F25) Schizoaffective Disorder Bipolar Type.  296.44 Bipolar I Disorder Current or Most Recent Episode Manic, with psychotic features.  Provisional Diagnosis: 296.21 (F32.0) Major Depressive Disorder, Single Episode, Mild _ and With mixed features   Psychiatry   Date/time: 2024 @ 8:00 AM Telephone  Provider:  Donta Bob MD  Address: [facility] [address], [facility] [address] [address]  Phone: [phone]  Fax: [phone]  Individual Therapy:  Nabil Craig   Address: [place] [address]  Phone: [phone]  Fax: [phone]   Note:  Provider is out of office -  please call or e-mail [email or web address] to schedule a follow up once he is back in office.  PCP: Dmitry Tee PA-C  Location: [facility] [place] [facility], [address], [place] [address]  Phone: [phone]  Fax: [phone]       [facility] Mental Health Outpatient Programs  TRACK: iop/dt2p    NUMBER OF PARTICIPANTS: 5        Data:    Session content: At the start of this group, patients were invited to check in by identifying themselves, describing their current emotional status, and identifying issues to address in this group.   Area(s) of treatment focus addressed in this session included Symptom Management, Personal Safety, and Community Resources/Discharge Planning.  Client reported being safe today.  Reported mood is \"good, but some worry about starting a new job.\"   Goal for today is to attend group therapy. The client talked to the group about how he is worried that the won't be able to keep up with all the duties of his new job, " and the group reassured him that all jobs take time to learn. He stated that he is proud that he has packed so many boxes and will be ready to move to [place]. He stated that he has negative thoughts and obsessive thoughts, and paranoia thoughts, that are in his consciousness, but he doesn't respond to them. He talked with others about being aware that they are there, and choosing not to engage with them. He talked about practicing being present and staying grounded.  He reported staying sober, taking medications, taking care of his active 3 year old son, and valuing his marriage.    Therapeutic Interventions/Treatment Strategies:  Psychotherapist reinforced use of skills. Treatment modalities used include Cognitive Behavioral Therapy and Dialectical Behavioral Therapy. Interventions include Relapse Prevention: Facilitated understanding of effective coping skills in response to triggers for substance use, Mindfulness: Facilitated discussion of when/how to use mindfulness skills to benefit general health, mental health symptoms, and stressors, Symptoms Management: Promoted understanding of their diagnoses and how it impacts their functioning, and Emotions Management:  Reviewed opposite action skill.    Assessment:    Patient response:   Patient responded to session by accepting feedback, giving feedback, and listening    Possible barriers to participation / learning include: severity of symptoms    Health Issues:   None reported       Substance Use Review:   Substance Use: No active concerns identified.    Mental Status/Behavioral Observations  Appearance:   Appropriate   Eye Contact:   Good   Psychomotor Behavior: Normal   Attitude:   Cooperative   Orientation:   All  Speech   Rate / Production: Normal    Volume:  Normal   Mood:    Anxious  Normal  Affect:    Appropriate   Thought Content:   Clear, Rumination, and Psychosis reports hallucinations auditory  Thought Form:  Coherent  Logical     Insight:    Good     Plan:    Safety Plan: Recommended that patient call 911 or go to the local ED should there be a change in any of these risk factors.   Barriers to treatment: None identified  Patient Contracts (see media tab):  None  Substance Use: Not addressed in session   Continue or Discharge: Patient will discharge from Adult Day Treatment (ADT)  as planned. He will continue with psychiatry and individual therapy in [place], [place].   \       Lily Espino Psy.D, LP  September 10, 2024

## 2024-09-10 NOTE — GROUP NOTE
Psychotherapy Group Note    PATIENT'S NAME: Basil Andrews  MRN:   [id number]  :   1990  ACCT. NUMBER: [id number]  DATE OF SERVICE: 9/10/24  START TIME:  2:00 PM  END TIME:  2:50 PM  FACILITATOR: Luz Pa LICSW  TOPIC: MH EBP Group: Specialty [facility] Adult Mental Health Outpatient Programs  TRACK: IOP/DT 2    NUMBER OF PARTICIPANTS: 5    Summary of Group / Topics Discussed:  Specialty Topics: Goal Setting: Provided education and assessment on patient's group programming goals. Evaluated patient's assessment of their ability to participate in groups, share emotions with group members, and openness to the group format. Provided education regarding appropriate individual-based group therapy goals.     Patient Session Goals and Objectives:  -Participate in reflective assessment of group readiness.  -Understand appropriate topics and methods to discuss those topics in group programming.  -Identify and problem solve barriers to group participation.  -Identify strategies to actively cope while engaging in group programming.   -Reflected up individualized treatment plans  -Meet with members of the treatment team to assess goal progress       Patient Participation / Response:  Fully participated with the group by sharing personal reflections / insights and openly received / provided feedback with other participants.    Demonstrated understanding of topics discussed through group discussion and participation and Identified / Expressed readiness to act on skill suggestions discussed in topic    Treatment Plan:  Patient has a current master individualized treatment plan.  See Epic treatment plan for more information.

## 2024-09-10 NOTE — GROUP NOTE
Psychotherapy Group Note    PATIENT'S NAME: Basil Andrews  MRN:   [id number]  :   1990  [id number]  DATE OF SERVICE: 9/10/24  START TIME: 12:00 PM  END TIME: 12:50 PM  FACILITATOR: Lily Espino Psy.D, LP  TOPIC: MH EBP Group: Cognitive Restructuring  [facility] Mental Health Outpatient Programs  TRACK: iop/dt2p  6B    NUMBER OF PARTICIPANTS: 5    Summary of Group / Topics Discussed:  Cognitive Restructuring: Core Beliefs: Patients received an overview of what a core belief is, and how they develop. Patients then began to identify their negative core beliefs. Patients worked to modify core beliefs with the goal of improved self-image and functioning.     Patient Session Goals / Objectives:  Familiarize self with the concept of core beliefs and how they develop.    Explore personal core beliefs (positive and negative)  Develop / advance recognition of the connection between negative thoughts and negative core beliefs.  Formulate new neutral/positive core beliefs             Patient Participation / Response:  Fully participated with the group by sharing personal reflections / insights and openly received / provided feedback with other participants.    Expressed understanding of the relationship between behaviors, thoughts, and feelings    Treatment Plan:  Patient has a current master individualized treatment plan.  See Epic treatment plan for more information.    Lily Espino Psy.D, LP

## 2024-09-11 ENCOUNTER — TELEPHONE (OUTPATIENT)
Dept: BEHAVIORAL HEALTH | Facility: CLINIC | Age: 34
End: 2024-09-11
Payer: COMMERCIAL

## 2024-09-11 NOTE — TELEPHONE ENCOUNTER
----- Message from Lily Espino sent at 9/11/2024 10:36 AM CDT -----  Regarding: discharged  Hello    Please remove from the [facility]    IOP/DT2P   6b          Location of programming: in-person outpatient mental health    Start Date: 7/29/24  Group:   iop/dt 2p             6B, meets M,T,Th from noon - 3 pm   Provider: Mera  Number of visits to be scheduled: DISCHARGED  Length/Duration of Appointment in minutes: 180   Visit Type in-person   Additional notes:       Thanks  From  Lily Espino, Pserasmo., D,  Licensed Clinical Psychologist  Adult Day Tx

## 2024-09-11 NOTE — ADDENDUM NOTE
Encounter addended by: Lily Espino Psy.D, LP on: 9/11/2024 10:38 AM   Actions taken: Episode resolved, Problem List modified

## 2024-09-11 NOTE — ADDENDUM NOTE
Encounter addended by: Lily Espino Psy.D, YOLETTE on: 9/11/2024 10:23 AM   Actions taken: Flowsheet accepted

## 2024-09-13 ENCOUNTER — HOSPITAL ENCOUNTER (OUTPATIENT)
Dept: BEHAVIORAL HEALTH | Facility: CLINIC | Age: 34
Discharge: HOME | End: 2024-09-13
Attending: PSYCHIATRY & NEUROLOGY
Payer: COMMERCIAL

## 2024-09-13 DIAGNOSIS — G25.9 EXTRAPYRAMIDAL AND MOVEMENT DISORDER: Primary | ICD-10-CM

## 2024-09-13 DIAGNOSIS — F31.2 BIPOLAR DISORDER, CURRENT EPISODE MANIC SEVERE WITH PSYCHOTIC FEATURES (H): ICD-10-CM

## 2024-09-13 PROCEDURE — 99214 OFFICE O/P EST MOD 30 MIN: CPT | Mod: 95

## 2024-09-13 RX ORDER — DIVALPROEX SODIUM 500 MG/1
1500 TABLET, EXTENDED RELEASE ORAL AT BEDTIME
Qty: 270 TABLET | Refills: 0 | Status: SHIPPED | OUTPATIENT
Start: 2024-09-13

## 2024-09-13 RX ORDER — HALOPERIDOL 10 MG/1
10 TABLET ORAL AT BEDTIME
Qty: 90 TABLET | Refills: 0 | Status: SHIPPED | OUTPATIENT
Start: 2024-09-13

## 2024-09-13 RX ORDER — BENZTROPINE MESYLATE 1 MG/1
1 TABLET ORAL 2 TIMES DAILY
Qty: 180 TABLET | Refills: 0 | Status: SHIPPED | OUTPATIENT
Start: 2024-09-13
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
## 2024-09-14 ENCOUNTER — HEALTH MAINTENANCE LETTER (OUTPATIENT)
Age: 34
End: 2024-09-14

## 2024-12-12 NOTE — CARE PLAN
Rehab Group    Start time: 1130    End time: 1215  Patient time total: 45 minutes    attended full group    #1 attended   Group Type: general health and coping   Group Topic Covered: coping skills and emotional regulation     Group Session Detail:  Identify 3 mental health symptoms to increase insight,  and 3 positive coping skills to manage symptoms.     Patient Response/Contribution:  Cooperative with task, Positive Affect, Attentive, and Actively engaged     Patient Detail:    Pt was very open in discussing his mental health - struggling with anxiety, ruminating thoughts/behavior - leading him explore if he has OCD, and sx of chantal.  Pt has various coping skills he already uses - walking, repeating things/talking things through to himself, counting, breathing, though appears to find the sx affecting his life more, and has been discussing this with his provider.        Train & Education Service Per Session 45 + Minutes () OT Group code    Patient Active Problem List   Diagnosis    Marijuana dependence (H)    Alcohol abuse    Bipolar disorder, current episode manic severe with psychotic features (H)    Chantal (H)    Schizoaffective disorder, bipolar type (H)    Psychosis, unspecified psychosis type (H)        "11/20/2018 Received a DENIAL from [employer] for Vaniqa cream.  \"We denied this request under Medicare Part D because: Your plan's Medicare Part D drug plan cannot cover drugs used for cosmetic purposes.  Your Medicare Part D drug plan was asked to cover the requested drug. The requested drug is being used for cosmetic purposes. Under section 1927(d)(2) of the Social Security Act, drugs used for cosmetic purposes are excluded from coverage under Medicare Pard D coverage, the request for coverage under your Medicare Pard D benefit is denied.\" Forms scanned to [facility].  " RN called home care RN/PT/OT back and provided verbal orders via a detailed message denoted in messages below.    Jaziel Arroyo RN     [facility]

## 2025-01-08 NOTE — PROGRESS NOTES
Rehab Group    Start time: 1700  End time: 1750  Patient time total: 50 minutes    attended full group    #7 attended   Group Type: recreation   Group Topic Covered: healthy leisure time       Group Session Detail:  TR leisure group     Patient Response/Contribution:  cooperative with task, attentive, and actively engaged       Patient Detail:    Pt participated in Therapeutic Recreation group with focus on leisure participation, communication skills, and critical thinking. Engaged and focused in the group recreational activity via a group game.  Pt was a full participant throughout the entire duration of group.  Appropriately interacted with others as part of the group activity.         Activity Therapy Per 15 minutes () Other Rehab therapies    Patient Active Problem List   Diagnosis    Marijuana dependence (H)    Alcohol abuse    Bipolar disorder, current episode manic severe with psychotic features (H)    Chantal (H)    Schizoaffective disorder, bipolar type (H)    Psychosis, unspecified psychosis type (H)        Well , 24 Months Old  Well-child exams are visits with a health care provider to track your child's growth and development at certain ages. The following information tells you what to expect during this visit and gives you some helpful tips about caring for your child.  What immunizations does my child need?  Influenza vaccine (flu shot). A yearly (annual) flu shot is recommended.  Other vaccines may be suggested to catch up on any missed vaccines or if your child has certain high-risk conditions.  For more information about vaccines, talk to your child's health care provider or go to the Centers for Disease Control and Prevention website for immunization schedules: www.cdc.gov/vaccines/schedules  What tests does my child need?    Your child's health care provider will complete a physical exam of your child.  Your child's health care provider will measure your child's length, weight, and head size. The health care provider will compare the measurements to a growth chart to see how your child is growing.  Depending on your child's risk factors, your child's health care provider may screen for:  Low red blood cell count (anemia).  Lead poisoning.  Hearing problems.  Tuberculosis (TB).  High cholesterol.  Autism spectrum disorder (ASD).  Starting at this age, your child's health care provider will measure body mass index (BMI) annually to screen for obesity. BMI is an estimate of body fat and is calculated from your child's height and weight.  Caring for your child  Parenting tips  Praise your child's good behavior by giving your child your attention.  Spend some one-on-one time with your child daily. Vary activities. Your child's attention span should be getting longer.  Discipline your child consistently and fairly.  Make sure your child's caregivers are consistent with your discipline routines.  Avoid shouting at or spanking your child.  Recognize that your child has a limited ability to understand  "consequences at this age.  When giving your child instructions (not choices), avoid asking yes and no questions (\"Do you want a bath?\"). Instead, give clear instructions (\"Time for a bath.\").  Interrupt your child's inappropriate behavior and show your child what to do instead. You can also remove your child from the situation and move on to a more appropriate activity.  If your child cries to get what he or she wants, wait until your child briefly calms down before you give him or her the item or activity. Also, model the words that your child should use. For example, say \"cookie, please\" or \"climb up.\"  Avoid situations or activities that may cause your child to have a temper tantrum, such as shopping trips.  Oral health    Brush your child's teeth after meals and before bedtime.  Take your child to a dentist to discuss oral health. Ask if you should start using fluoride toothpaste to clean your child's teeth.  Give fluoride supplements or apply fluoride varnish to your child's teeth as told by your child's health care provider.  Provide all beverages in a cup and not in a bottle. Using a cup helps to prevent tooth decay.  Check your child's teeth for brown or white spots. These are signs of tooth decay.  If your child uses a pacifier, try to stop giving it to your child when he or she is awake.  Sleep  Children at this age typically need 12 or more hours of sleep a day and may only take one nap in the afternoon.  Keep naptime and bedtime routines consistent.  Provide a separate sleep space for your child.  Toilet training  When your child becomes aware of wet or soiled diapers and stays dry for longer periods of time, he or she may be ready for toilet training. To toilet train your child:  Let your child see others using the toilet.  Introduce your child to a potty chair.  Give your child lots of praise when he or she successfully uses the potty chair.  Talk with your child's health care provider if you need help " toilet training your child. Do not force your child to use the toilet. Some children will resist toilet training and may not be trained until 3 years of age. It is normal for boys to be toilet trained later than girls.  General instructions  Talk with your child's health care provider if you are worried about access to food or housing.  What's next?  Your next visit will take place when your child is 30 months old.  Summary  Depending on your child's risk factors, your child's health care provider may screen for lead poisoning, hearing problems, as well as other conditions.  Children this age typically need 12 or more hours of sleep a day and may only take one nap in the afternoon.  Your child may be ready for toilet training when he or she becomes aware of wet or soiled diapers and stays dry for longer periods of time.  Take your child to a dentist to discuss oral health. Ask if you should start using fluoride toothpaste to clean your child's teeth.  This information is not intended to replace advice given to you by your health care provider. Make sure you discuss any questions you have with your health care provider.  Document Revised: 2022 Document Reviewed: 2022  [employer]vier Patient Education © 2023 [employer]

## 2025-02-10 ENCOUNTER — TELEPHONE (OUTPATIENT)
Dept: BEHAVIORAL HEALTH | Facility: CLINIC | Age: 35
End: 2025-02-10
Payer: COMMERCIAL

## 2025-05-16 NOTE — PLAN OF CARE
"  Problem: Psychotic Signs/Symptoms  Goal: Optimal Cognitive Function (Psychotic Signs/Symptoms)  Outcome: Progressing  Intervention: Support and Promote Cognitive Ability  Recent Flowsheet Documentation  Taken 6/16/2024 1805 by Power Reynolds, RN  Trust Relationship/Rapport:   care explained   emotional support provided   choices provided   empathic listening provided   questions answered   questions encouraged   reassurance provided   thoughts/feelings acknowledged   Goal Outcome Evaluation:    Plan of Care Reviewed With: patient        Pt split his time between walking the halls and resting in his room. Just before 1800 pt requested medication for \"paranoia.\" Upon further discussion pt said he was paranoid he would be here [the hospital] forever. Pt could not elaborate on his feelings further. Pt's facial expression and tone of voice changed leading this writer believe he may start to cry. Pt did report feeling tearful. After allowing this writer to obtain vital signs pt said he believed the 112 (SBP displayed on the monitor) was \"something for me\" and the 74 (DBP) was \"for the Muslims\" Pt denied hallucinations, suicidal thoughts, and/or homicidal thoughts. Pt was reminded that our goal was to keep him safe and work towards a discharge plan as soon as possible. Pt was encouraged to continue to seek out staff whenever he needed someone to talk to or thought he may need medication. Pt was given 10 mg olanzapine. When assessed later pt reported the olanzapine did not reduce his paranoia at all. Pt also reported 10/10 severity depression and anxiety but said he could think of \"nothing at all\" that staff could do to assist in reducing his symptoms. Pt remained calm throughout the shift.           " 16-May-2025 15:08